# Patient Record
Sex: FEMALE | Race: WHITE | NOT HISPANIC OR LATINO | URBAN - METROPOLITAN AREA
[De-identification: names, ages, dates, MRNs, and addresses within clinical notes are randomized per-mention and may not be internally consistent; named-entity substitution may affect disease eponyms.]

---

## 2017-04-06 ENCOUNTER — OUTPATIENT (OUTPATIENT)
Dept: OUTPATIENT SERVICES | Facility: HOSPITAL | Age: 77
LOS: 1 days | Discharge: HOME | End: 2017-04-06

## 2017-06-27 DIAGNOSIS — Z12.31 ENCOUNTER FOR SCREENING MAMMOGRAM FOR MALIGNANT NEOPLASM OF BREAST: ICD-10-CM

## 2018-04-09 ENCOUNTER — OUTPATIENT (OUTPATIENT)
Dept: OUTPATIENT SERVICES | Facility: HOSPITAL | Age: 78
LOS: 1 days | Discharge: HOME | End: 2018-04-09

## 2018-04-09 DIAGNOSIS — Z12.31 ENCOUNTER FOR SCREENING MAMMOGRAM FOR MALIGNANT NEOPLASM OF BREAST: ICD-10-CM

## 2018-05-02 ENCOUNTER — INPATIENT (INPATIENT)
Facility: HOSPITAL | Age: 78
LOS: 4 days | Discharge: HOME | End: 2018-05-07
Attending: HOSPITALIST | Admitting: HOSPITALIST

## 2018-05-02 VITALS
TEMPERATURE: 98 F | HEART RATE: 85 BPM | OXYGEN SATURATION: 99 % | SYSTOLIC BLOOD PRESSURE: 110 MMHG | RESPIRATION RATE: 16 BRPM | DIASTOLIC BLOOD PRESSURE: 52 MMHG

## 2018-05-02 LAB
ABO RH CONFIRMATION: SIGNIFICANT CHANGE UP
ALBUMIN SERPL ELPH-MCNC: 3.4 G/DL — LOW (ref 3.5–5.2)
ALP SERPL-CCNC: 76 U/L — SIGNIFICANT CHANGE UP (ref 30–115)
ALT FLD-CCNC: 13 U/L — SIGNIFICANT CHANGE UP (ref 0–41)
ANION GAP SERPL CALC-SCNC: 13 MMOL/L — SIGNIFICANT CHANGE UP (ref 7–14)
ANION GAP SERPL CALC-SCNC: 13 MMOL/L — SIGNIFICANT CHANGE UP (ref 7–14)
ANION GAP SERPL CALC-SCNC: 9 MMOL/L — SIGNIFICANT CHANGE UP (ref 7–14)
APTT BLD: 23.3 SEC — CRITICAL LOW (ref 27–39.2)
AST SERPL-CCNC: 21 U/L — SIGNIFICANT CHANGE UP (ref 0–41)
BASE EXCESS BLDV CALC-SCNC: -1.5 MMOL/L — SIGNIFICANT CHANGE UP (ref -2–2)
BASOPHILS # BLD AUTO: 0.06 K/UL — SIGNIFICANT CHANGE UP (ref 0–0.2)
BASOPHILS # BLD AUTO: 0.06 K/UL — SIGNIFICANT CHANGE UP (ref 0–0.2)
BASOPHILS NFR BLD AUTO: 0.5 % — SIGNIFICANT CHANGE UP (ref 0–1)
BASOPHILS NFR BLD AUTO: 0.6 % — SIGNIFICANT CHANGE UP (ref 0–1)
BILIRUB SERPL-MCNC: 0.2 MG/DL — SIGNIFICANT CHANGE UP (ref 0.2–1.2)
BLD GP AB SCN SERPL QL: SIGNIFICANT CHANGE UP
BUN SERPL-MCNC: 36 MG/DL — HIGH (ref 10–20)
BUN SERPL-MCNC: 38 MG/DL — HIGH (ref 10–20)
BUN SERPL-MCNC: 43 MG/DL — HIGH (ref 10–20)
CA-I SERPL-SCNC: 1.16 MMOL/L — SIGNIFICANT CHANGE UP (ref 1.12–1.3)
CALCIUM SERPL-MCNC: 8.2 MG/DL — LOW (ref 8.5–10.1)
CALCIUM SERPL-MCNC: 8.5 MG/DL — SIGNIFICANT CHANGE UP (ref 8.5–10.1)
CALCIUM SERPL-MCNC: 8.6 MG/DL — SIGNIFICANT CHANGE UP (ref 8.5–10.1)
CHLORIDE SERPL-SCNC: 102 MMOL/L — SIGNIFICANT CHANGE UP (ref 98–110)
CHLORIDE SERPL-SCNC: 107 MMOL/L — SIGNIFICANT CHANGE UP (ref 98–110)
CHLORIDE SERPL-SCNC: 108 MMOL/L — SIGNIFICANT CHANGE UP (ref 98–110)
CHOLEST SERPL-MCNC: 134 MG/DL — SIGNIFICANT CHANGE UP (ref 100–200)
CK SERPL-CCNC: 66 U/L — SIGNIFICANT CHANGE UP (ref 0–225)
CK SERPL-CCNC: 72 U/L — SIGNIFICANT CHANGE UP (ref 0–225)
CK SERPL-CCNC: 75 U/L — SIGNIFICANT CHANGE UP (ref 0–225)
CO2 SERPL-SCNC: 22 MMOL/L — SIGNIFICANT CHANGE UP (ref 17–32)
CO2 SERPL-SCNC: 23 MMOL/L — SIGNIFICANT CHANGE UP (ref 17–32)
CO2 SERPL-SCNC: 24 MMOL/L — SIGNIFICANT CHANGE UP (ref 17–32)
CREAT SERPL-MCNC: 0.8 MG/DL — SIGNIFICANT CHANGE UP (ref 0.7–1.5)
CREAT SERPL-MCNC: 0.8 MG/DL — SIGNIFICANT CHANGE UP (ref 0.7–1.5)
CREAT SERPL-MCNC: 0.9 MG/DL — SIGNIFICANT CHANGE UP (ref 0.7–1.5)
EOSINOPHIL # BLD AUTO: 0.02 K/UL — SIGNIFICANT CHANGE UP (ref 0–0.7)
EOSINOPHIL # BLD AUTO: 0.06 K/UL — SIGNIFICANT CHANGE UP (ref 0–0.7)
EOSINOPHIL NFR BLD AUTO: 0.2 % — SIGNIFICANT CHANGE UP (ref 0–8)
EOSINOPHIL NFR BLD AUTO: 0.6 % — SIGNIFICANT CHANGE UP (ref 0–8)
GAS PNL BLDV: 141 MMOL/L — SIGNIFICANT CHANGE UP (ref 136–145)
GAS PNL BLDV: SIGNIFICANT CHANGE UP
GLUCOSE SERPL-MCNC: 112 MG/DL — HIGH (ref 70–99)
GLUCOSE SERPL-MCNC: 86 MG/DL — SIGNIFICANT CHANGE UP (ref 70–99)
GLUCOSE SERPL-MCNC: 95 MG/DL — SIGNIFICANT CHANGE UP (ref 70–99)
HCO3 BLDV-SCNC: 23 MMOL/L — SIGNIFICANT CHANGE UP (ref 22–29)
HCT VFR BLD CALC: 20.1 % — LOW (ref 37–47)
HCT VFR BLD CALC: 23.7 % — LOW (ref 37–47)
HCT VFR BLD CALC: 25.8 % — LOW (ref 37–47)
HCT VFR BLDA CALC: 23.9 % — LOW (ref 34–44)
HDLC SERPL-MCNC: 31 MG/DL — LOW (ref 40–125)
HGB BLD CALC-MCNC: 7.8 G/DL — LOW (ref 14–18)
HGB BLD-MCNC: 6.8 G/DL — CRITICAL LOW (ref 12–16)
HGB BLD-MCNC: 8 G/DL — LOW (ref 12–16)
HGB BLD-MCNC: 8.8 G/DL — LOW (ref 12–16)
IMM GRANULOCYTES NFR BLD AUTO: 0.4 % — HIGH (ref 0.1–0.3)
IMM GRANULOCYTES NFR BLD AUTO: 0.4 % — HIGH (ref 0.1–0.3)
INR BLD: 1.06 RATIO — SIGNIFICANT CHANGE UP (ref 0.65–1.3)
LACTATE BLDV-MCNC: 1.7 MMOL/L — HIGH (ref 0.5–1.6)
LIPID PNL WITH DIRECT LDL SERPL: 80 MG/DL — SIGNIFICANT CHANGE UP (ref 4–129)
LYMPHOCYTES # BLD AUTO: 2.35 K/UL — SIGNIFICANT CHANGE UP (ref 1.2–3.4)
LYMPHOCYTES # BLD AUTO: 2.69 K/UL — SIGNIFICANT CHANGE UP (ref 1.2–3.4)
LYMPHOCYTES # BLD AUTO: 21 % — SIGNIFICANT CHANGE UP (ref 20.5–51.1)
LYMPHOCYTES # BLD AUTO: 28.2 % — SIGNIFICANT CHANGE UP (ref 20.5–51.1)
MCHC RBC-ENTMCNC: 28.7 PG — SIGNIFICANT CHANGE UP (ref 27–31)
MCHC RBC-ENTMCNC: 29 PG — SIGNIFICANT CHANGE UP (ref 27–31)
MCHC RBC-ENTMCNC: 29.2 PG — SIGNIFICANT CHANGE UP (ref 27–31)
MCHC RBC-ENTMCNC: 33.8 G/DL — SIGNIFICANT CHANGE UP (ref 32–37)
MCHC RBC-ENTMCNC: 33.8 G/DL — SIGNIFICANT CHANGE UP (ref 32–37)
MCHC RBC-ENTMCNC: 34.1 G/DL — SIGNIFICANT CHANGE UP (ref 32–37)
MCV RBC AUTO: 84 FL — SIGNIFICANT CHANGE UP (ref 81–99)
MCV RBC AUTO: 85.9 FL — SIGNIFICANT CHANGE UP (ref 81–99)
MCV RBC AUTO: 86.3 FL — SIGNIFICANT CHANGE UP (ref 81–99)
MONOCYTES # BLD AUTO: 0.65 K/UL — HIGH (ref 0.1–0.6)
MONOCYTES # BLD AUTO: 0.69 K/UL — HIGH (ref 0.1–0.6)
MONOCYTES NFR BLD AUTO: 6.2 % — SIGNIFICANT CHANGE UP (ref 1.7–9.3)
MONOCYTES NFR BLD AUTO: 6.8 % — SIGNIFICANT CHANGE UP (ref 1.7–9.3)
NEUTROPHILS # BLD AUTO: 6.04 K/UL — SIGNIFICANT CHANGE UP (ref 1.4–6.5)
NEUTROPHILS # BLD AUTO: 8.05 K/UL — HIGH (ref 1.4–6.5)
NEUTROPHILS NFR BLD AUTO: 63.4 % — SIGNIFICANT CHANGE UP (ref 42.2–75.2)
NEUTROPHILS NFR BLD AUTO: 71.7 % — SIGNIFICANT CHANGE UP (ref 42.2–75.2)
NRBC # BLD: 0 /100 WBCS — SIGNIFICANT CHANGE UP (ref 0–0)
NRBC # BLD: 0 /100 WBCS — SIGNIFICANT CHANGE UP (ref 0–0)
NT-PROBNP SERPL-SCNC: 3189 PG/ML — HIGH (ref 0–300)
PCO2 BLDV: 34 MMHG — LOW (ref 41–51)
PH BLDV: 7.43 — SIGNIFICANT CHANGE UP (ref 7.26–7.43)
PLATELET # BLD AUTO: 177 K/UL — SIGNIFICANT CHANGE UP (ref 130–400)
PLATELET # BLD AUTO: 181 K/UL — SIGNIFICANT CHANGE UP (ref 130–400)
PLATELET # BLD AUTO: 192 K/UL — SIGNIFICANT CHANGE UP (ref 130–400)
PO2 BLDV: 21 MMHG — SIGNIFICANT CHANGE UP (ref 20–40)
POTASSIUM BLDV-SCNC: 3.9 MMOL/L — SIGNIFICANT CHANGE UP (ref 3.3–5.6)
POTASSIUM SERPL-MCNC: 3.7 MMOL/L — SIGNIFICANT CHANGE UP (ref 3.5–5)
POTASSIUM SERPL-MCNC: 3.9 MMOL/L — SIGNIFICANT CHANGE UP (ref 3.5–5)
POTASSIUM SERPL-MCNC: 4 MMOL/L — SIGNIFICANT CHANGE UP (ref 3.5–5)
POTASSIUM SERPL-SCNC: 3.7 MMOL/L — SIGNIFICANT CHANGE UP (ref 3.5–5)
POTASSIUM SERPL-SCNC: 3.9 MMOL/L — SIGNIFICANT CHANGE UP (ref 3.5–5)
POTASSIUM SERPL-SCNC: 4 MMOL/L — SIGNIFICANT CHANGE UP (ref 3.5–5)
PROT SERPL-MCNC: 5.6 G/DL — LOW (ref 6–8)
PROTHROM AB SERPL-ACNC: 11.5 SEC — SIGNIFICANT CHANGE UP (ref 9.95–12.87)
RBC # BLD: 2.33 M/UL — LOW (ref 4.2–5.4)
RBC # BLD: 2.76 M/UL — LOW (ref 4.2–5.4)
RBC # BLD: 3.07 M/UL — LOW (ref 4.2–5.4)
RBC # FLD: 15.1 % — HIGH (ref 11.5–14.5)
RBC # FLD: 15.4 % — HIGH (ref 11.5–14.5)
RBC # FLD: 16.6 % — HIGH (ref 11.5–14.5)
SAO2 % BLDV: 30 % — SIGNIFICANT CHANGE UP
SODIUM SERPL-SCNC: 138 MMOL/L — SIGNIFICANT CHANGE UP (ref 135–146)
SODIUM SERPL-SCNC: 141 MMOL/L — SIGNIFICANT CHANGE UP (ref 135–146)
SODIUM SERPL-SCNC: 142 MMOL/L — SIGNIFICANT CHANGE UP (ref 135–146)
TOTAL CHOLESTEROL/HDL RATIO MEASUREMENT: 4.3 RATIO — SIGNIFICANT CHANGE UP (ref 4–5.5)
TRIGL SERPL-MCNC: 189 MG/DL — HIGH (ref 10–149)
TROPONIN T SERPL-MCNC: 0.02 NG/ML — HIGH
TYPE + AB SCN PNL BLD: SIGNIFICANT CHANGE UP
WBC # BLD: 11.21 K/UL — HIGH (ref 4.8–10.8)
WBC # BLD: 9.24 K/UL — SIGNIFICANT CHANGE UP (ref 4.8–10.8)
WBC # BLD: 9.54 K/UL — SIGNIFICANT CHANGE UP (ref 4.8–10.8)
WBC # FLD AUTO: 11.21 K/UL — HIGH (ref 4.8–10.8)
WBC # FLD AUTO: 9.24 K/UL — SIGNIFICANT CHANGE UP (ref 4.8–10.8)
WBC # FLD AUTO: 9.54 K/UL — SIGNIFICANT CHANGE UP (ref 4.8–10.8)

## 2018-05-02 RX ORDER — PANTOPRAZOLE SODIUM 20 MG/1
40 TABLET, DELAYED RELEASE ORAL
Qty: 0 | Refills: 0 | Status: DISCONTINUED | OUTPATIENT
Start: 2018-05-02 | End: 2018-05-06

## 2018-05-02 RX ORDER — INFLUENZA VIRUS VACCINE 15; 15; 15; 15 UG/.5ML; UG/.5ML; UG/.5ML; UG/.5ML
0.5 SUSPENSION INTRAMUSCULAR ONCE
Qty: 0 | Refills: 0 | Status: COMPLETED | OUTPATIENT
Start: 2018-05-02 | End: 2018-05-02

## 2018-05-02 RX ORDER — SODIUM CHLORIDE 9 MG/ML
3 INJECTION INTRAMUSCULAR; INTRAVENOUS; SUBCUTANEOUS ONCE
Qty: 0 | Refills: 0 | Status: COMPLETED | OUTPATIENT
Start: 2018-05-02 | End: 2018-05-02

## 2018-05-02 RX ORDER — SIMVASTATIN 20 MG/1
20 TABLET, FILM COATED ORAL AT BEDTIME
Qty: 0 | Refills: 0 | Status: DISCONTINUED | OUTPATIENT
Start: 2018-05-02 | End: 2018-05-07

## 2018-05-02 RX ORDER — SODIUM CHLORIDE 9 MG/ML
1000 INJECTION INTRAMUSCULAR; INTRAVENOUS; SUBCUTANEOUS ONCE
Qty: 0 | Refills: 0 | Status: COMPLETED | OUTPATIENT
Start: 2018-05-02 | End: 2018-05-02

## 2018-05-02 RX ADMIN — SODIUM CHLORIDE 1000 MILLILITER(S): 9 INJECTION INTRAMUSCULAR; INTRAVENOUS; SUBCUTANEOUS at 11:12

## 2018-05-02 RX ADMIN — SODIUM CHLORIDE 3 MILLILITER(S): 9 INJECTION INTRAMUSCULAR; INTRAVENOUS; SUBCUTANEOUS at 11:11

## 2018-05-02 RX ADMIN — Medication 1 TABLET(S): at 18:15

## 2018-05-02 RX ADMIN — PANTOPRAZOLE SODIUM 40 MILLIGRAM(S): 20 TABLET, DELAYED RELEASE ORAL at 21:40

## 2018-05-02 NOTE — H&P ADULT - NSHPLABSRESULTS_GEN_ALL_CORE
6.8    11.21 )-----------( 192      ( 02 May 2018 10:32 )             20.1       05-02    138  |  102  |  43<H>  ----------------------------<  112<H>  4.0   |  23  |  0.9    Ca    8.6      02 May 2018 10:32    TPro  5.6<L>  /  Alb  3.4<L>  /  TBili  0.2  /  DBili  x   /  AST  21  /  ALT  13  /  AlkPhos  76  05-02          PT/INR - ( 02 May 2018 10:32 )   PT: 11.50 sec;   INR: 1.06 ratio         PTT - ( 02 May 2018 10:32 )  PTT:23.3 sec    Lactate Trend      CARDIAC MARKERS ( 02 May 2018 10:32 )  x     / 0.02 ng/mL / 75 U/L / x     / x            CAPILLARY BLOOD GLUCOSE        < from: Xray Chest 1 View AP/PA (05.02.18 @ 11:40) >    No focal opacity.      < end of copied text >

## 2018-05-02 NOTE — PROGRESS NOTE ADULT - SUBJECTIVE AND OBJECTIVE BOX
HISTORY OF PRESENTING ILLNESS:   76 yo F with PMHx of DLD, questionable CHF, and anemia presents with intermittent BRBPR x 4 d associated with tenesmus. Overnight, developed lightheadedness, headache, accompanied by chest pain and palpitations. On arrival, EKG showed ST elevation in aVR with reciprocal inferolateral ST depressions. STEMI code was initially activated but later cancelled per Cardio as EKG did not meet criteria. She reports history of GIB, never requiring urgent colonoscopy or blood transfusion. Followed by GI Dr. Aparicio as outpatient. Last colonoscopy < 6 yo showing polyps. Was taking Alleve PM 1 tab QHS x 5 days for shoulder pain PTP. Stool guaiac+.     Admission Vitals: T 96.2 F, HR 83, BP 95/48, RR 20, SpO2 99% on RA  Admission Labs: WBC 11.21, Hb 6.8 --> 8.0, CE negative x 2, BNP 3189,     Cardio: Dr. Arenas  GI: Dr. Aparicio  PMD: Dr. Lilly    PAST MEDICAL & SURGICAL HISTORY:  PAST MEDICAL & SURGICAL HISTORY:  High cholesterol  No significant past surgical history    SOCIAL HISTORY:  Negative x 3     ALLERGIES:  lidocaine (Rash)    MEDICATIONS:  STANDING MEDICATIONS  influenza   Vaccine 0.5 milliLiter(s) IntraMuscular once  multivitamin 1 Tablet(s) Oral daily  pantoprazole  Injectable 40 milliGRAM(s) IV Push two times a day  simvastatin 20 milliGRAM(s) Oral at bedtime    PRN MEDICATIONS    VITALS:   T(F): 97.4  HR: 80  BP: 119/53  RR: 18  SpO2: 99%      LABS:                        8.0    9.24  )-----------( 177      ( 02 May 2018 17:45 )             23.7     05-02    141  |  108  |  38<H>  ----------------------------<  95  3.9   |  24  |  0.8    Ca    8.5      02 May 2018 17:45  Mg     2.0     05-02    TPro  5.6<L>  /  Alb  3.4<L>  /  TBili  0.2  /  DBili  x   /  AST  21  /  ALT  13  /  AlkPhos  76  05-02    PT/INR - ( 02 May 2018 10:32 )   PT: 11.50 sec;   INR: 1.06 ratio       PTT - ( 02 May 2018 10:32 )  PTT:23.3 sec    Creatine Kinase, Serum: 72 U/L (05-02-18 @ 17:45)  Troponin T, Serum: 0.02 ng/mL <H> (05-02-18 @ 17:45)  Creatine Kinase, Serum: 75 U/L (05-02-18 @ 10:32)  Troponin T, Serum: 0.02 ng/mL <H> (05-02-18 @ 10:32)    CARDIAC MARKERS ( 02 May 2018 17:45 )  x     / 0.02 ng/mL / 72 U/L / x     / 4.3 ng/mL  CARDIAC MARKERS ( 02 May 2018 10:32 )  x     / 0.02 ng/mL / 75 U/L / x     / x        RADIOLOGY:  CT A/P NC (05/02):   1.  No evidence of retroperitoneal hematoma.  2.  There is a 2.3 cm right hepatic hypoattenuating lesion, indeterminant etiology. Suggest further evaluation by MRI of the liver with and without gadolinium.    PHYSICAL EXAM:  GEN: No acute distress  HEENT: NCAT  LUNGS: Clear to auscultation bilaterally   HEART: S1/S2 present. RRR.   ABD: Soft, non-tender, non-distended. Bowel sounds present  EXT: Moves all 4 extremities  NEURO: AAOX3. Obeys commands.

## 2018-05-02 NOTE — CONSULT NOTE ADULT - ASSESSMENT
IMPRESSION:    GI bleeding - mostly lower   Acute blood loss anemia ( last hb was 12.4 8/2016)  Htn    PLAN:    CNS: monitor, no sedative meds     HEENT: Oral care    PULMONARY:  HOB @ 45 degrees    CARDIOVASCULAR: Hold anti htn meds, trend cardiac enzymes     GI: GI prophylaxis.  npo, ivf, ppi bid, gi consult. Lavage. Transfuse 2 units PRBC     RENAL:  Follow up lytes.  Correct as needed    INFECTIOUS DISEASE: off abx     HEMATOLOGICAL:  DVT prophylaxis- scd.     ENDOCRINE:  Follow up FS.  Insulin protocol if needed.    dispo : No need for ICU monitoring as of now. If pt's gastric Lavage is positive or pt becomes tachycardic/hypotensive or if GI recommends ICU placement, please call [phone]. IMPRESSION:    GI bleeding - mostly lower   Acute blood loss anemia ( last hb was 12.4 8/2016)  Htn    PLAN:    CNS: monitor, no sedative meds     HEENT: Oral care    PULMONARY:  HOB @ 45 degrees    CARDIOVASCULAR: Hold anti htn meds, trend cardiac enzymes     GI: GI prophylaxis.  npo, ivf, ppi bid, gi consult. Lavage. Transfuse 2 units PRBC, q 6 cbc    RENAL:  Follow up lytes.  Correct as needed    INFECTIOUS DISEASE: off abx     HEMATOLOGICAL:  DVT prophylaxis- scd.     ENDOCRINE:  Follow up FS.  Insulin protocol if needed.    dispo : No need for ICU monitoring as of now. If pt's gastric Lavage is positive or pt becomes tachycardic/hypotensive or if GI recommends ICU placement, please call [phone]. IMPRESSION:    GI bleeding - mostly lower   Acute blood loss anemia ( last hb was 12.4 8/2016)  Htn  NSTEMI    PLAN:    CNS: monitor, no sedative meds     HEENT: Oral care    PULMONARY:  HOB @ 45 degrees    CARDIOVASCULAR: Hold anti htn meds, trend cardiac enzymes     GI: GI prophylaxis.  npo, ivf, ppi bid, gi consult. Lavage. Transfuse 2 units PRBC, q 6 cbc    RENAL:  Follow up lytes.  Correct as needed    INFECTIOUS DISEASE: off abx     HEMATOLOGICAL:  DVT prophylaxis- scd.     ENDOCRINE:  Follow up FS.  Insulin protocol if needed.    Dispo: Admit to ICU for overnight monitoring. IMPRESSION:    GI bleeding -Likely lowerr   Acute blood loss anemia  HO HTN    PLAN:    CNS: NO depressants     HEENT: Oral care    PULMONARY:  HOB @ 45 degrees    CARDIOVASCULAR: I=O.  FU with cardiology.  ECHO     GI: GI prophylaxis.  NPO for now.  FU with GI.  Scheduled for endo today    RENAL:  Follow up lytes.  Correct as needed    INFECTIOUS DISEASE: off abx     HEMATOLOGICAL:  DVT prophylaxis Seq.  FU CBC .     ENDOCRINE:  Follow up FS.  Insulin protocol if needed.    Dispo: Transfer to floor after EGD unless high risk finding.

## 2018-05-02 NOTE — ED PROVIDER NOTE - CARE PLAN
Principal Discharge DX:	Gastrointestinal hemorrhage, unspecified gastrointestinal hemorrhage type Principal Discharge DX:	Gastrointestinal hemorrhage, unspecified gastrointestinal hemorrhage type  Secondary Diagnosis:	Anemia  Secondary Diagnosis:	Abnormal EKG

## 2018-05-02 NOTE — ED PROVIDER NOTE - ATTENDING CONTRIBUTION TO CARE
77y f h/o hl, anemia p/w GIB. Pt rpts intermitt bloody diarrhea x 4d, described as loose brown stool mixed w/brb. Accomp by crampy lower abd pain during BMs only. Overnight dvlpd lighthheadedness, HA, accomp by CP earlier this am. 77y f h/o hl, anemia, no blood thinners p/w GIB. Pt rpts intermitt bloody diarrhea x 4d, described as loose brown stool mixed w/brb. Accomp by crampy lower abd pain during BMs only. Overnight dvlpd lighthheadedness, HA, accomp by CP earlier this am. Rpts GIB before but never requiring urgent colonoscopy or blood transfusion. Followed by GI Dr. Aparicio, pt rpts last colonoscopy <5y ago showing polyps. Pt denies visual changes, loc, diaphoresis, sob, nv, melena, flank pain, urinary sx, edema. EKG on arrival showing AVR elevation w/inferolat depressions, STEMI code was activated but cancelled per Cardio team (see progress notes). PE: elderly f mild pallor nad, ncat, perrl/eomi, neck supple, rrr nl s1s2 no mrg, ctab no wrr, abd soft ntnd no palpable masses no rgr, rectal- as per MLP note, ext no cce dpi x 4. a/p: GIB, poss demand ischemia - ekg, cxr, labs, vbg, reassess.

## 2018-05-02 NOTE — ED PROVIDER NOTE - OBJECTIVE STATEMENT
76 y/o F with a PMH of HLD and ?CHF who presents with profusely bloody diarrhea x 4 days, now with lightheadedness, palpitations, tingling, and intermittent CP. She has had GIB in the past, but has not required transfusion. Denies f/c, SOB, abd pain, distention, n/v, urinary sxs, calf pain, leg swelling, use of AC/AP, other complaints.  Last colonscopy <5 years ago and with polyps.

## 2018-05-02 NOTE — H&P ADULT - NSHPOUTPATIENTPROVIDERS_GEN_ALL_CORE
Cardio- Dr. Jackson  GI- Dr. Aparicio  PMD-  Pharm- Cardio- Dr. Jackson  GI- Dr. Aparicio  PMD- Dr. Lilly  Pharm- [facility] [address] (previously the Rx Department [facility])

## 2018-05-02 NOTE — H&P ADULT - HISTORY OF PRESENT ILLNESS
78 y/o F with PMHx of HLD, CHF, anemia, no blood thinners p/w GIB. Pt reports intermittent bloody diarrhea x 4d, described as loose brown stool mixed with bright red blood per rectum. Associated with crampy lower abd pain during BMs only, not associated with eating. Overnight developed lightheadedness, HA, accompanied by chest pain and palpitations earlier on morning of presentation. EKG on arrival showed AVR elevation w/inferolat depressions, STEMI code was activated but cancelled per Cardio team as EKG did not meet criteria. Reports GIB before but never requiring urgent colonoscopy or blood transfusion. Followed by GI Dr. Aparicio as outpatient, last colonoscopy <5y ago showing polyps. Pt denies diarrhea, hematemesis, coffee brown emesis, melena, SOB, N/V, LOC, flank pain, or urinary sx. Patient reports no use of NSAIDs, anticoagulants, antiplatelets, or excessive caffeine use. 76 y/o F with PMHx of HLD, questionable CHF, anemia, no blood thinners p/w GIB. Pt reports intermittent bloody diarrhea x 4d, described as loose brown stool mixed with bright red blood per rectum. Associated with crampy lower abd pain during BMs only, not associated with eating. Overnight developed lightheadedness, HA, accompanied by chest pain and palpitations earlier on morning of presentation. EKG on arrival showed AVR elevation w/inferolat depressions, STEMI code was activated but cancelled per Cardio team as EKG did not meet criteria. Reports GIB before but never requiring urgent colonoscopy or blood transfusion. Followed by GI Dr. Aparicio as outpatient, last colonoscopy <5y ago showing polyps. Pt denies hematemesis, coffee brown emesis, melena, SOB, N/V, LOC, flank pain, or urinary sx. Patient reports no use of anticoagulants, antiplatelets, or excessive caffeine use, but was taking aleve PM 1 tab at night for 5 days for shoulder pain prior to presentation.

## 2018-05-02 NOTE — H&P ADULT - NSHPPHYSICALEXAM_GEN_ALL_CORE
T(F): 96.2 (05-02-18 @ 14:40), Max: 98 (05-02-18 @ 10:12)  HR: 83 (05-02-18 @ 14:40) (83 - 86)  BP: 95/48 (05-02-18 @ 14:40) (95/48 - 129/59)  RR: 20 (05-02-18 @ 14:40) (16 - 20)  SpO2: 99% (05-02-18 @ 14:40) (99% - 99%)    PHYSICAL EXAM:  GENERAL: NAD, well-developed  HEAD:  Atraumatic, Normocephalic  EYES: EOMI, PERRLA, conjunctiva and sclera clear  NECK: Supple, No JVD  CHEST/LUNG: Clear to auscultation bilaterally; No wheeze  HEART: Regular rate and rhythm; No murmurs, rubs, or gallops  ABDOMEN: Soft, Nontender, Nondistended; Bowel sounds present  EXTREMITIES:  2+ Peripheral Pulses, No clubbing, cyanosis, or edema  PSYCH: AAOx3  NEUROLOGY: non-focal  SKIN: No rashes or lesions  RECTAL: Guaiac positive, brown stool T(F): 96.2 (05-02-18 @ 14:40), Max: 98 (05-02-18 @ 10:12)  HR: 83 (05-02-18 @ 14:40) (83 - 86)  BP: 95/48 (05-02-18 @ 14:40) (95/48 - 129/59)  RR: 20 (05-02-18 @ 14:40) (16 - 20)  SpO2: 99% (05-02-18 @ 14:40) (99% - 99%)    PHYSICAL EXAM:  GENERAL: NAD, well-developed, pallor  HEAD:  Atraumatic, Normocephalic  EYES: EOMI, PERRLA, conjunctiva and sclera clear, pallor  NECK: Supple, No JVD  CHEST/LUNG: Clear to auscultation bilaterally; No wheeze  HEART: Regular rate and rhythm; SEJ murmur  ABDOMEN: Soft, Nontender, Nondistended; Bowel sounds present  EXTREMITIES:  2+ Peripheral Pulses, No clubbing, cyanosis, or edema  PSYCH: AAOx3  NEUROLOGY: non-focal  SKIN: No rashes or lesions  RECTAL: Guaiac positive, brown stool

## 2018-05-02 NOTE — CHART NOTE - NSCHARTNOTEFT_GEN_A_CORE
STEMI code overheaded to which I responded and arrived to asses patient. EKG reviewed and patient examined personally by myself. Case d/w Interventional Cardiology attending(Dr. Rivas), patient assessed not be suitable for emergent cardiac catheterization. STEMI code to be cancelled and this has been communicated by myself to ED staff.  Pateint sees Dr. Arenas as OP, refer patient to her cardiologist for further cardiac care if needed.

## 2018-05-02 NOTE — CONSULT NOTE ADULT - ASSESSMENT
gibleed   anemia   Plan - cardiac w/u for cp    transfuse 2 units prbc   egd tomorrow after transfusion and cardiac eval( elevated bun)   colonoscopy on friday    iv protonix

## 2018-05-02 NOTE — CONSULT NOTE ADULT - ASSESSMENT
chest pain  and GI bleed   ,anemia           transfuse PRBC       serial ECG enzymes    echo      GI consult

## 2018-05-02 NOTE — PROGRESS NOTE ADULT - ASSESSMENT
DIAGNOSIS:  #) Lower GI bleed   #) QT Prolongation   #) CHF exacerbation   #) Hyperlipidemia   #) Presyncope    PLAN:  CNS:   - No sedatives     HEENT:  - Oral care    PULMONARY:  - HOB @ 45 degrees     CARDIOVASCULAR:  - CE negative x 1, continue to trend CE   - f/u 2D Echo  - QTc 491, avoid QT prolonging medications   - BNP 3189; hold HCTZ until BP improves   - GI requesting Cardio clearance for procedure  - c/w Zocor 20 mg QHS     GI:  - c/w IV Protonix 40 mg BID   - Diet: Clears  - NPO after midnight for EGD (05/03) and Colonoscopy (05/04)  - c/w Multivitamin QD    RENAL:  - None     INFECTIOUS DISEASE:  - None     HEMATOLOGICAL:  - s/p 2 U PRBC in ED   - f/u CBC Q6H  - Transfuse Hb < 7  - Active T+S     ENDOCRINE:  - None     MUSCULOSKELETAL:  - Activity: Bedrest    CODE STATUS:  - Full Code    DISPOSITION:   - Continued MICU observation

## 2018-05-02 NOTE — ED PROVIDER NOTE - PROGRESS NOTE DETAILS
STEMI code was activated for AVR elevation w/inferolat depressions - Cardio fellow Dr. Ledezma assessed pt @ bedside, no active CP during assessment, advised EKG does not meet STEMI criteria, code cancelled, case d/w Att Dr. Rivas rectal exam - brown stool, guaiac pos s/w GI Dr. Rawls will come assess pt in ED; Cardio Dr. Nava @ bedside, will follow EFRAIN Rodriguez requesting ICU consult - s/w ICU fellow Dr. Browne, @ bedside now assessing pt ICU fellow Dr. Browne @ bedside - d/w GI Dr. Aparicio, a/w plan for tele admission, does not need ICU, requested CT AP to look for any gross masses/lesions, plan for poss inpt egd/colonoscopy this Fri - ICU Dr. Browne aware, EFRAIN Rodriguez aware, med team will f/u final results of CT AP

## 2018-05-02 NOTE — H&P ADULT - NSHPSOCIALHISTORY_GEN_ALL_CORE
Marital Status:  (   )    (   ) Single    (   )    (  )   Occupation:   Lives with: (  ) alone  (  ) children   (  ) spouse   (  ) parents  (  ) other    Substance Use (street drugs): (  ) never used  (  ) other:  Tobacco Usage:  (   ) never smoked   (   ) former smoker   (   ) current smoker  (     ) pack year  (        ) last cigarette date  Alcohol Usage:  Sexual History: Marital Status:  ( X  )    (   ) Single    (   )    (  )   Occupation:   Lives with: (  ) alone  (  ) children   (X  ) spouse   (  ) parents  (  ) other    Substance Use (street drugs): (X  ) never used  (  ) other:  Tobacco Usage:  ( X  ) never smoked   (   ) former smoker   (   ) current smoker  (     ) pack year  (        ) last cigarette date  Alcohol Usage: none

## 2018-05-02 NOTE — ED ADULT NURSE NOTE - OBJECTIVE STATEMENT
Pt aox3, came to ED for chest pain, dizziness and bloody stool. Pt denies chest pain at this time, EKG done, no active bleeding, STEMI code called, code was then cancelled by cardiac fellow. VS wnl, pt remains on cardiac monitor, labs drawn and sent, IV line in place, will continue to monitor the pt.

## 2018-05-02 NOTE — H&P ADULT - ASSESSMENT
77y female with  h/o HLD, CHF, anemia, no blood thinners p/w BRBPR associated with diffuse lower abd pain with BMs, also reporting chest pain and palpitations likely from symptomatic anemia. Guaiac positive, admitted for GIB suspected lower source    # GIB suspected lower source  - evaluated by ICU- no need for unit, f/u CT A/P to look for gross masses/lesions, f/u NG lavage  - s/p 2 units PRBC in ED. If patient becomes symptomatic during transfusion please give lasix 40mg IV x1  - f/u CBC Q12H, transfuse if Hgb <7, maintain active T&S  - Protonix 40mg IV BID  - GI, Dr. Love: NPO after midnight for EGD 5/3 after cardio eval, c-scope 5/4    # Chest pain and palpitations- likely symptomatic anemia, Hgb 6.8  - CE neg x1, f/u serial cardiac enzymes  - EKG showed new ~1mm elevation in AVR--> assessed by CardioDr. Rivas and STEMI code canceled EKG does not meet criteria  - Cardio, Dr. Jackson: f/u serial cardiac enzymes, echo     # Prolonged QTc : 491  - please avoid Zofran and QT prolonging medications, repeat EKG prior    # CHF  - f/u echo  - c/w home meds    # HLD  - f/u lipid profile  - c/w statin    Full Code  From home  DVT PPx- not indicated in setting of GIB  GI PPx- on Protonix IV BID  Diet: clears, NPO after midnight for EGD 5/3  Dispo: pending EGD 5/3, c-scope 5/4 76 y/o female with PMHx HLD, CHF, anemia, no blood thinners p/w BRBPR associated with diffuse lower abd pain with BMs, also reporting chest pain and palpitations likely from symptomatic anemia. Guaiac positive, admitted for GIB suspected lower source    # GIB suspected lower source  - evaluated by ICU- no need for unit, f/u CT A/P to look for gross masses/lesions, f/u NG lavage  - s/p 2 units PRBC in ED. If patient becomes symptomatic during transfusion please give lasix 40mg IV x1  - f/u CBC Q12H, transfuse if Hgb <7, maintain active T&S  - Protonix 40mg IV BID  - GI, Dr. Love: NPO after midnight for EGD 5/3 after cardio eval, c-scope 5/4    # Chest pain and palpitations- likely symptomatic anemia, Hgb 6.8  - CE neg x1, f/u serial cardiac enzymes  - EKG showed new ~1mm elevation in AVR--> assessed by CardioDr. Rivas and STEMI code canceled EKG does not meet criteria  - Cardio, Dr. Jackson: f/u serial cardiac enzymes, echo     # Prolonged QTc : 491  - please avoid Zofran and QT prolonging medications, repeat EKG prior    # R/o CHF- patient denies history, BNP elevated 3189  - f/u echo  - hold HCTZ until BP improves, then restart if clinically appropriate. Consider NS IV bolus if patient still hypotensive after transfusion    # HLD  - f/u lipid profile  - c/w statin    Full Code  From home  DVT PPx- not indicated in setting of GIB  GI PPx- on Protonix IV BID  Diet: clears, NPO after midnight for EGD 5/3  Dispo: pending EGD 5/3, c-scope 5/4

## 2018-05-02 NOTE — H&P ADULT - NSHPREVIEWOFSYSTEMS_GEN_ALL_CORE
CONSTITUTIONAL: No weakness, fevers or chills  EYES/ENT: No visual changes;  No vertigo or throat pain   NECK: No pain or stiffness  RESPIRATORY: No cough, wheezing, hemoptysis; No shortness of breath  CARDIOVASCULAR: No chest pain or palpitations  GASTROINTESTINAL: Abd pain as in HPI, no epigastric pain. No nausea, vomiting, hematemesis or coffee ground emesis; No diarrhea or constipation. No melena.  GENITOURINARY: No dysuria, frequency or hematuria  NEUROLOGICAL: No numbness or weakness  SKIN: No itching, rashes CONSTITUTIONAL: No weakness, fevers or chills  EYES/ENT: No visual changes;  No vertigo or throat pain   NECK: No pain or stiffness  RESPIRATORY: No cough, wheezing, hemoptysis; No shortness of breath  CARDIOVASCULAR: No chest pain or palpitations  GASTROINTESTINAL: Abd pain as in HPI, no epigastric pain. No nausea, vomiting, hematemesis or coffee ground emesis; No constipation. No melena.  GENITOURINARY: No dysuria, frequency or hematuria  NEUROLOGICAL: No numbness or weakness  SKIN: No itching, rashes

## 2018-05-03 LAB
ANION GAP SERPL CALC-SCNC: 9 MMOL/L — SIGNIFICANT CHANGE UP (ref 7–14)
BASOPHILS # BLD AUTO: 0.08 K/UL — SIGNIFICANT CHANGE UP (ref 0–0.2)
BASOPHILS # BLD AUTO: 0.08 K/UL — SIGNIFICANT CHANGE UP (ref 0–0.2)
BASOPHILS # BLD AUTO: 0.1 K/UL — SIGNIFICANT CHANGE UP (ref 0–0.2)
BASOPHILS NFR BLD AUTO: 0.7 % — SIGNIFICANT CHANGE UP (ref 0–1)
BASOPHILS NFR BLD AUTO: 0.9 % — SIGNIFICANT CHANGE UP (ref 0–1)
BASOPHILS NFR BLD AUTO: 1.3 % — HIGH (ref 0–1)
BUN SERPL-MCNC: 29 MG/DL — HIGH (ref 10–20)
CALCIUM SERPL-MCNC: 8.3 MG/DL — LOW (ref 8.5–10.1)
CHLORIDE SERPL-SCNC: 108 MMOL/L — SIGNIFICANT CHANGE UP (ref 98–110)
CK SERPL-CCNC: 58 U/L — SIGNIFICANT CHANGE UP (ref 0–225)
CO2 SERPL-SCNC: 23 MMOL/L — SIGNIFICANT CHANGE UP (ref 17–32)
CREAT SERPL-MCNC: 0.7 MG/DL — SIGNIFICANT CHANGE UP (ref 0.7–1.5)
EOSINOPHIL # BLD AUTO: 0.07 K/UL — SIGNIFICANT CHANGE UP (ref 0–0.7)
EOSINOPHIL # BLD AUTO: 0.07 K/UL — SIGNIFICANT CHANGE UP (ref 0–0.7)
EOSINOPHIL # BLD AUTO: 0.1 K/UL — SIGNIFICANT CHANGE UP (ref 0–0.7)
EOSINOPHIL NFR BLD AUTO: 0.6 % — SIGNIFICANT CHANGE UP (ref 0–8)
EOSINOPHIL NFR BLD AUTO: 0.9 % — SIGNIFICANT CHANGE UP (ref 0–8)
EOSINOPHIL NFR BLD AUTO: 1.2 % — SIGNIFICANT CHANGE UP (ref 0–8)
GLUCOSE SERPL-MCNC: 92 MG/DL — SIGNIFICANT CHANGE UP (ref 70–99)
HCT VFR BLD CALC: 25.7 % — LOW (ref 37–47)
HCT VFR BLD CALC: 26.1 % — LOW (ref 37–47)
HCT VFR BLD CALC: 26.2 % — LOW (ref 37–47)
HGB BLD-MCNC: 8.8 G/DL — LOW (ref 12–16)
IMM GRANULOCYTES NFR BLD AUTO: 0.3 % — SIGNIFICANT CHANGE UP (ref 0.1–0.3)
IMM GRANULOCYTES NFR BLD AUTO: 0.3 % — SIGNIFICANT CHANGE UP (ref 0.1–0.3)
IMM GRANULOCYTES NFR BLD AUTO: 0.4 % — HIGH (ref 0.1–0.3)
LYMPHOCYTES # BLD AUTO: 2.21 K/UL — SIGNIFICANT CHANGE UP (ref 1.2–3.4)
LYMPHOCYTES # BLD AUTO: 2.56 K/UL — SIGNIFICANT CHANGE UP (ref 1.2–3.4)
LYMPHOCYTES # BLD AUTO: 2.58 K/UL — SIGNIFICANT CHANGE UP (ref 1.2–3.4)
LYMPHOCYTES # BLD AUTO: 21.7 % — SIGNIFICANT CHANGE UP (ref 20.5–51.1)
LYMPHOCYTES # BLD AUTO: 28.2 % — SIGNIFICANT CHANGE UP (ref 20.5–51.1)
LYMPHOCYTES # BLD AUTO: 30 % — SIGNIFICANT CHANGE UP (ref 20.5–51.1)
MAGNESIUM SERPL-MCNC: 1.9 MG/DL — SIGNIFICANT CHANGE UP (ref 1.8–2.4)
MCHC RBC-ENTMCNC: 28.3 PG — SIGNIFICANT CHANGE UP (ref 27–31)
MCHC RBC-ENTMCNC: 28.5 PG — SIGNIFICANT CHANGE UP (ref 27–31)
MCHC RBC-ENTMCNC: 28.7 PG — SIGNIFICANT CHANGE UP (ref 27–31)
MCHC RBC-ENTMCNC: 33.6 G/DL — SIGNIFICANT CHANGE UP (ref 32–37)
MCHC RBC-ENTMCNC: 33.7 G/DL — SIGNIFICANT CHANGE UP (ref 32–37)
MCHC RBC-ENTMCNC: 34.2 G/DL — SIGNIFICANT CHANGE UP (ref 32–37)
MCV RBC AUTO: 83.2 FL — SIGNIFICANT CHANGE UP (ref 81–99)
MCV RBC AUTO: 83.9 FL — SIGNIFICANT CHANGE UP (ref 81–99)
MCV RBC AUTO: 85.3 FL — SIGNIFICANT CHANGE UP (ref 81–99)
MONOCYTES # BLD AUTO: 0.59 K/UL — SIGNIFICANT CHANGE UP (ref 0.1–0.6)
MONOCYTES # BLD AUTO: 0.61 K/UL — HIGH (ref 0.1–0.6)
MONOCYTES # BLD AUTO: 0.84 K/UL — HIGH (ref 0.1–0.6)
MONOCYTES NFR BLD AUTO: 7.1 % — SIGNIFICANT CHANGE UP (ref 1.7–9.3)
MONOCYTES NFR BLD AUTO: 7.1 % — SIGNIFICANT CHANGE UP (ref 1.7–9.3)
MONOCYTES NFR BLD AUTO: 7.5 % — SIGNIFICANT CHANGE UP (ref 1.7–9.3)
NEUTROPHILS # BLD AUTO: 4.85 K/UL — SIGNIFICANT CHANGE UP (ref 1.4–6.5)
NEUTROPHILS # BLD AUTO: 5.21 K/UL — SIGNIFICANT CHANGE UP (ref 1.4–6.5)
NEUTROPHILS # BLD AUTO: 8.21 K/UL — HIGH (ref 1.4–6.5)
NEUTROPHILS NFR BLD AUTO: 60.5 % — SIGNIFICANT CHANGE UP (ref 42.2–75.2)
NEUTROPHILS NFR BLD AUTO: 61.7 % — SIGNIFICANT CHANGE UP (ref 42.2–75.2)
NEUTROPHILS NFR BLD AUTO: 69.6 % — SIGNIFICANT CHANGE UP (ref 42.2–75.2)
NRBC # BLD: 0 /100 WBCS — SIGNIFICANT CHANGE UP (ref 0–0)
NRBC # BLD: 0 /100 WBCS — SIGNIFICANT CHANGE UP (ref 0–0)
PLATELET # BLD AUTO: 179 K/UL — SIGNIFICANT CHANGE UP (ref 130–400)
PLATELET # BLD AUTO: 182 K/UL — SIGNIFICANT CHANGE UP (ref 130–400)
PLATELET # BLD AUTO: 204 K/UL — SIGNIFICANT CHANGE UP (ref 130–400)
POTASSIUM SERPL-MCNC: 3.7 MMOL/L — SIGNIFICANT CHANGE UP (ref 3.5–5)
POTASSIUM SERPL-SCNC: 3.7 MMOL/L — SIGNIFICANT CHANGE UP (ref 3.5–5)
RBC # BLD: 3.07 M/UL — LOW (ref 4.2–5.4)
RBC # BLD: 3.09 M/UL — LOW (ref 4.2–5.4)
RBC # BLD: 3.11 M/UL — LOW (ref 4.2–5.4)
RBC # FLD: 15.5 % — HIGH (ref 11.5–14.5)
RBC # FLD: 15.6 % — HIGH (ref 11.5–14.5)
RBC # FLD: 16.4 % — HIGH (ref 11.5–14.5)
SODIUM SERPL-SCNC: 140 MMOL/L — SIGNIFICANT CHANGE UP (ref 135–146)
TROPONIN T SERPL-MCNC: 0.03 NG/ML — CRITICAL HIGH
WBC # BLD: 11.8 K/UL — HIGH (ref 4.8–10.8)
WBC # BLD: 7.85 K/UL — SIGNIFICANT CHANGE UP (ref 4.8–10.8)
WBC # BLD: 8.61 K/UL — SIGNIFICANT CHANGE UP (ref 4.8–10.8)
WBC # FLD AUTO: 11.8 K/UL — HIGH (ref 4.8–10.8)
WBC # FLD AUTO: 7.85 K/UL — SIGNIFICANT CHANGE UP (ref 4.8–10.8)
WBC # FLD AUTO: 8.61 K/UL — SIGNIFICANT CHANGE UP (ref 4.8–10.8)

## 2018-05-03 RX ORDER — SOD SULF/SODIUM/NAHCO3/KCL/PEG
4000 SOLUTION, RECONSTITUTED, ORAL ORAL ONCE
Qty: 0 | Refills: 0 | Status: COMPLETED | OUTPATIENT
Start: 2018-05-03 | End: 2018-05-03

## 2018-05-03 RX ORDER — SODIUM CHLORIDE 9 MG/ML
1000 INJECTION, SOLUTION INTRAVENOUS
Qty: 0 | Refills: 0 | Status: DISCONTINUED | OUTPATIENT
Start: 2018-05-03 | End: 2018-05-07

## 2018-05-03 RX ADMIN — Medication 20 MILLIGRAM(S): at 21:44

## 2018-05-03 RX ADMIN — PANTOPRAZOLE SODIUM 40 MILLIGRAM(S): 20 TABLET, DELAYED RELEASE ORAL at 05:52

## 2018-05-03 RX ADMIN — SODIUM CHLORIDE 50 MILLILITER(S): 9 INJECTION, SOLUTION INTRAVENOUS at 19:55

## 2018-05-03 RX ADMIN — SODIUM CHLORIDE 50 MILLILITER(S): 9 INJECTION, SOLUTION INTRAVENOUS at 09:37

## 2018-05-03 RX ADMIN — SIMVASTATIN 20 MILLIGRAM(S): 20 TABLET, FILM COATED ORAL at 00:09

## 2018-05-03 RX ADMIN — Medication 4000 MILLILITER(S): at 17:43

## 2018-05-03 RX ADMIN — SIMVASTATIN 20 MILLIGRAM(S): 20 TABLET, FILM COATED ORAL at 21:43

## 2018-05-03 RX ADMIN — PANTOPRAZOLE SODIUM 40 MILLIGRAM(S): 20 TABLET, DELAYED RELEASE ORAL at 17:46

## 2018-05-03 RX ADMIN — Medication 1 TABLET(S): at 13:43

## 2018-05-03 NOTE — PROGRESS NOTE ADULT - SUBJECTIVE AND OBJECTIVE BOX
OVERNIGHT EVENTS:       HISTORY OF PRESENTING ILLNESS:   78 yo F with PMHx of DLD, questionable CHF, and anemia presents with intermittent BRBPR x 4 d associated with tenesmus. Overnight, developed lightheadedness, headache, accompanied by chest pain and palpitations. On arrival, EKG showed ST elevation in aVR with reciprocal inferolateral ST depressions. STEMI code was initially activated but later cancelled per Cardio as EKG did not meet criteria. She reports history of GIB, never requiring urgent colonoscopy or blood transfusion. Followed by GI Dr. Aparicio as outpatient. Last colonoscopy < 3 yo showing polyps. Was taking Alleve PM 1 tab QHS x 5 days for shoulder pain PTP. Stool guaiac+.     Admission Vitals: T 96.2 F, HR 83, BP 95/48, RR 20, SpO2 99% on RA  Admission Labs: WBC 11.21, Hb 6.8 --> 8.0, CE negative x 2, BNP 3189,     Cardio: Dr. Arenas  GI: Dr. Aparicio  PMD: Dr. Lilly      PAST MEDICAL & SURGICAL HISTORY:  PAST MEDICAL & SURGICAL HISTORY:  High cholesterol  No significant past surgical history    SOCIAL HISTORY:    ALLERGIES:  lidocaine (Rash)    MEDICATIONS:  STANDING MEDICATIONS  influenza   Vaccine 0.5 milliLiter(s) IntraMuscular once  lactated ringers. 1000 milliLiter(s) IV Continuous <Continuous>  multivitamin 1 Tablet(s) Oral daily  pantoprazole  Injectable 40 milliGRAM(s) IV Push two times a day  simvastatin 20 milliGRAM(s) Oral at bedtime    PRN MEDICATIONS    VITALS:   T(F): 97.7  HR: 74  BP: 107/46  RR: 22  SpO2: 99%    05-02-18 @ 07:01  -  05-03-18 @ 07:00  --------------------------------------------------------  IN: 278 mL / OUT: 1 mL / NET: 277 mL    05-03-18 @ 07:01  -  05-03-18 @ 15:06  --------------------------------------------------------  IN: 350 mL / OUT: 400 mL / NET: -50 mL        LABS:                        8.8    7.85  )-----------( 179      ( 03 May 2018 04:28 )             26.1     05-03    140  |  108  |  29<H>  ----------------------------<  92  3.7   |  23  |  0.7    Ca    8.3<L>      03 May 2018 04:28  Mg     1.9     05-03    TPro  5.6<L>  /  Alb  3.4<L>  /  TBili  0.2  /  DBili  x   /  AST  21  /  ALT  13  /  AlkPhos  76  05-02    PT/INR - ( 02 May 2018 10:32 )   PT: 11.50 sec;   INR: 1.06 ratio         PTT - ( 02 May 2018 10:32 )  PTT:23.3 sec      Creatine Kinase, Serum: 58 U/L (05-03-18 @ 04:28)  Troponin T, Serum: 0.03 ng/mL <HH> (05-03-18 @ 04:28)  Creatine Kinase, Serum: 66 U/L (05-02-18 @ 21:02)  Troponin T, Serum: 0.02 ng/mL <H> (05-02-18 @ 21:02)  Creatine Kinase, Serum: 72 U/L (05-02-18 @ 17:45)  Troponin T, Serum: 0.02 ng/mL <H> (05-02-18 @ 17:45)      CARDIAC MARKERS ( 03 May 2018 04:28 )  x     / 0.03 ng/mL / 58 U/L / x     / x      CARDIAC MARKERS ( 02 May 2018 21:02 )  x     / 0.02 ng/mL / 66 U/L / x     / x      CARDIAC MARKERS ( 02 May 2018 17:45 )  x     / 0.02 ng/mL / 72 U/L / x     / 4.3 ng/mL  CARDIAC MARKERS ( 02 May 2018 10:32 )  x     / 0.02 ng/mL / 75 U/L / x     / x          RADIOLOGY:    PHYSICAL EXAM:  GEN: No acute distress  HEENT:   LUNGS: Clear to auscultation bilaterally   HEART: S1/S2 present. RRR.   ABD: Soft, non-tender, non-distended. Bowel sounds present  EXT: Moves all 4 extremities  NEURO: AAOX3. Obeys commands.     1. OVERNIGHT EVENTS:       HISTORY OF PRESENTING ILLNESS:   76 yo F with PMHx of DLD, questionable CHF, and anemia presents with intermittent BRBPR x 4 d associated with tenesmus. Overnight, developed lightheadedness, headache, accompanied by chest pain and palpitations. On arrival, EKG showed ST elevation in aVR with reciprocal inferolateral ST depressions. STEMI code was initially activated but later cancelled per Cardio as EKG did not meet criteria. She reports history of GIB, never requiring urgent colonoscopy or blood transfusion. Followed by GI Dr. Aparicio as outpatient. Last colonoscopy < 5 yo showing polyps. Was taking Alleve PM 1 tab QHS x 5 days for shoulder pain PTP. Stool guaiac+.     Admission Vitals: T 96.2 F, HR 83, BP 95/48, RR 20, SpO2 99% on RA  Admission Labs: WBC 11.21, Hb 6.8 --> 8.0, CE negative x 2, BNP 3189,     Cardio: Dr. Arenas  GI: Dr. Aparicio  PMD: Dr. Lilly      PAST MEDICAL & SURGICAL HISTORY:  PAST MEDICAL & SURGICAL HISTORY:  High cholesterol  No significant past surgical history    SOCIAL HISTORY:  Ex-smoker, smoked for 20 years    ALLERGIES:  lidocaine (Rash)    MEDICATIONS:  STANDING MEDICATIONS  influenza   Vaccine 0.5 milliLiter(s) IntraMuscular once  lactated ringers. 1000 milliLiter(s) IV Continuous <Continuous>  multivitamin 1 Tablet(s) Oral daily  pantoprazole  Injectable 40 milliGRAM(s) IV Push two times a day  simvastatin 20 milliGRAM(s) Oral at bedtime    PRN MEDICATIONS    VITALS:   T(F): 97.7  HR: 74  BP: 107/46  RR: 22  SpO2: 99%    05-02-18 @ 07:01  -  05-03-18 @ 07:00  --------------------------------------------------------  IN: 278 mL / OUT: 1 mL / NET: 277 mL    05-03-18 @ 07:01  -  05-03-18 @ 15:06  --------------------------------------------------------  IN: 350 mL / OUT: 400 mL / NET: -50 mL        LABS:                        8.8    7.85  )-----------( 179      ( 03 May 2018 04:28 )             26.1     05-03    140  |  108  |  29<H>  ----------------------------<  92  3.7   |  23  |  0.7    Ca    8.3<L>      03 May 2018 04:28  Mg     1.9     05-03    TPro  5.6<L>  /  Alb  3.4<L>  /  TBili  0.2  /  DBili  x   /  AST  21  /  ALT  13  /  AlkPhos  76  05-02    PT/INR - ( 02 May 2018 10:32 )   PT: 11.50 sec;   INR: 1.06 ratio         PTT - ( 02 May 2018 10:32 )  PTT:23.3 sec      Creatine Kinase, Serum: 58 U/L (05-03-18 @ 04:28)  Troponin T, Serum: 0.03 ng/mL <HH> (05-03-18 @ 04:28)  Creatine Kinase, Serum: 66 U/L (05-02-18 @ 21:02)  Troponin T, Serum: 0.02 ng/mL <H> (05-02-18 @ 21:02)  Creatine Kinase, Serum: 72 U/L (05-02-18 @ 17:45)  Troponin T, Serum: 0.02 ng/mL <H> (05-02-18 @ 17:45)      CARDIAC MARKERS ( 03 May 2018 04:28 )  x     / 0.03 ng/mL / 58 U/L / x     / x      CARDIAC MARKERS ( 02 May 2018 21:02 )  x     / 0.02 ng/mL / 66 U/L / x     / x      CARDIAC MARKERS ( 02 May 2018 17:45 )  x     / 0.02 ng/mL / 72 U/L / x     / 4.3 ng/mL  CARDIAC MARKERS ( 02 May 2018 10:32 )  x     / 0.02 ng/mL / 75 U/L / x     / x          RADIOLOGY:  CT A/P NC (05/02):   1.  No evidence of retroperitoneal hematoma.  2.  There is a 2.3 cm right hepatic hypoattenuating lesion, indeterminant etiology. Suggest further evaluation by MRI of the liver with and without gadolinium.    PHYSICAL EXAM:  GEN: No acute distress  HEENT: NCAT  LUNGS: Clear to auscultation bilaterally   HEART: Systolic heart murmur   ABD: Soft, non-tender, non-distended. Bowel sounds present  EXT: Moves all 4 extremities  NEURO: AAOX3. Obeys commands.

## 2018-05-04 ENCOUNTER — RESULT REVIEW (OUTPATIENT)
Age: 78
End: 2018-05-04

## 2018-05-04 DIAGNOSIS — R58 HEMORRHAGE, NOT ELSEWHERE CLASSIFIED: ICD-10-CM

## 2018-05-04 DIAGNOSIS — D64.9 ANEMIA, UNSPECIFIED: ICD-10-CM

## 2018-05-04 LAB
ALBUMIN SERPL ELPH-MCNC: 2.8 G/DL — LOW (ref 3.5–5.2)
ALP SERPL-CCNC: 65 U/L — SIGNIFICANT CHANGE UP (ref 30–115)
ALT FLD-CCNC: 12 U/L — SIGNIFICANT CHANGE UP (ref 0–41)
ANION GAP SERPL CALC-SCNC: 12 MMOL/L — SIGNIFICANT CHANGE UP (ref 7–14)
ANION GAP SERPL CALC-SCNC: 12 MMOL/L — SIGNIFICANT CHANGE UP (ref 7–14)
AST SERPL-CCNC: 20 U/L — SIGNIFICANT CHANGE UP (ref 0–41)
BASOPHILS # BLD AUTO: 0.03 K/UL — SIGNIFICANT CHANGE UP (ref 0–0.2)
BASOPHILS # BLD AUTO: 0.06 K/UL — SIGNIFICANT CHANGE UP (ref 0–0.2)
BASOPHILS NFR BLD AUTO: 0.4 % — SIGNIFICANT CHANGE UP (ref 0–1)
BASOPHILS NFR BLD AUTO: 0.8 % — SIGNIFICANT CHANGE UP (ref 0–1)
BILIRUB SERPL-MCNC: 0.3 MG/DL — SIGNIFICANT CHANGE UP (ref 0.2–1.2)
BUN SERPL-MCNC: 15 MG/DL — SIGNIFICANT CHANGE UP (ref 10–20)
BUN SERPL-MCNC: 21 MG/DL — HIGH (ref 10–20)
CALCIUM SERPL-MCNC: 8.1 MG/DL — LOW (ref 8.5–10.1)
CALCIUM SERPL-MCNC: 8.1 MG/DL — LOW (ref 8.5–10.1)
CHLORIDE SERPL-SCNC: 106 MMOL/L — SIGNIFICANT CHANGE UP (ref 98–110)
CHLORIDE SERPL-SCNC: 107 MMOL/L — SIGNIFICANT CHANGE UP (ref 98–110)
CK SERPL-CCNC: 63 U/L — SIGNIFICANT CHANGE UP (ref 0–225)
CO2 SERPL-SCNC: 23 MMOL/L — SIGNIFICANT CHANGE UP (ref 17–32)
CO2 SERPL-SCNC: 24 MMOL/L — SIGNIFICANT CHANGE UP (ref 17–32)
CREAT SERPL-MCNC: 0.7 MG/DL — SIGNIFICANT CHANGE UP (ref 0.7–1.5)
CREAT SERPL-MCNC: 0.7 MG/DL — SIGNIFICANT CHANGE UP (ref 0.7–1.5)
EOSINOPHIL # BLD AUTO: 0.03 K/UL — SIGNIFICANT CHANGE UP (ref 0–0.7)
EOSINOPHIL # BLD AUTO: 0.08 K/UL — SIGNIFICANT CHANGE UP (ref 0–0.7)
EOSINOPHIL NFR BLD AUTO: 0.4 % — SIGNIFICANT CHANGE UP (ref 0–8)
EOSINOPHIL NFR BLD AUTO: 1.1 % — SIGNIFICANT CHANGE UP (ref 0–8)
GLUCOSE SERPL-MCNC: 87 MG/DL — SIGNIFICANT CHANGE UP (ref 70–99)
GLUCOSE SERPL-MCNC: 96 MG/DL — SIGNIFICANT CHANGE UP (ref 70–99)
HCT VFR BLD CALC: 23.4 % — LOW (ref 37–47)
HCT VFR BLD CALC: 24.4 % — LOW (ref 37–47)
HGB BLD-MCNC: 8 G/DL — LOW (ref 12–16)
HGB BLD-MCNC: 8.1 G/DL — LOW (ref 12–16)
IMM GRANULOCYTES NFR BLD AUTO: 0.3 % — SIGNIFICANT CHANGE UP (ref 0.1–0.3)
IMM GRANULOCYTES NFR BLD AUTO: 0.4 % — HIGH (ref 0.1–0.3)
LYMPHOCYTES # BLD AUTO: 1.79 K/UL — SIGNIFICANT CHANGE UP (ref 1.2–3.4)
LYMPHOCYTES # BLD AUTO: 1.99 K/UL — SIGNIFICANT CHANGE UP (ref 1.2–3.4)
LYMPHOCYTES # BLD AUTO: 21.3 % — SIGNIFICANT CHANGE UP (ref 20.5–51.1)
LYMPHOCYTES # BLD AUTO: 28.1 % — SIGNIFICANT CHANGE UP (ref 20.5–51.1)
MAGNESIUM SERPL-MCNC: 1.8 MG/DL — SIGNIFICANT CHANGE UP (ref 1.8–2.4)
MCHC RBC-ENTMCNC: 28.7 PG — SIGNIFICANT CHANGE UP (ref 27–31)
MCHC RBC-ENTMCNC: 28.8 PG — SIGNIFICANT CHANGE UP (ref 27–31)
MCHC RBC-ENTMCNC: 33.2 G/DL — SIGNIFICANT CHANGE UP (ref 32–37)
MCHC RBC-ENTMCNC: 34.2 G/DL — SIGNIFICANT CHANGE UP (ref 32–37)
MCV RBC AUTO: 84.2 FL — SIGNIFICANT CHANGE UP (ref 81–99)
MCV RBC AUTO: 86.5 FL — SIGNIFICANT CHANGE UP (ref 81–99)
MONOCYTES # BLD AUTO: 0.46 K/UL — SIGNIFICANT CHANGE UP (ref 0.1–0.6)
MONOCYTES # BLD AUTO: 0.49 K/UL — SIGNIFICANT CHANGE UP (ref 0.1–0.6)
MONOCYTES NFR BLD AUTO: 5.5 % — SIGNIFICANT CHANGE UP (ref 1.7–9.3)
MONOCYTES NFR BLD AUTO: 6.9 % — SIGNIFICANT CHANGE UP (ref 1.7–9.3)
NEUTROPHILS # BLD AUTO: 4.43 K/UL — SIGNIFICANT CHANGE UP (ref 1.4–6.5)
NEUTROPHILS # BLD AUTO: 6.05 K/UL — SIGNIFICANT CHANGE UP (ref 1.4–6.5)
NEUTROPHILS NFR BLD AUTO: 62.8 % — SIGNIFICANT CHANGE UP (ref 42.2–75.2)
NEUTROPHILS NFR BLD AUTO: 72 % — SIGNIFICANT CHANGE UP (ref 42.2–75.2)
NRBC # BLD: 0 /100 WBCS — SIGNIFICANT CHANGE UP (ref 0–0)
PLATELET # BLD AUTO: 186 K/UL — SIGNIFICANT CHANGE UP (ref 130–400)
PLATELET # BLD AUTO: 204 K/UL — SIGNIFICANT CHANGE UP (ref 130–400)
POTASSIUM SERPL-MCNC: 3.4 MMOL/L — LOW (ref 3.5–5)
POTASSIUM SERPL-MCNC: 3.6 MMOL/L — SIGNIFICANT CHANGE UP (ref 3.5–5)
POTASSIUM SERPL-SCNC: 3.4 MMOL/L — LOW (ref 3.5–5)
POTASSIUM SERPL-SCNC: 3.6 MMOL/L — SIGNIFICANT CHANGE UP (ref 3.5–5)
PROT SERPL-MCNC: 4.8 G/DL — LOW (ref 6–8)
RBC # BLD: 2.78 M/UL — LOW (ref 4.2–5.4)
RBC # BLD: 2.82 M/UL — LOW (ref 4.2–5.4)
RBC # FLD: 16.1 % — HIGH (ref 11.5–14.5)
RBC # FLD: 16.6 % — HIGH (ref 11.5–14.5)
SODIUM SERPL-SCNC: 142 MMOL/L — SIGNIFICANT CHANGE UP (ref 135–146)
SODIUM SERPL-SCNC: 142 MMOL/L — SIGNIFICANT CHANGE UP (ref 135–146)
TROPONIN T SERPL-MCNC: 0.03 NG/ML — CRITICAL HIGH
WBC # BLD: 7.07 K/UL — SIGNIFICANT CHANGE UP (ref 4.8–10.8)
WBC # BLD: 8.39 K/UL — SIGNIFICANT CHANGE UP (ref 4.8–10.8)
WBC # FLD AUTO: 7.07 K/UL — SIGNIFICANT CHANGE UP (ref 4.8–10.8)
WBC # FLD AUTO: 8.39 K/UL — SIGNIFICANT CHANGE UP (ref 4.8–10.8)

## 2018-05-04 RX ADMIN — PANTOPRAZOLE SODIUM 40 MILLIGRAM(S): 20 TABLET, DELAYED RELEASE ORAL at 05:52

## 2018-05-04 RX ADMIN — PANTOPRAZOLE SODIUM 40 MILLIGRAM(S): 20 TABLET, DELAYED RELEASE ORAL at 17:46

## 2018-05-04 RX ADMIN — SODIUM CHLORIDE 50 MILLILITER(S): 9 INJECTION, SOLUTION INTRAVENOUS at 19:28

## 2018-05-04 RX ADMIN — SIMVASTATIN 20 MILLIGRAM(S): 20 TABLET, FILM COATED ORAL at 21:41

## 2018-05-04 NOTE — PROGRESS NOTE ADULT - SUBJECTIVE AND OBJECTIVE BOX
Over Night Events:  No further bleeding.  NO transfusion last 24 hour.  For EGD and Colon today         ROS:  See HPI    PHYSICAL EXAM    ICU Vital Signs Last 24 Hrs  T(C): 36.3 (04 May 2018 08:00), Max: 36.7 (03 May 2018 20:00)  T(F): 97.4 (04 May 2018 08:00), Max: 98.1 (03 May 2018 20:00)  HR: 78 (04 May 2018 08:00) (68 - 86)  BP: 103/54 (04 May 2018 08:00) (73/44 - 138/73)  BP(mean): 69 (04 May 2018 08:00) (54 - 111)  ABP: --  ABP(mean): --  RR: 27 (04 May 2018 08:00) (15 - 37)  SpO2: 98% (04 May 2018 08:00) (95% - 100%)      General: In NAD   HEENT: DILLON             Lymph Nodes: No cervical LN   Lungs: Bilateral BS  Cardiovascular: Regular   Abdomen: Soft, Positive BS  Extremities: No clubbing   Skin: Warm  Neurological: Non focal       05-03-18 @ 07:01  -  05-04-18 @ 07:00  --------------------------------------------------------  IN:    lactated ringers.: 1150 mL    Oral Fluid: 3300 mL  Total IN: 4450 mL    OUT:    Voided: 750 mL  Total OUT: 750 mL    Total NET: 3700 mL      05-04-18 @ 07:01  -  05-04-18 @ 08:56  --------------------------------------------------------  IN:    lactated ringers.: 100 mL  Total IN: 100 mL    OUT:    Voided: 1 mL  Total OUT: 1 mL    Total NET: 99 mL          LABS:                            8.0    7.07  )-----------( 186      ( 04 May 2018 04:39 )             23.4                                               05-04    142  |  107  |  21<H>  ----------------------------<  96  3.4<L>   |  23  |  0.7    Ca    8.1<L>      04 May 2018 04:39  Mg     1.8     05-04    TPro  4.8<L>  /  Alb  2.8<L>  /  TBili  0.3  /  DBili  x   /  AST  20  /  ALT  12  /  AlkPhos  65  05-04      PT/INR - ( 02 May 2018 10:32 )   PT: 11.50 sec;   INR: 1.06 ratio         PTT - ( 02 May 2018 10:32 )  PTT:23.3 sec                                           CARDIAC MARKERS ( 04 May 2018 04:39 )  x     / 0.03 ng/mL / 63 U/L / x     / x      CARDIAC MARKERS ( 03 May 2018 04:28 )  x     / 0.03 ng/mL / 58 U/L / x     / x      CARDIAC MARKERS ( 02 May 2018 21:02 )  x     / 0.02 ng/mL / 66 U/L / x     / x      CARDIAC MARKERS ( 02 May 2018 17:45 )  x     / 0.02 ng/mL / 72 U/L / x     / 4.3 ng/mL  CARDIAC MARKERS ( 02 May 2018 10:32 )  x     / 0.02 ng/mL / 75 U/L / x     / x                                                LIVER FUNCTIONS - ( 04 May 2018 04:39 )  Alb: 2.8 g/dL / Pro: 4.8 g/dL / ALK PHOS: 65 U/L / ALT: 12 U/L / AST: 20 U/L / GGT: x                                                                                                                                       MEDICATIONS  (STANDING):  bisacodyl 20 milliGRAM(s) Oral at bedtime  influenza   Vaccine 0.5 milliLiter(s) IntraMuscular once  lactated ringers. 1000 milliLiter(s) (50 mL/Hr) IV Continuous <Continuous>  multivitamin 1 Tablet(s) Oral daily  pantoprazole  Injectable 40 milliGRAM(s) IV Push two times a day  simvastatin 20 milliGRAM(s) Oral at bedtime    MEDICATIONS  (PRN):      Xrays:                                                                                     ECHO

## 2018-05-04 NOTE — CONSULT NOTE ADULT - SUBJECTIVE AND OBJECTIVE BOX
Patient is a 77y old  Female who presents with a chief complaint of     HPI:      PAST MEDICAL & SURGICAL HISTORY:  Water retention  High cholesterol      PREVIOUS DIAGNOSTIC TESTING:      ECHO  FINDINGS:    STRESS  FINDINGS:    CATHETERIZATION  FINDINGS:    MEDICATIONS  (STANDING):    MEDICATIONS  (PRN):      FAMILY HISTORY:      SOCIAL HISTORY:  CIGARETTES:    ALCOHOL:    REVIEW OF SYSTEMS:  CONSTITUTIONAL: No fever, weight loss, or fatigue  NECK: No pain or stiffness  RESPIRATORY: No cough, wheezing, chills or hemoptysis; No shortness of breath  CARDIOVASCULAR: No chest pain, palpitations, dizziness, or leg swelling  GASTROINTESTINAL: No abdominal or epigastric pain. No nausea, vomiting, or hematemesis; No diarrhea or constipation. No melena or hematochezia.  GENITOURINARY: No dysuria, frequency, hematuria, or incontinence  NEUROLOGICAL: No headaches, memory loss, loss of strength, numbness, or tremors  SKIN: No itching, burning, rashes, or lesions   ENDOCRINE: No heat or cold intolerance; No hair loss  MUSCULOSKELETAL: No joint pain or swelling; No muscle, back, or extremity pain  HEME/LYMPH: No easy bruising, or bleeding gums          Vital Signs Last 24 Hrs  T(C): 36.7 (02 May 2018 10:12), Max: 36.7 (02 May 2018 10:12)  T(F): 98 (02 May 2018 10:12), Max: 98 (02 May 2018 10:12)  HR: 86 (02 May 2018 10:34) (85 - 86)  BP: 129/59 (02 May 2018 10:34) (110/52 - 129/59)  BP(mean): --  RR: 16 (02 May 2018 10:12) (16 - 16)  SpO2: 99% (02 May 2018 10:12) (99% - 99%)        PHYSICAL EXAM:  GENERAL: NAD, well-groomed, well-developed  HEAD:  Atraumatic, Normocephalic  NECK: Supple, No JVD, Normal thyroid  NERVOUS SYSTEM:  Alert & Oriented X3, Good concentration  CHEST/LUNG: Clear to percussion bilaterally; No rales, rhonchi, wheezing, or rubs  HEART: Regular rate and rhythm; No murmurs, rubs, or gallops  ABDOMEN: Soft, Nontender, Nondistended; Bowel sounds present  EXTREMITIES:  2+ Peripheral Pulses, No clubbing, cyanosis, or edema  SKIN: No rashes or lesions    INTERPRETATION OF TELEMETRY:    ECG:    I&O's Detail      LABS:                        6.8    11.21 )-----------( 192      ( 02 May 2018 10:32 )             20.1     05-02    138  |  102  |  43<H>  ----------------------------<  112<H>  4.0   |  23  |  0.9    Ca    8.6      02 May 2018 10:32    TPro  5.6<L>  /  Alb  3.4<L>  /  TBili  0.2  /  DBili  x   /  AST  21  /  ALT  13  /  AlkPhos  76  05-02    CARDIAC MARKERS ( 02 May 2018 10:32 )  x     / 0.02 ng/mL / 75 U/L / x     / x          PT/INR - ( 02 May 2018 10:32 )   PT: 11.50 sec;   INR: 1.06 ratio         PTT - ( 02 May 2018 10:32 )  PTT:23.3 sec    I&O's Summary      RADIOLOGY & ADDITIONAL STUDIES:
Patient is a 77y old  Female who presents with a chief complaint of dizziness and GI bleeding     HPI:    78 YO F p/w c/o dizziness, pre syncope since last night. Pt had one episode of bright red blood movement 4 days ago, than was constipated for 2 days, than yesterday had 2 episodes of BRBPR mixed with some dark blood. Pt Denies abd pain, nausea, vomiting. Has been taking aleeve once a night for last 5 days. No hx of GIB in past. No reflux. Pt is not tachycardic currently, not hypotensive. Pt had chest pain last night which resolved.     PAST MEDICAL & SURGICAL HISTORY:  Water retention  High cholesterol      SOCIAL HX:   Smoking   -ve x 3                       ETOH                            Other    FAMILY HISTORY:      ROS:  See HPI     Allergies    lidocaine (Rash)    Intolerances          PHYSICAL EXAM    ICU Vital Signs Last 24 Hrs  T(C): 36.7 (02 May 2018 10:12), Max: 36.7 (02 May 2018 10:12)  T(F): 98 (02 May 2018 10:12), Max: 98 (02 May 2018 10:12)  HR: 86 (02 May 2018 10:34) (85 - 86)  BP: 129/59 (02 May 2018 10:34) (110/52 - 129/59)  RR: 16 (02 May 2018 10:12) (16 - 16)  SpO2: 99% (02 May 2018 10:12) (99% - 99%)      General: NAD, AAO x 3, looks pale   HEENT:  DILLON              Lymph Nodes: No cervical LN   Lungs: Bilateral BS  Cardiovascular: Regular  Abdomen: Soft, Positive BS  Extremities: No clubbing  Skin: Warm  Neurological: Non focal         LABS:                          6.8    11.21 )-----------( 192      ( 02 May 2018 10:32 )             20.1                                               05-02    138  |  102  |  43<H>  ----------------------------<  112<H>  4.0   |  23  |  0.9    Ca    8.6      02 May 2018 10:32    TPro  5.6<L>  /  Alb  3.4<L>  /  TBili  0.2  /  DBili  x   /  AST  21  /  ALT  13  /  AlkPhos  76  05-02      PT/INR - ( 02 May 2018 10:32 )   PT: 11.50 sec;   INR: 1.06 ratio         PTT - ( 02 May 2018 10:32 )  PTT:23.3 sec                                           CARDIAC MARKERS ( 02 May 2018 10:32 )  x     / 0.02 ng/mL / 75 U/L / x     / x                                                LIVER FUNCTIONS - ( 02 May 2018 10:32 )  Alb: 3.4 g/dL / Pro: 5.6 g/dL / ALK PHOS: 76 U/L / ALT: 13 U/L / AST: 21 U/L / GGT: x                                                                         X-Rays                no opacity.                                                                      ECHO    MEDICATIONS  (STANDING):    MEDICATIONS  (PRN):
bleeding
Chief Complaint: Patient is a 77y old  Female who presents with a chief complaint of lower gi bleeding for past several days . mostly brbpr   past colonosacopy 5 years ago  reported polyps in past    Review Of Systems: presyncope episode , denies abdominal pain , denies diarrhea , hematemesis, or coffee brown emesis     Medications:      PMHX/PSHX:  Water retention  High cholesterol      Family history:      Social History:       Allergies:  lidocaine (Rash)            PHYSICAL EXAM:   Vital Signs:  Vital Signs Last 24 Hrs  T(C): 36.7 (02 May 2018 10:12), Max: 36.7 (02 May 2018 10:12)  T(F): 98 (02 May 2018 10:12), Max: 98 (02 May 2018 10:12)  HR: 86 (02 May 2018 10:34) (85 - 86)  BP: 129/59 (02 May 2018 10:34) (110/52 - 129/59)  BP(mean): --  RR: 16 (02 May 2018 10:12) (16 - 16)  SpO2: 99% (02 May 2018 10:12) (99% - 99%)  Daily     Daily     T(C): 36.7 (05-02-18 @ 10:12), Max: 36.7 (05-02-18 @ 10:12)  HR: 86 (05-02-18 @ 10:34) (85 - 86)  BP: 129/59 (05-02-18 @ 10:34) (110/52 - 129/59)  RR: 16 (05-02-18 @ 10:12) (16 - 16)  SpO2: 99% (05-02-18 @ 10:12) (99% - 99%)    GENERAL:  Appears stated age, well-groomed, well-nourished, no distress  ABDOMEN:  Soft, non-tender, non-distended, normoactive bowel sounds,  no masses ,no hepato-splenomegaly, no signs of chronic liver disease        LABS:                        6.8    11.21 )-----------( 192      ( 02 May 2018 10:32 )             20.1     05-02    138  |  102  |  43<H>  ----------------------------<  112<H>  4.0   |  23  |  0.9    Ca    8.6      02 May 2018 10:32    TPro  5.6<L>  /  Alb  3.4<L>  /  TBili  0.2  /  DBili  x   /  AST  21  /  ALT  13  /  AlkPhos  76  05-02    LIVER FUNCTIONS - ( 02 May 2018 10:32 )  Alb: 3.4 g/dL / Pro: 5.6 g/dL / ALK PHOS: 76 U/L / ALT: 13 U/L / AST: 21 U/L / GGT: x           PT/INR - ( 02 May 2018 10:32 )   PT: 11.50 sec;   INR: 1.06 ratio         PTT - ( 02 May 2018 10:32 )  PTT:23.3 sec        Imaging:

## 2018-05-04 NOTE — PROGRESS NOTE ADULT - ASSESSMENT
IMPRESSION:    GI bleeding -Likely lower   Acute blood loss anemia.  Stable   HO HTN    PLAN:    CNS: NO depressants     HEENT: Oral care    PULMONARY:  HOB @ 45 degrees    CARDIOVASCULAR: I=O.  FU with cardiology.  FU ECHO     GI: GI prophylaxis.  NPO for now.  FU with GI.  Scheduled for endo today    RENAL:  Follow up lytes.  Correct as needed    INFECTIOUS DISEASE: off abx     HEMATOLOGICAL:  DVT prophylaxis Seq.  FU CBC .     ENDOCRINE:  Follow up FS.      Dispo: Transfer to floor after EGD unless high risk finding.

## 2018-05-04 NOTE — PROGRESS NOTE ADULT - SUBJECTIVE AND OBJECTIVE BOX
OVERNIGHT EVENTS:   None. NPO for EGD/Colonoscopy today    HISTORY OF PRESENTING ILLNESS:   76 yo F with PMHx of DLD, questionable CHF, and anemia presents with intermittent BRBPR x 4 d associated with tenesmus. Overnight, developed lightheadedness, headache, accompanied by chest pain and palpitations. On arrival, EKG showed ST elevation in aVR with reciprocal inferolateral ST depressions. STEMI code was initially activated but later cancelled per Cardio as EKG did not meet criteria. She reports history of GIB, never requiring urgent colonoscopy or blood transfusion. Followed by GI Dr. Aparicio as outpatient. Last colonoscopy < 3 yo showing polyps. Was taking Alleve PM 1 tab QHS x 5 days for shoulder pain PTP. Stool guaiac+.     Admission Vitals: T 96.2 F, HR 83, BP 95/48, RR 20, SpO2 99% on RA  Admission Labs: WBC 11.21, Hb 6.8 --> 8.0, CE negative x 2, BNP 3189,     Cardio: Dr. Arenas  GI: Dr. Aparicio  PMD: Dr. Lilly    MEDICATIONS:  STANDING MEDICATIONS  bisacodyl 20 milliGRAM(s) Oral at bedtime  influenza   Vaccine 0.5 milliLiter(s) IntraMuscular once  lactated ringers. 1000 milliLiter(s) IV Continuous <Continuous>  multivitamin 1 Tablet(s) Oral daily  pantoprazole  Injectable 40 milliGRAM(s) IV Push two times a day  simvastatin 20 milliGRAM(s) Oral at bedtime    PRN MEDICATIONS    VITALS:   T(F): 97.4  HR: 72  BP: 103/44  RR: 11  SpO2: 98%    LABS:                        8.0    7.07  )-----------( 186      ( 04 May 2018 04:39 )             23.4     05-04    142  |  107  |  21<H>  ----------------------------<  96  3.4<L>   |  23  |  0.7    Ca    8.1<L>      04 May 2018 04:39  Mg     1.8     05-04    TPro  4.8<L>  /  Alb  2.8<L>  /  TBili  0.3  /  DBili  x   /  AST  20  /  ALT  12  /  AlkPhos  65  05-04    Creatine Kinase, Serum: 63 U/L (05-04-18 @ 04:39)  Troponin T, Serum: 0.03 ng/mL <HH> (05-04-18 @ 04:39)    CARDIAC MARKERS ( 04 May 2018 04:39 )  x     / 0.03 ng/mL / 63 U/L / x     / x      CARDIAC MARKERS ( 03 May 2018 04:28 )  x     / 0.03 ng/mL / 58 U/L / x     / x      CARDIAC MARKERS ( 02 May 2018 21:02 )  x     / 0.02 ng/mL / 66 U/L / x     / x      CARDIAC MARKERS ( 02 May 2018 17:45 )  x     / 0.02 ng/mL / 72 U/L / x     / 4.3 ng/mL    RADIOLOGY:  CT A/P NC (05/02):   1.  No evidence of retroperitoneal hematoma.  2.  There is a 2.3 cm right hepatic hypoattenuating lesion, indeterminant etiology. Suggest further evaluation by MRI of the liver with and without gadolinium.    PHYSICAL EXAM:  GEN: No acute distress  HEENT: NCAT  LUNGS: Clear to auscultation bilaterally   HEART: Systolic heart murmur   ABD: Soft, non-tender, non-distended. Bowel sounds present  EXT: Moves all 4 extremities  NEURO: AAOX3. Obeys commands.

## 2018-05-04 NOTE — PROGRESS NOTE ADULT - ASSESSMENT
DIAGNOSIS:  #) Lower GI bleed   #) QT Prolongation   #) CHF exacerbation   #) Hyperlipidemia   #) Presyncope    PLAN:  CNS:   - No sedatives     HEENT:  - Oral care    PULMONARY:  - HOB @ 45 degrees     CARDIOVASCULAR:  - Keep I = O  - CE negative x 4  - f/u 2D Echo  - QTc 491, avoid QT prolonging medications   - BNP 3189; hold HCTZ until BP improves   - Cardio cleared patient for EGD/Colonoscopy  - c/w Zocor 20 mg QHS     GI:  - c/w IV Protonix 40 mg BID   - Diet: Clears  - Pending EGD and Colonoscopy (05/04), 10 am   - c/w Multivitamin QD    RENAL:  - None     INFECTIOUS DISEASE:  - None     HEMATOLOGICAL:  - s/p 2 U PRBC in ED   - f/u CBC Q6H  - Transfuse Hb < 7  - Active T+S     ENDOCRINE:  - None     MUSCULOSKELETAL:  - Activity: Bedrest    CODE STATUS:  - Full Code    DISPOSITION:   - Continued MICU observation until after EGD (unless high risk finding)

## 2018-05-04 NOTE — PROGRESS NOTE ADULT - SUBJECTIVE AND OBJECTIVE BOX
INTERVAL HISTORY: No overnight events.  	  MEDICATIONS:  bisacodyl 20 milliGRAM(s) Oral at bedtime  influenza   Vaccine 0.5 milliLiter(s) IntraMuscular once  lactated ringers. 1000 milliLiter(s) IV Continuous <Continuous>  multivitamin 1 Tablet(s) Oral daily  pantoprazole  Injectable 40 milliGRAM(s) IV Push two times a day  simvastatin 20 milliGRAM(s) Oral at bedtime      REVIEW OF SYSTEMS:  CONSTITUTIONAL: No fever, weight loss, or fatigue  NECK: No pain or stiffness  RESPIRATORY: No cough, wheezing, chills or hemoptysis; No shortness of breath  CARDIOVASCULAR: No chest pain, palpitations, dizziness, or leg swelling  GASTROINTESTINAL: No abdominal or epigastric pain. No nausea, vomiting, or hematemesis; No diarrhea or constipation. No melena or hematochezia.  GENITOURINARY: No dysuria, frequency, hematuria, or incontinence  NEUROLOGICAL: No headaches, memory loss, loss of strength, numbness, or tremors  SKIN: No itching, burning, rashes, or lesions   ENDOCRINE: No heat or cold intolerance; No hair loss  MUSCULOSKELETAL: No joint pain or swelling; No muscle, back, or extremity pain  HEME/LYMPH: No easy bruising, or bleeding gums    PHYSICAL EXAM:  T(C): 36.3 (05-04-18 @ 08:00), Max: 36.7 (05-03-18 @ 20:00)  HR: 78 (05-04-18 @ 08:00) (68 - 86)  BP: 103/54 (05-04-18 @ 08:00) (73/44 - 138/73)  RR: 27 (05-04-18 @ 08:00) (15 - 37)  SpO2: 98% (05-04-18 @ 08:00) (95% - 100%)  Wt(kg): --  I&O's Summary    03 May 2018 07:01  -  04 May 2018 07:00  --------------------------------------------------------  IN: 4450 mL / OUT: 750 mL / NET: 3700 mL    04 May 2018 07:01  -  04 May 2018 09:19  --------------------------------------------------------  IN: 100 mL / OUT: 1 mL / NET: 99 mL          GENERAL: NAD, well-groomed, well-developed  HEAD:  Atraumatic, Normocephalic  NECK: Supple, No JVD, Normal thyroid  NERVOUS SYSTEM:  Alert & Oriented X3, Good concentration  CHEST/LUNG: Clear to percussion bilaterally; No rales, rhonchi, wheezing, or rubs  HEART: Regular rate and rhythm; No murmurs, rubs, or gallops  ABDOMEN: Soft, Nontender, Nondistended; Bowel sounds present  EXTREMITIES:  2+ Peripheral Pulses, No clubbing, cyanosis, or edema  SKIN: No rashes or lesions    LABS:	 	    CARDIAC MARKERS ( 04 May 2018 04:39 )  x     / 0.03 ng/mL / 63 U/L / x     / x      CARDIAC MARKERS ( 03 May 2018 04:28 )  x     / 0.03 ng/mL / 58 U/L / x     / x      CARDIAC MARKERS ( 02 May 2018 21:02 )  x     / 0.02 ng/mL / 66 U/L / x     / x      CARDIAC MARKERS ( 02 May 2018 17:45 )  x     / 0.02 ng/mL / 72 U/L / x     / 4.3 ng/mL  CARDIAC MARKERS ( 02 May 2018 10:32 )  x     / 0.02 ng/mL / 75 U/L / x     / x                                8.0    7.07  )-----------( 186      ( 04 May 2018 04:39 )             23.4     05-04    142  |  107  |  21<H>  ----------------------------<  96  3.4<L>   |  23  |  0.7    Ca    8.1<L>      04 May 2018 04:39  Mg     1.8     05-04    TPro  4.8<L>  /  Alb  2.8<L>  /  TBili  0.3  /  DBili  x   /  AST  20  /  ALT  12  /  AlkPhos  65  05-04    proBNP:   Lipid Profile:

## 2018-05-04 NOTE — CHART NOTE - NSCHARTNOTEFT_GEN_A_CORE
ICU DOWNGRADE NOTE    #) HISTORY OF PRESENTING ILLNESS   76 yo F with PMHx of DLD, questionable CHF, and anemia presents with intermittent BRBPR x 4 d associated with tenesmus. Overnight, developed lightheadedness, headache, accompanied by chest pain and palpitations. On arrival, EKG showed ST elevation in aVR with reciprocal inferolateral ST depressions. STEMI code was initially activated but later cancelled per Cardio as EKG did not meet criteria. She reports history of GIB, never requiring urgent colonoscopy or blood transfusion. Followed by GI Dr. Aparicio as outpatient. Last colonoscopy < 5 yo showing polyps. Was taking Alleve PM 1 tab QHS x 5 days for shoulder pain PTP. Stool guaiac+.     Admission Vitals: T 96.2 F, HR 83, BP 95/48, RR 20, SpO2 99% on RA  Admission Labs: WBC 11.21, Hb 6.8 --> 8.0, CE negative x 2, BNP 3189,     Cardio: Dr. Arenas  GI: Dr. Aparicio  PMD: Dr. Lilly    Patient was upgraded to ICU for increased monitoring.    #) ICU COURSE    Patient was awaiting EGD and Colonoscopy with Dr. Aparicio. Dr. Arenas cleared her for the procedure today. She stayed in the ICU for 2 days because the procedure was delayed by 1 day.     She received no transfusions in the ICU. She drank 2/3 of her Golytely and took 20 g of the Bisacodyl bowel prep.     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 Overnight, developed lightheadedness, headache, accompanied by chest pain and palpitations. On arrival, EKG showed ST elevation in aVR with reciprocal inferolateral ST depressions. STEMI code was initially activated but later cancelled per Cardio as EKG did not meet criteria. She reports history of GIB, never requiring urgent colonoscopy or blood transfusion. Followed by GI Dr. Aparicio as outpatient. Last colonoscopy < 5 yo showing polyps. Was taking Alleve PM 1 tab QHS x 5 days for shoulder pain PTP. Stool guaiac+.     Admission Vitals: T 96.2 F, HR 83, BP 95/48, RR 20, SpO2 99% on RA  Admission Labs: WBC 11.21, Hb 6.8 --> 8.0, CE negative x 2, BNP 3189,     Cardio: Dr. Arenas  GI: Dr. Aparicio  PMD: Dr. Lilly    Patient was upgraded to ICU for increased monitoring.    #) ICU COURSE    Patient was awaiting EGD and Colonoscopy with Dr. Aparicio. Dr. Arenas cleared her for the procedure today. She stayed in the ICU for 2 days because the procedure was delayed by 1 day.     She received no transfusions in the ICU. She drank 2/3 of her Golytely and took 20 mg of the Bisacodyl bowel prep.     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

## 2018-05-05 LAB
ANION GAP SERPL CALC-SCNC: 12 MMOL/L — SIGNIFICANT CHANGE UP (ref 7–14)
BASOPHILS # BLD AUTO: 0.04 K/UL — SIGNIFICANT CHANGE UP (ref 0–0.2)
BASOPHILS NFR BLD AUTO: 0.6 % — SIGNIFICANT CHANGE UP (ref 0–1)
BLD GP AB SCN SERPL QL: SIGNIFICANT CHANGE UP
BUN SERPL-MCNC: 16 MG/DL — SIGNIFICANT CHANGE UP (ref 10–20)
CALCIUM SERPL-MCNC: 8 MG/DL — LOW (ref 8.5–10.1)
CHLORIDE SERPL-SCNC: 104 MMOL/L — SIGNIFICANT CHANGE UP (ref 98–110)
CO2 SERPL-SCNC: 24 MMOL/L — SIGNIFICANT CHANGE UP (ref 17–32)
CREAT SERPL-MCNC: 0.7 MG/DL — SIGNIFICANT CHANGE UP (ref 0.7–1.5)
EOSINOPHIL # BLD AUTO: 0.03 K/UL — SIGNIFICANT CHANGE UP (ref 0–0.7)
EOSINOPHIL NFR BLD AUTO: 0.4 % — SIGNIFICANT CHANGE UP (ref 0–8)
GLUCOSE SERPL-MCNC: 88 MG/DL — SIGNIFICANT CHANGE UP (ref 70–99)
HCT VFR BLD CALC: 20.6 % — LOW (ref 37–47)
HGB BLD-MCNC: 6.8 G/DL — CRITICAL LOW (ref 12–16)
IMM GRANULOCYTES NFR BLD AUTO: 0.3 % — SIGNIFICANT CHANGE UP (ref 0.1–0.3)
LYMPHOCYTES # BLD AUTO: 1.45 K/UL — SIGNIFICANT CHANGE UP (ref 1.2–3.4)
LYMPHOCYTES # BLD AUTO: 21.1 % — SIGNIFICANT CHANGE UP (ref 20.5–51.1)
MCHC RBC-ENTMCNC: 28.7 PG — SIGNIFICANT CHANGE UP (ref 27–31)
MCHC RBC-ENTMCNC: 33 G/DL — SIGNIFICANT CHANGE UP (ref 32–37)
MCV RBC AUTO: 86.9 FL — SIGNIFICANT CHANGE UP (ref 81–99)
MONOCYTES # BLD AUTO: 0.44 K/UL — SIGNIFICANT CHANGE UP (ref 0.1–0.6)
MONOCYTES NFR BLD AUTO: 6.4 % — SIGNIFICANT CHANGE UP (ref 1.7–9.3)
NEUTROPHILS # BLD AUTO: 4.88 K/UL — SIGNIFICANT CHANGE UP (ref 1.4–6.5)
NEUTROPHILS NFR BLD AUTO: 71.2 % — SIGNIFICANT CHANGE UP (ref 42.2–75.2)
NRBC # BLD: 0 /100 WBCS — SIGNIFICANT CHANGE UP (ref 0–0)
PLATELET # BLD AUTO: 176 K/UL — SIGNIFICANT CHANGE UP (ref 130–400)
POTASSIUM SERPL-MCNC: 4.1 MMOL/L — SIGNIFICANT CHANGE UP (ref 3.5–5)
POTASSIUM SERPL-SCNC: 4.1 MMOL/L — SIGNIFICANT CHANGE UP (ref 3.5–5)
RBC # BLD: 2.37 M/UL — LOW (ref 4.2–5.4)
RBC # FLD: 16.5 % — HIGH (ref 11.5–14.5)
SODIUM SERPL-SCNC: 140 MMOL/L — SIGNIFICANT CHANGE UP (ref 135–146)
TYPE + AB SCN PNL BLD: SIGNIFICANT CHANGE UP
WBC # BLD: 6.86 K/UL — SIGNIFICANT CHANGE UP (ref 4.8–10.8)
WBC # FLD AUTO: 6.86 K/UL — SIGNIFICANT CHANGE UP (ref 4.8–10.8)

## 2018-05-05 RX ORDER — LANOLIN ALCOHOL/MO/W.PET/CERES
5 CREAM (GRAM) TOPICAL AT BEDTIME
Qty: 0 | Refills: 0 | Status: DISCONTINUED | OUTPATIENT
Start: 2018-05-05 | End: 2018-05-07

## 2018-05-05 RX ORDER — DIPHENHYDRAMINE HCL 50 MG
25 CAPSULE ORAL ONCE
Qty: 0 | Refills: 0 | Status: COMPLETED | OUTPATIENT
Start: 2018-05-05 | End: 2018-05-05

## 2018-05-05 RX ADMIN — SODIUM CHLORIDE 50 MILLILITER(S): 9 INJECTION, SOLUTION INTRAVENOUS at 12:09

## 2018-05-05 RX ADMIN — Medication 5 MILLIGRAM(S): at 22:29

## 2018-05-05 RX ADMIN — Medication 25 MILLIGRAM(S): at 22:30

## 2018-05-05 RX ADMIN — PANTOPRAZOLE SODIUM 40 MILLIGRAM(S): 20 TABLET, DELAYED RELEASE ORAL at 17:49

## 2018-05-05 RX ADMIN — SIMVASTATIN 20 MILLIGRAM(S): 20 TABLET, FILM COATED ORAL at 22:29

## 2018-05-05 RX ADMIN — Medication 1 TABLET(S): at 12:08

## 2018-05-05 RX ADMIN — PANTOPRAZOLE SODIUM 40 MILLIGRAM(S): 20 TABLET, DELAYED RELEASE ORAL at 05:17

## 2018-05-05 NOTE — PROGRESS NOTE ADULT - SUBJECTIVE AND OBJECTIVE BOX
GERRY FRIED MRN-[id number]    Hospitalist Note  78 y/o F with PMHx of HLD, Chronic CHF, and chronic Anemia (off anti-platelet medications) admitted for hematochezia  no blood thinners admitted for hematochezia.  The patient was initially admitted to the MICU and receiving 2u PRBC transfusion.  She later underwent colonoscopy and was found to have diverticular disease and AVMs.     Overnight events/Updates: Her Hgb has stabilized at 8.1, though she reported streaks of fresh blood on toilet paper.  She also complained of dizziness while ambulating.    Vital Signs Last 24 Hrs  T(C): 36.3 (05 May 2018 05:28), Max: 37.1 (04 May 2018 21:25)  T(F): 97.3 (05 May 2018 05:28), Max: 98.7 (04 May 2018 21:25)  HR: 86 (05 May 2018 09:43) (75 - 86)  BP: 90/36 (05 May 2018 09:43) (90/36 - 119/59)  BP(mean): 77 (04 May 2018 19:00) (70 - 87)  RR: 18 (05 May 2018 05:28) (18 - 41)  SpO2: 97% (05 May 2018 08:23) (97% - 100%)    Physical Examination:  General: AAO x 3  HEENT: PERRLA, EOMI  CV= S1 & S2 appreciated, holosystolic murmur  Lungs=CTA BL  Abdominal Examination= + BS, Soft, NT/ND  Extremity Examination= No C/C/E    ROS: No chest pain, no shortness of breath.  All other systems reviewed and are within normal limits except for the complaints in the HPI.    MEDICATIONS  (STANDING):  bisacodyl 20 milliGRAM(s) Oral at bedtime  lactated ringers. 1000 milliLiter(s) (50 mL/Hr) IV Continuous <Continuous>  multivitamin 1 Tablet(s) Oral daily  pantoprazole  Injectable 40 milliGRAM(s) IV Push two times a day  simvastatin 20 milliGRAM(s) Oral at bedtime    MEDICATIONS  (PRN):                            8.1    8.39  )-----------( 204      ( 04 May 2018 20:44 )             24.4     05-04    142  |  106  |  15  ----------------------------<  87  3.6   |  24  |  0.7    Ca    8.1<L>      04 May 2018 20:44  Mg     1.8     05-04    TPro  4.8<L>  /  Alb  2.8<L>  /  TBili  0.3  /  DBili  x   /  AST  20  /  ALT  12  /  AlkPhos  65  05-04      Case discussed with housestaff & family  SUNSHINE Flores [id number]

## 2018-05-05 NOTE — PROGRESS NOTE ADULT - SUBJECTIVE AND OBJECTIVE BOX
INTERVAL HPI/OVERNIGHT EVENTS:    78 y/o F with PMHx of HLD, Chronic CHF, and chronic Anemia (off anti-platelet medications) admitted for hematochezia. The patient was initially admitted to the MICU and receiving 2u PRBC transfusion.      5/4/18 egd by dr hoyos showing esophagitis and erosive gastritis.   5/4/18 colonoscopy Dr hoyos good prep: GII hemorrhoids, sigmoid and ascending diverticulosis, avm in cecum and ascending colon, semi pedunculated polyp in hepatic flexure removed by snare after saline injection.     On 5/5 morning, patient admits to having a small bm with red blood noticed, BP dropped to 90 systolic and patient c/o dizziness and nausea, hb dropped to 6.8 s/p 1 u PRBC which resolved her symptoms.      ROS wnl     PAST MEDICAL & SURGICAL HISTORY:  High cholesterol  No significant past surgical history      Home Medications:  hydroCHLOROthiazide 12.5 mg oral capsule: 1 cap(s) orally once a day (02 May 2018 15:53)  simvastatin 20 mg oral tablet: 1 tab(s) orally once a day (at bedtime) (02 May 2018 15:53)      MEDICATIONS  (STANDING):  bisacodyl 20 milliGRAM(s) Oral at bedtime  lactated ringers. 1000 milliLiter(s) (50 mL/Hr) IV Continuous <Continuous>  melatonin 5 milliGRAM(s) Oral at bedtime  multivitamin 1 Tablet(s) Oral daily  pantoprazole  Injectable 40 milliGRAM(s) IV Push two times a day  simvastatin 20 milliGRAM(s) Oral at bedtime    MEDICATIONS  (PRN):      Allergies    lidocaine (Rash)    Intolerances            PHYSICAL EXAM:   Vital Signs:  Vital Signs Last 24 Hrs  T(C): 36.5 (05 May 2018 20:58), Max: 36.9 (05 May 2018 13:37)  T(F): 97.7 (05 May 2018 20:58), Max: 98.4 (05 May 2018 13:37)  HR: 74 (05 May 2018 20:58) (74 - 86)  BP: 116/57 (05 May 2018 20:58) (90/36 - 118/54)  BP(mean): --  RR: 18 (05 May 2018 20:58) (16 - 18)  SpO2: 97% (05 May 2018 08:23) (97% - 97%)  Daily     Daily     GENERAL:  no distress  HEENT:  NC/AT,  anicteric  CHEST:   no increased effort, breath sounds clear  HEART:  Regular rhythm  ABDOMEN:  Soft, non-tender, non-distended, normoactive bowel sounds,  no masses ,no hepato-splenomegaly, no signs of chronic liver disease  EXTEREMITIES:  no cyanosis      LABS:                        6.8    6.86  )-----------( 176      ( 05 May 2018 12:19 )             20.6       Hemoglobin: 6.8 g/dL (05-05-18 @ 12:19)  Hemoglobin: 8.1 g/dL (05-04-18 @ 20:44)  Hemoglobin: 8.0 g/dL (05-04-18 @ 04:39)  Hemoglobin: 8.8 g/dL (05-03-18 @ 21:40)  Hemoglobin: 8.8 g/dL (05-03-18 @ 04:28)  Hemoglobin: 8.8 g/dL (05-03-18 @ 00:54)      05-05    140  |  104  |  16  ----------------------------<  88  4.1   |  24  |  0.7    Ca    8.0<L>      05 May 2018 12:19  Mg     1.8     05-04    TPro  4.8<L>  /  Alb  2.8<L>  /  TBili  0.3  /  DBili  x   /  AST  20  /  ALT  12  /  AlkPhos  65  05-04          Alanine Aminotransferase (ALT/SGPT): 12 U/L (05-04-18 @ 04:39)  Alkaline Phosphatase, Serum: 65 U/L (05-04-18 @ 04:39)  Aspartate Aminotransferase (AST/SGOT): 20 U/L (05-04-18 @ 04:39)            RADIOLOGY & ADDITIONAL TESTS:

## 2018-05-05 NOTE — PROGRESS NOTE ADULT - ASSESSMENT
78 y/o F with PMHx of HLD, Chronic CHF, and chronic Anemia (off anti-platelet medications) admitted for hematochezia. The patient was initially admitted to the MICU and receiving 2u PRBC transfusion. On 5/5 morning, patient admits to having a small bm with red blood noticed, BP dropped to 90 systolic and patient c/o dizziness and nausea, hb dropped to 6.8 s/p 1 u PRBC which resolved her symptoms.     5/4/18 egd by dr hoyos showing esophagitis and erosive gastritis.   5/4/18 colonoscopy Dr hoyos good prep: GII hemorrhoids, sigmoid and ascending diverticulosis, avm in cecum and ascending colon, semi pedunculated polyp in hepatic flexure removed by snare after saline injection.     *Symptomatic anemia   likely 2/2 previous bleeding  cbc q12h   c/w PPI   transfuse as needed   no signs of active GI bleed at current time   will continue with conservative management 78 y/o F with PMHx of HLD, Chronic CHF, and chronic Anemia (off anti-platelet medications) admitted for hematochezia. The patient was initially admitted to the MICU and receiving 2u PRBC transfusion. On 5/5 morning, patient admits to having a small bm with red blood noticed, BP dropped to 90 systolic and patient c/o dizziness and nausea, hb dropped to 6.8 s/p 1 u PRBC which resolved her symptoms.     5/4/18 egd by dr hoyos showing esophagitis and erosive gastritis.   5/4/18 colonoscopy Dr hoyos good prep: GII hemorrhoids, sigmoid and ascending diverticulosis, avm in cecum and ascending colon, semi pedunculated polyp in hepatic flexure removed by snare after saline injection.     *Symptomatic anemia   likely 2/2 previous bleeding  cbc q12h   c/w PPI   transfuse as needed   no signs of active GI bleed at current time   will continue with conservative management for now 78 y/o F with PMHx of HLD, Chronic CHF, and chronic Anemia (off anti-platelet medications) admitted for hematochezia. The patient was initially admitted to the MICU and receiving 2u PRBC transfusion. On 5/5 morning, patient admits to having a small bm with red blood noticed, BP dropped to 90 systolic and patient c/o dizziness and nausea, hb dropped to 6.8 s/p 1 u PRBC which resolved her symptoms.     5/4/18 egd by dr Aparicio showing esophagitis and erosive gastritis.   5/4/18 colonoscopy Dr aparicio good prep: GII hemorrhoids, sigmoid and ascending diverticulosis, avm in cecum and ascending colon, semi pedunculated polyp in hepatic flexure removed by snare after saline injection.     *Symptomatic anemia   likely 2/2 previous bleeding  cbc q12h   c/w PPI   transfuse as needed   no signs of active GI bleed at current time   BP is 105/52, HR 75.  Pt feels much improved and is not dizzy.  Hb today is 7.9 and yesterday it was 6.8.  Pt received 1 U  will continue with conservative management for now 78 y/o F with PMHx of HLD, Chronic CHF, and chronic Anemia (off anti-platelet medications) admitted for hematochezia. The patient was initially admitted to the MICU and receiving 2u PRBC transfusion. On 5/5 morning, patient admits to having a small bm with red blood noticed, BP dropped to 90 systolic and patient c/o dizziness and nausea, hb dropped to 6.8 s/p 1 u PRBC which resolved her symptoms.     5/4/18 egd by dr Aparicio showing esophagitis and erosive gastritis.   5/4/18 colonoscopy Dr aparicio good prep: GII hemorrhoids, sigmoid and ascending diverticulosis, avm in cecum and ascending colon, semi pedunculated polyp in hepatic flexure removed by snare after saline injection.     *Symptomatic anemia   likely 2/2 previous bleeding  cbc q12h   c/w PPI   transfuse as needed   no signs of active GI bleed at current time   BP is 105/52, HR 75.  Pt feels much improved and is not dizzy.  Hb today is 7.9 and yesterday it was 6.8.  Pt received 1 U of prbc  start anusol hc q12h  repeat cbc  will continue with conservative management for now

## 2018-05-05 NOTE — PROGRESS NOTE ADULT - ASSESSMENT
78 y/o F with PMHx of HLD, Chronic CHF, and chronic Anemia (off anti-platelet medications) admitted for hematochezia  no blood thinners admitted for hematochezia.     Hematochezia: likely secondary to AVMs/diverticular bleed.  Hgb remains stable ~8.  Continue Protonix and switch to an oral formulation.  Follow-up with GI-Dr. Aparicio later today.  Repeat CBC for tomorrow.  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  Please observe for signs of volume overload while the patient receives IVF/following blood transfusion  Hypercholesteremia: continue Zocor as directed  DVT prophylaxis: compression stockings to the LE   Physical Therapy evaluation requested

## 2018-05-05 NOTE — PROGRESS NOTE ADULT - ASSESSMENT
76 y/o female with PMHx HLD, CHF, anemia, no blood thinners p/w BRBPR associated with diffuse lower abd pain with BMs, also reporting chest pain and palpitations likely from symptomatic anemia. Guaiac positive, admitted for GIB suspected lower source    LGIB  - s/p 2 units PRBC in ED. If patient becomes symptomatic during transfusion please give lasix 40mg IV x1  - f/u CBC Q12H, transfuse if Hgb <7, maintain active T&S  - Protonix 40mg IV BID  - GI on board  -s/p colonoscopy. f/u report    Chest pain and palpitations- likely symptomatic anemia, Hgb 6.8  - CE neg x1, f/u serial cardiac enzymes  - EKG showed new ~1mm elevation in AVR--> assessed by Cardio, Dr. Rivas and STEMI code canceled EKG does not meet criteria  - Cardio, Dr. Jackson cleared pt for colo    Prolonged QTc : 491  - please avoid Zofran and QT prolonging medications, repeat EKG prior    r/o CHF  - patient denies history, BNP elevated 3189  - hold HCTZ until BP improves, then restart if clinically appropriate. Consider NS IV bolus if patient still hypotensive after transfusion  -Echo: G1DD    HLD  - c/w statin    DVT PPx  - not indicated in setting of GIB    GI PPx  - on Protonix IV BID    Dispo  -from home    FULL CODE 76 y/o female with PMHx HLD, CHF, anemia, no blood thinners p/w BRBPR associated with diffuse lower abd pain with BMs, also reporting chest pain and palpitations likely from symptomatic anemia. Guaiac positive, admitted for GIB suspected lower source    LGIB  - s/p 2 units PRBC in ED. If patient becomes symptomatic during transfusion please give lasix 40mg IV x1  - f/u CBC Q12H, transfuse if Hgb <7, maintain active T&S  - Protonix 40mg IV BID  - GI on board  -s/p colonoscopy.   -tx 1U pRBCs as per hospitalist    Chest pain and palpitations- likely symptomatic anemia, Hgb 6.8  - CE neg x1, f/u serial cardiac enzymes  - EKG showed new ~1mm elevation in AVR--> assessed by Cardio, Dr. Rivas and STEMI code canceled EKG does not meet criteria  - Cardio, Dr. Jackson cleared pt for colo    Prolonged QTc : 491  - please avoid Zofran and QT prolonging medications, repeat EKG prior    r/o CHF  - patient denies history, BNP elevated 3189  - hold HCTZ until BP improves, then restart if clinically appropriate. Consider NS IV bolus if patient still hypotensive after transfusion  -Echo: G1DD    HLD  - c/w statin    DVT PPx  - not indicated in setting of GIB    GI PPx  - on Protonix IV BID    Dispo  -from home    FULL CODE

## 2018-05-05 NOTE — PROGRESS NOTE ADULT - SUBJECTIVE AND OBJECTIVE BOX
SUBJECTIVE:    Patient is a 77y old Female who presents with a chief complaint of GI bleed- BRPBR (02 May 2018 14:53)    Currently admitted to medicine with the primary diagnosis of Gastrointestinal hemorrhage, unspecified gastrointestinal hemorrhage type     Today is hospital day 3d. This morning she is resting comfortably in bed and reports no new issues or overnight events.     PAST MEDICAL & SURGICAL HISTORY  High cholesterol  No significant past surgical history    SOCIAL HISTORY:  Negative for smoking/alcohol/drug use.     ALLERGIES:  lidocaine (Rash)    MEDICATIONS:  STANDING MEDICATIONS  bisacodyl 20 milliGRAM(s) Oral at bedtime  lactated ringers. 1000 milliLiter(s) IV Continuous <Continuous>  multivitamin 1 Tablet(s) Oral daily  pantoprazole  Injectable 40 milliGRAM(s) IV Push two times a day  simvastatin 20 milliGRAM(s) Oral at bedtime    PRN MEDICATIONS    VITALS:   T(F): 97.3  HR: 80  BP: 112/52  RR: 18  SpO2: 98%    LABS:                        8.1    8.39  )-----------( 204      ( 04 May 2018 20:44 )             24.4     05-04    142  |  106  |  15  ----------------------------<  87  3.6   |  24  |  0.7    Ca    8.1<L>      04 May 2018 20:44  Mg     1.8     05-04    TPro  4.8<L>  /  Alb  2.8<L>  /  TBili  0.3  /  DBili  x   /  AST  20  /  ALT  12  /  AlkPhos  65  05-04    CARDIAC MARKERS ( 04 May 2018 04:39 )  x     / 0.03 ng/mL / 63 U/L / x     / x        Troponin T, Serum: 0.03 ng/mL (05-04-18 @ 04:39)  Troponin T, Serum: 0.03 ng/mL (05-03-18 @ 04:28)  Troponin T, Serum: 0.02 ng/mL (05-02-18 @ 21:02)  Troponin T, Serum: 0.02 ng/mL (05-02-18 @ 17:45)  Troponin T, Serum: 0.02 ng/mL (05-02-18 @ 10:32)  Serum Pro-Brain Natriuretic Peptide: 3189 pg/mL (05-02-18 @ 10:32)      PHYSICAL EXAM:  GEN: NAD  LUNGS: Clear to auscultation bilaterally.   HEART: +s1s2 RRR.   ABD: Soft, NT/ND. (+) bs  EXT: no c/c/e. 2+PP, MOORE  NEURO: AAOX3. No focal defecits SUBJECTIVE:    Patient is a 77y old Female who presents with a chief complaint of GI bleed- BRPBR (02 May 2018 14:53)    Currently admitted to medicine with the primary diagnosis of Gastrointestinal hemorrhage, unspecified gastrointestinal hemorrhage type     Today is hospital day 3d. This morning she is resting comfortably in bed and reports one blood BM in AM and squeezing cp    PAST MEDICAL & SURGICAL HISTORY  High cholesterol  No significant past surgical history    SOCIAL HISTORY:  Negative for smoking/alcohol/drug use.     ALLERGIES:  lidocaine (Rash)    MEDICATIONS:  STANDING MEDICATIONS  bisacodyl 20 milliGRAM(s) Oral at bedtime  lactated ringers. 1000 milliLiter(s) IV Continuous <Continuous>  multivitamin 1 Tablet(s) Oral daily  pantoprazole  Injectable 40 milliGRAM(s) IV Push two times a day  simvastatin 20 milliGRAM(s) Oral at bedtime    PRN MEDICATIONS    VITALS:   T(F): 97.3  HR: 80  BP: 112/52  RR: 18  SpO2: 98%    LABS:                        8.1    8.39  )-----------( 204      ( 04 May 2018 20:44 )             24.4     05-04    142  |  106  |  15  ----------------------------<  87  3.6   |  24  |  0.7    Ca    8.1<L>      04 May 2018 20:44  Mg     1.8     05-04    TPro  4.8<L>  /  Alb  2.8<L>  /  TBili  0.3  /  DBili  x   /  AST  20  /  ALT  12  /  AlkPhos  65  05-04    CARDIAC MARKERS ( 04 May 2018 04:39 )  x     / 0.03 ng/mL / 63 U/L / x     / x        Troponin T, Serum: 0.03 ng/mL (05-04-18 @ 04:39)  Troponin T, Serum: 0.03 ng/mL (05-03-18 @ 04:28)  Troponin T, Serum: 0.02 ng/mL (05-02-18 @ 21:02)  Troponin T, Serum: 0.02 ng/mL (05-02-18 @ 17:45)  Troponin T, Serum: 0.02 ng/mL (05-02-18 @ 10:32)  Serum Pro-Brain Natriuretic Peptide: 3189 pg/mL (05-02-18 @ 10:32)      PHYSICAL EXAM:  GEN: NAD  LUNGS: Clear to auscultation bilaterally.   HEART: +s1s2 RRR.   ABD: Soft, NT/ND. (+) bs  EXT: no c/c/e. 2+PP, MOORE  NEURO: AAOX3. No focal defecits

## 2018-05-06 LAB
ANION GAP SERPL CALC-SCNC: 12 MMOL/L — SIGNIFICANT CHANGE UP (ref 7–14)
BASOPHILS # BLD AUTO: 0.05 K/UL — SIGNIFICANT CHANGE UP (ref 0–0.2)
BASOPHILS NFR BLD AUTO: 0.9 % — SIGNIFICANT CHANGE UP (ref 0–1)
BUN SERPL-MCNC: 12 MG/DL — SIGNIFICANT CHANGE UP (ref 10–20)
CALCIUM SERPL-MCNC: 8 MG/DL — LOW (ref 8.5–10.1)
CHLORIDE SERPL-SCNC: 104 MMOL/L — SIGNIFICANT CHANGE UP (ref 98–110)
CO2 SERPL-SCNC: 23 MMOL/L — SIGNIFICANT CHANGE UP (ref 17–32)
CREAT SERPL-MCNC: 0.7 MG/DL — SIGNIFICANT CHANGE UP (ref 0.7–1.5)
EOSINOPHIL # BLD AUTO: 0.14 K/UL — SIGNIFICANT CHANGE UP (ref 0–0.7)
EOSINOPHIL NFR BLD AUTO: 2.4 % — SIGNIFICANT CHANGE UP (ref 0–8)
GLUCOSE SERPL-MCNC: 87 MG/DL — SIGNIFICANT CHANGE UP (ref 70–99)
HCT VFR BLD CALC: 23.9 % — LOW (ref 37–47)
HGB BLD-MCNC: 7.9 G/DL — LOW (ref 12–16)
IMM GRANULOCYTES NFR BLD AUTO: 0.2 % — SIGNIFICANT CHANGE UP (ref 0.1–0.3)
LYMPHOCYTES # BLD AUTO: 1.64 K/UL — SIGNIFICANT CHANGE UP (ref 1.2–3.4)
LYMPHOCYTES # BLD AUTO: 28.7 % — SIGNIFICANT CHANGE UP (ref 20.5–51.1)
MCHC RBC-ENTMCNC: 27.8 PG — SIGNIFICANT CHANGE UP (ref 27–31)
MCHC RBC-ENTMCNC: 33.1 G/DL — SIGNIFICANT CHANGE UP (ref 32–37)
MCV RBC AUTO: 84.2 FL — SIGNIFICANT CHANGE UP (ref 81–99)
MONOCYTES # BLD AUTO: 0.49 K/UL — SIGNIFICANT CHANGE UP (ref 0.1–0.6)
MONOCYTES NFR BLD AUTO: 8.6 % — SIGNIFICANT CHANGE UP (ref 1.7–9.3)
NEUTROPHILS # BLD AUTO: 3.39 K/UL — SIGNIFICANT CHANGE UP (ref 1.4–6.5)
NEUTROPHILS NFR BLD AUTO: 59.2 % — SIGNIFICANT CHANGE UP (ref 42.2–75.2)
NRBC # BLD: 0 /100 WBCS — SIGNIFICANT CHANGE UP (ref 0–0)
PLATELET # BLD AUTO: 177 K/UL — SIGNIFICANT CHANGE UP (ref 130–400)
POTASSIUM SERPL-MCNC: 3.4 MMOL/L — LOW (ref 3.5–5)
POTASSIUM SERPL-SCNC: 3.4 MMOL/L — LOW (ref 3.5–5)
RBC # BLD: 2.84 M/UL — LOW (ref 4.2–5.4)
RBC # FLD: 17.5 % — HIGH (ref 11.5–14.5)
SODIUM SERPL-SCNC: 139 MMOL/L — SIGNIFICANT CHANGE UP (ref 135–146)
WBC # BLD: 5.72 K/UL — SIGNIFICANT CHANGE UP (ref 4.8–10.8)
WBC # FLD AUTO: 5.72 K/UL — SIGNIFICANT CHANGE UP (ref 4.8–10.8)

## 2018-05-06 RX ORDER — DIPHENHYDRAMINE HCL 50 MG
25 CAPSULE ORAL ONCE
Qty: 0 | Refills: 0 | Status: COMPLETED | OUTPATIENT
Start: 2018-05-06 | End: 2018-05-06

## 2018-05-06 RX ORDER — PANTOPRAZOLE SODIUM 20 MG/1
40 TABLET, DELAYED RELEASE ORAL
Qty: 0 | Refills: 0 | Status: DISCONTINUED | OUTPATIENT
Start: 2018-05-06 | End: 2018-05-07

## 2018-05-06 RX ADMIN — SIMVASTATIN 20 MILLIGRAM(S): 20 TABLET, FILM COATED ORAL at 22:12

## 2018-05-06 RX ADMIN — Medication 5 MILLIGRAM(S): at 22:12

## 2018-05-06 RX ADMIN — SODIUM CHLORIDE 50 MILLILITER(S): 9 INJECTION, SOLUTION INTRAVENOUS at 12:50

## 2018-05-06 RX ADMIN — Medication 1 TABLET(S): at 12:00

## 2018-05-06 RX ADMIN — PANTOPRAZOLE SODIUM 40 MILLIGRAM(S): 20 TABLET, DELAYED RELEASE ORAL at 05:16

## 2018-05-06 RX ADMIN — PANTOPRAZOLE SODIUM 40 MILLIGRAM(S): 20 TABLET, DELAYED RELEASE ORAL at 17:20

## 2018-05-06 NOTE — PROGRESS NOTE ADULT - SUBJECTIVE AND OBJECTIVE BOX
GERRY FRIED [id number]    Hospitalist Note  76 y/o F with PMHx of HLD, Chronic CHF, and chronic Anemia (off anti-platelet medications) admitted for hematochezia  no blood thinners admitted for hematochezia.  The patient was initially admitted to the MICU and receiving 2u PRBC transfusion.  Status post colonoscopy with polyp removal    Overnight events/Updates: the patient received a third unit of blood yesterday afternoon.  Her Hgb rebounded from 6.8 to 7.9.  No hematochezia or melena reported.    Vital Signs Last 24 Hrse   T(C): 36.4 (06 May 2018 05:09), Max: 36.8 (05 May 2018 15:50)  T(F): 97.5 (06 May 2018 05:09), Max: 98.2 (05 May 2018 15:50)  HR: 72 (06 May 2018 05:09) (72 - 80)  BP: 112/53 (06 May 2018 05:09) (108/48 - 116/57)  BP(mean): --  RR: 18 (06 May 2018 05:09) (18 - 18)  SpO2: --    Physical Examination:  General: AAO x 3  HEENT: PERRLA, EOMI  CV= S1 & S2 appreciated  Lungs=CTA BL  Abdominal Examination= + BS, Soft, NT/ND  Extremity Examination= No C/C/E    ROS: No chest pain, no shortness of breath.  All other systems reviewed and are within normal limits except for the complaints in the HPI.    MEDICATIONS  (STANDING):  bisacodyl 20 milliGRAM(s) Oral at bedtime  lactated ringers. 1000 milliLiter(s) (50 mL/Hr) IV Continuous <Continuous>  melatonin 5 milliGRAM(s) Oral at bedtime  multivitamin 1 Tablet(s) Oral daily  pantoprazole  Injectable 40 milliGRAM(s) IV Push two times a day  simvastatin 20 milliGRAM(s) Oral at bedtime    MEDICATIONS  (PRN):                            7.9    5.72  )-----------( 177      ( 06 May 2018 06:57 )             23.9     05-06    139  |  104  |  12  ----------------------------<  87  3.4<L>   |  23  |  0.7    Ca    8.0<L>      06 May 2018 06:57        Case discussed with housestaff & family  SUNSHINE Flores [id number]

## 2018-05-06 NOTE — PROGRESS NOTE ADULT - ASSESSMENT
78 y/o F with PMHx of HLD, Chronic CHF, and chronic Anemia (off anti-platelet medications) admitted for hematochezia  no blood thinners admitted for hematochezia.     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  Status post 3u PRBC transfusion.  Hgb improved to 7.9.  Continue Protonix 40mg PO q12.  GI advised conservative management.  Repeat CBC in AM.   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  No evidence of volume overload.  Hypercholesteremia: continue Zocor as directed  DVT prophylaxis: compression stockings to the LE BL  Follow-up with physical therapy

## 2018-05-07 ENCOUNTER — TRANSCRIPTION ENCOUNTER (OUTPATIENT)
Age: 78
End: 2018-05-07

## 2018-05-07 VITALS
RESPIRATION RATE: 18 BRPM | SYSTOLIC BLOOD PRESSURE: 122 MMHG | TEMPERATURE: 98 F | DIASTOLIC BLOOD PRESSURE: 58 MMHG | HEART RATE: 69 BPM

## 2018-05-07 LAB
ANION GAP SERPL CALC-SCNC: 10 MMOL/L — SIGNIFICANT CHANGE UP (ref 7–14)
BASOPHILS # BLD AUTO: 0.03 K/UL — SIGNIFICANT CHANGE UP (ref 0–0.2)
BASOPHILS NFR BLD AUTO: 0.5 % — SIGNIFICANT CHANGE UP (ref 0–1)
BUN SERPL-MCNC: 9 MG/DL — LOW (ref 10–20)
CALCIUM SERPL-MCNC: 8 MG/DL — LOW (ref 8.5–10.1)
CHLORIDE SERPL-SCNC: 107 MMOL/L — SIGNIFICANT CHANGE UP (ref 98–110)
CO2 SERPL-SCNC: 24 MMOL/L — SIGNIFICANT CHANGE UP (ref 17–32)
CREAT SERPL-MCNC: 0.6 MG/DL — LOW (ref 0.7–1.5)
EOSINOPHIL # BLD AUTO: 0.18 K/UL — SIGNIFICANT CHANGE UP (ref 0–0.7)
EOSINOPHIL NFR BLD AUTO: 3.1 % — SIGNIFICANT CHANGE UP (ref 0–8)
GLUCOSE SERPL-MCNC: 87 MG/DL — SIGNIFICANT CHANGE UP (ref 70–99)
HCT VFR BLD CALC: 23.7 % — LOW (ref 37–47)
HGB BLD-MCNC: 8 G/DL — LOW (ref 12–16)
IMM GRANULOCYTES NFR BLD AUTO: 0.3 % — SIGNIFICANT CHANGE UP (ref 0.1–0.3)
LYMPHOCYTES # BLD AUTO: 1.69 K/UL — SIGNIFICANT CHANGE UP (ref 1.2–3.4)
LYMPHOCYTES # BLD AUTO: 29 % — SIGNIFICANT CHANGE UP (ref 20.5–51.1)
MCHC RBC-ENTMCNC: 28.6 PG — SIGNIFICANT CHANGE UP (ref 27–31)
MCHC RBC-ENTMCNC: 33.8 G/DL — SIGNIFICANT CHANGE UP (ref 32–37)
MCV RBC AUTO: 84.6 FL — SIGNIFICANT CHANGE UP (ref 81–99)
MONOCYTES # BLD AUTO: 0.5 K/UL — SIGNIFICANT CHANGE UP (ref 0.1–0.6)
MONOCYTES NFR BLD AUTO: 8.6 % — SIGNIFICANT CHANGE UP (ref 1.7–9.3)
NEUTROPHILS # BLD AUTO: 3.41 K/UL — SIGNIFICANT CHANGE UP (ref 1.4–6.5)
NEUTROPHILS NFR BLD AUTO: 58.5 % — SIGNIFICANT CHANGE UP (ref 42.2–75.2)
NRBC # BLD: 0 /100 WBCS — SIGNIFICANT CHANGE UP (ref 0–0)
PLATELET # BLD AUTO: 178 K/UL — SIGNIFICANT CHANGE UP (ref 130–400)
POTASSIUM SERPL-MCNC: 3.4 MMOL/L — LOW (ref 3.5–5)
POTASSIUM SERPL-SCNC: 3.4 MMOL/L — LOW (ref 3.5–5)
RBC # BLD: 2.8 M/UL — LOW (ref 4.2–5.4)
RBC # FLD: 17.5 % — HIGH (ref 11.5–14.5)
SODIUM SERPL-SCNC: 141 MMOL/L — SIGNIFICANT CHANGE UP (ref 135–146)
SURGICAL PATHOLOGY STUDY: SIGNIFICANT CHANGE UP
SURGICAL PATHOLOGY STUDY: SIGNIFICANT CHANGE UP
WBC # BLD: 5.83 K/UL — SIGNIFICANT CHANGE UP (ref 4.8–10.8)
WBC # FLD AUTO: 5.83 K/UL — SIGNIFICANT CHANGE UP (ref 4.8–10.8)

## 2018-05-07 RX ORDER — HYDROCORTISONE 1 %
1 OINTMENT (GRAM) TOPICAL
Qty: 0 | Refills: 0 | Status: DISCONTINUED | OUTPATIENT
Start: 2018-05-07 | End: 2018-05-07

## 2018-05-07 RX ORDER — PANTOPRAZOLE SODIUM 20 MG/1
1 TABLET, DELAYED RELEASE ORAL
Qty: 60 | Refills: 0
Start: 2018-05-07 | End: 2018-06-05

## 2018-05-07 RX ORDER — HYDROCORTISONE 1 %
1 OINTMENT (GRAM) TOPICAL
Qty: 28 | Refills: 0
Start: 2018-05-07 | End: 2018-05-20

## 2018-05-07 RX ADMIN — PANTOPRAZOLE SODIUM 40 MILLIGRAM(S): 20 TABLET, DELAYED RELEASE ORAL at 05:38

## 2018-05-07 NOTE — DIETITIAN INITIAL EVALUATION ADULT. - ORAL INTAKE PTA
Pt reports persistent good appetite with PO intake 3 meals/day w. occasional snacks. Pt reports having persistent episodes of diarrhea PTA but not directly associated w. food as diarrhea would occur at sporadic times throughout day, not directly after eating./good

## 2018-05-07 NOTE — PROGRESS NOTE ADULT - SUBJECTIVE AND OBJECTIVE BOX
GERRY FRIED [id number]    Hospitalist Note  78 y/o F with PMHx of HLD, Chronic CHF, and chronic Anemia (off anti-platelet medications) admitted for hematochezia  no blood thinners admitted for hematochezia.  The patient was initially admitted to the MICU and receiving 2u PRBC transfusion.  Status post colonoscopy with polyp removal    Overnight events/Updates: status post 3u PRBC transfusion.  Her Hgb rebounded from 6.8 to 7.9.  No further melena reported.  The patient is comfortable.    Vital Signs Last 24 Hrs  T(C): 36.2 (07 May 2018 05:18), Max: 36.2 (07 May 2018 05:18)  T(F): 97.1 (07 May 2018 05:18), Max: 97.1 (07 May 2018 05:18)  HR: 69 (07 May 2018 05:18) (69 - 75)  BP: 105/51 (07 May 2018 05:18) (105/51 - 128/54)  BP(mean): --  RR: 18 (07 May 2018 05:18) (18 - 18)  SpO2: --    Physical Examination:  General: AAO x 3  HEENT: PERRLA, EOMI  CV= S1 & S2 appreciated  Lungs=CTA BL  Abdominal Examination= + BS, Soft, NT/ND  Extremity Examination= No C/C/E    ROS: No chest pain, no shortness of breath.  All other systems reviewed and are within normal limits except for the complaints in the HPI.    MEDICATIONS  (STANDING):  bisacodyl 20 milliGRAM(s) Oral at bedtime  hydrocortisone hemorrhoidal Suppository 1 Suppository(s) Rectal two times a day  melatonin 5 milliGRAM(s) Oral at bedtime  multivitamin 1 Tablet(s) Oral daily  pantoprazole    Tablet 40 milliGRAM(s) Oral two times a day  simvastatin 20 milliGRAM(s) Oral at bedtime    MEDICATIONS  (PRN):                            8.0    5.83  )-----------( 178      ( 07 May 2018 05:54 )             23.7     05-07    141  |  107  |  9<L>  ----------------------------<  87  3.4<L>   |  24  |  0.6<L>    Ca    8.0<L>      07 May 2018 05:54        Case discussed with housestaff & family  SUNSHINE Flores [id number]

## 2018-05-07 NOTE — PROGRESS NOTE ADULT - ASSESSMENT
78 y/o F with PMHx of HLD, Chronic CHF, and chronic Anemia (off anti-platelet medications) admitted for hematochezia  no blood thinners admitted for hematochezia.     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  Status post 3u PRBC transfusion.  Hgb stable @ 8.  Continue Protonix 40mg PO q12.  Follow-up with Dr. Aparicio as outpatient.  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  No evidence of volume overload.  Hypercholesteremia: continue Zocor as directed  DVT prophylaxis: compression stockings to the LE BL  The patient was able to ambulate indendently; discharge home; time spent = 35 minutes

## 2018-05-07 NOTE — PROGRESS NOTE ADULT - PROVIDER SPECIALTY LIST ADULT
Cardiology
Critical Care
Gastroenterology
Hospitalist
Internal Medicine
Physiatry
Internal Medicine

## 2018-05-07 NOTE — DISCHARGE NOTE ADULT - CARE PROVIDER_API CALL
Deshawn Aparicio), Gastroenterology; Internal Medicine  [address] [place] [address]  Phone: [phone]  Fax: [phone] Deshawn Aparicio), Gastroenterology; Internal Medicine  [address] [place] [address]  Phone: [phone]  Fax: [phone]    Gatito Lilly), [address] [place] [address]  Phone: [phone]  Fax: [phone]

## 2018-05-07 NOTE — DISCHARGE NOTE ADULT - PLAN OF CARE
resolution and control of symptoms with close follow up Please take your protonix twice a day as well as the anusol suppository.  Please follow up with Dr. Aparicio in two weeks.  Avoid NSAIDS Resolution and control of symptoms with close follow up Please take your Protonix twice a day as well as the anusol suppository.  Please follow up with Dr. Aparicio in two weeks.  Avoid NSAIDS

## 2018-05-07 NOTE — PROGRESS NOTE ADULT - SUBJECTIVE AND OBJECTIVE BOX
SUBJECTIVE:    Patient is a 77y old Female who presents with a chief complaint of GI bleed- BRPBR (02 May 2018 14:53)    Currently admitted to medicine with the primary diagnosis of Gastrointestinal hemorrhage, unspecified gastrointestinal hemorrhage type     Today is hospital day 5d. This morning she is resting comfortably in bed and reports no new issues or overnight events.     PAST MEDICAL & SURGICAL HISTORY  High cholesterol  No significant past surgical history    SOCIAL HISTORY:    ALLERGIES:  lidocaine (Rash)    MEDICATIONS:  STANDING MEDICATIONS  bisacodyl 20 milliGRAM(s) Oral at bedtime  melatonin 5 milliGRAM(s) Oral at bedtime  multivitamin 1 Tablet(s) Oral daily  pantoprazole    Tablet 40 milliGRAM(s) Oral two times a day  simvastatin 20 milliGRAM(s) Oral at bedtime    PRN MEDICATIONS    VITALS:   T(F): 97.1  HR: 69  BP: 105/51  RR: 18  SpO2: --    LABS:                        8.0    5.83  )-----------( 178      ( 07 May 2018 05:54 )             23.7     05-07    141  |  107  |  9<L>  ----------------------------<  87  3.4<L>   |  24  |  0.6<L>    Ca    8.0<L>      07 May 2018 05:54      PHYSICAL EXAM:  GEN: No acute distress  LUNGS: Clear to auscultation bilaterally   HEART: S1/S2 present. RRR. harsh systolic murmur  ABD: Soft, non-tender, non-distended. Bowel sounds present  EXT: NC/NC/NE/2+PP/MOORE  NEURO: AAOX3    HOME MEDICATIONS:  Home Medications:  hydroCHLOROthiazide 12.5 mg oral capsule (05-02-18)  simvastatin 20 mg oral tablet (05-02-18)

## 2018-05-07 NOTE — DISCHARGE NOTE ADULT - MEDICATION SUMMARY - MEDICATIONS TO TAKE
I will START or STAY ON the medications listed below when I get home from the hospital:    simvastatin 20 mg oral tablet  -- 1 tab(s) by mouth once a day (at bedtime)  -- Indication: For Dyslipidemia    hydrocortisone 25 mg rectal suppository  -- 1 suppository(ies) rectally 2 times a day  -- Indication: For Hemorrhoids    hydroCHLOROthiazide 12.5 mg oral capsule  -- 1 cap(s) by mouth once a day  -- Indication: For HTN    pantoprazole 40 mg oral delayed release tablet  -- 1 tab(s) by mouth 2 times a day  -- Indication: For GASTRITIS

## 2018-05-07 NOTE — PROGRESS NOTE ADULT - SUBJECTIVE AND OBJECTIVE BOX
She has been transfused 3 units.   at this point she is ambulating nicely.  She does not require PT at this time.

## 2018-05-07 NOTE — DIETITIAN INITIAL EVALUATION ADULT. - ENERGY NEEDS
total kcal = 2244-4417 kcal/day (MSJ x 1.2-1.3).  total protein = 70-84 g/day (1.0-1.2 g/kd ABW).   total fluid = 1 mL : 1 kcal

## 2018-05-07 NOTE — PROGRESS NOTE ADULT - ASSESSMENT
76 y/o F with PMHx of HLD, Chronic CHF, and chronic Anemia (off anti-platelet medications) admitted for hematochezia  no blood thinners admitted for hematochezia.     Hematochezia - s/p colonoscopy and EGD on 5/4 showed esophagitis and gastritis, Hemorrhoids, diverticulosis and AVM's and polyps  -s/p 3U PRBC transfusion, CBC Q12H  -Protonix 40mg PO q12  -GI following, rec's anusol Q12H    Valvular CHF - echocardiogram revealed diastolic dysfunction and severe AS.  No evidence of volume overload.    DVT ppx - compression stockings  Dispo:home today after patient ambulates

## 2018-05-07 NOTE — DISCHARGE NOTE ADULT - CARE PLAN
Principal Discharge DX:	Gastrointestinal hemorrhage, unspecified gastrointestinal hemorrhage type  Goal:	resolution and control of symptoms with close follow up  Assessment and plan of treatment:	Please take your protonix twice a day as well as the anusol suppository.  Please follow up with Dr. Aparicio in two weeks.  Avoid NSAIDS

## 2018-05-07 NOTE — DISCHARGE NOTE ADULT - HOSPITAL COURSE
Patient arrived in the ED with low hg and required 3U PRBC transfusions during her stay.  She has chronic anemia.  She had an EGD and colonoscopy which showed diverticulosis, polyps, gastritis and esophagitis and polyp.  Patient was stabilized and cleared for discharge 76 y/o F with PMHx of HLD, Chronic CHF, and chronic Anemia (off anti-platelet medications) admitted for hematochezia.  She has chronic anemia.  The patient received a total of 3u PRBC.  She had an EGD and colonoscopy which showed diverticulosis, polyps, gastritis and esophagitis and polyp.  Patient was stabilized and cleared for discharge.

## 2018-05-07 NOTE — DISCHARGE NOTE ADULT - PATIENT PORTAL LINK FT
You can access the [facility] Patient Portal, offered by Health system, by registering with the following website: [email or web address]

## 2018-05-07 NOTE — DIETITIAN INITIAL EVALUATION ADULT. - DIET TYPE
Pt has been NPO/clears since admission (x6 days). Reported that she has been tolerating clears well w.out abd pain/discomfort. Received solid food at breakfast this AM and tolerated rice Krispies with milk and 1/2 banana but that was all pt was able to consume. Denied discomfort/pain with food. Defers nutrition supplements at this time./clear fluid

## 2018-05-07 NOTE — DIETITIAN INITIAL EVALUATION ADULT. - OTHER INFO
Pt presented with intermittent BRBPR x 4 days. Primary dx: GI hemorrhage, unspecified type. Hospital course complicated Hematochezia - s/p colonoscopy and EGD on 5/4 revealing esophagitis and gastritis, Hemorrhoids, diverticulosis and AVM's and polyps. Pt was given 3 units PRBC. Valvular CHF-echo showed diastolic dysfunction and severe AS. DVT ppx. Reason for assessment: NPO day 6.

## 2018-05-07 NOTE — DIETITIAN INITIAL EVALUATION ADULT. - FACTORS AFF FOOD INTAKE
+LBM 5/6 but no longer diarrhea. Denies abd pain/discomfort or difficulty chewing/swallowing./persistent diarrhea

## 2018-05-09 DIAGNOSIS — I35.0 NONRHEUMATIC AORTIC (VALVE) STENOSIS: ICD-10-CM

## 2018-05-09 DIAGNOSIS — I50.32 CHRONIC DIASTOLIC (CONGESTIVE) HEART FAILURE: ICD-10-CM

## 2018-05-09 DIAGNOSIS — K21.0 GASTRO-ESOPHAGEAL REFLUX DISEASE WITH ESOPHAGITIS: ICD-10-CM

## 2018-05-09 DIAGNOSIS — K55.21 ANGIODYSPLASIA OF COLON WITH HEMORRHAGE: ICD-10-CM

## 2018-05-09 DIAGNOSIS — K92.2 GASTROINTESTINAL HEMORRHAGE, UNSPECIFIED: ICD-10-CM

## 2018-05-09 DIAGNOSIS — I45.81 LONG QT SYNDROME: ICD-10-CM

## 2018-05-09 DIAGNOSIS — K63.5 POLYP OF COLON: ICD-10-CM

## 2018-05-09 DIAGNOSIS — D62 ACUTE POSTHEMORRHAGIC ANEMIA: ICD-10-CM

## 2018-05-09 DIAGNOSIS — E78.5 HYPERLIPIDEMIA, UNSPECIFIED: ICD-10-CM

## 2018-05-09 DIAGNOSIS — I10 ESSENTIAL (PRIMARY) HYPERTENSION: ICD-10-CM

## 2018-05-09 DIAGNOSIS — K57.31 DIVERTICULOSIS OF LARGE INTESTINE WITHOUT PERFORATION OR ABSCESS WITH BLEEDING: ICD-10-CM

## 2018-05-09 DIAGNOSIS — I95.9 HYPOTENSION, UNSPECIFIED: ICD-10-CM

## 2018-05-09 DIAGNOSIS — K29.00 ACUTE GASTRITIS WITHOUT BLEEDING: ICD-10-CM

## 2018-05-09 DIAGNOSIS — K64.1 SECOND DEGREE HEMORRHOIDS: ICD-10-CM

## 2018-05-09 LAB
CULTURE RESULTS: SIGNIFICANT CHANGE UP
SPECIMEN SOURCE: SIGNIFICANT CHANGE UP

## 2019-04-12 ENCOUNTER — OUTPATIENT (OUTPATIENT)
Dept: OUTPATIENT SERVICES | Facility: HOSPITAL | Age: 79
LOS: 1 days | Discharge: HOME | End: 2019-04-12
Payer: MEDICARE

## 2019-04-12 DIAGNOSIS — Z12.31 ENCOUNTER FOR SCREENING MAMMOGRAM FOR MALIGNANT NEOPLASM OF BREAST: ICD-10-CM

## 2019-04-12 PROBLEM — E78.00 PURE HYPERCHOLESTEROLEMIA, UNSPECIFIED: Chronic | Status: ACTIVE | Noted: 2018-05-02

## 2019-04-12 PROCEDURE — 77067 SCR MAMMO BI INCL CAD: CPT | Mod: 26

## 2019-04-12 PROCEDURE — 77063 BREAST TOMOSYNTHESIS BI: CPT | Mod: 26

## 2019-06-02 ENCOUNTER — EMERGENCY (EMERGENCY)
Facility: HOSPITAL | Age: 79
LOS: 0 days | Discharge: HOME | End: 2019-06-03
Attending: EMERGENCY MEDICINE | Admitting: EMERGENCY MEDICINE
Payer: MEDICARE

## 2019-06-02 VITALS
OXYGEN SATURATION: 95 % | SYSTOLIC BLOOD PRESSURE: 128 MMHG | DIASTOLIC BLOOD PRESSURE: 62 MMHG | TEMPERATURE: 98 F | HEART RATE: 88 BPM

## 2019-06-02 DIAGNOSIS — R06.02 SHORTNESS OF BREATH: ICD-10-CM

## 2019-06-02 DIAGNOSIS — R07.89 OTHER CHEST PAIN: ICD-10-CM

## 2019-06-02 DIAGNOSIS — K92.2 GASTROINTESTINAL HEMORRHAGE, UNSPECIFIED: ICD-10-CM

## 2019-06-02 DIAGNOSIS — R01.1 CARDIAC MURMUR, UNSPECIFIED: ICD-10-CM

## 2019-06-02 DIAGNOSIS — M79.661 PAIN IN RIGHT LOWER LEG: ICD-10-CM

## 2019-06-02 DIAGNOSIS — R07.9 CHEST PAIN, UNSPECIFIED: ICD-10-CM

## 2019-06-02 DIAGNOSIS — R60.0 LOCALIZED EDEMA: ICD-10-CM

## 2019-06-02 DIAGNOSIS — E78.5 HYPERLIPIDEMIA, UNSPECIFIED: ICD-10-CM

## 2019-06-02 DIAGNOSIS — D64.9 ANEMIA, UNSPECIFIED: ICD-10-CM

## 2019-06-02 DIAGNOSIS — Z87.891 PERSONAL HISTORY OF NICOTINE DEPENDENCE: ICD-10-CM

## 2019-06-02 DIAGNOSIS — M66.0 RUPTURE OF POPLITEAL CYST: ICD-10-CM

## 2019-06-02 LAB
ALBUMIN SERPL ELPH-MCNC: 4 G/DL — SIGNIFICANT CHANGE UP (ref 3.5–5.2)
ALP SERPL-CCNC: 102 U/L — SIGNIFICANT CHANGE UP (ref 30–115)
ALT FLD-CCNC: 15 U/L — SIGNIFICANT CHANGE UP (ref 0–41)
ANION GAP SERPL CALC-SCNC: 16 MMOL/L — HIGH (ref 7–14)
APPEARANCE UR: CLEAR — SIGNIFICANT CHANGE UP
AST SERPL-CCNC: 24 U/L — SIGNIFICANT CHANGE UP (ref 0–41)
BASE EXCESS BLDV CALC-SCNC: 3.1 MMOL/L — HIGH (ref -2–2)
BILIRUB SERPL-MCNC: 0.3 MG/DL — SIGNIFICANT CHANGE UP (ref 0.2–1.2)
BILIRUB UR-MCNC: NEGATIVE — SIGNIFICANT CHANGE UP
BUN SERPL-MCNC: 20 MG/DL — SIGNIFICANT CHANGE UP (ref 10–20)
CA-I SERPL-SCNC: 1.25 MMOL/L — SIGNIFICANT CHANGE UP (ref 1.12–1.3)
CALCIUM SERPL-MCNC: 9.6 MG/DL — SIGNIFICANT CHANGE UP (ref 8.5–10.1)
CHLORIDE SERPL-SCNC: 103 MMOL/L — SIGNIFICANT CHANGE UP (ref 98–110)
CO2 SERPL-SCNC: 22 MMOL/L — SIGNIFICANT CHANGE UP (ref 17–32)
COLOR SPEC: YELLOW — SIGNIFICANT CHANGE UP
CREAT SERPL-MCNC: 1 MG/DL — SIGNIFICANT CHANGE UP (ref 0.7–1.5)
DIFF PNL FLD: ABNORMAL
EPI CELLS # UR: ABNORMAL /HPF
GAS PNL BLDV: 140 MMOL/L — SIGNIFICANT CHANGE UP (ref 136–145)
GAS PNL BLDV: SIGNIFICANT CHANGE UP
GLUCOSE SERPL-MCNC: 136 MG/DL — HIGH (ref 70–99)
GLUCOSE UR QL: NEGATIVE MG/DL — SIGNIFICANT CHANGE UP
HCO3 BLDV-SCNC: 28 MMOL/L — SIGNIFICANT CHANGE UP (ref 22–29)
HCT VFR BLD CALC: 34.3 % — LOW (ref 37–47)
HCT VFR BLDA CALC: 36.1 % — SIGNIFICANT CHANGE UP (ref 34–44)
HGB BLD CALC-MCNC: 11.8 G/DL — LOW (ref 14–18)
HGB BLD-MCNC: 11.3 G/DL — LOW (ref 12–16)
INR BLD: 1.06 RATIO — SIGNIFICANT CHANGE UP (ref 0.65–1.3)
KETONES UR-MCNC: NEGATIVE — SIGNIFICANT CHANGE UP
LACTATE BLDV-MCNC: 1.1 MMOL/L — SIGNIFICANT CHANGE UP (ref 0.5–1.6)
LEUKOCYTE ESTERASE UR-ACNC: ABNORMAL
MCHC RBC-ENTMCNC: 28.8 PG — SIGNIFICANT CHANGE UP (ref 27–31)
MCHC RBC-ENTMCNC: 32.9 G/DL — SIGNIFICANT CHANGE UP (ref 32–37)
MCV RBC AUTO: 87.5 FL — SIGNIFICANT CHANGE UP (ref 81–99)
NITRITE UR-MCNC: NEGATIVE — SIGNIFICANT CHANGE UP
NRBC # BLD: 0 /100 WBCS — SIGNIFICANT CHANGE UP (ref 0–0)
NT-PROBNP SERPL-SCNC: 1864 PG/ML — HIGH (ref 0–300)
PCO2 BLDV: 45 MMHG — SIGNIFICANT CHANGE UP (ref 41–51)
PH BLDV: 7.41 — SIGNIFICANT CHANGE UP (ref 7.26–7.43)
PH UR: 6 — SIGNIFICANT CHANGE UP (ref 5–8)
PLATELET # BLD AUTO: 200 K/UL — SIGNIFICANT CHANGE UP (ref 130–400)
PO2 BLDV: 29 MMHG — SIGNIFICANT CHANGE UP (ref 20–40)
POTASSIUM BLDV-SCNC: 3.6 MMOL/L — SIGNIFICANT CHANGE UP (ref 3.3–5.6)
POTASSIUM SERPL-MCNC: 4.1 MMOL/L — SIGNIFICANT CHANGE UP (ref 3.5–5)
POTASSIUM SERPL-SCNC: 4.1 MMOL/L — SIGNIFICANT CHANGE UP (ref 3.5–5)
PROT SERPL-MCNC: 6.6 G/DL — SIGNIFICANT CHANGE UP (ref 6–8)
PROT UR-MCNC: NEGATIVE MG/DL — SIGNIFICANT CHANGE UP
PROTHROM AB SERPL-ACNC: 12.2 SEC — SIGNIFICANT CHANGE UP (ref 9.95–12.87)
RBC # BLD: 3.92 M/UL — LOW (ref 4.2–5.4)
RBC # FLD: 14.7 % — HIGH (ref 11.5–14.5)
RBC CASTS # UR COMP ASSIST: NEGATIVE — SIGNIFICANT CHANGE UP
SAO2 % BLDV: 52 % — SIGNIFICANT CHANGE UP
SODIUM SERPL-SCNC: 141 MMOL/L — SIGNIFICANT CHANGE UP (ref 135–146)
SP GR SPEC: 1.01 — SIGNIFICANT CHANGE UP (ref 1.01–1.03)
TROPONIN T SERPL-MCNC: 0.01 NG/ML — SIGNIFICANT CHANGE UP
UROBILINOGEN FLD QL: 0.2 MG/DL — SIGNIFICANT CHANGE UP (ref 0.2–0.2)
WBC # BLD: 8.44 K/UL — SIGNIFICANT CHANGE UP (ref 4.8–10.8)
WBC # FLD AUTO: 8.44 K/UL — SIGNIFICANT CHANGE UP (ref 4.8–10.8)
WBC UR QL: ABNORMAL /HPF

## 2019-06-02 PROCEDURE — 99218: CPT

## 2019-06-02 PROCEDURE — 93010 ELECTROCARDIOGRAM REPORT: CPT | Mod: 77

## 2019-06-02 PROCEDURE — 71045 X-RAY EXAM CHEST 1 VIEW: CPT | Mod: 26

## 2019-06-02 PROCEDURE — 93010 ELECTROCARDIOGRAM REPORT: CPT

## 2019-06-02 PROCEDURE — 93970 EXTREMITY STUDY: CPT | Mod: 26

## 2019-06-02 RX ORDER — ASPIRIN/CALCIUM CARB/MAGNESIUM 324 MG
325 TABLET ORAL ONCE
Refills: 0 | Status: COMPLETED | OUTPATIENT
Start: 2019-06-02 | End: 2019-06-03

## 2019-06-02 RX ORDER — SIMVASTATIN 20 MG/1
20 TABLET, FILM COATED ORAL ONCE
Refills: 0 | Status: COMPLETED | OUTPATIENT
Start: 2019-06-02 | End: 2019-06-03

## 2019-06-02 NOTE — ED ADULT NURSE REASSESSMENT NOTE - NS ED NURSE REASSESS COMMENT FT1
Presents to OBS unit with r calf swelling, venous doppler of bilateral lower extremities completed and pending radiology report. Patient aware of plan of care and in agreement. No need verbalized at this time.

## 2019-06-02 NOTE — ED ADULT NURSE NOTE - OBJECTIVE STATEMENT
presented to the ED w one day right calf pain, w swelling a/w intermittent chest pain, dyspnea on exertion for "some time"

## 2019-06-02 NOTE — ED CDU PROVIDER INITIAL DAY NOTE - OBJECTIVE STATEMENT
77 yo F with PMHx of lower GI bleed, anemia, HLD, heart murmur, and CHF? (pt denies but has it in medical hx) presents to the ED c/o mild intermittent midsternal chest pain that radiates into both arms x several weeks and pain behind right knee x 1 day. Pt describes the chest pain as tightness and the pain behind the knee as sharp. Pt went to PMD who told her to follow-up with her cardiologist, Dr. Arenas. Pt admits to mild associated SOB. She denies other complaints. Pt denies fever, chills, nausea, vomiting, abdominal pain, diarrhea, headache, dizziness, weakness, back pain, LOC, trauma, urinary symptoms, cough, recent travel, recent surgery.

## 2019-06-02 NOTE — ED CDU PROVIDER INITIAL DAY NOTE - PHYSICAL EXAMINATION
VITAL SIGNS: I have reviewed nursing notes and confirm.  CONSTITUTIONAL: Well-developed; well-nourished; in no acute distress.  SKIN: Skin exam is warm and dry, no acute rash.  HEAD: Normocephalic; atraumatic.  EYES: Conjunctiva and sclera clear.  ENT: No nasal discharge; airway clear.  CARD: S1, S2 normal; no gallops, or rubs. (+) systolic murmur. Regular rate and rhythm.  RESP: No wheezes, rales or rhonchi. Speaking in full sentences.   ABD: Normal bowel sounds; soft; non-distended; non-tender; No rebound or guarding.   EXT: Normal ROM. (+) TTP to posterior aspect of right knee with mild swelling. DP 2+.   NEURO: Alert, oriented. Grossly unremarkable. No focal deficits.

## 2019-06-02 NOTE — ED PROVIDER NOTE - NS ED ROS FT
Constitutional: No fever, chills.  Eyes: No visual changes.  ENT: No hearing changes.  Neck: No neck pain or stiffness.  Cardiovascular: + chest pain/sob  Pulmonary: No SOB, cough. No hemoptysis.  Abdominal:  No nausea, vomiting, diarrhea.  : No dysuria, frequency.  Neuro: No headache, syncope, dizziness.  MS: see hpi  Psych: No suicidal ideations.

## 2019-06-02 NOTE — ED CDU PROVIDER INITIAL DAY NOTE - NS ED ROS FT
Review of Systems  Constitutional:  No fever, chills.  Eyes:  No visual changes, eye pain, or discharge.  ENMT:  No hearing changes, pain, or discharge. No nasal congestion, discharge, or bleeding. No throat pain, swelling, or difficulty swallowing.  Cardiac:  No palpitations, syncope. (+) chest pain  Respiratory:  No cough. No hemoptysis. (+) SOB  GI:  No nausea, vomiting, diarrhea, or abdominal pain.   :  No dysuria, hematuria, frequency, or burning.   MS:  No back pain. (+) pain behind right knee  Skin:  No skin rash, pruritis, jaundice, or lesions.  Neuro:  No headache, dizziness, loss of sensation, or focal weakness.  No change in mental status.   Endocrine: No history of thyroid disease or diabetes.

## 2019-06-02 NOTE — ED PROVIDER NOTE - PHYSICAL EXAMINATION
Constitutional: Well developed, well nourished. NAD  Head: Normocephalic, atraumatic.  Eyes: PERRL, EOMI.  ENT: No nasal discharge. Mucous membranes dry.  Neck: Supple. Painless ROM.  Cardiovascular:  Regular rate and rhythm. + systolic murmur 3/6;  Pulmonary:  Lungs clear to auscultation bilaterally.   Abdominal: Soft. Nondistended. No rebound, guarding, rigidity.  Extremities. Pelvis stable. + right leg calf pain, mild redness, and swelling; pulses dp/pt, popliteal bilat equal  Skin: No rashes, cyanosis.  Neuro: AAOx3.;   Psych: Normal mood. Normal affect.

## 2019-06-02 NOTE — ED PROVIDER NOTE - CLINICAL SUMMARY MEDICAL DECISION MAKING FREE TEXT BOX
pw chest pain, SOB, exertional limitation and rle swelling and color change - US supports bakers cyst as cause of leg symptoms. Patient to be placed into observation status. There is a lack of diagnostic certainty, where a more precise diagnosis could decide inpatient admission or discharge to home, and/or need for therapeutic intensity, where extensive therapy has a reasonable possibility of abating the patient's presenting condition, and thereby prevents inpatient admission.

## 2019-06-02 NOTE — ED PROVIDER NOTE - OBJECTIVE STATEMENT
78 yold female to Ed Pmhx HLD, anemia 2t2 lower GI Bleed, ?CHF, Murmur c/o right lower extremity swelling and pain behind right calf and knee x 1 day; pt also c/o intermittent CP described as tightness, mild sob, joint pains x weeks; pt seen by pmd and referred to Dr. Cruz 6/29; pt last workup 1 yr ago stress test pt unsure of results;

## 2019-06-02 NOTE — ED PROVIDER NOTE - PROGRESS NOTE DETAILS
I personally evaluated the patient. I reviewed the Physician Assistant’s note (as assigned above), and agree with the findings and plan except as documented in my note.   77 y/o F PMH HTN, hyperlipidemia, prior GI bleed p/w R calf pain and RLE swelling x1 day c/o intermittent SOB and CP, worsening with exertion, relieved by rest. No palpitation. No n/v/d. No AC. No trauma. Exam: NAD, NCAT, HEENT: mmm, EOMI, PERRLA, Neck: supple, nontender, nl ROM, Heart: RRR, no murmur. Extremities: RLE diffuse enlargement, skin reddening and darkening, no pitting, tender to proximal and popliteal areas Lungs: BCTA, no signs of increased WOB, Abd: NTND, no guarding or rebound, no hernia palpated, no CVAT. MSK: chest, back, and ext nontender, nl rom, no deformity. Neuro: A&Ox3, normal strength, nl sensation throughout, normal speech. A/P: Concern for DVT +/- unstable angina vs embolic events, labs, imaging sx control, and reassess. I personally evaluated the patient. I reviewed the Physician Assistant’s note (as assigned above), and agree with the findings and plan except as documented in my note.   79 y/o F PMH HTN, hyperlipidemia, prior GI bleed p/w R calf pain and RLE swelling x1 day c/o intermittent SOB and CP, worsening with exertion, relieved by rest. No palpitations. No n/v/d. No AC. No trauma. Exam: NAD, NCAT, HEENT: mmm, EOMI, PERRLA, Neck: supple, nontender, nl ROM, Heart: RRR, no murmur. Extremities: RLE diffuse enlargement, skin reddening and darkening, no pitting, tender to proximal and popliteal areas Lungs: BCTA, no signs of increased WOB, Abd: NTND, no guarding or rebound, no hernia palpated, no CVAT. MSK: chest, back, and ext nontender, nl rom, no deformity. Neuro: A&Ox3, normal strength, nl sensation throughout, normal speech. A/P: Concern for DVT +/- unstable angina vs embolic events, labs, imaging sx control, and reassess.

## 2019-06-02 NOTE — ED ADULT TRIAGE NOTE - CHIEF COMPLAINT QUOTE
pt c.o. right leg swelling/pain that started yesterday associated with intermittent episodes of chest tightness and SOB

## 2019-06-03 VITALS — WEIGHT: 151.9 LBS

## 2019-06-03 LAB — TROPONIN T SERPL-MCNC: 0.01 NG/ML — SIGNIFICANT CHANGE UP

## 2019-06-03 PROCEDURE — 93018 CV STRESS TEST I&R ONLY: CPT

## 2019-06-03 PROCEDURE — 78452 HT MUSCLE IMAGE SPECT MULT: CPT | Mod: 26

## 2019-06-03 PROCEDURE — 93306 TTE W/DOPPLER COMPLETE: CPT | Mod: 26

## 2019-06-03 PROCEDURE — 93016 CV STRESS TEST SUPVJ ONLY: CPT

## 2019-06-03 PROCEDURE — 99217: CPT

## 2019-06-03 RX ORDER — ADENOSINE 3 MG/ML
60 INJECTION INTRAVENOUS ONCE
Refills: 0 | Status: COMPLETED | OUTPATIENT
Start: 2019-06-03 | End: 2019-06-03

## 2019-06-03 RX ADMIN — Medication 325 MILLIGRAM(S): at 00:42

## 2019-06-03 RX ADMIN — ADENOSINE 600 MILLIGRAM(S): 3 INJECTION INTRAVENOUS at 12:03

## 2019-06-03 RX ADMIN — SIMVASTATIN 20 MILLIGRAM(S): 20 TABLET, FILM COATED ORAL at 00:41

## 2019-06-03 NOTE — ED CDU PROVIDER DISPOSITION NOTE - NSFOLLOWUPINSTRUCTIONS_ED_ALL_ED_FT
Follow up with your primary care physician / cardiologist.    Chest Pain    Chest pain can be caused by many different conditions which may or may not be dangerous. Causes include heartburn, lung infections, heart attack, blood clot in lungs, skin infections, strain or damage to muscle, cartilage, or bones, etc. In addition to a history and physical examination, an electrocardiogram (ECG) or other lab tests may have been performed to determine the cause of your chest pain. Follow up with your primary care provider or with a cardiologist as instructed.     SEEK IMMEDIATE MEDICAL CARE IF YOU HAVE ANY OF THE FOLLOWING SYMPTOMS: worsening chest pain, coughing up blood, unexplained back/neck/jaw pain, severe abdominal pain, dizziness or lightheadedness, fainting, shortness of breath, sweaty or clammy skin, vomiting, or racing heart beat. These symptoms may represent a serious problem that is an emergency. Do not wait to see if the symptoms will go away. Get medical help right away. Call 911 and do not drive yourself to the hospital.

## 2019-06-03 NOTE — CONSULT NOTE ADULT - SUBJECTIVE AND OBJECTIVE BOX
Patient is a 78y old  Female who presents with a chief complaint of     HPI:      PAST MEDICAL & SURGICAL HISTORY:  High cholesterol  No significant past surgical history      PREVIOUS DIAGNOSTIC TESTING:      ECHO  FINDINGS:    STRESS  FINDINGS:    CATHETERIZATION  FINDINGS:    MEDICATIONS  (STANDING):  adenosine Injectable (ADENOSCAN) 60 milliGRAM(s) IV Bolus once    MEDICATIONS  (PRN):      FAMILY HISTORY:  No pertinent family history in first degree relatives      SOCIAL HISTORY:  CIGARETTES:    ALCOHOL:    REVIEW OF SYSTEMS:  CONSTITUTIONAL: No fever, weight loss, or fatigue  NECK: No pain or stiffness  RESPIRATORY: No cough, wheezing, chills or hemoptysis; No shortness of breath  CARDIOVASCULAR: No chest pain, palpitations, dizziness, or leg swelling  GASTROINTESTINAL: No abdominal or epigastric pain. No nausea, vomiting, or hematemesis; No diarrhea or constipation. No melena or hematochezia.  GENITOURINARY: No dysuria, frequency, hematuria, or incontinence  NEUROLOGICAL: No headaches, memory loss, loss of strength, numbness, or tremors  SKIN: No itching, burning, rashes, or lesions   ENDOCRINE: No heat or cold intolerance; No hair loss  MUSCULOSKELETAL: No joint pain or swelling; No muscle, back, or extremity pain  HEME/LYMPH: No easy bruising, or bleeding gums          Vital Signs Last 24 Hrs  T(C): 35.5 (2019 08:06), Max: 36.8 (2019 03:33)  T(F): 95.9 (2019 08:06), Max: 98.2 (2019 03:33)  HR: 61 (2019 08:06) (61 - 88)  BP: 140/61 (2019 08:06) (118/56 - 152/61)  BP(mean): --  RR: 18 (2019 08:06) (18 - 18)  SpO2: 96% (2019 03:33) (95% - 97%)        PHYSICAL EXAM:  GENERAL: NAD, well-groomed, well-developed  HEAD:  Atraumatic, Normocephalic  NECK: Supple, No JVD, Normal thyroid  NERVOUS SYSTEM:  Alert & Oriented X3, Good concentration  CHEST/LUNG: Clear to percussion bilaterally; No rales, rhonchi, wheezing, or rubs  HEART: Regular rate and rhythm; No murmurs, rubs, or gallops  ABDOMEN: Soft, Nontender, Nondistended; Bowel sounds present  EXTREMITIES:  2+ Peripheral Pulses, No clubbing, cyanosis, or edema  SKIN: No rashes or lesions    INTERPRETATION OF TELEMETRY:    ECG:    I&O's Detail      LABS:                        11.3   8.44  )-----------( 200      ( 2019 20:34 )             34.3     06-02    141  |  103  |  20  ----------------------------<  136<H>  4.1   |  22  |  1.0    Ca    9.6      2019 20:34    TPro  6.6  /  Alb  4.0  /  TBili  0.3  /  DBili  x   /  AST  24  /  ALT  15  /  AlkPhos  102  06-02    CARDIAC MARKERS ( 2019 01:17 )  x     / 0.01 ng/mL / x     / x     / x      CARDIAC MARKERS ( 2019 20:34 )  x     / 0.01 ng/mL / x     / x     / x          PT/INR - ( 2019 20:34 )   PT: 12.20 sec;   INR: 1.06 ratio           Urinalysis Basic - ( 2019 22:00 )    Color: Yellow / Appearance: Clear / S.010 / pH: x  Gluc: x / Ketone: Negative  / Bili: Negative / Urobili: 0.2 mg/dL   Blood: x / Protein: Negative mg/dL / Nitrite: Negative   Leuk Esterase: Small / RBC: Negative / WBC 10-25 /HPF   Sq Epi: x / Non Sq Epi: Few /HPF / Bacteria: x      I&O's Summary      RADIOLOGY & ADDITIONAL STUDIES:

## 2019-06-03 NOTE — ED CDU PROVIDER SUBSEQUENT DAY NOTE - NS ED ROS FT
Review of Systems  Constitutional:  No fever, chills.  Cardiac:  No active chest pain, palpitations, syncope, or edema.  Respiratory:  No dyspnea, cough. No hemoptysis.  GI:  No nausea, vomiting, diarrhea, or abdominal pain.   MS:  No back pain.  Skin:  No skin rash, pruritis, jaundice, or lesions.  Neuro:  No headache, dizziness, loss of sensation, or focal weakness.  No change in mental status.   Endocrine: No history of thyroid disease or diabetes.

## 2019-06-03 NOTE — ED ADULT NURSE REASSESSMENT NOTE - NS ED NURSE REASSESS COMMENT FT1
Pt assessed A/O times 4 Vs stable back from 2DECHO placed on cardiac monitor denies chest pain no SOB no N/V or dizziness ,ambulate with assistance Ed attending aware of pt status on going nursing observation .

## 2019-06-03 NOTE — ED CDU PROVIDER SUBSEQUENT DAY NOTE - PHYSICAL EXAMINATION
VITAL SIGNS: I have reviewed nursing notes and confirm.  CONSTITUTIONAL: Well-developed; well-nourished; in no acute distress.  SKIN: Skin exam is warm and dry, no acute rash.  HEAD: Normocephalic; atraumatic.  EYES: Conjunctiva and sclera clear.  CARD: S1, S2 normal; no murmurs, gallops, or rubs. Regular rate and rhythm.  RESP: No wheezes, rales or rhonchi.   ABD: Normal bowel sounds; soft; non-distended; non-tender; No rebound or guarding.   EXT: Normal ROM.  NEURO: Alert, oriented. Grossly unremarkable. No focal deficits.

## 2019-06-03 NOTE — ED CDU PROVIDER DISPOSITION NOTE - CLINICAL COURSE
No events on telemetry.  Normal troponin and non ischemic EKG.  Had a normal nuclear stress and a 2d echo that showed no acute pathology.  Was seen by her cardiologist who cleared patient for OP follow up.

## 2020-02-01 ENCOUNTER — INPATIENT (INPATIENT)
Facility: HOSPITAL | Age: 80
LOS: 0 days | Discharge: LEFT BEFORE DISCHARGE | End: 2020-02-02
Attending: HOSPITALIST | Admitting: HOSPITALIST
Payer: MEDICARE

## 2020-02-01 VITALS
HEART RATE: 61 BPM | TEMPERATURE: 99 F | RESPIRATION RATE: 18 BRPM | SYSTOLIC BLOOD PRESSURE: 201 MMHG | DIASTOLIC BLOOD PRESSURE: 81 MMHG | OXYGEN SATURATION: 99 %

## 2020-02-01 PROCEDURE — 93010 ELECTROCARDIOGRAM REPORT: CPT

## 2020-02-01 PROCEDURE — 99285 EMERGENCY DEPT VISIT HI MDM: CPT

## 2020-02-01 NOTE — ED ADULT TRIAGE NOTE - CHIEF COMPLAINT QUOTE
pt complaining of chest pain, feeling anxious. states chest pain started 2 hrs ago, states pain started when she was walking up the steps to go to bed.

## 2020-02-01 NOTE — ED ADULT NURSE NOTE - OBJECTIVE STATEMENT
patient complaints of mid chest pain that is now resolved. Patient reports SOB on execration and dizziness, Patient denies n/v.

## 2020-02-02 ENCOUNTER — TRANSCRIPTION ENCOUNTER (OUTPATIENT)
Age: 80
End: 2020-02-02

## 2020-02-02 VITALS
OXYGEN SATURATION: 96 % | RESPIRATION RATE: 18 BRPM | TEMPERATURE: 98 F | HEART RATE: 65 BPM | DIASTOLIC BLOOD PRESSURE: 56 MMHG | SYSTOLIC BLOOD PRESSURE: 120 MMHG

## 2020-02-02 LAB
ALBUMIN SERPL ELPH-MCNC: 3.9 G/DL — SIGNIFICANT CHANGE UP (ref 3.5–5.2)
ALBUMIN SERPL ELPH-MCNC: 4.2 G/DL — SIGNIFICANT CHANGE UP (ref 3.5–5.2)
ALP SERPL-CCNC: 85 U/L — SIGNIFICANT CHANGE UP (ref 30–115)
ALP SERPL-CCNC: 91 U/L — SIGNIFICANT CHANGE UP (ref 30–115)
ALT FLD-CCNC: 14 U/L — SIGNIFICANT CHANGE UP (ref 0–41)
ALT FLD-CCNC: 14 U/L — SIGNIFICANT CHANGE UP (ref 0–41)
ANION GAP SERPL CALC-SCNC: 10 MMOL/L — SIGNIFICANT CHANGE UP (ref 7–14)
ANION GAP SERPL CALC-SCNC: 12 MMOL/L — SIGNIFICANT CHANGE UP (ref 7–14)
APTT BLD: 29 SEC — SIGNIFICANT CHANGE UP (ref 27–39.2)
AST SERPL-CCNC: 21 U/L — SIGNIFICANT CHANGE UP (ref 0–41)
AST SERPL-CCNC: 24 U/L — SIGNIFICANT CHANGE UP (ref 0–41)
BASOPHILS # BLD AUTO: 0.08 K/UL — SIGNIFICANT CHANGE UP (ref 0–0.2)
BASOPHILS # BLD AUTO: 0.09 K/UL — SIGNIFICANT CHANGE UP (ref 0–0.2)
BASOPHILS NFR BLD AUTO: 1.3 % — HIGH (ref 0–1)
BASOPHILS NFR BLD AUTO: 1.4 % — HIGH (ref 0–1)
BILIRUB DIRECT SERPL-MCNC: <0.2 MG/DL — SIGNIFICANT CHANGE UP (ref 0–0.2)
BILIRUB DIRECT SERPL-MCNC: <0.2 MG/DL — SIGNIFICANT CHANGE UP (ref 0–0.2)
BILIRUB INDIRECT FLD-MCNC: >0 MG/DL — LOW (ref 0.2–1.2)
BILIRUB INDIRECT FLD-MCNC: >0.1 MG/DL — LOW (ref 0.2–1.2)
BILIRUB SERPL-MCNC: 0.2 MG/DL — SIGNIFICANT CHANGE UP (ref 0.2–1.2)
BILIRUB SERPL-MCNC: 0.3 MG/DL — SIGNIFICANT CHANGE UP (ref 0.2–1.2)
BUN SERPL-MCNC: 29 MG/DL — HIGH (ref 10–20)
BUN SERPL-MCNC: 32 MG/DL — HIGH (ref 10–20)
CALCIUM SERPL-MCNC: 9 MG/DL — SIGNIFICANT CHANGE UP (ref 8.5–10.1)
CALCIUM SERPL-MCNC: 9.3 MG/DL — SIGNIFICANT CHANGE UP (ref 8.5–10.1)
CHLORIDE SERPL-SCNC: 109 MMOL/L — SIGNIFICANT CHANGE UP (ref 98–110)
CHLORIDE SERPL-SCNC: 112 MMOL/L — HIGH (ref 98–110)
CK MB CFR SERPL CALC: 8.1 NG/ML — HIGH (ref 0.6–6.3)
CO2 SERPL-SCNC: 24 MMOL/L — SIGNIFICANT CHANGE UP (ref 17–32)
CO2 SERPL-SCNC: 24 MMOL/L — SIGNIFICANT CHANGE UP (ref 17–32)
CREAT SERPL-MCNC: 0.8 MG/DL — SIGNIFICANT CHANGE UP (ref 0.7–1.5)
CREAT SERPL-MCNC: 1 MG/DL — SIGNIFICANT CHANGE UP (ref 0.7–1.5)
EOSINOPHIL # BLD AUTO: 0.14 K/UL — SIGNIFICANT CHANGE UP (ref 0–0.7)
EOSINOPHIL # BLD AUTO: 0.17 K/UL — SIGNIFICANT CHANGE UP (ref 0–0.7)
EOSINOPHIL NFR BLD AUTO: 2.4 % — SIGNIFICANT CHANGE UP (ref 0–8)
EOSINOPHIL NFR BLD AUTO: 2.4 % — SIGNIFICANT CHANGE UP (ref 0–8)
GLUCOSE SERPL-MCNC: 129 MG/DL — HIGH (ref 70–99)
GLUCOSE SERPL-MCNC: 93 MG/DL — SIGNIFICANT CHANGE UP (ref 70–99)
HCT VFR BLD CALC: 34.2 % — LOW (ref 37–47)
HCT VFR BLD CALC: 35.2 % — LOW (ref 37–47)
HGB BLD-MCNC: 11.3 G/DL — LOW (ref 12–16)
HGB BLD-MCNC: 11.3 G/DL — LOW (ref 12–16)
IMM GRANULOCYTES NFR BLD AUTO: 0.2 % — SIGNIFICANT CHANGE UP (ref 0.1–0.3)
IMM GRANULOCYTES NFR BLD AUTO: 0.3 % — SIGNIFICANT CHANGE UP (ref 0.1–0.3)
INR BLD: 1.01 RATIO — SIGNIFICANT CHANGE UP (ref 0.65–1.3)
LYMPHOCYTES # BLD AUTO: 1.74 K/UL — SIGNIFICANT CHANGE UP (ref 1.2–3.4)
LYMPHOCYTES # BLD AUTO: 1.84 K/UL — SIGNIFICANT CHANGE UP (ref 1.2–3.4)
LYMPHOCYTES # BLD AUTO: 26 % — SIGNIFICANT CHANGE UP (ref 20.5–51.1)
LYMPHOCYTES # BLD AUTO: 29.7 % — SIGNIFICANT CHANGE UP (ref 20.5–51.1)
MAGNESIUM SERPL-MCNC: 2 MG/DL — SIGNIFICANT CHANGE UP (ref 1.8–2.4)
MCHC RBC-ENTMCNC: 28.9 PG — SIGNIFICANT CHANGE UP (ref 27–31)
MCHC RBC-ENTMCNC: 29.7 PG — SIGNIFICANT CHANGE UP (ref 27–31)
MCHC RBC-ENTMCNC: 32.1 G/DL — SIGNIFICANT CHANGE UP (ref 32–37)
MCHC RBC-ENTMCNC: 33 G/DL — SIGNIFICANT CHANGE UP (ref 32–37)
MCV RBC AUTO: 90 FL — SIGNIFICANT CHANGE UP (ref 81–99)
MCV RBC AUTO: 90 FL — SIGNIFICANT CHANGE UP (ref 81–99)
MONOCYTES # BLD AUTO: 0.47 K/UL — SIGNIFICANT CHANGE UP (ref 0.1–0.6)
MONOCYTES # BLD AUTO: 0.62 K/UL — HIGH (ref 0.1–0.6)
MONOCYTES NFR BLD AUTO: 8 % — SIGNIFICANT CHANGE UP (ref 1.7–9.3)
MONOCYTES NFR BLD AUTO: 8.8 % — SIGNIFICANT CHANGE UP (ref 1.7–9.3)
NEUTROPHILS # BLD AUTO: 3.42 K/UL — SIGNIFICANT CHANGE UP (ref 1.4–6.5)
NEUTROPHILS # BLD AUTO: 4.33 K/UL — SIGNIFICANT CHANGE UP (ref 1.4–6.5)
NEUTROPHILS NFR BLD AUTO: 58.3 % — SIGNIFICANT CHANGE UP (ref 42.2–75.2)
NEUTROPHILS NFR BLD AUTO: 61.2 % — SIGNIFICANT CHANGE UP (ref 42.2–75.2)
NRBC # BLD: 0 /100 WBCS — SIGNIFICANT CHANGE UP (ref 0–0)
NRBC # BLD: 0 /100 WBCS — SIGNIFICANT CHANGE UP (ref 0–0)
NT-PROBNP SERPL-SCNC: 1442 PG/ML — HIGH (ref 0–300)
PLATELET # BLD AUTO: 157 K/UL — SIGNIFICANT CHANGE UP (ref 130–400)
PLATELET # BLD AUTO: 175 K/UL — SIGNIFICANT CHANGE UP (ref 130–400)
POTASSIUM SERPL-MCNC: 3.6 MMOL/L — SIGNIFICANT CHANGE UP (ref 3.5–5)
POTASSIUM SERPL-MCNC: 3.9 MMOL/L — SIGNIFICANT CHANGE UP (ref 3.5–5)
POTASSIUM SERPL-SCNC: 3.6 MMOL/L — SIGNIFICANT CHANGE UP (ref 3.5–5)
POTASSIUM SERPL-SCNC: 3.9 MMOL/L — SIGNIFICANT CHANGE UP (ref 3.5–5)
PROT SERPL-MCNC: 6.1 G/DL — SIGNIFICANT CHANGE UP (ref 6–8)
PROT SERPL-MCNC: 6.6 G/DL — SIGNIFICANT CHANGE UP (ref 6–8)
PROTHROM AB SERPL-ACNC: 11.6 SEC — SIGNIFICANT CHANGE UP (ref 9.95–12.87)
RBC # BLD: 3.8 M/UL — LOW (ref 4.2–5.4)
RBC # BLD: 3.91 M/UL — LOW (ref 4.2–5.4)
RBC # FLD: 14.8 % — HIGH (ref 11.5–14.5)
RBC # FLD: 15 % — HIGH (ref 11.5–14.5)
SODIUM SERPL-SCNC: 145 MMOL/L — SIGNIFICANT CHANGE UP (ref 135–146)
SODIUM SERPL-SCNC: 146 MMOL/L — SIGNIFICANT CHANGE UP (ref 135–146)
TROPONIN T SERPL-MCNC: 0.01 NG/ML — SIGNIFICANT CHANGE UP
TROPONIN T SERPL-MCNC: 0.02 NG/ML — HIGH
WBC # BLD: 5.86 K/UL — SIGNIFICANT CHANGE UP (ref 4.8–10.8)
WBC # BLD: 7.07 K/UL — SIGNIFICANT CHANGE UP (ref 4.8–10.8)
WBC # FLD AUTO: 5.86 K/UL — SIGNIFICANT CHANGE UP (ref 4.8–10.8)
WBC # FLD AUTO: 7.07 K/UL — SIGNIFICANT CHANGE UP (ref 4.8–10.8)

## 2020-02-02 PROCEDURE — 71045 X-RAY EXAM CHEST 1 VIEW: CPT | Mod: 26

## 2020-02-02 PROCEDURE — 99223 1ST HOSP IP/OBS HIGH 75: CPT

## 2020-02-02 PROCEDURE — 93010 ELECTROCARDIOGRAM REPORT: CPT

## 2020-02-02 RX ORDER — HYDROCHLOROTHIAZIDE 25 MG
12.5 TABLET ORAL DAILY
Refills: 0 | Status: DISCONTINUED | OUTPATIENT
Start: 2020-02-02 | End: 2020-02-02

## 2020-02-02 RX ORDER — ASPIRIN/CALCIUM CARB/MAGNESIUM 324 MG
1 TABLET ORAL
Qty: 30 | Refills: 0
Start: 2020-02-02 | End: 2020-03-02

## 2020-02-02 RX ORDER — PANTOPRAZOLE SODIUM 20 MG/1
40 TABLET, DELAYED RELEASE ORAL
Refills: 0 | Status: DISCONTINUED | OUTPATIENT
Start: 2020-02-02 | End: 2020-02-02

## 2020-02-02 RX ORDER — ASPIRIN/CALCIUM CARB/MAGNESIUM 324 MG
162 TABLET ORAL ONCE
Refills: 0 | Status: COMPLETED | OUTPATIENT
Start: 2020-02-02 | End: 2020-02-02

## 2020-02-02 RX ORDER — SIMVASTATIN 20 MG/1
1 TABLET, FILM COATED ORAL
Qty: 0 | Refills: 0 | DISCHARGE

## 2020-02-02 RX ORDER — SIMVASTATIN 20 MG/1
20 TABLET, FILM COATED ORAL AT BEDTIME
Refills: 0 | Status: DISCONTINUED | OUTPATIENT
Start: 2020-02-02 | End: 2020-02-02

## 2020-02-02 RX ORDER — NITROGLYCERIN 6.5 MG
0.4 CAPSULE, EXTENDED RELEASE ORAL
Refills: 0 | Status: DISCONTINUED | OUTPATIENT
Start: 2020-02-02 | End: 2020-02-02

## 2020-02-02 RX ORDER — SIMVASTATIN 20 MG/1
1 TABLET, FILM COATED ORAL
Qty: 30 | Refills: 0
Start: 2020-02-02 | End: 2020-03-02

## 2020-02-02 RX ADMIN — PANTOPRAZOLE SODIUM 40 MILLIGRAM(S): 20 TABLET, DELAYED RELEASE ORAL at 12:43

## 2020-02-02 RX ADMIN — Medication 12.5 MILLIGRAM(S): at 06:38

## 2020-02-02 RX ADMIN — Medication 162 MILLIGRAM(S): at 01:38

## 2020-02-02 NOTE — H&P ADULT - ASSESSMENT
Chest pain:   typical chest pain  trop 0.02; trend troponin  EKG t wave inversion in multiple leads  Stress test 6 month back--normal  nitro PRN  s/p aspirin 162 mg     DLD    CHFpEF      DVt ppx  SCD Chest pain:   typical chest pain  EKG t wave inversion in multiple leads, present since prior  trop 0.02; trend troponin  Stress test 6 month back--normal  patient not on aspirin because high risk of bleeding  will likely need further workup again  nitro PRN  s/p aspirin 162 mg   restart aspirin 81 in AM, be cautious for Anemia  ECHO  telemonitoring  cardiology Dr Fisher    DLD  statin    CHFpEF  Diastolic CHF  no shortness of breath  no lower extremity edema    DVt ppx  SCD    Dispo: Acute

## 2020-02-02 NOTE — DISCHARGE NOTE NURSING/CASE MANAGEMENT/SOCIAL WORK - PATIENT PORTAL LINK FT
You can access the FollowMyHealth Patient Portal offered by [facility] by registering at the following website: [email or web address]. By joining [facility]’s FollowMyHealth portal, you will also be able to view your health information using other applications (apps) compatible with our system.

## 2020-02-02 NOTE — H&P ADULT - NSHPLABSRESULTS_GEN_ALL_CORE
11.3   7.07  )-----------( 175      ( 02 Feb 2020 00:35 )             35.2                 02-02    145  |  109  |  32<H>  ----------------------------<  129<H>  3.9   |  24  |  1.0    Ca    9.3      02 Feb 2020 00:35    TPro  6.6  /  Alb  4.2  /  TBili  0.2  /  DBili  <0.2  /  AST  24  /  ALT  14  /  AlkPhos  91  02-02    LIVER FUNCTIONS - ( 02 Feb 2020 00:35 )  Alb: 4.2 g/dL / Pro: 6.6 g/dL / ALK PHOS: 91 U/L / ALT: 14 U/L / AST: 24 U/L / GGT: x         PT/INR - ( 02 Feb 2020 00:35 )   PT: 11.60 sec;   INR: 1.01 ratio      PTT - ( 02 Feb 2020 00:35 )  PTT:29.0 sec  CARDIAC MARKERS ( 02 Feb 2020 00:35 )  x     / 0.02 ng/mL     Serum Pro-Brain Natriuretic Peptide: 1442 pg/mL (02-02-20 @ 00:35)

## 2020-02-02 NOTE — ED PROVIDER NOTE - OBJECTIVE STATEMENT
Patient states that while walking up the steps, started having chest pain, described as tightness, associated with sob and radiation of the pain to B/L shoulders, symptoms continued, patient got up to the floor, and sat/rested with resolution of symptoms. Patient family helped her and brought to ED for evaluation. Patient denies syncope/near syncope. No trauma. Denies abdominal pain, no nausea, no vomiting. Patient denies neurologic symptoms.

## 2020-02-02 NOTE — ED PROVIDER NOTE - CLINICAL SUMMARY MEDICAL DECISION MAKING FREE TEXT BOX
Patient remained stable in ED, discussed with cardiology fellow, followed recommendations. Patient remained stable in ED, EKG/Labs/imaging studies noted and discussed with patient, discussed with cardiology fellow, followed recommendations, Patient is admitted to Medicine for further evaluation and care.

## 2020-02-02 NOTE — DISCHARGE NOTE PROVIDER - HOSPITAL COURSE
78 y/o F with PMH of DLD, CHFpEF, Anemia, GI bleed came to the ED for chest pain for 1 day. Pain started 1 hour prior to the presentation, substernal chest pain, pressure/burning in character, 7-8 in intensity, sudden onset, no radiation; symptoms started when she was taking upstairs to the second floor, pain lasted for about 10 minutes and got relieved with rest, no radiation of the pain, no associated nausea, vomiting, reflux, palpitation, lower extremity edema.    She reports that she gets right shoulder pain on and off and takes tylenol for relief but this pain was different and severe. Troponin first set 0.02, second set negative. EKG showed similar changes from prior EKG in 2017. Spoke to  over the phone. He recommended repeat ECHO and nuclear stress test but patient refused to stay for the tests. PAtient chose to leave [facility] inspite of explaining multiple times that she is at risk of having an MI and needs more testing for chest pain.

## 2020-02-02 NOTE — CHART NOTE - NSCHARTNOTEFT_GEN_A_CORE
Patient wanted to leave AMA in the evening. Spoke to  over the phone about the plan. As per , patient has last seen Cardiologist  in 2017 when she had EF 55% and MVR, TR. She never followed up since 2017. He recommended that the patient have an ECHO and Nuclear stress test due to typical chest pain this admission. Patient still declined further work up and preferred to have all further workup outpatient. Explained to the patient that she might have an MI or die from cardiac arrest. Patient explained understanding. She said she will come back incase she has another episode of chest pain.

## 2020-02-02 NOTE — DISCHARGE NOTE PROVIDER - NSDCCPCAREPLAN_GEN_ALL_CORE_FT
PRINCIPAL DISCHARGE DIAGNOSIS  Diagnosis: Unstable angina pectoris  Assessment and Plan of Treatment: You were admitted with chest pain after walking up the stairs which resolved within few minutes. EKG stable changes from before but troponin first set was slightly elevated and second set was normal. Cardiologist recommended ECHO and stress test. You chose to leave against medical advise and schedule an appointment as outpatient with  for further testing. Please make an appointment as soon as possible after you leave the hospital. If you have any recurrence of symptoms please call 911 or come to nearest ED. PRINCIPAL DISCHARGE DIAGNOSIS  Diagnosis: Angina pectoris, unspecified  Assessment and Plan of Treatment: You were admitted with chest pain after walking up the stairs which resolved within few minutes. EKG stable changes from before but troponin first set was slightly elevated and second set was normal. Cardiologist recommended ECHO and stress test. You chose to leave against medical advise and schedule an appointment as outpatient with  for further testing. Please make an appointment as soon as possible after you leave the hospital. If you have any recurrence of symptoms please call 911 or come to nearest ED.

## 2020-02-02 NOTE — DISCHARGE NOTE PROVIDER - NSDCMRMEDTOKEN_GEN_ALL_CORE_FT
aspirin 81 mg oral delayed release tablet: 1 tab(s) orally once a day   hydroCHLOROthiazide 12.5 mg oral capsule: 1 cap(s) orally once a day  pantoprazole 40 mg oral delayed release tablet: 1 tab(s) orally 2 times a day  simvastatin 40 mg oral tablet: 1 tab(s) orally once a day

## 2020-02-02 NOTE — H&P ADULT - ATTENDING COMMENTS
Pt seen and examined at bedside. Presented with typical chest pain. 2d echo pending, cardiology consult pending. c/w tele, pt amenable to inpt workup.

## 2020-02-02 NOTE — DISCHARGE NOTE PROVIDER - CARE PROVIDER_API CALL
Yung Arenas)  Cardiovascular Disease  [address] [place] [address]  Phone: [phone]  Fax: [phone]  Established Patient  Follow Up Time: 1-3 days

## 2020-02-02 NOTE — H&P ADULT - NSHPPHYSICALEXAM_GEN_ALL_CORE
PHYSICAL EXAM:  GENERAL: NAD, well-developed  HEAD:  Atraumatic, Normocephalic  EYES: EOMI, PERRLA, conjunctiva and sclera clear  NECK: Supple, No JVD  CHEST/LUNG: Clear to auscultation bilaterally; No wheeze  HEART: Regular rate and rhythm; No murmurs, rubs, or gallops  ABDOMEN: Soft, Nontender, Nondistended; Bowel sounds present  EXTREMITIES:  2+ Peripheral Pulses, No clubbing, cyanosis, or edema  PSYCH: AAOx3  NEUROLOGY: non-focal  SKIN: No rashes or lesions PHYSICAL EXAM:  GENERAL: NAD, well-developed  HEAD:  Atraumatic, Normocephalic  EYES: Anicteric  NECK: Supple, No JVD  CHEST/LUNG: Clear to auscultation bilaterally; No wheeze  HEART: Regular rate and rhythm; No murmurs, rubs, or gallops  ABDOMEN: Soft, Nontender, Nondistended; Bowel sounds present  EXTREMITIES:  2+ Peripheral Pulses, No clubbing, cyanosis, or edema  PSYCH: AAOx3  NEUROLOGY: non-focal  SKIN: No rashes or lesions

## 2020-02-02 NOTE — H&P ADULT - HISTORY OF PRESENT ILLNESS
Patient states that while walking up the steps, started having chest pain, described as tightness, associated with sob and radiation of the pain to B/L shoulders, symptoms continued, patient got up to the floor, and sat/rested with resolution of symptoms. Patient family helped her and brought to ED for evaluation. Patient denies syncope/near syncope. No trauma. Denies abdominal pain, no nausea, no vomiting. Patient denies neurologic symptoms. 76 y/o F with PMH of DLD, CHFpEF, Anemia, GI bleed comes to the ED for chest pain for 1 day. Pain started 1 hour prior to the presentation, substernal chest pain, pressure/burning in character, 7-8 in intensity, sudden onset, no radiation; symptoms started when she was taking upstairs to the second floor, pain lasted for about 10 minutes and got relieved with rest, no radiation of the pain, no associated nausea, vomiting, reflux, palpitation, lower extremity edema.  She reports that she gets right shoulder pain on and off and takes tylenol for relief but this pain was different and severe.    No headache, loss of consciousness, seizure, fever, focal weakness.

## 2020-02-02 NOTE — ED PROVIDER NOTE - PHYSICAL EXAMINATION
Patient permission obtained for rectal exam, Chaperon is ED PCA  Rectal exam: no blood, no melena, brown stool.

## 2020-02-10 DIAGNOSIS — R07.9 CHEST PAIN, UNSPECIFIED: ICD-10-CM

## 2020-02-10 DIAGNOSIS — E78.5 HYPERLIPIDEMIA, UNSPECIFIED: ICD-10-CM

## 2020-02-10 DIAGNOSIS — I50.32 CHRONIC DIASTOLIC (CONGESTIVE) HEART FAILURE: ICD-10-CM

## 2020-02-10 DIAGNOSIS — D64.9 ANEMIA, UNSPECIFIED: ICD-10-CM

## 2020-02-10 DIAGNOSIS — R79.89 OTHER SPECIFIED ABNORMAL FINDINGS OF BLOOD CHEMISTRY: ICD-10-CM

## 2020-02-10 DIAGNOSIS — I20.0 UNSTABLE ANGINA: ICD-10-CM

## 2020-02-10 DIAGNOSIS — Z88.4 ALLERGY STATUS TO ANESTHETIC AGENT: ICD-10-CM

## 2020-02-10 DIAGNOSIS — Z53.29 PROCEDURE AND TREATMENT NOT CARRIED OUT BECAUSE OF PATIENT'S DECISION FOR OTHER REASONS: ICD-10-CM

## 2020-12-13 ENCOUNTER — OUTPATIENT (OUTPATIENT)
Dept: OUTPATIENT SERVICES | Facility: HOSPITAL | Age: 80
LOS: 1 days | Discharge: HOME | End: 2020-12-13

## 2020-12-13 DIAGNOSIS — Z11.59 ENCOUNTER FOR SCREENING FOR OTHER VIRAL DISEASES: ICD-10-CM

## 2020-12-16 ENCOUNTER — INPATIENT (INPATIENT)
Facility: HOSPITAL | Age: 80
LOS: 3 days | Discharge: HOME | End: 2020-12-20
Attending: INTERNAL MEDICINE | Admitting: INTERNAL MEDICINE
Payer: MEDICARE

## 2020-12-16 VITALS
RESPIRATION RATE: 16 BRPM | TEMPERATURE: 98 F | SYSTOLIC BLOOD PRESSURE: 190 MMHG | HEART RATE: 59 BPM | OXYGEN SATURATION: 99 % | DIASTOLIC BLOOD PRESSURE: 72 MMHG

## 2020-12-16 LAB
ALBUMIN SERPL ELPH-MCNC: 4 G/DL — SIGNIFICANT CHANGE UP (ref 3.5–5.2)
ALP SERPL-CCNC: 94 U/L — SIGNIFICANT CHANGE UP (ref 30–115)
ALT FLD-CCNC: 16 U/L — SIGNIFICANT CHANGE UP (ref 0–41)
ANION GAP SERPL CALC-SCNC: 10 MMOL/L — SIGNIFICANT CHANGE UP (ref 7–14)
ANION GAP SERPL CALC-SCNC: 13 MMOL/L — SIGNIFICANT CHANGE UP (ref 7–14)
APTT BLD: 29 SEC — SIGNIFICANT CHANGE UP (ref 27–39.2)
AST SERPL-CCNC: 64 U/L — HIGH (ref 0–41)
BASOPHILS # BLD AUTO: 0.04 K/UL — SIGNIFICANT CHANGE UP (ref 0–0.2)
BASOPHILS NFR BLD AUTO: 0.3 % — SIGNIFICANT CHANGE UP (ref 0–1)
BILIRUB SERPL-MCNC: 1 MG/DL — SIGNIFICANT CHANGE UP (ref 0.2–1.2)
BUN SERPL-MCNC: 20 MG/DL — SIGNIFICANT CHANGE UP (ref 10–20)
BUN SERPL-MCNC: 23 MG/DL — HIGH (ref 10–20)
CALCIUM SERPL-MCNC: 9.3 MG/DL — SIGNIFICANT CHANGE UP (ref 8.5–10.1)
CALCIUM SERPL-MCNC: 9.5 MG/DL — SIGNIFICANT CHANGE UP (ref 8.5–10.1)
CHLORIDE SERPL-SCNC: 106 MMOL/L — SIGNIFICANT CHANGE UP (ref 98–110)
CHLORIDE SERPL-SCNC: 107 MMOL/L — SIGNIFICANT CHANGE UP (ref 98–110)
CO2 SERPL-SCNC: 22 MMOL/L — SIGNIFICANT CHANGE UP (ref 17–32)
CO2 SERPL-SCNC: 25 MMOL/L — SIGNIFICANT CHANGE UP (ref 17–32)
CREAT SERPL-MCNC: 0.7 MG/DL — SIGNIFICANT CHANGE UP (ref 0.7–1.5)
CREAT SERPL-MCNC: 0.8 MG/DL — SIGNIFICANT CHANGE UP (ref 0.7–1.5)
EOSINOPHIL # BLD AUTO: 0 K/UL — SIGNIFICANT CHANGE UP (ref 0–0.7)
EOSINOPHIL NFR BLD AUTO: 0 % — SIGNIFICANT CHANGE UP (ref 0–8)
GLUCOSE BLDC GLUCOMTR-MCNC: 95 MG/DL — SIGNIFICANT CHANGE UP (ref 70–99)
GLUCOSE SERPL-MCNC: 100 MG/DL — HIGH (ref 70–99)
GLUCOSE SERPL-MCNC: 91 MG/DL — SIGNIFICANT CHANGE UP (ref 70–99)
HCT VFR BLD CALC: 36.4 % — LOW (ref 37–47)
HCT VFR BLD CALC: 37.5 % — SIGNIFICANT CHANGE UP (ref 37–47)
HGB BLD-MCNC: 11.6 G/DL — LOW (ref 12–16)
HGB BLD-MCNC: 11.7 G/DL — LOW (ref 12–16)
IMM GRANULOCYTES NFR BLD AUTO: 0.3 % — SIGNIFICANT CHANGE UP (ref 0.1–0.3)
INR BLD: 1.07 RATIO — SIGNIFICANT CHANGE UP (ref 0.65–1.3)
LYMPHOCYTES # BLD AUTO: 1.04 K/UL — LOW (ref 1.2–3.4)
LYMPHOCYTES # BLD AUTO: 8.3 % — LOW (ref 20.5–51.1)
MAGNESIUM SERPL-MCNC: 2 MG/DL — SIGNIFICANT CHANGE UP (ref 1.8–2.4)
MCHC RBC-ENTMCNC: 28.6 PG — SIGNIFICANT CHANGE UP (ref 27–31)
MCHC RBC-ENTMCNC: 28.7 PG — SIGNIFICANT CHANGE UP (ref 27–31)
MCHC RBC-ENTMCNC: 31.2 G/DL — LOW (ref 32–37)
MCHC RBC-ENTMCNC: 31.9 G/DL — LOW (ref 32–37)
MCV RBC AUTO: 89.9 FL — SIGNIFICANT CHANGE UP (ref 81–99)
MCV RBC AUTO: 91.9 FL — SIGNIFICANT CHANGE UP (ref 81–99)
MONOCYTES # BLD AUTO: 0.57 K/UL — SIGNIFICANT CHANGE UP (ref 0.1–0.6)
MONOCYTES NFR BLD AUTO: 4.6 % — SIGNIFICANT CHANGE UP (ref 1.7–9.3)
NEUTROPHILS # BLD AUTO: 10.79 K/UL — HIGH (ref 1.4–6.5)
NEUTROPHILS NFR BLD AUTO: 86.5 % — HIGH (ref 42.2–75.2)
NRBC # BLD: 0 /100 WBCS — SIGNIFICANT CHANGE UP (ref 0–0)
NRBC # BLD: 0 /100 WBCS — SIGNIFICANT CHANGE UP (ref 0–0)
PLATELET # BLD AUTO: 171 K/UL — SIGNIFICANT CHANGE UP (ref 130–400)
PLATELET # BLD AUTO: 201 K/UL — SIGNIFICANT CHANGE UP (ref 130–400)
POTASSIUM SERPL-MCNC: 3.9 MMOL/L — SIGNIFICANT CHANGE UP (ref 3.5–5)
POTASSIUM SERPL-MCNC: 4.6 MMOL/L — SIGNIFICANT CHANGE UP (ref 3.5–5)
POTASSIUM SERPL-SCNC: 3.9 MMOL/L — SIGNIFICANT CHANGE UP (ref 3.5–5)
POTASSIUM SERPL-SCNC: 4.6 MMOL/L — SIGNIFICANT CHANGE UP (ref 3.5–5)
PROT SERPL-MCNC: 6.4 G/DL — SIGNIFICANT CHANGE UP (ref 6–8)
PROTHROM AB SERPL-ACNC: 12.3 SEC — SIGNIFICANT CHANGE UP (ref 9.95–12.87)
RBC # BLD: 4.05 M/UL — LOW (ref 4.2–5.4)
RBC # BLD: 4.08 M/UL — LOW (ref 4.2–5.4)
RBC # FLD: 15.1 % — HIGH (ref 11.5–14.5)
RBC # FLD: 15.3 % — HIGH (ref 11.5–14.5)
SODIUM SERPL-SCNC: 141 MMOL/L — SIGNIFICANT CHANGE UP (ref 135–146)
SODIUM SERPL-SCNC: 142 MMOL/L — SIGNIFICANT CHANGE UP (ref 135–146)
WBC # BLD: 12.48 K/UL — HIGH (ref 4.8–10.8)
WBC # BLD: 6.45 K/UL — SIGNIFICANT CHANGE UP (ref 4.8–10.8)
WBC # FLD AUTO: 12.48 K/UL — HIGH (ref 4.8–10.8)
WBC # FLD AUTO: 6.45 K/UL — SIGNIFICANT CHANGE UP (ref 4.8–10.8)

## 2020-12-16 PROCEDURE — 93010 ELECTROCARDIOGRAM REPORT: CPT

## 2020-12-16 PROCEDURE — 71045 X-RAY EXAM CHEST 1 VIEW: CPT | Mod: 26

## 2020-12-16 PROCEDURE — 74174 CTA ABD&PLVS W/CONTRAST: CPT | Mod: 26

## 2020-12-16 RX ORDER — ACETAMINOPHEN 500 MG
650 TABLET ORAL EVERY 6 HOURS
Refills: 0 | Status: DISCONTINUED | OUTPATIENT
Start: 2020-12-16 | End: 2020-12-20

## 2020-12-16 RX ORDER — CHLOROPROCAINE HCL/PF 20 MG/ML
10 VIAL (ML) INJECTION ONCE
Refills: 0 | Status: DISCONTINUED | OUTPATIENT
Start: 2020-12-16 | End: 2020-12-20

## 2020-12-16 RX ORDER — PANTOPRAZOLE SODIUM 20 MG/1
40 TABLET, DELAYED RELEASE ORAL
Refills: 0 | Status: DISCONTINUED | OUTPATIENT
Start: 2020-12-17 | End: 2020-12-20

## 2020-12-16 RX ORDER — ASPIRIN/CALCIUM CARB/MAGNESIUM 324 MG
81 TABLET ORAL DAILY
Refills: 0 | Status: DISCONTINUED | OUTPATIENT
Start: 2020-12-17 | End: 2020-12-20

## 2020-12-16 RX ORDER — METOPROLOL TARTRATE 50 MG
25 TABLET ORAL
Qty: 0 | Refills: 0 | DISCHARGE

## 2020-12-16 RX ORDER — CHLORHEXIDINE GLUCONATE 213 G/1000ML
1 SOLUTION TOPICAL DAILY
Refills: 0 | Status: DISCONTINUED | OUTPATIENT
Start: 2020-12-16 | End: 2020-12-20

## 2020-12-16 RX ORDER — ATORVASTATIN CALCIUM 80 MG/1
40 TABLET, FILM COATED ORAL AT BEDTIME
Refills: 0 | Status: DISCONTINUED | OUTPATIENT
Start: 2020-12-16 | End: 2020-12-20

## 2020-12-16 RX ORDER — FUROSEMIDE 40 MG
20 TABLET ORAL DAILY
Refills: 0 | Status: DISCONTINUED | OUTPATIENT
Start: 2020-12-17 | End: 2020-12-17

## 2020-12-16 RX ADMIN — Medication 650 MILLIGRAM(S): at 22:02

## 2020-12-16 RX ADMIN — ATORVASTATIN CALCIUM 40 MILLIGRAM(S): 80 TABLET, FILM COATED ORAL at 21:09

## 2020-12-16 NOTE — CHART NOTE - NSCHARTNOTEFT_GEN_A_CORE
PRE-OP DIAGNOSIS: Stable angina / moderate to severe AS    PROCEDURE:    [X] LHC                         [X] Coronary angiography                         [X] RHC    Physician: Dr. Arenas  Interventional Fellow: Dr. Katz  Fellow: Dr. Olsen    ANESTHESIA TYPE:  [  ]General Anesthesia  [ x ] Sedation  [  ] Local/Regional    ESTIMATED BLOOD LOSS:    10   mL    CONDITION  [  ] Critical  [  ] Serious  [  ]Fair  [X]Good    IV CONTRAST:       50      mL    FINDINGS  Left Heart Catheterization:  LVEF%: 55% on echo  LVEDP: Elevated  [X] Non-obstructive CAD    ACCESS:    [ ] right radial artery  [X] right femoral artery --> Manual Compression  [X] right femoral vein --> Manual Compression    LEFT HEART CATHETERIZATION                                    Left main:  Normal    LAD:       Prox: Minor luminal irregularities       Mid: Mild disease       Dist: Mild disease  Diag: Mild disease    Left Circumflex:       Prox: Minor luminal irregularities       Dist: Mild luminal irregularities  OM: Mild luminal irregularities    Right Coronary Artery:       Prox: Minor luminal irregularities       Mid: Normal       Dist: Normal    INTERVENTION: none  IMPLANTS: none    SPECIMEN REMOVED: N/A    RIGHT HEART CATHETERIZATION  RA: 18/14/14  RV 55/12/17  PA: 48/25/34  PCW: 26/30/25  CO/CI (by thermodilution): 3.75 / 2.19  CO/CI (by Leslie): 3.23 / 1.88      POST-OP DIAGNOSIS:  - Non-obstructive CAD  - Elevated PCW      PLAN OF CARE  [X] D/C Home today  [X] Lasix 20mg PO daily starting 12/17/2020  [X] Outpatient follow-up with cardiology

## 2020-12-16 NOTE — H&P ADULT - ASSESSMENT
IMPRESSION:    Severe hypotension in the setting of underlying HOCM and moderate to severe AS requiring vasopressor support  Non-obstructive CAD    PLAN:    CNS: Avoid sedation    HEENT: Oral care    PULMONARY:  HOB @ 45 degrees    CARDIOVASCULAR:  - Hypotension, likely secondary to reduced preload from diuresis and worsened dynamic LVOT obstruction  - Give NS 1 Liter bolus, monitor BP  - Continue with ASA, statin  - Wean off Phenylephrine as tolerated  - Obtain 2d echo to assess intracavitary HOCM gradient and severity of AS    GI: GI prophylaxis.  Feeding     RENAL:  Follow up lytes.  Correct as needed    INFECTIOUS DISEASE:   - Fever of 101 F yesterday with no signs/symptoms of infection  - F/U UA, UCx, Blood cultures  - Monitor off Abx    HEMATOLOGICAL:  DVT prophylaxis (on lovenox)    ENDOCRINE:  Follow up FS.  Insulin protocol if needed.    MUSCULOSKELETAL: OOB to chair once hemodynamically stable

## 2020-12-16 NOTE — H&P ADULT - NSICDXPASTMEDICALHX_GEN_ALL_CORE_FT
PAST MEDICAL HISTORY:  Aortic stenosis     High cholesterol     Hypertrophic obstructive cardiomyopathy (HOCM)

## 2020-12-16 NOTE — H&P ADULT - HISTORY OF PRESENT ILLNESS
80 years old female known to have DLD, HFpEF, Aortic stenosis, HOCM, HTN comes to the ED for chest pain for 1 day. Pain started 1 hour prior to the presentation, substernal chest pain, pressure/burning in character, 7-8 in intensity, sudden onset, no radiation; symptoms started when she was taking upstairs to the second floor, pain lasted for about 10 minutes and got relieved with rest, no radiation of the pain, no associated nausea, vomiting, reflux, palpitation, lower extremity edema.  She reports that she gets right shoulder pain on and off and takes tylenol for relief but this pain was different and severe.    No headache, loss of consciousness, seizure, fever, focal weakness.

## 2020-12-16 NOTE — CHART NOTE - NSCHARTNOTEFT_GEN_A_CORE
Cardiology fellow note:    Post cardiac cath, patient became hypotensive with SBP in the 80s. On physical exam, patient was complaining of upper back/shoulder pain. Groin (access site) was examined and there were no hematoma. She received IV fluids but remained hypotensive. CT scan done STAT ruled-out RPH or any other bleeding complication. Following that, patient became tachypneic with desaturation in the 80s. Physical exam as well as chest x-ray were in favor of pulmonary edema. She was given lasix and put on BiPAP with significant improvement of symptoms.    Plan  - Admit to CCU  - BP improved (hypotension most probably secondary to Hydralazine and Fentanyl that were given during cardiac cath)  - Lasix 20mg IV q24h (avoid aggressive diuresis)  - Monitor renal function, electrolytes, daily weight and volume status, strict I&Os; keep I<Os and adjust diuretics accordingly  - BiPAP as needed  - Check chest x-ray in AM  - Hold all anti-hypertensive medications for now  - Check right groin (access site) frequently  - Plan discussed with Dr. Arenas

## 2020-12-16 NOTE — H&P ADULT - NSHPREVIEWOFSYSTEMS_GEN_ALL_CORE
REVIEW OF SYSTEMS:    CONSTITUTIONAL: No weakness, fevers or chills  EYES/ENT: No visual changes;  No vertigo or throat pain   NECK: No pain or stiffness  RESPIRATORY: No cough, wheezing, hemoptysis  CARDIOVASCULAR: No chest pain or palpitations  GASTROINTESTINAL: No abdominal or epigastric pain. No nausea, vomiting, or hematemesis; No diarrhea or constipation. No melena or hematochezia.  GENITOURINARY: No dysuria, frequency or hematuria  NEUROLOGICAL: No numbness or weakness  SKIN: No itching, rashes

## 2020-12-16 NOTE — H&P ADULT - NSHPPHYSICALEXAM_GEN_ALL_CORE
T(F): 99.1 (12-17-20 @ 08:00), Max: 101 (12-16-20 @ 21:00)  HR: 78 (12-17-20 @ 13:00) (66 - 86)  BP: 87/50 (12-17-20 @ 13:00) (76/48 - 119/58)  RR: 29 (12-17-20 @ 13:00) (19 - 39)  SpO2: 100% (12-17-20 @ 13:00) (88% - 100%)    PHYSICAL EXAM:    GENERAL: NAD, well-developed  HEAD:  Atraumatic, Normocephalic  EYES: EOMI, PERRLA, conjunctiva and sclera clear  NECK: Supple, No JVD  CHEST/LUNG: Clear to auscultation bilaterally; No wheeze  HEART: Regular rate and rhythm; No murmurs, rubs, or gallops  ABDOMEN: Soft, Nontender, Nondistended; Bowel sounds present  EXTREMITIES:  2+ Peripheral Pulses, No clubbing, cyanosis, or edema  PSYCH: AAOx3  NEUROLOGY: non-focal  SKIN: No rashes or lesions

## 2020-12-16 NOTE — H&P ADULT - NSHPLABSRESULTS_GEN_ALL_CORE
11.1   10.41 )-----------( 192      ( 17 Dec 2020 04:30 )             35.6           138  |  104  |  25<H>  ----------------------------<  156<H>  3.6   |  23  |  0.9    Ca    8.8      17 Dec 2020 10:41  Mg     1.9         TPro  5.8<L>  /  Alb  3.5  /  TBili  1.1  /  DBili  x   /  AST  70<H>  /  ALT  16  /  AlkPhos  81                Urinalysis Basic - ( 17 Dec 2020 10:41 )    Color: Light Yellow / Appearance: Clear / S.026 / pH: x  Gluc: x / Ketone: Trace  / Bili: Negative / Urobili: <2 mg/dL   Blood: x / Protein: Trace / Nitrite: Negative   Leuk Esterase: Small / RBC: 61 /HPF / WBC 15 /HPF   Sq Epi: x / Non Sq Epi: 4 /HPF / Bacteria: Negative        PT/INR - ( 16 Dec 2020 20:55 )   PT: 12.30 sec;   INR: 1.07 ratio         PTT - ( 16 Dec 2020 20:55 )  PTT:29.0 sec    Lactate Trend      CARDIAC MARKERS ( 17 Dec 2020 10:41 )  x     / 1.12 ng/mL / 347 U/L / x     / 27.3 ng/mL        CAPILLARY BLOOD GLUCOSE      POCT Blood Glucose.: 95 mg/dL (16 Dec 2020 21:22)

## 2020-12-16 NOTE — H&P ADULT - ATTENDING COMMENTS
Seen / examined and above reviewed.  Critically ill patient with hemodynamic instability.    ECHO reviewed:  HOCM with severe LVH, hyperdynamic LVSF, MEGHAN with mild to moderate MR and severe subvalvular gradient.  Likely moderate valvular AS.    Elective cath demonstrated no  obstructive CAD.  Complicated by pulmonary edema, then hypotension after IV Lasix.  Likley related to dynamic outflow obstruction.  Now stable post 1L NS / on PERCY.    Plan to wean off PERCY.  Maintain euvolemic / hold diuretics.  Initiate low-dose beta blocker when hemodynamics stable.    Follow-up cultures / COVID for isolated fever.

## 2020-12-17 DIAGNOSIS — Z02.9 ENCOUNTER FOR ADMINISTRATIVE EXAMINATIONS, UNSPECIFIED: ICD-10-CM

## 2020-12-17 LAB
ALBUMIN SERPL ELPH-MCNC: 3.5 G/DL — SIGNIFICANT CHANGE UP (ref 3.5–5.2)
ALBUMIN SERPL ELPH-MCNC: 3.7 G/DL — SIGNIFICANT CHANGE UP (ref 3.5–5.2)
ALP SERPL-CCNC: 81 U/L — SIGNIFICANT CHANGE UP (ref 30–115)
ALP SERPL-CCNC: 85 U/L — SIGNIFICANT CHANGE UP (ref 30–115)
ALT FLD-CCNC: 16 U/L — SIGNIFICANT CHANGE UP (ref 0–41)
ALT FLD-CCNC: 17 U/L — SIGNIFICANT CHANGE UP (ref 0–41)
ANION GAP SERPL CALC-SCNC: 11 MMOL/L — SIGNIFICANT CHANGE UP (ref 7–14)
ANION GAP SERPL CALC-SCNC: 13 MMOL/L — SIGNIFICANT CHANGE UP (ref 7–14)
APPEARANCE UR: CLEAR — SIGNIFICANT CHANGE UP
AST SERPL-CCNC: 70 U/L — HIGH (ref 0–41)
AST SERPL-CCNC: 79 U/L — HIGH (ref 0–41)
BACTERIA # UR AUTO: NEGATIVE — SIGNIFICANT CHANGE UP
BASOPHILS # BLD AUTO: 0.07 K/UL — SIGNIFICANT CHANGE UP (ref 0–0.2)
BASOPHILS # BLD AUTO: 0.08 K/UL — SIGNIFICANT CHANGE UP (ref 0–0.2)
BASOPHILS NFR BLD AUTO: 0.6 % — SIGNIFICANT CHANGE UP (ref 0–1)
BASOPHILS NFR BLD AUTO: 0.8 % — SIGNIFICANT CHANGE UP (ref 0–1)
BILIRUB SERPL-MCNC: 0.4 MG/DL — SIGNIFICANT CHANGE UP (ref 0.2–1.2)
BILIRUB SERPL-MCNC: 1.1 MG/DL — SIGNIFICANT CHANGE UP (ref 0.2–1.2)
BILIRUB UR-MCNC: NEGATIVE — SIGNIFICANT CHANGE UP
BLD GP AB SCN SERPL QL: SIGNIFICANT CHANGE UP
BLD GP AB SCN SERPL QL: SIGNIFICANT CHANGE UP
BUN SERPL-MCNC: 22 MG/DL — HIGH (ref 10–20)
BUN SERPL-MCNC: 25 MG/DL — HIGH (ref 10–20)
CALCIUM SERPL-MCNC: 8.8 MG/DL — SIGNIFICANT CHANGE UP (ref 8.5–10.1)
CALCIUM SERPL-MCNC: 9.3 MG/DL — SIGNIFICANT CHANGE UP (ref 8.5–10.1)
CHLORIDE SERPL-SCNC: 104 MMOL/L — SIGNIFICANT CHANGE UP (ref 98–110)
CHLORIDE SERPL-SCNC: 107 MMOL/L — SIGNIFICANT CHANGE UP (ref 98–110)
CK MB CFR SERPL CALC: 27.3 NG/ML — HIGH (ref 0.6–6.3)
CK SERPL-CCNC: 347 U/L — HIGH (ref 0–225)
CO2 SERPL-SCNC: 21 MMOL/L — SIGNIFICANT CHANGE UP (ref 17–32)
CO2 SERPL-SCNC: 23 MMOL/L — SIGNIFICANT CHANGE UP (ref 17–32)
COLOR SPEC: SIGNIFICANT CHANGE UP
CREAT SERPL-MCNC: 0.8 MG/DL — SIGNIFICANT CHANGE UP (ref 0.7–1.5)
CREAT SERPL-MCNC: 0.9 MG/DL — SIGNIFICANT CHANGE UP (ref 0.7–1.5)
DIFF PNL FLD: ABNORMAL
EOSINOPHIL # BLD AUTO: 0.02 K/UL — SIGNIFICANT CHANGE UP (ref 0–0.7)
EOSINOPHIL # BLD AUTO: 0.03 K/UL — SIGNIFICANT CHANGE UP (ref 0–0.7)
EOSINOPHIL NFR BLD AUTO: 0.2 % — SIGNIFICANT CHANGE UP (ref 0–8)
EOSINOPHIL NFR BLD AUTO: 0.3 % — SIGNIFICANT CHANGE UP (ref 0–8)
EPI CELLS # UR: 4 /HPF — SIGNIFICANT CHANGE UP (ref 0–5)
GLUCOSE SERPL-MCNC: 156 MG/DL — HIGH (ref 70–99)
GLUCOSE SERPL-MCNC: 89 MG/DL — SIGNIFICANT CHANGE UP (ref 70–99)
GLUCOSE UR QL: NEGATIVE — SIGNIFICANT CHANGE UP
HCT VFR BLD CALC: 32.5 % — LOW (ref 37–47)
HCT VFR BLD CALC: 35.6 % — LOW (ref 37–47)
HGB BLD-MCNC: 10.1 G/DL — LOW (ref 12–16)
HGB BLD-MCNC: 11.1 G/DL — LOW (ref 12–16)
HYALINE CASTS # UR AUTO: 8 /LPF — HIGH (ref 0–7)
IMM GRANULOCYTES NFR BLD AUTO: 0.3 % — SIGNIFICANT CHANGE UP (ref 0.1–0.3)
IMM GRANULOCYTES NFR BLD AUTO: 0.4 % — HIGH (ref 0.1–0.3)
KETONES UR-MCNC: SIGNIFICANT CHANGE UP
LEUKOCYTE ESTERASE UR-ACNC: ABNORMAL
LYMPHOCYTES # BLD AUTO: 1.75 K/UL — SIGNIFICANT CHANGE UP (ref 1.2–3.4)
LYMPHOCYTES # BLD AUTO: 15.7 % — LOW (ref 20.5–51.1)
LYMPHOCYTES # BLD AUTO: 19.3 % — LOW (ref 20.5–51.1)
LYMPHOCYTES # BLD AUTO: 2.01 K/UL — SIGNIFICANT CHANGE UP (ref 1.2–3.4)
MAGNESIUM SERPL-MCNC: 1.9 MG/DL — SIGNIFICANT CHANGE UP (ref 1.8–2.4)
MAGNESIUM SERPL-MCNC: 1.9 MG/DL — SIGNIFICANT CHANGE UP (ref 1.8–2.4)
MCHC RBC-ENTMCNC: 28 PG — SIGNIFICANT CHANGE UP (ref 27–31)
MCHC RBC-ENTMCNC: 28.1 PG — SIGNIFICANT CHANGE UP (ref 27–31)
MCHC RBC-ENTMCNC: 31.1 G/DL — LOW (ref 32–37)
MCHC RBC-ENTMCNC: 31.2 G/DL — LOW (ref 32–37)
MCV RBC AUTO: 89.9 FL — SIGNIFICANT CHANGE UP (ref 81–99)
MCV RBC AUTO: 90.3 FL — SIGNIFICANT CHANGE UP (ref 81–99)
MONOCYTES # BLD AUTO: 0.92 K/UL — HIGH (ref 0.1–0.6)
MONOCYTES # BLD AUTO: 0.93 K/UL — HIGH (ref 0.1–0.6)
MONOCYTES NFR BLD AUTO: 8.3 % — SIGNIFICANT CHANGE UP (ref 1.7–9.3)
MONOCYTES NFR BLD AUTO: 8.9 % — SIGNIFICANT CHANGE UP (ref 1.7–9.3)
NEUTROPHILS # BLD AUTO: 7.33 K/UL — HIGH (ref 1.4–6.5)
NEUTROPHILS # BLD AUTO: 8.35 K/UL — HIGH (ref 1.4–6.5)
NEUTROPHILS NFR BLD AUTO: 70.4 % — SIGNIFICANT CHANGE UP (ref 42.2–75.2)
NEUTROPHILS NFR BLD AUTO: 74.8 % — SIGNIFICANT CHANGE UP (ref 42.2–75.2)
NITRITE UR-MCNC: NEGATIVE — SIGNIFICANT CHANGE UP
NRBC # BLD: 0 /100 WBCS — SIGNIFICANT CHANGE UP (ref 0–0)
NRBC # BLD: 0 /100 WBCS — SIGNIFICANT CHANGE UP (ref 0–0)
PH UR: 6 — SIGNIFICANT CHANGE UP (ref 5–8)
PLATELET # BLD AUTO: 166 K/UL — SIGNIFICANT CHANGE UP (ref 130–400)
PLATELET # BLD AUTO: 192 K/UL — SIGNIFICANT CHANGE UP (ref 130–400)
POTASSIUM SERPL-MCNC: 3.6 MMOL/L — SIGNIFICANT CHANGE UP (ref 3.5–5)
POTASSIUM SERPL-MCNC: 3.8 MMOL/L — SIGNIFICANT CHANGE UP (ref 3.5–5)
POTASSIUM SERPL-SCNC: 3.6 MMOL/L — SIGNIFICANT CHANGE UP (ref 3.5–5)
POTASSIUM SERPL-SCNC: 3.8 MMOL/L — SIGNIFICANT CHANGE UP (ref 3.5–5)
PROT SERPL-MCNC: 5.8 G/DL — LOW (ref 6–8)
PROT SERPL-MCNC: 5.9 G/DL — LOW (ref 6–8)
PROT UR-MCNC: SIGNIFICANT CHANGE UP
RBC # BLD: 3.6 M/UL — LOW (ref 4.2–5.4)
RBC # BLD: 3.96 M/UL — LOW (ref 4.2–5.4)
RBC # FLD: 15.2 % — HIGH (ref 11.5–14.5)
RBC # FLD: 15.2 % — HIGH (ref 11.5–14.5)
RBC CASTS # UR COMP ASSIST: 61 /HPF — HIGH (ref 0–4)
SODIUM SERPL-SCNC: 138 MMOL/L — SIGNIFICANT CHANGE UP (ref 135–146)
SODIUM SERPL-SCNC: 141 MMOL/L — SIGNIFICANT CHANGE UP (ref 135–146)
SP GR SPEC: 1.03 — SIGNIFICANT CHANGE UP (ref 1.01–1.03)
TROPONIN T SERPL-MCNC: 1.12 NG/ML — CRITICAL HIGH
UROBILINOGEN FLD QL: SIGNIFICANT CHANGE UP
WBC # BLD: 10.41 K/UL — SIGNIFICANT CHANGE UP (ref 4.8–10.8)
WBC # BLD: 11.15 K/UL — HIGH (ref 4.8–10.8)
WBC # FLD AUTO: 10.41 K/UL — SIGNIFICANT CHANGE UP (ref 4.8–10.8)
WBC # FLD AUTO: 11.15 K/UL — HIGH (ref 4.8–10.8)
WBC UR QL: 15 /HPF — HIGH (ref 0–5)

## 2020-12-17 PROCEDURE — 93306 TTE W/DOPPLER COMPLETE: CPT | Mod: 26

## 2020-12-17 PROCEDURE — 93010 ELECTROCARDIOGRAM REPORT: CPT

## 2020-12-17 PROCEDURE — 99291 CRITICAL CARE FIRST HOUR: CPT

## 2020-12-17 PROCEDURE — 71045 X-RAY EXAM CHEST 1 VIEW: CPT | Mod: 26

## 2020-12-17 RX ORDER — SODIUM CHLORIDE 9 MG/ML
500 INJECTION INTRAMUSCULAR; INTRAVENOUS; SUBCUTANEOUS ONCE
Refills: 0 | Status: COMPLETED | OUTPATIENT
Start: 2020-12-17 | End: 2020-12-17

## 2020-12-17 RX ORDER — MAGNESIUM SULFATE 500 MG/ML
2 VIAL (ML) INJECTION ONCE
Refills: 0 | Status: DISCONTINUED | OUTPATIENT
Start: 2020-12-17 | End: 2020-12-17

## 2020-12-17 RX ORDER — PHENYLEPHRINE HYDROCHLORIDE 10 MG/ML
0.2 INJECTION INTRAVENOUS
Qty: 160 | Refills: 0 | Status: DISCONTINUED | OUTPATIENT
Start: 2020-12-17 | End: 2020-12-18

## 2020-12-17 RX ORDER — POTASSIUM CHLORIDE 20 MEQ
20 PACKET (EA) ORAL ONCE
Refills: 0 | Status: COMPLETED | OUTPATIENT
Start: 2020-12-17 | End: 2020-12-17

## 2020-12-17 RX ORDER — ENOXAPARIN SODIUM 100 MG/ML
40 INJECTION SUBCUTANEOUS DAILY
Refills: 0 | Status: DISCONTINUED | OUTPATIENT
Start: 2020-12-17 | End: 2020-12-20

## 2020-12-17 RX ADMIN — PHENYLEPHRINE HYDROCHLORIDE 2.5 MICROGRAM(S)/KG/MIN: 10 INJECTION INTRAVENOUS at 10:00

## 2020-12-17 RX ADMIN — Medication 20 MILLIGRAM(S): at 05:00

## 2020-12-17 RX ADMIN — SODIUM CHLORIDE 125 MILLILITER(S): 9 INJECTION INTRAMUSCULAR; INTRAVENOUS; SUBCUTANEOUS at 18:52

## 2020-12-17 RX ADMIN — SODIUM CHLORIDE 2000 MILLILITER(S): 9 INJECTION INTRAMUSCULAR; INTRAVENOUS; SUBCUTANEOUS at 09:49

## 2020-12-17 RX ADMIN — Medication 650 MILLIGRAM(S): at 20:28

## 2020-12-17 RX ADMIN — CHLORHEXIDINE GLUCONATE 1 APPLICATION(S): 213 SOLUTION TOPICAL at 13:31

## 2020-12-17 RX ADMIN — Medication 20 MILLIEQUIVALENT(S): at 09:49

## 2020-12-17 RX ADMIN — PANTOPRAZOLE SODIUM 40 MILLIGRAM(S): 20 TABLET, DELAYED RELEASE ORAL at 09:49

## 2020-12-17 RX ADMIN — Medication 650 MILLIGRAM(S): at 20:58

## 2020-12-17 RX ADMIN — Medication 650 MILLIGRAM(S): at 00:00

## 2020-12-17 RX ADMIN — ATORVASTATIN CALCIUM 40 MILLIGRAM(S): 80 TABLET, FILM COATED ORAL at 22:11

## 2020-12-17 RX ADMIN — Medication 81 MILLIGRAM(S): at 13:31

## 2020-12-17 NOTE — PROGRESS NOTE ADULT - SUBJECTIVE AND OBJECTIVE BOX
PATIENT:  GERRY FRIED  [id number]    CHIEF COMPLAINT:  Patient is a 80y old  Female who presents with a chief complaint of     INTERVAL HISTORY/OVERNIGHT EVENTS:      MEDICATIONS:  MEDICATIONS  (STANDING):  aspirin enteric coated 81 milliGRAM(s) Oral daily  atorvastatin 40 milliGRAM(s) Oral at bedtime  chlorhexidine 4% Liquid 1 Application(s) Topical daily  chloroprocaine 3% Injectable 10 milliLiter(s) Local Injection once  furosemide   Injectable 20 milliGRAM(s) IV Push daily  pantoprazole    Tablet 40 milliGRAM(s) Oral before breakfast    MEDICATIONS  (PRN):  acetaminophen   Tablet .. 650 milliGRAM(s) Oral every 6 hours PRN Temp greater or equal to 38C (100.4F), Moderate Pain (4 - 6)      ALLERGIES:  Allergies    lidocaine (Rash)    Intolerances        OBJECTIVE:  ICU Vital Signs Last 24 Hrs  T(C): 37.2 (17 Dec 2020 04:00), Max: 38.3 (16 Dec 2020 21:00)  T(F): 99 (17 Dec 2020 04:00), Max: 101 (16 Dec 2020 21:00)  HR: 68 (17 Dec 2020 04:00) (59 - 84)  BP: 98/56 (17 Dec 2020 04:00) (84/42 - 190/72)  BP(mean): 73 (17 Dec 2020 04:00) (56 - 111)  ABP: --  ABP(mean): --  RR: 31 (17 Dec 2020 04:00) (16 - 33)  SpO2: 97% (17 Dec 2020 04:05) (88% - 100%)      Adult Advanced Hemodynamics Last 24 Hrs  CVP(mm Hg): --  CVP(cm H2O): --  CO: --  CI: --  PA: --  PA(mean): --  PCWP: --  SVR: --  SVRI: --  PVR: --  PVRI: --  CAPILLARY BLOOD GLUCOSE      POCT Blood Glucose.: 95 mg/dL (16 Dec 2020 21:22)    CAPILLARY BLOOD GLUCOSE      POCT Blood Glucose.: 95 mg/dL (16 Dec 2020 21:22)    I&O's Summary    16 Dec 2020 07:01  -  17 Dec 2020 06:26  --------------------------------------------------------  IN: 240 mL / OUT: 320 mL / NET: -80 mL      Daily     Daily Weight in k.7 (17 Dec 2020 04:00)    PHYSICAL EXAMINATION:  General:   Neurology:   Respiratory:   CV:   Abdominal:   Extremities:  Incisions:       LABS:                          11.1   10.41 )-----------( 192      ( 17 Dec 2020 04:30 )             35.6     12-    142  |  107  |  20  ----------------------------<  91  4.6   |  22  |  0.7    Ca    9.5      16 Dec 2020 20:55  Mg     2.0     12-    TPro  6.4  /  Alb  4.0  /  TBili  1.0  /  DBili  x   /  AST  64<H>  /  ALT  16  /  AlkPhos  94  12-16    LIVER FUNCTIONS - ( 16 Dec 2020 20:55 )  Alb: 4.0 g/dL / Pro: 6.4 g/dL / ALK PHOS: 94 U/L / ALT: 16 U/L / AST: 64 U/L / GGT: x           PT/INR - ( 16 Dec 2020 20:55 )   PT: 12.30 sec;   INR: 1.07 ratio         PTT - ( 16 Dec 2020 20:55 )  PTT:29.0 sec            TELEMETRY:     EKG:     IMAGING:           PATIENT:  GERRY FRIED  [id number]    CHIEF COMPLAINT:  Patient is a 80y old  Female who presented for elective cath    INTERVAL HISTORY/OVERNIGHT EVENTS:  Overnight after cath pt became hypotensive was given bolus then she became tachypnic and desated to 80s. CXR suggestive of pulmonary edema was given 20 mg of lasix and started on BIPAP with improvement. This am pt became hypotensive again with MAP of 40s was given 250cc NS without improvement so was started on neosynephrine.    MEDICATIONS:  MEDICATIONS  (STANDING):  aspirin enteric coated 81 milliGRAM(s) Oral daily  atorvastatin 40 milliGRAM(s) Oral at bedtime  chlorhexidine 4% Liquid 1 Application(s) Topical daily  chloroprocaine 3% Injectable 10 milliLiter(s) Local Injection once  furosemide   Injectable 20 milliGRAM(s) IV Push daily  pantoprazole    Tablet 40 milliGRAM(s) Oral before breakfast    MEDICATIONS  (PRN):  acetaminophen   Tablet .. 650 milliGRAM(s) Oral every 6 hours PRN Temp greater or equal to 38C (100.4F), Moderate Pain (4 - 6)      ALLERGIES:  Allergies    lidocaine (Rash)    Intolerances        OBJECTIVE:  ICU Vital Signs Last 24 Hrs  T(C): 37.2 (17 Dec 2020 04:00), Max: 38.3 (16 Dec 2020 21:00)  T(F): 99 (17 Dec 2020 04:00), Max: 101 (16 Dec 2020 21:00)  HR: 68 (17 Dec 2020 04:00) (59 - 84)  BP: 98/56 (17 Dec 2020 04:00) (84/42 - 190/72)  BP(mean): 73 (17 Dec 2020 04:00) (56 - 111)  ABP: --  ABP(mean): --  RR: 31 (17 Dec 2020 04:00) (16 - 33)  SpO2: 97% (17 Dec 2020 04:05) (88% - 100%)      Adult Advanced Hemodynamics Last 24 Hrs  CVP(mm Hg): --  CVP(cm H2O): --  CO: --  CI: --  PA: --  PA(mean): --  PCWP: --  SVR: --  SVRI: --  PVR: --  PVRI: --  CAPILLARY BLOOD GLUCOSE      POCT Blood Glucose.: 95 mg/dL (16 Dec 2020 21:22)    CAPILLARY BLOOD GLUCOSE      POCT Blood Glucose.: 95 mg/dL (16 Dec 2020 21:22)    I&O's Summary    16 Dec 2020 07:01  -  17 Dec 2020 06:26  --------------------------------------------------------  IN: 240 mL / OUT: 320 mL / NET: -80 mL      Daily     Daily Weight in k.7 (17 Dec 2020 04:00)    PHYSICAL EXAMINATION:  General: No acute distress  Neurology: AAOX3  Respiratory: diminshed air entry bilaterally  CV: Regular S1&S2, no murmurs appreciated  Abdominal: soft, non tender, non distended  Extremities: No edema      LABS:                          11.1   10.41 )-----------( 192      ( 17 Dec 2020 04:30 )             35.6     12-    142  |  107  |  20  ----------------------------<  91  4.6   |  22  |  0.7    Ca    9.5      16 Dec 2020 20:55  Mg     2.0     12-16    TPro  6.4  /  Alb  4.0  /  TBili  1.0  /  DBili  x   /  AST  64<H>  /  ALT  16  /  AlkPhos  94  12-16    LIVER FUNCTIONS - ( 16 Dec 2020 20:55 )  Alb: 4.0 g/dL / Pro: 6.4 g/dL / ALK PHOS: 94 U/L / ALT: 16 U/L / AST: 64 U/L / GGT: x           PT/INR - ( 16 Dec 2020 20:55 )   PT: 12.30 sec;   INR: 1.07 ratio         PTT - ( 16 Dec 2020 20:55 )  PTT:29.0 sec            TELEMETRY:     EKG:   < from: 12 Lead ECG (20 @ 05:50) >  Ventricular Rate 75 BPM    Atrial Rate 75 BPM    P-R Interval 198 ms    QRS Duration 166 ms    Q-T Interval 480 ms    QTC Calculation(Bazett) 536 ms    P Axis 38 degrees    R Axis -29 degrees    T Axis 137 degrees    Diagnosis Line Normal sinus rhythm  Possible Left atrial enlargement  Left bundle branch block  Abnormal ECG    < end of copied text >               PATIENT:  GERRY FRIED  [id number]    CHIEF COMPLAINT:  Patient is a 80y old  Female who presented for elective cath    INTERVAL HISTORY/OVERNIGHT EVENTS:  Overnight after cath pt became hypotensive was given bolus then she became tachypnic and desated to 80s. CXR suggestive of pulmonary edema was given 20 mg of lasix and started on BIPAP with improvement. This am pt became hypotensive again with MAP of 40s was given 1L bolus NS without improvement so was started on neosynephrine.    MEDICATIONS:  MEDICATIONS  (STANDING):  aspirin enteric coated 81 milliGRAM(s) Oral daily  atorvastatin 40 milliGRAM(s) Oral at bedtime  chlorhexidine 4% Liquid 1 Application(s) Topical daily  chloroprocaine 3% Injectable 10 milliLiter(s) Local Injection once  furosemide   Injectable 20 milliGRAM(s) IV Push daily  pantoprazole    Tablet 40 milliGRAM(s) Oral before breakfast    MEDICATIONS  (PRN):  acetaminophen   Tablet .. 650 milliGRAM(s) Oral every 6 hours PRN Temp greater or equal to 38C (100.4F), Moderate Pain (4 - 6)      ALLERGIES:  Allergies    lidocaine (Rash)    Intolerances        OBJECTIVE:  ICU Vital Signs Last 24 Hrs  T(C): 37.2 (17 Dec 2020 04:00), Max: 38.3 (16 Dec 2020 21:00)  T(F): 99 (17 Dec 2020 04:00), Max: 101 (16 Dec 2020 21:00)  HR: 68 (17 Dec 2020 04:00) (59 - 84)  BP: 98/56 (17 Dec 2020 04:00) (84/42 - 190/72)  BP(mean): 73 (17 Dec 2020 04:00) (56 - 111)  ABP: --  ABP(mean): --  RR: 31 (17 Dec 2020 04:00) (16 - 33)  SpO2: 97% (17 Dec 2020 04:05) (88% - 100%)      Adult Advanced Hemodynamics Last 24 Hrs  CVP(mm Hg): --  CVP(cm H2O): --  CO: --  CI: --  PA: --  PA(mean): --  PCWP: --  SVR: --  SVRI: --  PVR: --  PVRI: --  CAPILLARY BLOOD GLUCOSE      POCT Blood Glucose.: 95 mg/dL (16 Dec 2020 21:22)    CAPILLARY BLOOD GLUCOSE      POCT Blood Glucose.: 95 mg/dL (16 Dec 2020 21:22)    I&O's Summary    16 Dec 2020 07:01  -  17 Dec 2020 06:26  --------------------------------------------------------  IN: 240 mL / OUT: 320 mL / NET: -80 mL      Daily     Daily Weight in k.7 (17 Dec 2020 04:00)    PHYSICAL EXAMINATION:  General: No acute distress  Neurology: AAOX3  Respiratory: diminshed air entry bilaterally  CV: Regular S1&S2, no murmurs appreciated  Abdominal: soft, non tender, non distended  Extremities: No edema      LABS:                          11.1   10.41 )-----------( 192      ( 17 Dec 2020 04:30 )             35.6     12-    142  |  107  |  20  ----------------------------<  91  4.6   |  22  |  0.7    Ca    9.5      16 Dec 2020 20:55  Mg     2.0     12-16    TPro  6.4  /  Alb  4.0  /  TBili  1.0  /  DBili  x   /  AST  64<H>  /  ALT  16  /  AlkPhos  94  12-16    LIVER FUNCTIONS - ( 16 Dec 2020 20:55 )  Alb: 4.0 g/dL / Pro: 6.4 g/dL / ALK PHOS: 94 U/L / ALT: 16 U/L / AST: 64 U/L / GGT: x           PT/INR - ( 16 Dec 2020 20:55 )   PT: 12.30 sec;   INR: 1.07 ratio         PTT - ( 16 Dec 2020 20:55 )  PTT:29.0 sec            TELEMETRY:     EKG:   < from: 12 Lead ECG (20 @ 05:50) >  Ventricular Rate 75 BPM    Atrial Rate 75 BPM    P-R Interval 198 ms    QRS Duration 166 ms    Q-T Interval 480 ms    QTC Calculation(Bazett) 536 ms    P Axis 38 degrees    R Axis -29 degrees    T Axis 137 degrees    Diagnosis Line Normal sinus rhythm  Possible Left atrial enlargement  Left bundle branch block  Abnormal ECG    < end of copied text >               PATIENT:  GERRY FRIED  [id number]    CHIEF COMPLAINT:  Patient is a 80y old  Female who presented for elective cath    INTERVAL HISTORY/OVERNIGHT EVENTS:  Pt. resting comfortably in bed. Off phenylephrine since 8 pm yesterday. No more episodes of fevers. Not requiring oxygen.    MEDICATIONS:  MEDICATIONS  (STANDING):  aspirin enteric coated 81 milliGRAM(s) Oral daily  atorvastatin 40 milliGRAM(s) Oral at bedtime  chlorhexidine 4% Liquid 1 Application(s) Topical daily  chloroprocaine 3% Injectable 10 milliLiter(s) Local Injection once  pantoprazole    Tablet 40 milliGRAM(s) Oral before breakfast    MEDICATIONS  (PRN):  acetaminophen   Tablet .. 650 milliGRAM(s) Oral every 6 hours PRN Temp greater or equal to 38C (100.4F), Moderate Pain (4 - 6)      ALLERGIES:  Allergies    lidocaine (Rash)    Intolerances        OBJECTIVE:  ICU Vital Signs Last 24 Hrs  T(C): 37.2 (17 Dec 2020 04:00), Max: 38.3 (16 Dec 2020 21:00)  T(F): 99 (17 Dec 2020 04:00), Max: 101 (16 Dec 2020 21:00)  HR: 68 (17 Dec 2020 04:00) (59 - 84)  BP: 98/56 (17 Dec 2020 04:00) (84/42 - 190/72)  BP(mean): 73 (17 Dec 2020 04:00) (56 - 111)  ABP: --  ABP(mean): --  RR: 31 (17 Dec 2020 04:00) (16 - 33)  SpO2: 97% (17 Dec 2020 04:05) (88% - 100%)      Adult Advanced Hemodynamics Last 24 Hrs  CVP(mm Hg): --  CVP(cm H2O): --  CO: --  CI: --  PA: --  PA(mean): --  PCWP: --  SVR: --  SVRI: --  PVR: --  PVRI: --  CAPILLARY BLOOD GLUCOSE      POCT Blood Glucose.: 95 mg/dL (16 Dec 2020 21:22)    CAPILLARY BLOOD GLUCOSE      POCT Blood Glucose.: 95 mg/dL (16 Dec 2020 21:22)    I&O's Summary    16 Dec 2020 07:01  -  17 Dec 2020 06:26  --------------------------------------------------------  IN: 240 mL / OUT: 320 mL / NET: -80 mL      Daily     Daily Weight in k.7 (17 Dec 2020 04:00)    PHYSICAL EXAMINATION:  General: No acute distress  Neurology: AAOX3  Respiratory: diminished air entry bilaterally  CV: Regular S1&S2, no murmurs appreciated  Abdominal: soft, non tender, non distended  Extremities: No edema      LABS:                          11.1   10.41 )-----------( 192      ( 17 Dec 2020 04:30 )             35.6     12-16    142  |  107  |  20  ----------------------------<  91  4.6   |  22  |  0.7    Ca    9.5      16 Dec 2020 20:55  Mg     2.0     12-16    TPro  6.4  /  Alb  4.0  /  TBili  1.0  /  DBili  x   /  AST  64<H>  /  ALT  16  /  AlkPhos  94  12-16    LIVER FUNCTIONS - ( 16 Dec 2020 20:55 )  Alb: 4.0 g/dL / Pro: 6.4 g/dL / ALK PHOS: 94 U/L / ALT: 16 U/L / AST: 64 U/L / GGT: x           PT/INR - ( 16 Dec 2020 20:55 )   PT: 12.30 sec;   INR: 1.07 ratio         PTT - ( 16 Dec 2020 20:55 )  PTT:29.0 sec            TELEMETRY:     EKG:   < from: 12 Lead ECG (20 @ 05:50) >  Ventricular Rate 75 BPM    Atrial Rate 75 BPM    P-R Interval 198 ms    QRS Duration 166 ms    Q-T Interval 480 ms    QTC Calculation(Bazett) 536 ms    P Axis 38 degrees    R Axis -29 degrees    T Axis 137 degrees    Diagnosis Line Normal sinus rhythm  Possible Left atrial enlargement  Left bundle branch block  Abnormal ECG      TTE Echo Complete w/o Contrast w/ Doppler (20 @ 09:53)   Summary:   1. LV Ejection Fraction by Roberson's Method with a biplane EF of 74 %.   2. Hyperdynamic global left ventricular systolic function.   3. Increased LVwall thickness with asymmetric septal hypertrophy measuring up to 2 cm. Systolic anterior motion of the mitral valve resulting in dynamic LVOT obstruction. Increase in LVOT gradient noted with Valsalva ( resting peak velocity 4 m/s, with Valsalva 5.5m/s).   4. Modertae to Severe MR. MEGHAN noted with posteriorly directed moderate-severe MR jet. Peak MR jet velocity 7.7 m/s and peak gradient 240 mmHG.   5. Mild thickening of the anterior and posterior mitral valve leaflets.   6. Moderate mitral annular calcification.   7. Fibrocalcific aortic valve with decreased opening. TONE by planimetry 1.4 cm2. Hemodynamic assessment cannot be performed due to increased LVOT velocities and MEGHAN.   8. Mild-moderate tricuspid regurgitation.   9. Estimated pulmonary artery systolic pressure is 45.6 mmHg assuming a right atrial pressure of 3 mmHg, which is consistent with mild pulmonary hypertension.  10. Severely enlarged left atrium.  11. LA volume Index is 75.0 ml/m² ml/m2.    PHYSICIAN INTERPRETATION:  Left Ventricle: Global LV systolic function was hyperdynamic. Increased LV wall thickness with asymmetric septal hypertrophy measuring up to 2 cm. Systolic anterior motion of the mitral valve resulting in dynamic LVOT obstruction. Increase in LVOT gradient noted with Valsalva ( resting peak velocity 4 m/s, with Valsalva 5.5 m/s).  Right Ventricle: The right ventricular size is normal. RV systolic function is normal.  Left Atrium: Severely enlarged left atrium. LA volume Index is 75.0 ml/m² ml/m2.  Right Atrium: Normal right atrial size.  Pericardium: There is no evidence of pericardial effusion.  Mitral Valve: Mild thickening of the anterior and posterior mitral valve leaflets. There is moderate mitral annular calcification. No evidence of mitral stenosis. No evidence of mitral valve stenosis. Modertae to Severe MR. MEGHAN noted with posteriorly directed moderate-severe MR jet. Peak MR jet velocity 7.7 m/s and peak gradient 240 mmHG.  Tricuspid Valve: Mild-moderate tricuspid regurgitation is visualized. Estimated pulmonary artery systolic pressure is 45.6 mmHg assuming a right atrial pressure of 3 mmHg, which is consistent with mild pulmonary hypertension.  Aortic Valve: The aortic valve is trileaflet. Fibrocalcific aortic valve with decreased opening. TONE by planimetry 1.4 cm2. Hemodynamic assessment cannot be performed due to increased LVOT velocities and MEGHAN.  Pulmonic Valve: The pulmonic valve was not well visualized.  Aorta: The aortic root is normal in size and structure.  Pulmonary Artery: The pulmonary artery is not well seen.  Venous: The inferior vena cava was normal sized, with respiratory size variation greater than 50%.

## 2020-12-17 NOTE — PROGRESS NOTE ADULT - ASSESSMENT
IMPRESSION:    Severe hypotension from worsening dynamic LVOT obstruction with underlying HOCM and moderate AS requiring vasopressor support - resolved, now off Phenylephrine  MEGHAN with moderate to severe MR  Non-obstructive CAD  Fever - one episode, now resolved - unclear etiology, with no fevers in the past 24 hours    PLAN:    CNS: Avoid sedation    HEENT: Oral care    PULMONARY:  HOB @ 45 degrees    CARDIOVASCULAR:  - Hypotension resolved s/p NS 1.5 liters yesterday, off phenylepherine  - Monitor BP  - Continue with ASA, statin  - Start Toprol 25mg XL Q24    GI: GI prophylaxis.  Feeding     RENAL:  Follow up lytes.  Correct as needed    INFECTIOUS DISEASE:   - No fever in the past 24 hours  - F/U UA, UCx, Blood cultures  - Monitor off Abx    HEMATOLOGICAL:  DVT prophylaxis (on lovenox)    ENDOCRINE:  Follow up FS.  Insulin protocol if needed.    MUSCULOSKELETAL: ambulate as tolerated    DISPOSITION: Discharge home today, if ambulating and hemodynamically stable in the afternoon

## 2020-12-17 NOTE — PROGRESS NOTE ADULT - SUBJECTIVE AND OBJECTIVE BOX
Cardiology Follow up    GERRY FRIED   80y Female    PMH:   DLD  CHF  GI bleed  HTN  AS    No significant past surgical history    Allergies    lidocaine (Rash)    Intolerances    Patient seen and examined at bedside.   B/P low in AM after Lasix dose. Lasix D/C, NS 1.5 L bolus given, started on Phenylephrine gtt for B/P support.   Patient without complaints. Denies CP, SOB, palpitations, or dizziness  NSVT 9 beats noted on TELE overnight.    Vital Signs Last 24 Hrs  T(C): 37.2 (17 Dec 2020 04:00), Max: 38.3 (16 Dec 2020 21:00)  T(F): 99 (17 Dec 2020 04:00), Max: 101 (16 Dec 2020 21:00)  HR: 70 (17 Dec 2020 06:00) (66 - 84)  BP: 92/43 (17 Dec 2020 06:00) (84/42 - 119/58)  BP(mean): 55 (17 Dec 2020 06:00) (55 - 82)  RR: 23 (17 Dec 2020 06:00) (19 - 33)  SpO2: 95% (17 Dec 2020 06:00) (88% - 100%)    MEDICATIONS  (STANDING):  aspirin enteric coated 81 milliGRAM(s) Oral daily  atorvastatin 40 milliGRAM(s) Oral at bedtime  chlorhexidine 4% Liquid 1 Application(s) Topical daily  chloroprocaine 3% Injectable 10 milliLiter(s) Local Injection once  pantoprazole    Tablet 40 milliGRAM(s) Oral before breakfast  phenylephrine    Infusion 0.2 MICROgram(s)/kG/Min (2.5 mL/Hr) IV Continuous <Continuous>  sodium chloride 0.9% Bolus 500 milliLiter(s) IV Bolus once    MEDICATIONS  (PRN):  acetaminophen   Tablet .. 650 milliGRAM(s) Oral every 6 hours PRN Temp greater or equal to 38C (100.4F), Moderate Pain (4 - 6)      REVIEW OF SYSTEMS:          All negative except as mentioned in HPI    PHYSICAL EXAM:           CONSTITUTIONAL: Well-developed; well-nourished; in no acute distress  	SKIN: warm, dry  	HEAD: Normocephalic; atraumatic  	EYES: PERRL.  	ENT: No nasal discharge, airway clear, mucous membranes moist  	NECK: Supple; non tender.  	CARD: +S1, +S2, no murmurs, gallops, or rubs. Regular rate and rhythm    	RESP: No wheezes, rales or rhonchi. CTA B/L  	ABD: soft ntnd, + BS x 4 quadrants  	EXT: moves all extremities,  no clubbing, cyanosis or edema  	NEURO: Alert and oriented x3, no focal deficits          PSYCH: Cooperative, appropriate          VASCULAR:  + Rad / + PTs / +  DPs          EXTREMITY:             Right Groin: access site soft, no hematoma, no pain, + pulses, no sign of infection, no numbness  	            ECG:   < from: 12 Lead ECG (12.17.20 @ 10:21) >  Ventricular Rate 79 BPM    Atrial Rate 79 BPM    P-R Interval 176 ms    QRS Duration 162 ms    Q-T Interval 460 ms    QTC Calculation(Bazett) 527 ms    P Axis 91 degrees    R Axis -37 degrees    T Axis 138 degrees    Diagnosis Line Normal sinus rhythm  Left axis deviation  Left bundle branch block  Abnormal ECG    Confirmed by Jameel Salgado ([id number]) on 12/17/2020 12:51:11 PM                                                                                                                2D ECHO:  < from: OBSERVATION (TTE Echo Complete w/o Contrast w/ Doppler) (12.17.20 @ 10:55) >  CardExmStatus_ExamStatus Exam Completed  Pending report     LABS:                        11.1   10.41 )-----------( 192      ( 17 Dec 2020 04:30 )             35.6     12-17    138  |  104  |  25<H>  ----------------------------<  156<H>  3.6   |  23  |  0.9    Ca    8.8      17 Dec 2020 10:41  Mg     1.9     12-17    TPro  5.8<L>  /  Alb  3.5  /  TBili  1.1  /  DBili  x   /  AST  70<H>  /  ALT  16  /  AlkPhos  81  12-17    CARDIAC MARKERS ( 17 Dec 2020 10:41 )  x     / 1.12 ng/mL / 347 U/L / x     / 27.3 ng/mL    Magnesium, Serum: 1.9 mg/dL [1.8 - 2.4] (12-17-20 @ 10:41)  Magnesium, Serum: 1.9 mg/dL [1.8 - 2.4] (12-17-20 @ 04:30)  Magnesium, Serum: 2.0 mg/dL [1.8 - 2.4] (12-16-20 @ 20:55)  LIVER FUNCTIONS - ( 17 Dec 2020 10:41 )  Alb: 3.5 g/dL / Pro: 5.8 g/dL / ALK PHOS: 81 U/L / ALT: 16 U/L / AST: 70 U/L / GGT: x             A/P:  I discussed the case with Cardiologist Dr. Arenas and recommend the following:    S/P LHC on 12/16/2020                      Phenylephrine gtt for B/P support, keep MAP > 65                   D/C Lasix                   Hold all anti-hypertensive medications for now                   Strict I/O, daily weight   	     Continue Aspirin, Statin Therapy                   GI / DVT prophylaxis                    Monitor renal function, electrolytes                   Keep K = 4, Mg = 2                   f/u 2D Echo results                                      Post cath instructions, access site care and activity restrictions reviewed with patient                     Discussed with patient to return to hospital if experience chest pain, shortness breath, dizziness and site bleeding                   Aggressive risk factor modification, diet counseling, smoking cessation discussed with patient                       Monitor in CCU

## 2020-12-18 ENCOUNTER — TRANSCRIPTION ENCOUNTER (OUTPATIENT)
Age: 80
End: 2020-12-18

## 2020-12-18 LAB
ANION GAP SERPL CALC-SCNC: 8 MMOL/L — SIGNIFICANT CHANGE UP (ref 7–14)
ANION GAP SERPL CALC-SCNC: 8 MMOL/L — SIGNIFICANT CHANGE UP (ref 7–14)
BASOPHILS # BLD AUTO: 0.05 K/UL — SIGNIFICANT CHANGE UP (ref 0–0.2)
BASOPHILS # BLD AUTO: 0.07 K/UL — SIGNIFICANT CHANGE UP (ref 0–0.2)
BASOPHILS NFR BLD AUTO: 0.7 % — SIGNIFICANT CHANGE UP (ref 0–1)
BASOPHILS NFR BLD AUTO: 0.8 % — SIGNIFICANT CHANGE UP (ref 0–1)
BUN SERPL-MCNC: 19 MG/DL — SIGNIFICANT CHANGE UP (ref 10–20)
BUN SERPL-MCNC: 22 MG/DL — HIGH (ref 10–20)
CALCIUM SERPL-MCNC: 8.7 MG/DL — SIGNIFICANT CHANGE UP (ref 8.5–10.1)
CALCIUM SERPL-MCNC: 8.7 MG/DL — SIGNIFICANT CHANGE UP (ref 8.5–10.1)
CHLORIDE SERPL-SCNC: 108 MMOL/L — SIGNIFICANT CHANGE UP (ref 98–110)
CHLORIDE SERPL-SCNC: 110 MMOL/L — SIGNIFICANT CHANGE UP (ref 98–110)
CO2 SERPL-SCNC: 23 MMOL/L — SIGNIFICANT CHANGE UP (ref 17–32)
CO2 SERPL-SCNC: 25 MMOL/L — SIGNIFICANT CHANGE UP (ref 17–32)
CREAT SERPL-MCNC: 0.7 MG/DL — SIGNIFICANT CHANGE UP (ref 0.7–1.5)
CREAT SERPL-MCNC: 0.8 MG/DL — SIGNIFICANT CHANGE UP (ref 0.7–1.5)
EOSINOPHIL # BLD AUTO: 0.1 K/UL — SIGNIFICANT CHANGE UP (ref 0–0.7)
EOSINOPHIL # BLD AUTO: 0.11 K/UL — SIGNIFICANT CHANGE UP (ref 0–0.7)
EOSINOPHIL NFR BLD AUTO: 1.2 % — SIGNIFICANT CHANGE UP (ref 0–8)
EOSINOPHIL NFR BLD AUTO: 1.5 % — SIGNIFICANT CHANGE UP (ref 0–8)
GLUCOSE SERPL-MCNC: 102 MG/DL — HIGH (ref 70–99)
GLUCOSE SERPL-MCNC: 104 MG/DL — HIGH (ref 70–99)
HCT VFR BLD CALC: 31.4 % — LOW (ref 37–47)
HCT VFR BLD CALC: 32.4 % — LOW (ref 37–47)
HGB BLD-MCNC: 10 G/DL — LOW (ref 12–16)
HGB BLD-MCNC: 10.2 G/DL — LOW (ref 12–16)
IMM GRANULOCYTES NFR BLD AUTO: 0.3 % — SIGNIFICANT CHANGE UP (ref 0.1–0.3)
IMM GRANULOCYTES NFR BLD AUTO: 0.4 % — HIGH (ref 0.1–0.3)
LYMPHOCYTES # BLD AUTO: 1.39 K/UL — SIGNIFICANT CHANGE UP (ref 1.2–3.4)
LYMPHOCYTES # BLD AUTO: 1.71 K/UL — SIGNIFICANT CHANGE UP (ref 1.2–3.4)
LYMPHOCYTES # BLD AUTO: 18.9 % — LOW (ref 20.5–51.1)
LYMPHOCYTES # BLD AUTO: 20.7 % — SIGNIFICANT CHANGE UP (ref 20.5–51.1)
MAGNESIUM SERPL-MCNC: 1.9 MG/DL — SIGNIFICANT CHANGE UP (ref 1.8–2.4)
MCHC RBC-ENTMCNC: 28.3 PG — SIGNIFICANT CHANGE UP (ref 27–31)
MCHC RBC-ENTMCNC: 28.7 PG — SIGNIFICANT CHANGE UP (ref 27–31)
MCHC RBC-ENTMCNC: 31.5 G/DL — LOW (ref 32–37)
MCHC RBC-ENTMCNC: 31.8 G/DL — LOW (ref 32–37)
MCV RBC AUTO: 89.8 FL — SIGNIFICANT CHANGE UP (ref 81–99)
MCV RBC AUTO: 90 FL — SIGNIFICANT CHANGE UP (ref 81–99)
MONOCYTES # BLD AUTO: 0.67 K/UL — HIGH (ref 0.1–0.6)
MONOCYTES # BLD AUTO: 0.79 K/UL — HIGH (ref 0.1–0.6)
MONOCYTES NFR BLD AUTO: 9.1 % — SIGNIFICANT CHANGE UP (ref 1.7–9.3)
MONOCYTES NFR BLD AUTO: 9.6 % — HIGH (ref 1.7–9.3)
NEUTROPHILS # BLD AUTO: 5.13 K/UL — SIGNIFICANT CHANGE UP (ref 1.4–6.5)
NEUTROPHILS # BLD AUTO: 5.55 K/UL — SIGNIFICANT CHANGE UP (ref 1.4–6.5)
NEUTROPHILS NFR BLD AUTO: 67.3 % — SIGNIFICANT CHANGE UP (ref 42.2–75.2)
NEUTROPHILS NFR BLD AUTO: 69.5 % — SIGNIFICANT CHANGE UP (ref 42.2–75.2)
NRBC # BLD: 0 /100 WBCS — SIGNIFICANT CHANGE UP (ref 0–0)
NRBC # BLD: 0 /100 WBCS — SIGNIFICANT CHANGE UP (ref 0–0)
PLATELET # BLD AUTO: 142 K/UL — SIGNIFICANT CHANGE UP (ref 130–400)
PLATELET # BLD AUTO: 144 K/UL — SIGNIFICANT CHANGE UP (ref 130–400)
POTASSIUM SERPL-MCNC: 3.8 MMOL/L — SIGNIFICANT CHANGE UP (ref 3.5–5)
POTASSIUM SERPL-MCNC: 4 MMOL/L — SIGNIFICANT CHANGE UP (ref 3.5–5)
POTASSIUM SERPL-SCNC: 3.8 MMOL/L — SIGNIFICANT CHANGE UP (ref 3.5–5)
POTASSIUM SERPL-SCNC: 4 MMOL/L — SIGNIFICANT CHANGE UP (ref 3.5–5)
RBC # BLD: 3.49 M/UL — LOW (ref 4.2–5.4)
RBC # BLD: 3.61 M/UL — LOW (ref 4.2–5.4)
RBC # FLD: 15.1 % — HIGH (ref 11.5–14.5)
RBC # FLD: 15.1 % — HIGH (ref 11.5–14.5)
SARS-COV-2 IGG SERPL QL IA: NEGATIVE — SIGNIFICANT CHANGE UP
SARS-COV-2 IGM SERPL IA-ACNC: <0.1 INDEX — SIGNIFICANT CHANGE UP
SARS-COV-2 RNA SPEC QL NAA+PROBE: DETECTED
SODIUM SERPL-SCNC: 141 MMOL/L — SIGNIFICANT CHANGE UP (ref 135–146)
SODIUM SERPL-SCNC: 141 MMOL/L — SIGNIFICANT CHANGE UP (ref 135–146)
TROPONIN T SERPL-MCNC: 0.86 NG/ML — CRITICAL HIGH
WBC # BLD: 7.37 K/UL — SIGNIFICANT CHANGE UP (ref 4.8–10.8)
WBC # BLD: 8.25 K/UL — SIGNIFICANT CHANGE UP (ref 4.8–10.8)
WBC # FLD AUTO: 7.37 K/UL — SIGNIFICANT CHANGE UP (ref 4.8–10.8)
WBC # FLD AUTO: 8.25 K/UL — SIGNIFICANT CHANGE UP (ref 4.8–10.8)

## 2020-12-18 PROCEDURE — 71045 X-RAY EXAM CHEST 1 VIEW: CPT | Mod: 26

## 2020-12-18 PROCEDURE — 99233 SBSQ HOSP IP/OBS HIGH 50: CPT

## 2020-12-18 PROCEDURE — 93010 ELECTROCARDIOGRAM REPORT: CPT

## 2020-12-18 RX ORDER — DEXAMETHASONE 0.5 MG/5ML
6 ELIXIR ORAL DAILY
Refills: 0 | Status: DISCONTINUED | OUTPATIENT
Start: 2020-12-18 | End: 2020-12-20

## 2020-12-18 RX ORDER — SIMVASTATIN 20 MG/1
1 TABLET, FILM COATED ORAL
Qty: 30 | Refills: 0
Start: 2020-12-18 | End: 2021-01-16

## 2020-12-18 RX ORDER — METOPROLOL TARTRATE 50 MG
25 TABLET ORAL DAILY
Refills: 0 | Status: DISCONTINUED | OUTPATIENT
Start: 2020-12-18 | End: 2020-12-20

## 2020-12-18 RX ORDER — METOPROLOL TARTRATE 50 MG
1 TABLET ORAL
Qty: 0 | Refills: 0 | DISCHARGE

## 2020-12-18 RX ORDER — METOPROLOL TARTRATE 50 MG
1 TABLET ORAL
Qty: 30 | Refills: 0
Start: 2020-12-18 | End: 2021-01-16

## 2020-12-18 RX ORDER — SIMVASTATIN 20 MG/1
1 TABLET, FILM COATED ORAL
Qty: 0 | Refills: 0 | DISCHARGE

## 2020-12-18 RX ORDER — LOSARTAN POTASSIUM 100 MG/1
1 TABLET, FILM COATED ORAL
Qty: 0 | Refills: 0 | DISCHARGE

## 2020-12-18 RX ADMIN — ATORVASTATIN CALCIUM 40 MILLIGRAM(S): 80 TABLET, FILM COATED ORAL at 21:53

## 2020-12-18 RX ADMIN — ENOXAPARIN SODIUM 40 MILLIGRAM(S): 100 INJECTION SUBCUTANEOUS at 11:25

## 2020-12-18 RX ADMIN — PANTOPRAZOLE SODIUM 40 MILLIGRAM(S): 20 TABLET, DELAYED RELEASE ORAL at 05:18

## 2020-12-18 RX ADMIN — Medication 25 MILLIGRAM(S): at 09:54

## 2020-12-18 RX ADMIN — Medication 81 MILLIGRAM(S): at 11:26

## 2020-12-18 RX ADMIN — CHLORHEXIDINE GLUCONATE 1 APPLICATION(S): 213 SOLUTION TOPICAL at 11:25

## 2020-12-18 RX ADMIN — Medication 6 MILLIGRAM(S): at 19:53

## 2020-12-18 NOTE — DISCHARGE NOTE PROVIDER - HOSPITAL COURSE
79 yo F PMHx HFpEF, aortic stenosis, HOCM, HTN and DLD, has been having intermittent chest pain with exertion for few months that has been worsening over last month and was referred for elective cath. Pt did develop chest pain an hour prior to presentation. Cath done on 12/16 and showed non obstructive CAD. After cath pt developed pt became hypotensive likely from hydralazine and fentanyl that were given during cath as CT r/o RPH and there was no groin hematoma at access site. She was given fluids then became tachypnic and desated to 80s. CXR with new bilateral opacities suggesting pulmonary edema so was given 20 g of lasix and put on BIPAP with improvement. The next day developed severe hypotension from worsening dynamic LVOT obstruction with underlying HOCM and moderate AS, total 1.5 L NS and required pressors ~ 10 hrs then BP stabilized.           79 yo F PMHx HFpEF, aortic stenosis, HOCM, HTN and DLD, has been having intermittent chest pain with exertion for few months that has been worsening over last month and was referred for elective cath. Pt did develop chest pain an hour prior to presentation. Cath done on 12/16 and showed non obstructive CAD. After cath pt developed pt became hypotensive likely from hydralazine and fentanyl that were given during cath as CT r/o RPH and there was no groin hematoma at access site. She was given fluids then became tachypnic and desated to 80s. CXR with new bilateral opacities suggesting pulmonary edema so was given 20 g of lasix and put on BIPAP with improvement. The next day developed severe hypotension from worsening dynamic LVOT obstruction with underlying HOCM and moderate AS, total 1.5 L NS and required pressors ~ 10 hrs then BP stabilized.  Home losartan held on discharge

## 2020-12-18 NOTE — DISCHARGE NOTE PROVIDER - NSDCCPCAREPLAN_GEN_ALL_CORE_FT
PRINCIPAL DISCHARGE DIAGNOSIS  Diagnosis: Chest pain  Assessment and Plan of Treatment: You came in because you have been experiencing chest pain. You had cardiac catheterization done on 12/16 and it was normal. No heart attack. Please take your medications as prescribed and follow up with your cardiologist.      SECONDARY DISCHARGE DIAGNOSES  Diagnosis: HOCM (hypertrophic obstructive cardiomyopathy)  Assessment and Plan of Treatment: You have an outflow obstruction in your heart. Please make sure you stay hydrated so your blood pressure does not drop. Please follow up with your cardiologist.    Diagnosis: Aortic stenosis  Assessment and Plan of Treatment: You have narrowing of one the valves in your heart. Please follow up with your cardiologist.    Diagnosis: Hypotension  Assessment and Plan of Treatment: You blood pressure dropped after the catheterization and then again the next day, you required intravenous medications to help support it. Please avoid dehydration.     PRINCIPAL DISCHARGE DIAGNOSIS  Diagnosis: Chest pain  Assessment and Plan of Treatment: You came in because you have been experiencing chest pain. You had cardiac catheterization done on 12/16 and it was normal. No heart attack. Please take your medications as prescribed and follow up with your cardiologist.      SECONDARY DISCHARGE DIAGNOSES  Diagnosis: Exposure to COVID-19 virus  Assessment and Plan of Treatment: You were found to be positive for COVID-19, however you do not require oxygen and your blood work is normal so you don't need hospitalization. You should quarantine at home and isolate yourself from you family members for 14 days. If you develop any shortness of breath, return to the hospital, however for symptoms of fever, you can take tylenol at home to control.    Diagnosis: HOCM (hypertrophic obstructive cardiomyopathy)  Assessment and Plan of Treatment: You have an outflow obstruction in your heart. Please make sure you stay hydrated so your blood pressure does not drop. Please follow up with your cardiologist.    Diagnosis: Aortic stenosis  Assessment and Plan of Treatment: You have narrowing of one the valves in your heart. Please follow up with your cardiologist.    Diagnosis: Hypotension  Assessment and Plan of Treatment: You blood pressure dropped after the catheterization and then again the next day, you required intravenous medications to help support it. Please avoid dehydration.

## 2020-12-18 NOTE — DISCHARGE NOTE PROVIDER - PROVIDER TOKENS
PROVIDER:[TOKEN:[[id number]],FOLLOWUP:[2 weeks]],PROVIDER:[TOKEN:[[id number]],FOLLOWUP:[2 weeks]]

## 2020-12-18 NOTE — DISCHARGE NOTE NURSING/CASE MANAGEMENT/SOCIAL WORK - PATIENT PORTAL LINK FT
You can access the FollowMyHealth Patient Portal offered by [facility] by registering at the following website: [email or web address]. By joining [facility]’s FollowMyHealth portal, you will also be able to view your health information using other applications (apps) compatible with our system.

## 2020-12-18 NOTE — DISCHARGE NOTE PROVIDER - CARE PROVIDER_API CALL
Yung Arenas  Cardiovascular Disease  [address] [place] [address] [address]  [place] [address]  Phone: [phone]  Fax: [phone]  Follow Up Time: 2 weeks    Gatito Lilly  [address] [place] [address] [place] [address]  Phone: [phone]  Fax: [phone]  Follow Up Time: 2 weeks

## 2020-12-18 NOTE — PROGRESS NOTE ADULT - SUBJECTIVE AND OBJECTIVE BOX
Cardiology Follow up    GERRY FRIED   80y Female  PAST MEDICAL & SURGICAL HISTORY:  Aortic stenosis    Hypertrophic obstructive cardiomyopathy (HOCM)    High cholesterol    No significant past surgical history         HPI:  80 years old female known to have DLD, HFpEF, Aortic stenosis, HOCM, HTN comes to the ED for chest pain for 1 day. Pain started 1 hour prior to the presentation, substernal chest pain, pressure/burning in character, 7-8 in intensity, sudden onset, no radiation; symptoms started when she was taking upstairs to the second floor, pain lasted for about 10 minutes and got relieved with rest, no radiation of the pain, no associated nausea, vomiting, reflux, palpitation, lower extremity edema.  She reports that she gets right shoulder pain on and off and takes tylenol for relief but this pain was different and severe.    No headache, loss of consciousness, seizure, fever, focal weakness.   (16 Dec 2020 11:54)    Allergies    lidocaine (Rash)    Intolerances    Patient seen and examined at bedside. No acute events overnight.  Patient without complaints. Pt ambulated OOB to chair without issues/symptoms  Denies CP, SOB, palpitations, or dizziness  Off Phenylephrine gtt since 8 pm yesterday, MAP > 65   NSVT 10 and 4 beats noted on telemetry overnight    Vital Signs Last 24 Hrs  T(C): 36.5 (18 Dec 2020 04:00), Max: 37.6 (17 Dec 2020 20:00)  T(F): 97.7 (18 Dec 2020 04:00), Max: 99.6 (17 Dec 2020 20:00)  HR: 68 (18 Dec 2020 06:00) (68 - 82)  BP: 126/59 (18 Dec 2020 06:00) (87/50 - 138/57)  BP(mean): 89 (18 Dec 2020 06:00) (53 - 91)  RR: 23 (18 Dec 2020 06:00) (21 - 56)  SpO2: 97% (18 Dec 2020 06:00) (93% - 100%)    MEDICATIONS  (STANDING):  aspirin enteric coated 81 milliGRAM(s) Oral daily  atorvastatin 40 milliGRAM(s) Oral at bedtime  chlorhexidine 4% Liquid 1 Application(s) Topical daily  chloroprocaine 3% Injectable 10 milliLiter(s) Local Injection once  enoxaparin Injectable 40 milliGRAM(s) SubCutaneous daily  metoprolol succinate ER 25 milliGRAM(s) Oral daily  pantoprazole    Tablet 40 milliGRAM(s) Oral before breakfast    MEDICATIONS  (PRN):  acetaminophen   Tablet .. 650 milliGRAM(s) Oral every 6 hours PRN Temp greater or equal to 38C (100.4F), Moderate Pain (4 - 6)      REVIEW OF SYSTEMS:          All negative except as mentioned in HPI    PHYSICAL EXAM:           CONSTITUTIONAL: Well-developed; well-nourished; in no acute distress  	SKIN: warm, dry  	HEAD: Normocephalic; atraumatic  	EYES: PERRL.  	ENT: No nasal discharge, airway clear, mucous membranes moist  	NECK: Supple; non tender.  	CARD: +S1, +S2, no murmurs, gallops, or rubs. Regular rate and rhythm    	RESP: No wheezes, rales or rhonchi. CTA B/L  	ABD: soft ntnd, + BS x 4 quadrants  	EXT: moves all extremities,  no clubbing, cyanosis or edema  	NEURO: Alert and oriented x3, no focal deficits          PSYCH: Cooperative, appropriate          VASCULAR:  + Rad / + PTs / +  DPs          EXTREMITY:             Right Groin: access site soft, no hematoma, no pain, + pulses, no sign of infection, no numbness  	            ECG:   < from: 12 Lead ECG (12.18.20 @ 05:09) >  Ventricular Rate 71 BPM    Atrial Rate 71 BPM    P-R Interval 158 ms    QRS Duration 158 ms    Q-T Interval 484 ms    QTC Calculation(Bazett) 525 ms    P Axis 40 degrees    R Axis -31 degrees    T Axis 139 degrees    Diagnosis Line Normal sinus rhythm  Possible Left atrial enlargement  Left axis deviation  Left bundle branch block  Abnormal ECG    Confirmed by SAL MARTINEZ MD ([id number]) on 12/18/2020 9:50:33 AM                                                                                                               2D ECHO:  < from: TTE Echo Complete w/o Contrast w/ Doppler (12.17.20 @ 09:53) >  Summary:   1. LV Ejection Fraction by Roberson's Method with a biplane EF of 74 %.   2. Hyperdynamic global left ventricular systolic function.   3. Increased LVwall thickness with asymmetric septal hypertrophy measuring up to 2 cm. Systolic anterior motion of the mitral valve resulting in dynamic LVOT obstruction. Increase in LVOT gradient noted with Valsalva ( resting peak velocity 4 m/s, with Valsalva 5.5m/s).   4. Modertae to Severe MR. MEGHAN noted with posteriorly directed moderate-severe MR jet. Peak MR jet velocity 7.7 m/s and peak gradient 240 mmHG.   5. Mild thickening of the anterior and posterior mitral valve leaflets.   6. Moderate mitral annular calcification.   7. Fibrocalcific aortic valve with decreased opening. TONE by planimetry 1.4 cm2. Hemodynamic assessment cannot be performed due to increased LVOT velocities and MEGHAN.   8. Mild-moderate tricuspid regurgitation.   9. Estimated pulmonary artery systolic pressure is 45.6 mmHg assuming a right atrial pressure of 3 mmHg, which is consistent with mild pulmonary hypertension.  10. Severely enlarged left atrium.  11. LA volume Index is 75.0 ml/m² ml/m2.    LABS:                        10.2   7.37  )-----------( 142      ( 18 Dec 2020 05:05 )             32.4     12-18    141  |  108  |  19  ----------------------------<  102<H>  4.0   |  25  |  0.8    Ca    8.7      18 Dec 2020 05:05  Mg     1.9     12-18    TPro  5.8<L>  /  Alb  3.5  /  TBili  1.1  /  DBili  x   /  AST  70<H>  /  ALT  16  /  AlkPhos  81  12-17    CARDIAC MARKERS ( 18 Dec 2020 00:41 )  x     / 0.86 ng/mL / x     / x     / x      CARDIAC MARKERS ( 17 Dec 2020 10:41 )  x     / 1.12 ng/mL / 347 U/L / x     / 27.3 ng/mL    Magnesium, Serum: 1.9 mg/dL [1.8 - 2.4] (12-18-20 @ 05:05)  Magnesium, Serum: 1.9 mg/dL [1.8 - 2.4] (12-17-20 @ 10:41)  LIVER FUNCTIONS - ( 17 Dec 2020 10:41 )  Alb: 3.5 g/dL / Pro: 5.8 g/dL / ALK PHOS: 81 U/L / ALT: 16 U/L / AST: 70 U/L / GGT: x             A/P:  I discussed the case with Cardiologist Dr. Arenas and recommend the following:    S/P LHC on 12/16/2020                       Reintroduce BB today                   If B/P can tolerate, resume ARB later in the day   	     Continue Aspirin, Statin Therapy                   Ambulate with assistance                    Post cath instructions, access site care and activity restrictions reviewed with patient                     Discussed with patient to return to hospital if experience chest pain, shortness breath, dizziness and site bleeding                   Aggressive risk factor modification, diet counseling, smoking cessation discussed with patient                       Follow up with Cardiology Dr. Reis regarding patient disposition.

## 2020-12-18 NOTE — DISCHARGE NOTE PROVIDER - NSDCMRMEDTOKEN_GEN_ALL_CORE_FT
aspirin 81 mg oral delayed release tablet: 1 tab(s) orally once a day   losartan 50 mg oral tablet: 1 tab(s) orally once a day  metoprolol succinate 25 mg oral tablet, extended release: 1 tab(s) orally once a day  pantoprazole 40 mg oral delayed release tablet: 1 tab(s) orally 2 times a day  simvastatin 20 mg oral tablet: 1 tab(s) orally once a day (at bedtime)   aspirin 81 mg oral delayed release tablet: 1 tab(s) orally once a day   metoprolol succinate 25 mg oral tablet, extended release: 1 tab(s) orally once a day  pantoprazole 40 mg oral delayed release tablet: 1 tab(s) orally 2 times a day  simvastatin 20 mg oral tablet: 1 tab(s) orally once a day (at bedtime)   aspirin 81 mg oral delayed release tablet: 1 tab(s) orally once a day   metoprolol succinate 25 mg oral tablet, extended release: 1 tab(s) orally once a day  pantoprazole 40 mg oral delayed release tablet: 1 tab(s) orally 2 times a day  simvastatin 20 mg oral tablet: 1 tab(s) orally once a day (at bedtime)    aspirin 81 mg oral delayed release tablet: 1 tab(s) orally once a day   pantoprazole 40 mg oral delayed release tablet: 1 tab(s) orally 2 times a day  simvastatin 20 mg oral tablet: 1 tab(s) orally once a day (at bedtime)   Toprol-XL 50 mg oral tablet, extended release: 1 tab(s) orally once a day

## 2020-12-18 NOTE — PROGRESS NOTE ADULT - SUBJECTIVE AND OBJECTIVE BOX
PATIENT:  GERRY FRIED  [id number]    CHIEF COMPLAINT:  Patient is a 80y old  Female who presents with a chief complaint of s/p LHC (17 Dec 2020 12:48)      INTERVAL HISTORY/OVERNIGHT EVENTS:      MEDICATIONS:  MEDICATIONS  (STANDING):  aspirin enteric coated 81 milliGRAM(s) Oral daily  atorvastatin 40 milliGRAM(s) Oral at bedtime  chlorhexidine 4% Liquid 1 Application(s) Topical daily  chloroprocaine 3% Injectable 10 milliLiter(s) Local Injection once  enoxaparin Injectable 40 milliGRAM(s) SubCutaneous daily  pantoprazole    Tablet 40 milliGRAM(s) Oral before breakfast    MEDICATIONS  (PRN):  acetaminophen   Tablet .. 650 milliGRAM(s) Oral every 6 hours PRN Temp greater or equal to 38C (100.4F), Moderate Pain (4 - 6)      ALLERGIES:  Allergies    lidocaine (Rash)    Intolerances        OBJECTIVE:  ICU Vital Signs Last 24 Hrs  T(C): 36.5 (18 Dec 2020 04:00), Max: 37.6 (17 Dec 2020 20:00)  T(F): 97.7 (18 Dec 2020 04:00), Max: 99.6 (17 Dec 2020 20:00)  HR: 68 (18 Dec 2020 06:00) (68 - 86)  BP: 126/59 (18 Dec 2020 06:00) (76/48 - 138/57)  BP(mean): 89 (18 Dec 2020 06:00) (53 - 91)  ABP: --  ABP(mean): --  RR: 23 (18 Dec 2020 06:00) (21 - 56)  SpO2: 97% (18 Dec 2020 06:00) (93% - 100%)      Adult Advanced Hemodynamics Last 24 Hrs  CVP(mm Hg): --  CVP(cm H2O): --  CO: --  CI: --  PA: --  PA(mean): --  PCWP: --  SVR: --  SVRI: --  PVR: --  PVRI: --  CAPILLARY BLOOD GLUCOSE        CAPILLARY BLOOD GLUCOSE      POCT Blood Glucose.: 95 mg/dL (16 Dec 2020 21:22)    I&O's Summary    16 Dec 2020 07:01  -  17 Dec 2020 07:00  --------------------------------------------------------  IN: 300 mL / OUT: 475 mL / NET: -175 mL    17 Dec 2020 07:01  -  18 Dec 2020 06:37  --------------------------------------------------------  IN: 2585.3 mL / OUT: 1050 mL / NET: 1535.3 mL      Daily     Daily Weight in k.2 (18 Dec 2020 06:00)    PHYSICAL EXAMINATION:  General:   HEENT:   Neurology:   Respiratory:   CV:   Abdominal:   Extremities:   Incisions:   Tubes:    LABS:                          10.2   7.37  )-----------( 142      ( 18 Dec 2020 05:05 )             32.4         141  |  108  |  19  ----------------------------<  102<H>  4.0   |  25  |  0.8    Ca    8.7      18 Dec 2020 05:05  Mg     1.9         TPro  5.8<L>  /  Alb  3.5  /  TBili  1.1  /  DBili  x   /  AST  70<H>  /  ALT  16  /  AlkPhos  81      LIVER FUNCTIONS - ( 17 Dec 2020 10:41 )  Alb: 3.5 g/dL / Pro: 5.8 g/dL / ALK PHOS: 81 U/L / ALT: 16 U/L / AST: 70 U/L / GGT: x           PT/INR - ( 16 Dec 2020 20:55 )   PT: 12.30 sec;   INR: 1.07 ratio         PTT - ( 16 Dec 2020 20:55 )  PTT:29.0 sec  Troponin T, Serum: 0.86 ng/mL ( @ 00:41)  Creatine Kinase, Serum: 347 U/L ( @ 10:41)  CKMB Units: 27.3 ng/mL ( @ 10:41)  Troponin T, Serum: 1.12 ng/mL ( @ 10:41)    CARDIAC MARKERS ( 18 Dec 2020 00:41 )  x     / 0.86 ng/mL / x     / x     / x      CARDIAC MARKERS ( 17 Dec 2020 10:41 )  x     / 1.12 ng/mL / 347 U/L / x     / 27.3 ng/mL      Urinalysis Basic - ( 17 Dec 2020 10:41 )    Color: Light Yellow / Appearance: Clear / S.026 / pH: x  Gluc: x / Ketone: Trace  / Bili: Negative / Urobili: <2 mg/dL   Blood: x / Protein: Trace / Nitrite: Negative   Leuk Esterase: Small / RBC: 61 /HPF / WBC 15 /HPF   Sq Epi: x / Non Sq Epi: 4 /HPF / Bacteria: Negative        TELEMETRY:     EKG:     IMAGING:           PATIENT:  GERRY FRIED  [id number]    CHIEF COMPLAINT:  Patient is a 80y old  Female who presents with a chief complaint of s/p LHC (17 Dec 2020 12:48)      INTERVAL HISTORY/OVERNIGHT EVENTS:  Pt has been off neosynephrine since 8:00 pm last night and BP stable. This morning no new issues and afebrile.      MEDICATIONS:  MEDICATIONS  (STANDING):  aspirin enteric coated 81 milliGRAM(s) Oral daily  atorvastatin 40 milliGRAM(s) Oral at bedtime  chlorhexidine 4% Liquid 1 Application(s) Topical daily  chloroprocaine 3% Injectable 10 milliLiter(s) Local Injection once  enoxaparin Injectable 40 milliGRAM(s) SubCutaneous daily  pantoprazole    Tablet 40 milliGRAM(s) Oral before breakfast    MEDICATIONS  (PRN):  acetaminophen   Tablet .. 650 milliGRAM(s) Oral every 6 hours PRN Temp greater or equal to 38C (100.4F), Moderate Pain (4 - 6)      ALLERGIES:  Allergies    lidocaine (Rash)    Intolerances        OBJECTIVE:  ICU Vital Signs Last 24 Hrs  T(C): 36.5 (18 Dec 2020 04:00), Max: 37.6 (17 Dec 2020 20:00)  T(F): 97.7 (18 Dec 2020 04:00), Max: 99.6 (17 Dec 2020 20:00)  HR: 68 (18 Dec 2020 06:00) (68 - 86)  BP: 126/59 (18 Dec 2020 06:00) (76/48 - 138/57)  BP(mean): 89 (18 Dec 2020 06:00) (53 - 91)  ABP: --  ABP(mean): --  RR: 23 (18 Dec 2020 06:00) (21 - 56)  SpO2: 97% (18 Dec 2020 06:00) (93% - 100%)      Adult Advanced Hemodynamics Last 24 Hrs  CVP(mm Hg): --  CVP(cm H2O): --  CO: --  CI: --  PA: --  PA(mean): --  PCWP: --  SVR: --  SVRI: --  PVR: --  PVRI: --  CAPILLARY BLOOD GLUCOSE        CAPILLARY BLOOD GLUCOSE      POCT Blood Glucose.: 95 mg/dL (16 Dec 2020 21:22)    I&O's Summary    16 Dec 2020 07:01  -  17 Dec 2020 07:00  --------------------------------------------------------  IN: 300 mL / OUT: 475 mL / NET: -175 mL    17 Dec 2020 07:01  -  18 Dec 2020 06:37  --------------------------------------------------------  IN: 2585.3 mL / OUT: 1050 mL / NET: 1535.3 mL      Daily     Daily Weight in k.2 (18 Dec 2020 06:00)    PHYSICAL EXAMINATION:  General: No acute distress  Neurology: AAOX3  Respiratory: Clear to auscultation bilaterally  CV: Regular S1&S2, no murmurs  Abdominal: soft, non tender, non distended  Extremities: No edema      LABS:                          10.2   7.37  )-----------( 142      ( 18 Dec 2020 05:05 )             32.4         141  |  108  |  19  ----------------------------<  102<H>  4.0   |  25  |  0.8    Ca    8.7      18 Dec 2020 05:05  Mg     1.9         TPro  5.8<L>  /  Alb  3.5  /  TBili  1.1  /  DBili  x   /  AST  70<H>  /  ALT  16  /  AlkPhos  81      LIVER FUNCTIONS - ( 17 Dec 2020 10:41 )  Alb: 3.5 g/dL / Pro: 5.8 g/dL / ALK PHOS: 81 U/L / ALT: 16 U/L / AST: 70 U/L / GGT: x           PT/INR - ( 16 Dec 2020 20:55 )   PT: 12.30 sec;   INR: 1.07 ratio         PTT - ( 16 Dec 2020 20:55 )  PTT:29.0 sec  Troponin T, Serum: 0.86 ng/mL ( @ 00:41)  Creatine Kinase, Serum: 347 U/L ( @ 10:41)  CKMB Units: 27.3 ng/mL ( @ 10:41)  Troponin T, Serum: 1.12 ng/mL ( @ 10:41)    CARDIAC MARKERS ( 18 Dec 2020 00:41 )  x     / 0.86 ng/mL / x     / x     / x      CARDIAC MARKERS ( 17 Dec 2020 10:41 )  x     / 1.12 ng/mL / 347 U/L / x     / 27.3 ng/mL      Urinalysis Basic - ( 17 Dec 2020 10:41 )    Color: Light Yellow / Appearance: Clear / S.026 / pH: x  Gluc: x / Ketone: Trace  / Bili: Negative / Urobili: <2 mg/dL   Blood: x / Protein: Trace / Nitrite: Negative   Leuk Esterase: Small / RBC: 61 /HPF / WBC 15 /HPF   Sq Epi: x / Non Sq Epi: 4 /HPF / Bacteria: Negative        TELEMETRY:   No events    EKG:

## 2020-12-18 NOTE — DISCHARGE NOTE PROVIDER - NSDCFUADDINST_GEN_ALL_CORE_FT
- Please follow up with Dr. Arenas in 1-2 weeks  - Please avoid baths, only showers allowed for 7 days  - Please avoid heavy weight lifting, no more than 10 lbs  - Please visit the nearest the emergency department or call 911 if you experience any chest pain, shortness of breath or dizziness.  - Please make sure to stay hydrated to avoid any drop in your blood pressure - Please follow up with Dr. Arenas in 1-2 weeks  - Please avoid baths, only showers allowed for 7 days  - Please avoid heavy weight lifting, no more than 10 lbs  - Please visit the nearest the emergency department or call 911 if you experience any chest pain, shortness of breath or dizziness.  - Please make sure to stay hydrated to avoid any drop in your blood pressure  - Please do not take losartan unless otherwise instructed by Dr. Arenas in 1-2 weeks

## 2020-12-18 NOTE — PHYSICAL THERAPY INITIAL EVALUATION ADULT - GENERAL OBSERVATIONS, REHAB EVAL
Pt encountered in the b/s chair, NAD, +tele, agreeable for b/s PT IE/tx. Pt left in the b/s chair post tx, antonia well to tx, all needs within reach.

## 2020-12-19 LAB
CRP SERPL-MCNC: 2.76 MG/DL — HIGH (ref 0–0.4)
D DIMER BLD IA.RAPID-MCNC: 681 NG/ML DDU — HIGH (ref 0–230)
LDH SERPL L TO P-CCNC: 383 — HIGH (ref 50–242)

## 2020-12-19 PROCEDURE — 99233 SBSQ HOSP IP/OBS HIGH 50: CPT

## 2020-12-19 PROCEDURE — 93010 ELECTROCARDIOGRAM REPORT: CPT

## 2020-12-19 PROCEDURE — 71045 X-RAY EXAM CHEST 1 VIEW: CPT | Mod: 26

## 2020-12-19 RX ORDER — SENNA PLUS 8.6 MG/1
2 TABLET ORAL AT BEDTIME
Refills: 0 | Status: DISCONTINUED | OUTPATIENT
Start: 2020-12-19 | End: 2020-12-20

## 2020-12-19 RX ADMIN — PANTOPRAZOLE SODIUM 40 MILLIGRAM(S): 20 TABLET, DELAYED RELEASE ORAL at 05:40

## 2020-12-19 RX ADMIN — SENNA PLUS 2 TABLET(S): 8.6 TABLET ORAL at 21:50

## 2020-12-19 RX ADMIN — ENOXAPARIN SODIUM 40 MILLIGRAM(S): 100 INJECTION SUBCUTANEOUS at 12:08

## 2020-12-19 RX ADMIN — Medication 650 MILLIGRAM(S): at 00:48

## 2020-12-19 RX ADMIN — Medication 650 MILLIGRAM(S): at 00:18

## 2020-12-19 RX ADMIN — Medication 6 MILLIGRAM(S): at 05:40

## 2020-12-19 RX ADMIN — Medication 25 MILLIGRAM(S): at 05:40

## 2020-12-19 RX ADMIN — Medication 81 MILLIGRAM(S): at 12:08

## 2020-12-19 RX ADMIN — ATORVASTATIN CALCIUM 40 MILLIGRAM(S): 80 TABLET, FILM COATED ORAL at 21:50

## 2020-12-19 NOTE — CONSULT NOTE ADULT - ASSESSMENT
ASSESSMENT  80 years old female known to have DLD, HFpEF, Aortic stenosis, HOCM, HTN comes to the ED for chest pain for 1 day. Pain started 1 hour prior to the presentation, substernal chest pain, pressure/burning in character, 7-8 in intensity, sudden onset, no radiation; symptoms started when she was taking upstairs to the second floor, pain lasted for about 10 minutes and got relieved with rest, no radiation of the pain, no associated nausea, vomiting, reflux, palpitation, lower extremity edema.      IMPRESSION  #Hypotension secondary to LVOT obstruction/HOCM, moderate AS  - s/p [facility] 12/16    #COVID-19 infection, Hospital Acquired  - had acute episode of desaturation, but now on room air   - COVID positive 12/17  - D-Dimer Assay, Quantitative: 681: Manufacturers recommended Cut off for VTE is 230 ng/ml D-DU ng/mL DDU (12.19.20 @ 04:57)  - C-Reactive Protein, Serum: 2.76 mg/dL (12.19.20 @ 04:57)  - Ferritin Pending  - Lactate Dehydrogenase, Serum: 383 (12.19.20 @ 04:57)  - Procal Pending    #Abx allergy: lidocaine (Rash)    RECOMMENDATIONS  - episode of desaturation, but now on room air  - CXR improved between 12/18 and 12/19, possibly fluid related  - would hold off on RDV/Steroids as patient currently on room air as patient does not meet criteria for COVID-19 therapeutics (SpO2 =94% on room air, and requiring supplemental oxygen)  - trend inflammatory markers every other day  - if O2 requirments dropping < 94, can start RDV/Steroids  - supportive care    Please call or message on [employer] Teams if with any questions.  Spectra [phone]

## 2020-12-19 NOTE — PROGRESS NOTE ADULT - SUBJECTIVE AND OBJECTIVE BOX
PATIENT:  GERRY FRIED  [id number]    CHIEF COMPLAINT:  Patient is a 80y old  Female who presents with a chief complaint of chest pain (18 Dec 2020 11:31)      INTERVAL HISTORY/OVERNIGHT EVENTS:          MEDICATIONS:  MEDICATIONS  (STANDING):  aspirin enteric coated 81 milliGRAM(s) Oral daily  atorvastatin 40 milliGRAM(s) Oral at bedtime  chlorhexidine 4% Liquid 1 Application(s) Topical daily  chloroprocaine 3% Injectable 10 milliLiter(s) Local Injection once  dexAMETHasone  Injectable 6 milliGRAM(s) IV Push daily  enoxaparin Injectable 40 milliGRAM(s) SubCutaneous daily  metoprolol succinate ER 25 milliGRAM(s) Oral daily  pantoprazole    Tablet 40 milliGRAM(s) Oral before breakfast    MEDICATIONS  (PRN):  acetaminophen   Tablet .. 650 milliGRAM(s) Oral every 6 hours PRN Temp greater or equal to 38C (100.4F), Moderate Pain (4 - 6)      ALLERGIES:  Allergies    lidocaine (Rash)    Intolerances        OBJECTIVE:  ICU Vital Signs Last 24 Hrs  T(C): 35.6 (19 Dec 2020 04:00), Max: 36.9 (18 Dec 2020 08:00)  T(F): 96 (19 Dec 2020 04:00), Max: 98.4 (18 Dec 2020 08:00)  HR: 68 (19 Dec 2020 06:00) (64 - 80)  BP: 134/65 (19 Dec 2020 06:00) (108/82 - 165/73)  BP(mean): 87 (19 Dec 2020 06:00) (83 - 120)  ABP: --  ABP(mean): --  RR: 21 (19 Dec 2020 06:00) (17 - 27)  SpO2: 96% (19 Dec 2020 06:00) (95% - 99%)      Adult Advanced Hemodynamics Last 24 Hrs  CVP(mm Hg): --  CVP(cm H2O): --  CO: --  CI: --  PA: --  PA(mean): --  PCWP: --  SVR: --  SVRI: --  PVR: --  PVRI: --  CAPILLARY BLOOD GLUCOSE        CAPILLARY BLOOD GLUCOSE        I&O's Summary    18 Dec 2020 07:01  -  19 Dec 2020 07:00  --------------------------------------------------------  IN: 1080 mL / OUT: 2750 mL / NET: -1670 mL      Daily     Daily Weight in k.7 (19 Dec 2020 06:00)    PHYSICAL EXAMINATION:  General:     Neurology:   Respiratory:   CV:   Abdominal:   Extremities:   Incisions:   Tubes:    LABS:                          10.2   7.37  )-----------( 142      ( 18 Dec 2020 05:05 )             32.4     12-18    141  |  108  |  19  ----------------------------<  102<H>  4.0   |  25  |  0.8    Ca    8.7      18 Dec 2020 05:05  Mg     1.9     12-18    TPro  5.8<L>  /  Alb  3.5  /  TBili  1.1  /  DBili  x   /  AST  70<H>  /  ALT  16  /  AlkPhos  81  12-17    LIVER FUNCTIONS - ( 17 Dec 2020 10:41 )  Alb: 3.5 g/dL / Pro: 5.8 g/dL / ALK PHOS: 81 U/L / ALT: 16 U/L / AST: 70 U/L / GGT: x               CARDIAC MARKERS ( 18 Dec 2020 00:41 )  x     / 0.86 ng/mL / x     / x     / x      CARDIAC MARKERS ( 17 Dec 2020 10:41 )  x     / 1.12 ng/mL / 347 U/L / x     / 27.3 ng/mL      Urinalysis Basic - ( 17 Dec 2020 10:41 )    Color: Light Yellow / Appearance: Clear / S.026 / pH: x  Gluc: x / Ketone: Trace  / Bili: Negative / Urobili: <2 mg/dL   Blood: x / Protein: Trace / Nitrite: Negative   Leuk Esterase: Small / RBC: 61 /HPF / WBC 15 /HPF   Sq Epi: x / Non Sq Epi: 4 /HPF / Bacteria: Negative        TELEMETRY:     EKG:     IMAGING:           PATIENT:  GERRY FRIED  [id number]    CHIEF COMPLAINT:  Patient is a 80y old  Female who presents with a chief complaint of chest pain (18 Dec 2020 11:31)      INTERVAL HISTORY/OVERNIGHT EVENTS:  No overnight events. Pt was cleared for discharge yesterday but then COVID came back positive and given that she had an episode of desaturation on admission and was started on BIPAP and also family was not ready to have her home, decision was made to hold her discharge.        MEDICATIONS:  MEDICATIONS  (STANDING):  aspirin enteric coated 81 milliGRAM(s) Oral daily  atorvastatin 40 milliGRAM(s) Oral at bedtime  chlorhexidine 4% Liquid 1 Application(s) Topical daily  chloroprocaine 3% Injectable 10 milliLiter(s) Local Injection once  dexAMETHasone  Injectable 6 milliGRAM(s) IV Push daily  enoxaparin Injectable 40 milliGRAM(s) SubCutaneous daily  metoprolol succinate ER 25 milliGRAM(s) Oral daily  pantoprazole    Tablet 40 milliGRAM(s) Oral before breakfast    MEDICATIONS  (PRN):  acetaminophen   Tablet .. 650 milliGRAM(s) Oral every 6 hours PRN Temp greater or equal to 38C (100.4F), Moderate Pain (4 - 6)      ALLERGIES:  Allergies    lidocaine (Rash)    Intolerances        OBJECTIVE:  ICU Vital Signs Last 24 Hrs  T(C): 35.6 (19 Dec 2020 04:00), Max: 36.9 (18 Dec 2020 08:00)  T(F): 96 (19 Dec 2020 04:00), Max: 98.4 (18 Dec 2020 08:00)  HR: 68 (19 Dec 2020 06:00) (64 - 80)  BP: 134/65 (19 Dec 2020 06:00) (108/82 - 165/73)  BP(mean): 87 (19 Dec 2020 06:00) (83 - 120)  ABP: --  ABP(mean): --  RR: 21 (19 Dec 2020 06:00) (17 - 27)  SpO2: 96% (19 Dec 2020 06:00) (95% - 99%)      Adult Advanced Hemodynamics Last 24 Hrs  CVP(mm Hg): --  CVP(cm H2O): --  CO: --  CI: --  PA: --  PA(mean): --  PCWP: --  SVR: --  SVRI: --  PVR: --  PVRI: --  CAPILLARY BLOOD GLUCOSE        CAPILLARY BLOOD GLUCOSE        I&O's Summary    18 Dec 2020 07:01  -  19 Dec 2020 07:00  --------------------------------------------------------  IN: 1080 mL / OUT: 2750 mL / NET: -1670 mL      Daily     Daily Weight in k.7 (19 Dec 2020 06:00)    PHYSICAL EXAMINATION:  General: No acute distress  Neurology: AAOX3  Respiratory: Diminshed air entry bilaterally  CV: Regular S1&S2, no murmurs appreciated  Abdominal: soft, non tender, non distended  Extremities: No edema    LABS:                          10.2   7.37  )-----------( 142      ( 18 Dec 2020 05:05 )             32.4     12-18    141  |  108  |  19  ----------------------------<  102<H>  4.0   |  25  |  0.8    Ca    8.7      18 Dec 2020 05:05  Mg     1.9     12-18    TPro  5.8<L>  /  Alb  3.5  /  TBili  1.1  /  DBili  x   /  AST  70<H>  /  ALT  16  /  AlkPhos  81  12-17    LIVER FUNCTIONS - ( 17 Dec 2020 10:41 )  Alb: 3.5 g/dL / Pro: 5.8 g/dL / ALK PHOS: 81 U/L / ALT: 16 U/L / AST: 70 U/L / GGT: x               CARDIAC MARKERS ( 18 Dec 2020 00:41 )  x     / 0.86 ng/mL / x     / x     / x      CARDIAC MARKERS ( 17 Dec 2020 10:41 )  x     / 1.12 ng/mL / 347 U/L / x     / 27.3 ng/mL      Urinalysis Basic - ( 17 Dec 2020 10:41 )    Color: Light Yellow / Appearance: Clear / S.026 / pH: x  Gluc: x / Ketone: Trace  / Bili: Negative / Urobili: <2 mg/dL   Blood: x / Protein: Trace / Nitrite: Negative   Leuk Esterase: Small / RBC: 61 /HPF / WBC 15 /HPF   Sq Epi: x / Non Sq Epi: 4 /HPF / Bacteria: Negative        TELEMETRY:   No events

## 2020-12-19 NOTE — CONSULT NOTE ADULT - SUBJECTIVE AND OBJECTIVE BOX
GERRY FRIED  80y, Female  Allergy: lidocaine (Rash)      CHIEF COMPLAINT: chest pain (19 Dec 2020 07:14)      LOS  3d    HPI:  80 years old female known to have DLD, HFpEF, Aortic stenosis, HOCM, HTN comes to the ED for chest pain for 1 day. Pain started 1 hour prior to the presentation, substernal chest pain, pressure/burning in character, 7-8 in intensity, sudden onset, no radiation; symptoms started when she was taking upstairs to the second floor, pain lasted for about 10 minutes and got relieved with rest, no radiation of the pain, no associated nausea, vomiting, reflux, palpitation, lower extremity edema.  She reports that she gets right shoulder pain on and off and takes tylenol for relief but this pain was different and severe.    No headache, loss of consciousness, seizure, fever, focal weakness.   (16 Dec 2020 11:54)      INFECTIOUS DISEASE HISTORY:    PAST MEDICAL & SURGICAL HISTORY:  Aortic stenosis    Hypertrophic obstructive cardiomyopathy (HOCM)    High cholesterol    No significant past surgical history        FAMILY HISTORY  No pertinent family history in first degree relatives        SOCIAL HISTORY  Social History:  Denies smoking, alcohol, drug abuse (02 Feb 2020 02:48)        ROS  General: Denies rigors, nightsweats  HEENT: Denies headache, rhinorrhea, sore throat, eye pain  CV: Denies CP, palpitations  PULM: Denies wheezing, hemoptysis  GI: Denies hematemesis, hematochezia, melena  : Denies discharge, hematuria  MSK: Denies arthralgias, myalgias  SKIN: Denies rash, lesions  NEURO: Denies paresthesias, weakness  PSYCH: Denies depression, anxiety    VITALS:  T(F): 96.8, Max: 98.4 (12-18-20 @ 20:00)  HR: 64  BP: 117/68  RR: 23Vital Signs Last 24 Hrs  T(C): 36 (19 Dec 2020 12:00), Max: 36.9 (18 Dec 2020 20:00)  T(F): 96.8 (19 Dec 2020 12:00), Max: 98.4 (18 Dec 2020 20:00)  HR: 64 (19 Dec 2020 14:24) (62 - 74)  BP: 117/68 (19 Dec 2020 14:24) (111/71 - 165/73)  BP(mean): 89 (19 Dec 2020 14:24) (69 - 120)  RR: 23 (19 Dec 2020 14:24) (17 - 32)  SpO2: 96% (19 Dec 2020 14:24) (94% - 99%)    PHYSICAL EXAM:  Gen: NAD, resting in bed  HEENT: Normocephalic, atraumatic  Neck: supple, no lymphadenopathy  CV: Regular rate & regular rhythm  Lungs: decreased BS at bases, no fremitus  Abdomen: Soft, BS present  Ext: Warm, well perfused  Neuro: non focal, awake  Skin: no rash, no erythema  Lines: no phlebitis    TESTS & MEASUREMENTS:                        10.2   7.37  )-----------( 142      ( 18 Dec 2020 05:05 )             32.4     12-18    141  |  108  |  19  ----------------------------<  102<H>  4.0   |  25  |  0.8    Ca    8.7      18 Dec 2020 05:05  Mg     1.9     12-18              Culture - Blood (collected 12-17-20 @ 16:17)  Source: .Blood Blood-Peripheral  Preliminary Report (12-18-20 @ 22:01):    No growth to date.    Culture - Blood (collected 12-17-20 @ 16:17)  Source: .Blood Blood-Peripheral  Preliminary Report (12-18-20 @ 22:01):    No growth to date.            INFECTIOUS DISEASES TESTING  COVID-19 PCR: Detected (12-17-20 @ 13:09)      RADIOLOGY & ADDITIONAL TESTS:  I have personally reviewed the last Chest xray  CXR  Xray Chest 1 View- PORTABLE-Urgent:   EXAM:  XR CHEST PORTABLE URGENT 1V            PROCEDURE DATE:  12/19/2020            INTERPRETATION:  Clinical History / Reason for exam: Shortness of breath.    Comparison : 12/18/2020.    Technique/Positioning: Frontal view of the chest.    Findings:    Support devices: Overlying telemetry leads    Cardiac/mediastinum/hilum: Stable appearance of the cardiomediastinal silhouette.    Lung parenchyma/Pleura: Significantly decreased bilateral radiating opacities. No new focal consolidation, pneumothorax or pleural effusion.    Skeleton/soft tissues: Unchanged.    Impression:    Significantly decreased bilateral radiating opacities. No new focal consolidation, pneumothorax or pleural effusion.              DIANNA FANG MD; Attending Radiologist  This document has been electronically signed. Dec 19 2020  2:27PM (12-19-20 @ 11:33)      CT      CARDIOLOGY TESTING  12 Lead ECG:   Ventricular Rate 67 BPM    Atrial Rate 67 BPM    P-R Interval 176 ms    QRS Duration 168 ms    Q-T Interval 506 ms    QTC Calculation(Bazett) 534 ms    P Axis 50 degrees    R Axis -37 degrees    T Axis 133 degrees    Diagnosis Line Normal sinus rhythm  Possible Left atrial enlargement  Left axis deviation  Left bundle branch block  Abnormal ECG    Confirmed by CHAZ SANCHEZ MD ([id number]) on 12/19/2020 9:39:46 AM (12-19-20 @ 05:59)  12 Lead ECG:   Ventricular Rate 71 BPM    Atrial Rate 71 BPM    P-R Interval 158 ms    QRS Duration 158 ms    Q-T Interval 484 ms    QTC Calculation(Bazett) 525 ms    P Axis 40 degrees    R Axis -31 degrees    T Axis 139 degrees    Diagnosis Line Normal sinus rhythm  Possible Left atrial enlargement  Left axis deviation  Left bundle branch block  Abnormal ECG    Confirmed by SAL MARTINEZ MD ([id number]) on 12/18/2020 9:50:33 AM (12-18-20 @ 05:09)      MEDICATIONS  aspirin enteric coated 81 Oral daily  atorvastatin 40 Oral at bedtime  chlorhexidine 4% Liquid 1 Topical daily  chloroprocaine 3% Injectable 10 Local Injection once  dexAMETHasone  Injectable 6 IV Push daily  enoxaparin Injectable 40 SubCutaneous daily  metoprolol succinate ER 25 Oral daily  pantoprazole    Tablet 40 Oral before breakfast      Weight  Weight (kg): 66.6 (12-17-20 @ 09:47)    ANTIBIOTICS:      ALLERGIES:  lidocaine (Rash)

## 2020-12-19 NOTE — PROGRESS NOTE ADULT - ATTENDING COMMENTS
Seen / examined and above reviewed.    Afebrile.  Hemodynamics stable.  On steroids.  Adequate SaO2 on room air.  Initial inflammatory markers mildly elevated.  No Remdesivir per ID.  Relatively stable opacities on CXR.    - Cont ASA  - Cont Toprol.  - COVID Rx per ID.
Seen / examined and above reviewed.    Clinical improvement.  Off PERCY.  Hemodynamics stable.  Compensated.    Toprol started.  Plan was for discharge, but COVID subsequently positive (afebrile / appears asymptomatic at present).    Discharge held.  CXR / inflammatory markers.

## 2020-12-19 NOTE — PROGRESS NOTE ADULT - ASSESSMENT
IMPRESSION:    Severe hypotension from worsening dynamic LVOT obstruction with underlying HOCM and moderate AS requiring vasopressor support - resolved, now off Phenylephrine  MEGHAN with moderate to severe MR  Non-obstructive CAD  Fever - no fever in the last 24h    PLAN:    CNS: Avoid sedation    HEENT: Oral care    PULMONARY:  HOB @ 45 degrees    CARDIOVASCULAR:  - Hypotension resolved, off phenylepherine  - Monitor BP  - Continue with ASA, statin  - Continue Toprol 25mg XL Q24    GI: GI prophylaxis.  Feeding     RENAL:  Follow up lytes.  Correct as needed    INFECTIOUS DISEASE:   - No fever in the past 24 hours  - COVID positive  - Check UCx  - Blood cultures negative to date  - Monitor off Abx    HEMATOLOGICAL:  DVT prophylaxis (on Lovenox)    ENDOCRINE:  Follow up FS.  Insulin protocol if needed.    MUSCULOSKELETAL: ambulate as tolerated IMPRESSION:    Severe hypotension from worsening dynamic LVOT obstruction with underlying HOCM and moderate AS requiring vasopressor support - resolved, now off Phenylephrine  MEGHAN with moderate to severe MR  Non-obstructive CAD  Fever - no fever in the last 24h    PLAN:    CNS: Avoid sedation    HEENT: Oral care    PULMONARY:  HOB @ 45 degrees    CARDIOVASCULAR:  - Hypotension resolved, off phenylepherine  - Monitor BP  - Continue with ASA, statin  - Continue Toprol 25mg XL Q24    GI: GI prophylaxis.  Feeding     RENAL:  Follow up lytes.  Correct as needed    INFECTIOUS DISEASE:   - No fever in the past 24 hours  - COVID positive  - Continue Decadron for now  - Check UCx  - Blood cultures negative to date  - Monitor off Abx  - ID evaluation today    HEMATOLOGICAL:  DVT prophylaxis (on Lovenox)    ENDOCRINE:  Follow up FS.  Insulin protocol if needed.    MUSCULOSKELETAL: ambulate as tolerated

## 2020-12-20 VITALS
TEMPERATURE: 97 F | HEART RATE: 64 BPM | RESPIRATION RATE: 18 BRPM | OXYGEN SATURATION: 97 % | DIASTOLIC BLOOD PRESSURE: 52 MMHG | SYSTOLIC BLOOD PRESSURE: 121 MMHG

## 2020-12-20 LAB
ANION GAP SERPL CALC-SCNC: 11 MMOL/L — SIGNIFICANT CHANGE UP (ref 7–14)
BASOPHILS # BLD AUTO: 0.03 K/UL — SIGNIFICANT CHANGE UP (ref 0–0.2)
BASOPHILS NFR BLD AUTO: 0.3 % — SIGNIFICANT CHANGE UP (ref 0–1)
BUN SERPL-MCNC: 24 MG/DL — HIGH (ref 10–20)
CALCIUM SERPL-MCNC: 9.4 MG/DL — SIGNIFICANT CHANGE UP (ref 8.5–10.1)
CHLORIDE SERPL-SCNC: 107 MMOL/L — SIGNIFICANT CHANGE UP (ref 98–110)
CO2 SERPL-SCNC: 23 MMOL/L — SIGNIFICANT CHANGE UP (ref 17–32)
CREAT SERPL-MCNC: 0.8 MG/DL — SIGNIFICANT CHANGE UP (ref 0.7–1.5)
EOSINOPHIL # BLD AUTO: 0.02 K/UL — SIGNIFICANT CHANGE UP (ref 0–0.7)
EOSINOPHIL NFR BLD AUTO: 0.2 % — SIGNIFICANT CHANGE UP (ref 0–8)
GLUCOSE SERPL-MCNC: 108 MG/DL — HIGH (ref 70–99)
HCT VFR BLD CALC: 30.5 % — LOW (ref 37–47)
HGB BLD-MCNC: 9.9 G/DL — LOW (ref 12–16)
IMM GRANULOCYTES NFR BLD AUTO: 0.2 % — SIGNIFICANT CHANGE UP (ref 0.1–0.3)
LYMPHOCYTES # BLD AUTO: 1.57 K/UL — SIGNIFICANT CHANGE UP (ref 1.2–3.4)
LYMPHOCYTES # BLD AUTO: 18 % — LOW (ref 20.5–51.1)
MAGNESIUM SERPL-MCNC: 1.8 MG/DL — SIGNIFICANT CHANGE UP (ref 1.8–2.4)
MCHC RBC-ENTMCNC: 28.3 PG — SIGNIFICANT CHANGE UP (ref 27–31)
MCHC RBC-ENTMCNC: 32.5 G/DL — SIGNIFICANT CHANGE UP (ref 32–37)
MCV RBC AUTO: 87.1 FL — SIGNIFICANT CHANGE UP (ref 81–99)
MONOCYTES # BLD AUTO: 0.63 K/UL — HIGH (ref 0.1–0.6)
MONOCYTES NFR BLD AUTO: 7.2 % — SIGNIFICANT CHANGE UP (ref 1.7–9.3)
NEUTROPHILS # BLD AUTO: 6.45 K/UL — SIGNIFICANT CHANGE UP (ref 1.4–6.5)
NEUTROPHILS NFR BLD AUTO: 74.1 % — SIGNIFICANT CHANGE UP (ref 42.2–75.2)
NRBC # BLD: 0 /100 WBCS — SIGNIFICANT CHANGE UP (ref 0–0)
PLATELET # BLD AUTO: 170 K/UL — SIGNIFICANT CHANGE UP (ref 130–400)
POTASSIUM SERPL-MCNC: 3.9 MMOL/L — SIGNIFICANT CHANGE UP (ref 3.5–5)
POTASSIUM SERPL-SCNC: 3.9 MMOL/L — SIGNIFICANT CHANGE UP (ref 3.5–5)
RBC # BLD: 3.5 M/UL — LOW (ref 4.2–5.4)
RBC # FLD: 14.9 % — HIGH (ref 11.5–14.5)
SODIUM SERPL-SCNC: 141 MMOL/L — SIGNIFICANT CHANGE UP (ref 135–146)
WBC # BLD: 8.72 K/UL — SIGNIFICANT CHANGE UP (ref 4.8–10.8)
WBC # FLD AUTO: 8.72 K/UL — SIGNIFICANT CHANGE UP (ref 4.8–10.8)

## 2020-12-20 PROCEDURE — 99233 SBSQ HOSP IP/OBS HIGH 50: CPT

## 2020-12-20 PROCEDURE — 71045 X-RAY EXAM CHEST 1 VIEW: CPT | Mod: 26

## 2020-12-20 PROCEDURE — 93010 ELECTROCARDIOGRAM REPORT: CPT

## 2020-12-20 RX ORDER — METOPROLOL TARTRATE 50 MG
1 TABLET ORAL
Qty: 30 | Refills: 0
Start: 2020-12-20 | End: 2021-01-18

## 2020-12-20 RX ORDER — METOPROLOL TARTRATE 50 MG
50 TABLET ORAL DAILY
Refills: 0 | Status: DISCONTINUED | OUTPATIENT
Start: 2020-12-20 | End: 2020-12-20

## 2020-12-20 RX ADMIN — Medication 81 MILLIGRAM(S): at 11:50

## 2020-12-20 RX ADMIN — ENOXAPARIN SODIUM 40 MILLIGRAM(S): 100 INJECTION SUBCUTANEOUS at 12:00

## 2020-12-20 RX ADMIN — PANTOPRAZOLE SODIUM 40 MILLIGRAM(S): 20 TABLET, DELAYED RELEASE ORAL at 07:28

## 2020-12-20 RX ADMIN — Medication 6 MILLIGRAM(S): at 05:22

## 2020-12-20 RX ADMIN — Medication 50 MILLIGRAM(S): at 11:50

## 2020-12-20 RX ADMIN — Medication 25 MILLIGRAM(S): at 05:22

## 2020-12-20 NOTE — PROGRESS NOTE ADULT - ASSESSMENT
IMPRESSION:    Severe hypotension from worsening dynamic LVOT obstruction with underlying HOCM and moderate AS (requiring vasopressor support - resolved, now off Phenylephrine)  MEGHAN with moderate to severe MR  Non-obstructive CAD  Fever - no fever in the last 24h    PLAN:    CNS: Avoid sedation    HEENT: Oral care    PULMONARY:  HOB @ 45 degrees    CARDIOVASCULAR:  - Hypotension resolved, off phenylepherine  - Monitor BP  - Continue with ASA, statin, toprol 25      GI: GI prophylaxis.  Feeding     RENAL:  Follow up lytes.  Correct as needed    INFECTIOUS DISEASE:   - No fever in the past 24 hours  - COVID positive  - Continue Decadron for now  - ua negative,  Blood cultures negative to date  - Monitor off Abx  - ID seen 12/19: doesn't meet criteria for rdv at this time    HEMATOLOGICAL:  DVT prophylaxis (on Lovenox)    ENDOCRINE:  Follow up FS.  Insulin protocol if needed.    MUSCULOSKELETAL: ambulate as tolerated IMPRESSION:    Severe hypotension from worsening dynamic LVOT obstruction with underlying HOCM and moderate AS (requiring vasopressor support - resolved, now off Phenylephrine)  MEGHAN with moderate to severe MR  Non-obstructive CAD  Fever - no fever in the last 24h    PLAN:    CNS: Avoid sedation    HEENT: Oral care    PULMONARY:  HOB @ 45 degrees    CARDIOVASCULAR:  - Hypotension resolved, off phenylepherine  - Monitor BP  - Continue with ASA, statin, increase toprol to 50 on dc      GI: GI prophylaxis.  Feeding     RENAL:  Follow up lytes.  Correct as needed    INFECTIOUS DISEASE:   - No fever in the past 24 hours  - COVID positive  - Continue Decadron for now  - ua negative,  Blood cultures negative to date  - Monitor off Abx  - ID seen 12/19: doesn't meet criteria for rdv at this time    HEMATOLOGICAL:  DVT prophylaxis (on Lovenox)    ENDOCRINE:  Follow up FS.  Insulin protocol if needed.    MUSCULOSKELETAL: ambulate as tolerated

## 2020-12-20 NOTE — PROGRESS NOTE ADULT - SUBJECTIVE AND OBJECTIVE BOX
Outpt cardiologist:  CHIEF COMPLAINT:  Patient is a 80y old  Female who presents with a chief complaint of chest pain (19 Dec 2020 16:24)      INTERVAL HISTORY/OVERNIGHT EVENTS:    no fevers overnight  no events on tele overnight      ======================  ROS: all other ros otherwise negative for new issues/complaints.   ======================  PHYSICAL EXAMINATION:  GEN:  nad.   HEENT:  eomi. ncat  PULM:  b/l bs. decreased at bases  CARD: regular. s1, s2.  no murmurs.   ABD: +bs. ntnd  EXT:  no pitting edema b/l . no new rashes  NEURO:  no new focal deficits.   ======================  MEDICATIONS:  aspirin enteric coated 81 milliGRAM(s) Oral daily  atorvastatin 40 milliGRAM(s) Oral at bedtime  chlorhexidine 4% Liquid 1 Application(s) Topical daily  chloroprocaine 3% Injectable 10 milliLiter(s) Local Injection once  dexAMETHasone  Injectable 6 milliGRAM(s) IV Push daily  enoxaparin Injectable 40 milliGRAM(s) SubCutaneous daily  metoprolol succinate ER 25 milliGRAM(s) Oral daily  pantoprazole    Tablet 40 milliGRAM(s) Oral before breakfast  senna 2 Tablet(s) Oral at bedtime    DRIPS:    PRN:     ======================    OBJECTIVE:        VS:  T(F): 98 (12-20 @ 04:00), Max: 98 (12-19 @ 20:00)  HR: 58 (12-20 @ 04:00) (58 - 72)  BP: 138/64 (12-20 @ 04:00) (95/51 - 161/55)  RR: 18 (12-20 @ 04:00) (17 - 39)  SpO2: 96% (12-20 @ 04:00) (94% - 98%)  CVP(mm Hg): --  CO: --  CI: --  PA: --  PCWP: --    I/O:      12-17 @ 07:01  -  12-18 @ 07:00  --------------------------------------------------------  IN: 2585.3 mL / OUT: 1050 mL / NET: 1535.3 mL    12-18 @ 07:01  -  12-19 @ 07:00  --------------------------------------------------------  IN: 1080 mL / OUT: 2750 mL / NET: -1670 mL    12-19 @ 07:01  -  12-20 @ 07:00  --------------------------------------------------------  IN: 1310 mL / OUT: 700 mL / NET: 610 mL        Weight trend:  Weight (kg): 66.6 (12-17)    ======================    LABS:                          9.9    8.72  )-----------( 170      ( 20 Dec 2020 04:52 )             30.5     12-20    141  |  107  |  24<H>  ----------------------------<  108<H>  3.9   |  23  |  0.8    Ca    9.4      20 Dec 2020 04:52  Mg     1.8     12-20                      Cultures:    Culture - Blood (collected 12-17)  Source: .Blood Blood-Peripheral  Preliminary Report:    No growth to date.    Culture - Blood (collected 12-17)  Source: .Blood Blood-Peripheral  Preliminary Report:    No growth to date.

## 2020-12-21 ENCOUNTER — TRANSCRIPTION ENCOUNTER (OUTPATIENT)
Age: 80
End: 2020-12-21

## 2020-12-22 LAB
CULTURE RESULTS: SIGNIFICANT CHANGE UP
CULTURE RESULTS: SIGNIFICANT CHANGE UP
SPECIMEN SOURCE: SIGNIFICANT CHANGE UP
SPECIMEN SOURCE: SIGNIFICANT CHANGE UP

## 2020-12-23 DIAGNOSIS — I08.0 RHEUMATIC DISORDERS OF BOTH MITRAL AND AORTIC VALVES: ICD-10-CM

## 2020-12-23 DIAGNOSIS — I50.32 CHRONIC DIASTOLIC (CONGESTIVE) HEART FAILURE: ICD-10-CM

## 2020-12-23 DIAGNOSIS — R07.9 CHEST PAIN, UNSPECIFIED: ICD-10-CM

## 2020-12-23 DIAGNOSIS — Z79.82 LONG TERM (CURRENT) USE OF ASPIRIN: ICD-10-CM

## 2020-12-23 DIAGNOSIS — T40.415A ADVERSE EFFECT OF FENTANYL OR FENTANYL ANALOGS, INITIAL ENCOUNTER: ICD-10-CM

## 2020-12-23 DIAGNOSIS — E78.00 PURE HYPERCHOLESTEROLEMIA, UNSPECIFIED: ICD-10-CM

## 2020-12-23 DIAGNOSIS — T46.5X5A ADVERSE EFFECT OF OTHER ANTIHYPERTENSIVE DRUGS, INITIAL ENCOUNTER: ICD-10-CM

## 2020-12-23 DIAGNOSIS — I25.10 ATHEROSCLEROTIC HEART DISEASE OF NATIVE CORONARY ARTERY WITHOUT ANGINA PECTORIS: ICD-10-CM

## 2020-12-23 DIAGNOSIS — U07.1 COVID-19: ICD-10-CM

## 2020-12-23 DIAGNOSIS — I42.1 OBSTRUCTIVE HYPERTROPHIC CARDIOMYOPATHY: ICD-10-CM

## 2020-12-23 DIAGNOSIS — I95.2 HYPOTENSION DUE TO DRUGS: ICD-10-CM

## 2020-12-23 DIAGNOSIS — I11.0 HYPERTENSIVE HEART DISEASE WITH HEART FAILURE: ICD-10-CM

## 2020-12-23 DIAGNOSIS — I47.2 VENTRICULAR TACHYCARDIA: ICD-10-CM

## 2020-12-23 DIAGNOSIS — I44.7 LEFT BUNDLE-BRANCH BLOCK, UNSPECIFIED: ICD-10-CM

## 2021-10-07 NOTE — H&P ADULT - CLICK TO LAUNCH ORM
Patient : Darian Antunez Age: 29 year old Sex: male   MRN: [id number] Encounter Date: 10/6/2021      E15/15   History     Chief Complaint   Patient presents with   • Abdominal Pain   • Back Pain       HPI provided by pt.  12:09 AM Darian Antunez is a 29 year old male who presents to the ED for evaluation of left abd pain and back pain that started at 9 PM. He describes the pain as a cramping sensation and he states that he feels like he needs to have a bowel movement. He feels nauseous. He denies vomiting. He was diaphoretic at home. He denies dysuria, hematuria, or pain in his testicles. He has h/o diabetes. He takes Metformin. His brother has had kidney stones. He denies personal h/o kidney stones. He took Tylenol at home with no relief of sx. No modifying factors.    PCP: Silver Holland MD       No Known Allergies      Current Outpatient Medications   Medication Sig   • ibuprofen (MOTRIN) 600 MG tablet Take 1 tablet by mouth every 8 hours as needed for Pain or Fever.   • benzonatate (TESSALON PERLES) 200 MG capsule Take 1 capsule by mouth 3 times daily as needed for Cough.   • fluticasone (FLONASE) 50 MCG/ACT nasal spray Spray 1 spray in each nostril daily.   • lidocaine viscous (XYLOCAINE) 2 % solution Take 15 mLs by mouth as needed for Pain.   • HYDROcodone-acetaminophen (NORCO) 5-325 MG per tablet Take 1-2 tablets by mouth every 6 hours as needed for Pain.   • ibuprofen (MOTRIN) 600 MG tablet Take 1 tablet by mouth every 6 hours as needed for Pain.   • HYDROcodone-acetaminophen (LORTAB) 2.5-167 MG/5ML solution Take 15 mLs by mouth 4 times daily as needed for Pain.   • ibuprofen (MOTRIN) 600 MG tablet Take 1 tablet by mouth 3 times daily as needed for Pain.       No past medical history pertinent to this visit.    No past surgical history pertinent to this visit.    No family history pertinent to this visit.      Social History     Tobacco Use   • Smoking status: Former Smoker   • Smokeless  tobacco: Never Used   Substance Use Topics   • Alcohol use: No   • Drug use: No       E-cigarette/Vaping     E-Cigarette/Vaping Substances & Devices       Review of Systems   Constitutional: Positive for diaphoresis. Negative for chills and fever.   HENT: Negative for congestion, rhinorrhea and sore throat.    Eyes: Negative for visual disturbance.   Respiratory: Negative for cough and shortness of breath.    Cardiovascular: Negative for chest pain.   Gastrointestinal: Positive for abdominal pain and nausea. Negative for constipation, diarrhea and vomiting.   Genitourinary: Negative for difficulty urinating, dysuria, frequency, hematuria and testicular pain.   Musculoskeletal: Positive for back pain. Negative for arthralgias.   Skin: Negative for rash.   Neurological: Negative for weakness, numbness and headaches.   Hematological: Does not bruise/bleed easily.   All other systems reviewed and are negative.      Physical Exam     ED Triage Vitals [10/07/21 0003]   ED Triage Vitals Group      Temp 98.3 °F (36.8 °C)      Heart Rate 86      Resp 18      BP (!) 165/106      SpO2 97 %      EtCO2 mmHg       Height 6' (1.829 m)      Weight (!) 331 lb 9.2 oz (150.4 kg)      Weight Scale Used Scale in bed      BMI (Calculated) 44.97      IBW/kg (Calculated) 77.6     Vitals:    10/07/21 0003   BP: (!) 165/106   BP Location: RUE - Right upper extremity   Patient Position: Semi-Abdullahi's   Pulse: 86   Resp: 18   Temp: 98.3 °F (36.8 °C)   TempSrc: Oral   SpO2: 97%   Weight: (!) 150.4 kg (331 lb 9.2 oz)   Height: 6' (1.829 m)       Physical Exam  Vitals and nursing note reviewed.   Constitutional:       General: He is not in acute distress.     Appearance: He is well-developed. He is not diaphoretic.   HENT:      Head: Normocephalic and atraumatic.      Nose: Nose normal.   Eyes:      General: No scleral icterus.     Conjunctiva/sclera: Conjunctivae normal.      Pupils: Pupils are equal, round, and reactive to light.   Neck:       Vascular: No JVD.   Cardiovascular:      Rate and Rhythm: Normal rate and regular rhythm.      Heart sounds: Normal heart sounds. No murmur heard.   No friction rub. No gallop.    Pulmonary:      Effort: Pulmonary effort is normal. No respiratory distress.      Breath sounds: Normal breath sounds. No stridor. No wheezing or rales.   Chest:      Chest wall: No tenderness.   Abdominal:      General: Bowel sounds are normal.      Palpations: Abdomen is soft. There is no mass.      Tenderness: There is no abdominal tenderness. There is no right CVA tenderness, left CVA tenderness, guarding or rebound.   Musculoskeletal:         General: No tenderness. Normal range of motion.      Cervical back: Normal range of motion and neck supple.   Lymphadenopathy:      Cervical: No cervical adenopathy.   Skin:     General: Skin is warm and dry.      Findings: No erythema or rash.   Neurological:      Mental Status: He is alert and oriented to person, place, and time.      Cranial Nerves: No cranial nerve deficit.      Motor: No abnormal muscle tone.      Coordination: Coordination normal.   Psychiatric:         Thought Content: Thought content normal.         Judgment: Judgment normal.         ED Course     Procedures    Lab Results     Results for orders placed or performed during the hospital encounter of 10/06/21   Comprehensive Metabolic Panel   Result Value Ref Range    Fasting Status      Sodium 144 135 - 145 mmol/L    Potassium 4.0 3.4 - 5.1 mmol/L    Chloride 104 98 - 107 mmol/L    Carbon Dioxide 32 21 - 32 mmol/L    Anion Gap 12 10 - 20 mmol/L    Glucose 137 (H) 70 - 99 mg/dL    BUN 13 6 - 20 mg/dL    Creatinine 0.92 0.67 - 1.17 mg/dL    Glomerular Filtration Rate >90 >=60    BUN/ Creatinine Ratio 14 7 - 25    Calcium 9.5 8.4 - 10.2 mg/dL    Bilirubin, Total 0.6 0.2 - 1.0 mg/dL    GOT/AST 40 (H) <=37 Units/L    GPT/ALT 83 (H) <64 Units/L    Alkaline Phosphatase 130 (H) 45 - 117 Units/L    Albumin 3.7 3.6 - 5.1 g/dL     Protein, Total 8.1 6.4 - 8.2 g/dL    Globulin 4.4 (H) 2.0 - 4.0 g/dL    A/G Ratio 0.8 (L) 1.0 - 2.4   Lipase   Result Value Ref Range    Lipase 132 73 - 393 Units/L   Urinalysis With Microscopy & Culture If Indicated   Result Value Ref Range    COLOR, URINALYSIS Yellow     APPEARANCE, URINALYSIS Cloudy     GLUCOSE, URINALYSIS Negative Negative mg/dL    BILIRUBIN, URINALYSIS Negative Negative    KETONES, URINALYSIS Negative Negative mg/dL    SPECIFIC GRAVITY, URINALYSIS 1.025 1.005 - 1.030    OCCULT BLOOD, URINALYSIS Large (A) Negative    PH, URINALYSIS 5.0 5.0 - 7.0    PROTEIN, URINALYSIS 30  (A) Negative mg/dL    UROBILINOGEN, URINALYSIS 2.0 (A) 0.2, 1.0 mg/dL    NITRITE, URINALYSIS Negative Negative    LEUKOCYTE ESTERASE, URINALYSIS Negative Negative    SQUAMOUS EPITHELIAL, URINALYSIS 1 to 5 None Seen, 1 to 5 /hpf    ERYTHROCYTES, URINALYSIS >100 (A) None Seen, 1 to 2 /hpf    LEUKOCYTES, URINALYSIS 1 to 5 None Seen, 1 to 5 /hpf    BACTERIA, URINALYSIS None Seen None Seen /hpf    HYALINE CASTS, URINALYSIS 1 to 5 None Seen, 1 to 5 /lpf    MUCUS Present    CBC with Automated Differential (performable only)   Result Value Ref Range    WBC 10.0 4.2 - 11.0 K/mcL    RBC 5.07 4.50 - 5.90 mil/mcL    HGB 14.4 13.0 - 17.0 g/dL    HCT 45.1 39.0 - 51.0 %    MCV 89.0 78.0 - 100.0 fl    MCH 28.4 26.0 - 34.0 pg    MCHC 31.9 (L) 32.0 - 36.5 g/dL    RDW-CV 15.4 (H) 11.0 - 15.0 %    RDW-SD 49.9 39.0 - 50.0 fL     140 - 450 K/mcL    NRBC 0 <=0 /100 WBC    Neutrophil, Percent 72 %    Lymphocytes, Percent 17 %    Mono, Percent 7 %    Eosinophils, Percent 3 %    Basophils, Percent 1 %    Immature Granulocytes 0 %    Absolute Neutrophils 7.3 1.8 - 7.7 K/mcL    Absolute Lymphocytes 1.7 1.0 - 4.8 K/mcL    Absolute Monocytes 0.7 0.3 - 0.9 K/mcL    Absolute Eosinophils  0.3 0.0 - 0.5 K/mcL    Absolute Basophils 0.1 0.0 - 0.3 K/mcL    Absolute Immmature Granulocytes 0.0 0.0 - 0.2 K/mcL   ISTAT8 VENOUS  POINT OF CARE   Result Value Ref  Range    BUN - POINT OF CARE 14 6 - 20 mg/dL    SODIUM - POINT OF CARE 141 135 - 145 mmol/L    POTASSIUM - POINT OF CARE 3.9 3.4 - 5.1 mmol/L    CHLORIDE - POINT OF CARE 100 98 - 107 mmol/L    TCO2 - POINT OF CARE 29 (H) 19 - 24 mmol/L    ANION GAP - POINT OF CARE 18 10 - 20 mmol/L    HEMATOCRIT - POINT OF CARE 48.0 39.0 - 51.0 %    HEMOGLOBIN - POINT OF CARE 16.3 13.0 - 17.0 g/dL    GLUCOSE - POINT OF CARE 137 (H) 70 - 99 mg/dL    CALCIUM, IONIZED - POINT OF CARE 1.28 1.15 - 1.29 mmol/L    Creatinine 0.90 0.67 - 1.17 mg/dL    Glomerular Filtration Rate >90 >=60       Radiology Results     Imaging Results          CT ABDOMEN PELVIS FOR KIDNEY STONES (Final result)  Result time 10/07/21 01:27:26    Final result                 Impression:    IMPRESSION:       1. 3 mm calculus in the dependent portions of the urinary bladder just past  the left ureterovesicular junction. This most likely represents a recently  migrated obstructive calculus given presence of left flank pain and minimal  left hydronephrosis and perinephric stranding.  2. Additional findings as outlined above.                     Narrative:    CT OF THE ABDOMEN AND PELVIS WITHOUT IV CONTRAST      INDICATION:  Left flank pain    COMPARISON:  None.    TECHNIQUE:  CT of the abdomen and CT of the pelvis were performed without  IV contrast.    FINDINGS:    Unenhanced images of the liver, spleen, pancreas, gallbladder, and adrenal  glands are unremarkable.    There is a 3 mm calculus in the dependent portions of the urinary bladder  just past the left ureterovesicular junction. This most likely represents a  recently migrated obstructive calculus given presence of left flank pain  and minimal left hydronephrosis and perinephric stranding.    There is no evidence of right hydronephrosis or obstructive  nephrolithiasis.    There is no evidence of bowel obstruction. Portions of a normal caliber  appendix visualized.                                    ED  Medication Orders (From admission, onward)    Ordered Start     Status Ordering Provider    10/07/21 0014 10/07/21 0013  morphine injection 4 mg  EVERY 15 MIN PRN         Last MAR action: Given JULI GALDAMEZ    10/07/21 0014 10/07/21 0015  ondansetron (ZOFRAN) injection 4 mg  ONCE         Last MAR action: Given JULI GALDAMEZ               [facility]      ED Course  Initial assessment and plan. Pt presents to the ED with  left abd pain and back pain. He describes the pain as a cramping sensation. He feels nauseous. He denies vomiting or urinary sx. Family h/o kidney stones. Plan for labs and CT abd.    2:20 AM The pt's status is rechecked. He is feeling much better. Pt updated on lab and CT abd results. UA negative for infection. We discussed that his CT showed a 3mm stone in his bladder. We discussed his plan of care. Pt understands return to the ED precautions. Pt agrees with plan. All questions answered.       [facility]  Critical Care time spent on this patient outside of billable procedures:  None      Clinical Impression  ED Diagnosis        Final diagnosis    Renal colic on left side                Follow Up:  Silver Holland MD  [address] [facility] [address]  [phone]    In 1 week  Return for any problems or worsening.    Richi Verduzco MD  [address]    [facility] [id number] [phone]    In 4 weeks  Return for any problems or worsening.          Summary of your Discharge Medications      Take these Medications      Details   ondansetron 4 MG disintegrating tablet  Commonly known as: Zofran ODT   Place 1 tablet onto the tongue every 6 hours.            Pt is discharged in stable condition      I have reviewed the information recorded by the scribe for accuracy and agree with its contents.    Gita Salvador acting as the scribe for Juli Galdamez MD  Physician: Juli Galdamez MD  Physician #: [id number]  Scribe: Gita Galdamez MD  10/07/21 0348     .

## 2022-05-06 NOTE — PROGRESS NOTE ADULT - ASSESSMENT
asymptomatic   no chest pain     can procede wiyh cardiac work up High Dose Vitamin A Counseling: Side effects reviewed, pt to contact office should one occur.

## 2022-08-31 ENCOUNTER — INPATIENT (INPATIENT)
Facility: HOSPITAL | Age: 82
LOS: 5 days | Discharge: NURSING, REHAB OR LONG-TERM CARE | End: 2022-09-06
Attending: INTERNAL MEDICINE | Admitting: INTERNAL MEDICINE

## 2022-08-31 VITALS
TEMPERATURE: 98 F | RESPIRATION RATE: 17 BRPM | WEIGHT: 147.05 LBS | DIASTOLIC BLOOD PRESSURE: 74 MMHG | HEIGHT: 59 IN | OXYGEN SATURATION: 96 % | SYSTOLIC BLOOD PRESSURE: 154 MMHG | HEART RATE: 65 BPM

## 2022-08-31 LAB
ALBUMIN SERPL ELPH-MCNC: 4.2 G/DL — SIGNIFICANT CHANGE UP (ref 3.5–5.2)
ALP SERPL-CCNC: 106 U/L — SIGNIFICANT CHANGE UP (ref 30–115)
ALT FLD-CCNC: 20 U/L — SIGNIFICANT CHANGE UP (ref 0–41)
ANION GAP SERPL CALC-SCNC: 11 MMOL/L — SIGNIFICANT CHANGE UP (ref 7–14)
APTT BLD: 29.2 SEC — SIGNIFICANT CHANGE UP (ref 27–39.2)
AST SERPL-CCNC: 38 U/L — SIGNIFICANT CHANGE UP (ref 0–41)
BASOPHILS # BLD AUTO: 0.07 K/UL — SIGNIFICANT CHANGE UP (ref 0–0.2)
BASOPHILS NFR BLD AUTO: 0.9 % — SIGNIFICANT CHANGE UP (ref 0–1)
BILIRUB SERPL-MCNC: 0.3 MG/DL — SIGNIFICANT CHANGE UP (ref 0.2–1.2)
BLD GP AB SCN SERPL QL: SIGNIFICANT CHANGE UP
BUN SERPL-MCNC: 23 MG/DL — HIGH (ref 10–20)
CALCIUM SERPL-MCNC: 9.5 MG/DL — SIGNIFICANT CHANGE UP (ref 8.5–10.1)
CHLORIDE SERPL-SCNC: 105 MMOL/L — SIGNIFICANT CHANGE UP (ref 98–110)
CK MB CFR SERPL CALC: 3.9 NG/ML — SIGNIFICANT CHANGE UP (ref 0.6–6.3)
CO2 SERPL-SCNC: 22 MMOL/L — SIGNIFICANT CHANGE UP (ref 17–32)
CREAT SERPL-MCNC: 0.9 MG/DL — SIGNIFICANT CHANGE UP (ref 0.7–1.5)
EGFR: 64 ML/MIN/1.73M2 — SIGNIFICANT CHANGE UP
EOSINOPHIL # BLD AUTO: 0.08 K/UL — SIGNIFICANT CHANGE UP (ref 0–0.7)
EOSINOPHIL NFR BLD AUTO: 1 % — SIGNIFICANT CHANGE UP (ref 0–8)
GLUCOSE SERPL-MCNC: 153 MG/DL — HIGH (ref 70–99)
HCT VFR BLD CALC: 38.6 % — SIGNIFICANT CHANGE UP (ref 37–47)
HGB BLD-MCNC: 12.7 G/DL — SIGNIFICANT CHANGE UP (ref 12–16)
IMM GRANULOCYTES NFR BLD AUTO: 0.7 % — HIGH (ref 0.1–0.3)
INR BLD: 1.08 RATIO — SIGNIFICANT CHANGE UP (ref 0.65–1.3)
LYMPHOCYTES # BLD AUTO: 1.44 K/UL — SIGNIFICANT CHANGE UP (ref 1.2–3.4)
LYMPHOCYTES # BLD AUTO: 17.8 % — LOW (ref 20.5–51.1)
MCHC RBC-ENTMCNC: 29.7 PG — SIGNIFICANT CHANGE UP (ref 27–31)
MCHC RBC-ENTMCNC: 32.9 G/DL — SIGNIFICANT CHANGE UP (ref 32–37)
MCV RBC AUTO: 90.4 FL — SIGNIFICANT CHANGE UP (ref 81–99)
MONOCYTES # BLD AUTO: 0.48 K/UL — SIGNIFICANT CHANGE UP (ref 0.1–0.6)
MONOCYTES NFR BLD AUTO: 5.9 % — SIGNIFICANT CHANGE UP (ref 1.7–9.3)
NEUTROPHILS # BLD AUTO: 5.97 K/UL — SIGNIFICANT CHANGE UP (ref 1.4–6.5)
NEUTROPHILS NFR BLD AUTO: 73.7 % — SIGNIFICANT CHANGE UP (ref 42.2–75.2)
NRBC # BLD: 0 /100 WBCS — SIGNIFICANT CHANGE UP (ref 0–0)
PLATELET # BLD AUTO: 167 K/UL — SIGNIFICANT CHANGE UP (ref 130–400)
POTASSIUM SERPL-MCNC: 4.5 MMOL/L — SIGNIFICANT CHANGE UP (ref 3.5–5)
POTASSIUM SERPL-SCNC: 4.5 MMOL/L — SIGNIFICANT CHANGE UP (ref 3.5–5)
PROT SERPL-MCNC: 6.5 G/DL — SIGNIFICANT CHANGE UP (ref 6–8)
PROTHROM AB SERPL-ACNC: 12.4 SEC — SIGNIFICANT CHANGE UP (ref 9.95–12.87)
RBC # BLD: 4.27 M/UL — SIGNIFICANT CHANGE UP (ref 4.2–5.4)
RBC # FLD: 14.8 % — HIGH (ref 11.5–14.5)
SARS-COV-2 RNA SPEC QL NAA+PROBE: SIGNIFICANT CHANGE UP
SODIUM SERPL-SCNC: 138 MMOL/L — SIGNIFICANT CHANGE UP (ref 135–146)
TROPONIN T SERPL-MCNC: <0.01 NG/ML — SIGNIFICANT CHANGE UP
WBC # BLD: 8.1 K/UL — SIGNIFICANT CHANGE UP (ref 4.8–10.8)
WBC # FLD AUTO: 8.1 K/UL — SIGNIFICANT CHANGE UP (ref 4.8–10.8)

## 2022-08-31 PROCEDURE — 99233 SBSQ HOSP IP/OBS HIGH 50: CPT

## 2022-08-31 PROCEDURE — 71045 X-RAY EXAM CHEST 1 VIEW: CPT | Mod: 26

## 2022-08-31 PROCEDURE — 73552 X-RAY EXAM OF FEMUR 2/>: CPT | Mod: 26,LT

## 2022-08-31 PROCEDURE — 93010 ELECTROCARDIOGRAM REPORT: CPT

## 2022-08-31 PROCEDURE — 99285 EMERGENCY DEPT VISIT HI MDM: CPT | Mod: FS

## 2022-08-31 PROCEDURE — 99283 EMERGENCY DEPT VISIT LOW MDM: CPT

## 2022-08-31 PROCEDURE — 73502 X-RAY EXAM HIP UNI 2-3 VIEWS: CPT | Mod: 26,LT

## 2022-08-31 RX ORDER — METOPROLOL TARTRATE 50 MG
50 TABLET ORAL DAILY
Refills: 0 | Status: DISCONTINUED | OUTPATIENT
Start: 2022-08-31 | End: 2022-09-01

## 2022-08-31 RX ORDER — PREGABALIN 225 MG/1
250 CAPSULE ORAL DAILY
Refills: 0 | Status: DISCONTINUED | OUTPATIENT
Start: 2022-08-31 | End: 2022-08-31

## 2022-08-31 RX ORDER — VERAPAMIL HCL 240 MG
120 CAPSULE, EXTENDED RELEASE PELLETS 24 HR ORAL DAILY
Refills: 0 | Status: DISCONTINUED | OUTPATIENT
Start: 2022-08-31 | End: 2022-09-01

## 2022-08-31 RX ORDER — DISOPYRAMIDE PHOSPHATE 100 MG
100 CAPSULE ORAL THREE TIMES A DAY
Refills: 0 | Status: DISCONTINUED | OUTPATIENT
Start: 2022-08-31 | End: 2022-09-01

## 2022-08-31 RX ORDER — CHOLECALCIFEROL (VITAMIN D3) 125 MCG
2000 CAPSULE ORAL DAILY
Refills: 0 | Status: DISCONTINUED | OUTPATIENT
Start: 2022-08-31 | End: 2022-09-01

## 2022-08-31 RX ORDER — MORPHINE SULFATE 50 MG/1
2 CAPSULE, EXTENDED RELEASE ORAL EVERY 4 HOURS
Refills: 0 | Status: DISCONTINUED | OUTPATIENT
Start: 2022-08-31 | End: 2022-09-01

## 2022-08-31 RX ORDER — ASPIRIN/CALCIUM CARB/MAGNESIUM 324 MG
81 TABLET ORAL DAILY
Refills: 0 | Status: DISCONTINUED | OUTPATIENT
Start: 2022-08-31 | End: 2022-09-01

## 2022-08-31 RX ORDER — ENOXAPARIN SODIUM 100 MG/ML
40 INJECTION SUBCUTANEOUS EVERY 24 HOURS
Refills: 0 | Status: DISCONTINUED | OUTPATIENT
Start: 2022-08-31 | End: 2022-09-01

## 2022-08-31 RX ORDER — PREGABALIN 225 MG/1
1000 CAPSULE ORAL DAILY
Refills: 0 | Status: DISCONTINUED | OUTPATIENT
Start: 2022-08-31 | End: 2022-09-01

## 2022-08-31 RX ORDER — ACETAMINOPHEN 500 MG
650 TABLET ORAL EVERY 6 HOURS
Refills: 0 | Status: DISCONTINUED | OUTPATIENT
Start: 2022-08-31 | End: 2022-09-01

## 2022-08-31 RX ORDER — MORPHINE SULFATE 50 MG/1
2 CAPSULE, EXTENDED RELEASE ORAL ONCE
Refills: 0 | Status: DISCONTINUED | OUTPATIENT
Start: 2022-08-31 | End: 2022-08-31

## 2022-08-31 RX ORDER — SIMVASTATIN 20 MG/1
20 TABLET, FILM COATED ORAL AT BEDTIME
Refills: 0 | Status: DISCONTINUED | OUTPATIENT
Start: 2022-08-31 | End: 2022-09-01

## 2022-08-31 RX ORDER — SODIUM CHLORIDE 9 MG/ML
1000 INJECTION, SOLUTION INTRAVENOUS
Refills: 0 | Status: DISCONTINUED | OUTPATIENT
Start: 2022-08-31 | End: 2022-09-01

## 2022-08-31 RX ORDER — POLYETHYLENE GLYCOL 3350 17 G/17G
17 POWDER, FOR SOLUTION ORAL DAILY
Refills: 0 | Status: DISCONTINUED | OUTPATIENT
Start: 2022-08-31 | End: 2022-09-01

## 2022-08-31 RX ADMIN — MORPHINE SULFATE 2 MILLIGRAM(S): 50 CAPSULE, EXTENDED RELEASE ORAL at 08:19

## 2022-08-31 RX ADMIN — SIMVASTATIN 20 MILLIGRAM(S): 20 TABLET, FILM COATED ORAL at 23:03

## 2022-08-31 RX ADMIN — MORPHINE SULFATE 2 MILLIGRAM(S): 50 CAPSULE, EXTENDED RELEASE ORAL at 08:34

## 2022-08-31 RX ADMIN — Medication 2000 UNIT(S): at 14:08

## 2022-08-31 RX ADMIN — MORPHINE SULFATE 2 MILLIGRAM(S): 50 CAPSULE, EXTENDED RELEASE ORAL at 19:30

## 2022-08-31 RX ADMIN — MORPHINE SULFATE 2 MILLIGRAM(S): 50 CAPSULE, EXTENDED RELEASE ORAL at 23:33

## 2022-08-31 RX ADMIN — Medication 81 MILLIGRAM(S): at 14:08

## 2022-08-31 RX ADMIN — PREGABALIN 1000 MICROGRAM(S): 225 CAPSULE ORAL at 14:08

## 2022-08-31 RX ADMIN — SODIUM CHLORIDE 50 MILLILITER(S): 9 INJECTION, SOLUTION INTRAVENOUS at 14:09

## 2022-08-31 NOTE — ED PROVIDER NOTE - OBJECTIVE STATEMENT
82 yo female, pmh of hld, HCOM, on asa, presents to ed for fall, occurred this morning, c/o left hip pain, unable to ambulate since fall, mild, aching, no radiation. Denies fever, chills, cp, sob, neck pain, visual changes, nvd, dizziness, numbness, tingling, chi, loc, back pain.

## 2022-08-31 NOTE — ED ADULT TRIAGE NOTE - MODE OF ARRIVAL
Patient was advised of his test results as reviewed by Dr Freire as well as her recommendations as seen below.  He is agreeable to see Dr Yanet Dhillon in [place] and will wait for the phone call from her office to schedule the appointment.  He will still have the bone density test done as recommended by Dr Freire.  He did verbalize understanding and has no further questions at this time but will call back if something comes up.    [facility]

## 2022-08-31 NOTE — ED PROVIDER NOTE - NS ED ATTENDING STATEMENT MOD
This was a shared visit with the NEGRA. I reviewed and verified the documentation and independently performed the documented:

## 2022-08-31 NOTE — ED PROVIDER NOTE - NS ED ROS FT
Constitutional: (-) fever, (-) chills  Eyes: (-) visual changes  ENT: (-) nasal congestions  Cardiovascular: (-) chest pain, (-) syncope  Respiratory: (-) cough, (-) shortness of breath, (-) dyspnea,   Gastrointestinal: (-) vomiting, (-) diarrhea, (-)nausea,  Musculoskeletal: (-) neck pain, (-) back pain, (+) joint pain,  Integumentary: (-) rash, (-) edema, (-) bruises  Neurological: (-) headache, (-) loc, (-) dizziness, (-) tingling, (-)numbness,  Peripheral Vascular: (-) leg swelling  :  (-)dysuria,  (-) hematuria  Allergic/Immunologic: (-) pruritus

## 2022-08-31 NOTE — H&P ADULT - ATTENDING COMMENTS
Patient seen and examined independently.     PAST MEDICAL & SURGICAL HISTORY:  High cholesterol  Hypertrophic obstructive cardiomyopathy (HOCM)  Aortic stenosis (moderate)  No significant past surgical history    Vitals:  T(F): 97.8 (08-31-22 @ 09:27)  HR: 59 (08-31-22 @ 09:27)  BP: 144/64 (08-31-22 @ 09:27)  RR: 18 (08-31-22 @ 09:27)  SpO2: 97% (08-31-22 @ 09:27)    TESTS & MEASUREMENTS:                        12.7   8.10  )-----------( 167      ( 31 Aug 2022 08:06 )             38.6     PT/INR - ( 31 Aug 2022 08:06 )   PT: 12.40 sec;   INR: 1.08 ratio         PTT - ( 31 Aug 2022 08:06 )  PTT:29.2 sec  08-31    138  |  105  |  23<H>  ----------------------------<  153<H>  4.5   |  22  |  0.9    Ca    9.5      31 Aug 2022 08:06    TPro  6.5  /  Alb  4.2  /  TBili  0.3  /  DBili  x   /  AST  38  /  ALT  20  /  AlkPhos  106  08-31    LIVER FUNCTIONS - ( 31 Aug 2022 08:06 )  Alb: 4.2 g/dL / Pro: 6.5 g/dL / ALK PHOS: 106 U/L / ALT: 20 U/L / AST: 38 U/L / GGT: x           In summary:  HEALTH ISSUES - PROBLEM Dx:  .. mechanical fall resulting in Left femoral neck fracture. Orthopedic team planning Left Patient seen and examined independently.     PAST MEDICAL & SURGICAL HISTORY:  High cholesterol  Hypertrophic obstructive cardiomyopathy (HOCM)  Aortic stenosis (moderate)  No significant past surgical history    Vitals:  T(F): 97.8 (08-31-22 @ 09:27)  HR: 59 (08-31-22 @ 09:27)  BP: 144/64 (08-31-22 @ 09:27)  RR: 18 (08-31-22 @ 09:27)  SpO2: 97% (08-31-22 @ 09:27)    TESTS & MEASUREMENTS:                        12.7   8.10  )-----------( 167      ( 31 Aug 2022 08:06 )             38.6     PT/INR - ( 31 Aug 2022 08:06 )   PT: 12.40 sec;   INR: 1.08 ratio         PTT - ( 31 Aug 2022 08:06 )  PTT:29.2 sec  08-31    138  |  105  |  23<H>  ----------------------------<  153<H>  4.5   |  22  |  0.9    Ca    9.5      31 Aug 2022 08:06    TPro  6.5  /  Alb  4.2  /  TBili  0.3  /  DBili  x   /  AST  38  /  ALT  20  /  AlkPhos  106  08-31    LIVER FUNCTIONS - ( 31 Aug 2022 08:06 )  Alb: 4.2 g/dL / Pro: 6.5 g/dL / ALK PHOS: 106 U/L / ALT: 20 U/L / AST: 38 U/L / GGT: x           In summary:  HEALTH ISSUES - PROBLEM Dx:  .. mechanical fall resulting in Left femoral neck fracture. Orthopedic team planning Left hip HA or QUIQUE  .. HTN on therapy   .. HOCM with Aortic Stenosis  .. Acute pain related to Trauma     PLAN  .. Analgesics for pain   .. Preoperative Assessment: No medical contraindication to proceed to OR; however, patient's main issue is cardiac due to HOCM and AS; please refer to cardiology evaluation. I also included in a separate chart note the aimee-operative recommendations provided by patient's outpatient [facility] cardiologist.   .. If uncontrolled BP, please evaluate for uncontrolled pain; may use selective Beta-Blocker therapy or clonidine. Must avoid potent vasodilators like hydralazine or ACE-I/ARBs  .. As per [facility] cardiologist:   "Ensure patient has taken all of their prescribed HCM-related medications (beta blockers, verapamil, disopyramide/Norpace) the morning of procedure, despite NPO status".   .. UH or Low-Molecular Weight Heparin (Lovenox) if OK with ortho/surgery team for DVT prophylaxis  .. I provided hand-off to medicine team on call. Patient's son to provide non-formulary medications to be ordered here. Patient seen and examined independently.     PAST MEDICAL & SURGICAL HISTORY:  High cholesterol  Hypertrophic obstructive cardiomyopathy (HOCM)  Aortic stenosis (moderate)  No significant past surgical history    Vitals:  T(F): 97.8 (08-31-22 @ 09:27)  HR: 59 (08-31-22 @ 09:27)  BP: 144/64 (08-31-22 @ 09:27)  RR: 18 (08-31-22 @ 09:27)  SpO2: 97% (08-31-22 @ 09:27)    TESTS & MEASUREMENTS:                        12.7   8.10  )-----------( 167      ( 31 Aug 2022 08:06 )             38.6     PT/INR - ( 31 Aug 2022 08:06 )   PT: 12.40 sec;   INR: 1.08 ratio         PTT - ( 31 Aug 2022 08:06 )  PTT:29.2 sec  08-31    138  |  105  |  23<H>  ----------------------------<  153<H>  4.5   |  22  |  0.9    Ca    9.5      31 Aug 2022 08:06    TPro  6.5  /  Alb  4.2  /  TBili  0.3  /  DBili  x   /  AST  38  /  ALT  20  /  AlkPhos  106  08-31    LIVER FUNCTIONS - ( 31 Aug 2022 08:06 )  Alb: 4.2 g/dL / Pro: 6.5 g/dL / ALK PHOS: 106 U/L / ALT: 20 U/L / AST: 38 U/L / GGT: x           In summary:  HEALTH ISSUES - PROBLEM Dx:  .. mechanical fall resulting in Left femoral neck fracture. Orthopedic team planning Left hip HA or QUIQUE  .. HTN on therapy   .. HOCM with Aortic Stenosis (no evidence of severe AS on most recent Echo)   .. Acute pain related to Trauma     PLAN  .. Analgesics for pain   .. Preoperative Assessment: No medical contraindication to proceed to OR; however, patient's main issue is cardiac due to HOCM and AS; please refer to cardiology evaluation. I also included in a separate chart note the aimee-operative recommendations provided by patient's outpatient [facility] cardiologist.   .. If uncontrolled BP, please evaluate for uncontrolled pain; may use selective Beta-Blocker therapy or clonidine. Must avoid potent vasodilators like hydralazine or ACE-I/ARBs  .. As per [facility] cardiologist:   "Ensure patient has taken all of their prescribed HCM-related medications (beta blockers, verapamil, disopyramide/Norpace) the morning of procedure, despite NPO status".   .. UH or Low-Molecular Weight Heparin (Lovenox) if OK with ortho/surgery team for DVT prophylaxis  .. I provided hand-off to medicine team on call. Patient's son to provide non-formulary medications to be ordered here. Patient seen and examined independently.     PAST MEDICAL & SURGICAL HISTORY:  High cholesterol  Hypertrophic obstructive cardiomyopathy (HOCM)  Aortic stenosis (moderate)  No significant past surgical history    Vitals:  T(F): 97.8 (08-31-22 @ 09:27)  HR: 59 (08-31-22 @ 09:27)  BP: 144/64 (08-31-22 @ 09:27)  RR: 18 (08-31-22 @ 09:27)  SpO2: 97% (08-31-22 @ 09:27)    TESTS & MEASUREMENTS:                        12.7   8.10  )-----------( 167      ( 31 Aug 2022 08:06 )             38.6     PT/INR - ( 31 Aug 2022 08:06 )   PT: 12.40 sec;   INR: 1.08 ratio         PTT - ( 31 Aug 2022 08:06 )  PTT:29.2 sec  08-31    138  |  105  |  23<H>  ----------------------------<  153<H>  4.5   |  22  |  0.9    Ca    9.5      31 Aug 2022 08:06    TPro  6.5  /  Alb  4.2  /  TBili  0.3  /  DBili  x   /  AST  38  /  ALT  20  /  AlkPhos  106  08-31    LIVER FUNCTIONS - ( 31 Aug 2022 08:06 )  Alb: 4.2 g/dL / Pro: 6.5 g/dL / ALK PHOS: 106 U/L / ALT: 20 U/L / AST: 38 U/L / GGT: x           In summary:  HEALTH ISSUES - PROBLEM Dx:  .. mechanical fall resulting in Left femoral neck fracture. Orthopedic team planning Left hip HA or QUIQUE  .. HTN on therapy   .. HOCM with Aortic Stenosis (no evidence of severe AS on most recent Echo)   .. Acute pain related to Trauma     PLAN  .. Analgesics for pain   .. Preoperative Assessment: No medical contraindication to proceed to OR; however, patient's main issue is cardiac due to HOCM and AS; please refer to cardiology evaluation. I also included in a separate chart note the aimee-operative recommendations provided by patient's outpatient [facility] cardiologist.   .. If uncontrolled BP, please evaluate for uncontrolled pain; may use selective Beta-Blocker therapy or clonidine. Must avoid potent vasodilators like hydralazine or ACE-I/ARBs  .. As per [facility] cardiologist:   "Ensure patient has taken all of their prescribed HCM-related medications (beta blockers, verapamil, disopyramide/Norpace) the morning of procedure, despite NPO status".   .. UH or Low-Molecular Weight Heparin (Lovenox) if OK with ortho/surgery team for DVT prophylaxis  .. I provided hand-off to medicine team on call. Patient's son to provide non-formulary medications to be ordered here.  .. I spent 65 minutes total in care coordination with outpatient cardiologist, ortho team, medicine house staff. I reviewed old records provided by Health system (see separate chart notes)

## 2022-08-31 NOTE — ED ADULT TRIAGE NOTE - CHIEF COMPLAINT QUOTE
BIBA Pt tripped while carrying something, denies LOC no blood thinners. Pt c/o left leg pain. Pt has abrasion to left elbow.

## 2022-08-31 NOTE — CONSULT NOTE ADULT - SUBJECTIVE AND OBJECTIVE BOX
Patient is a 81y old  Female who presents with a chief complaint of Pain in left hip / fracture (31 Aug 2022 10:44)      HPI:  81 year old female with past medical history of hypertrophic cardiomyopathy and HTN presents for pain in her left hip after falling. This morning the patient was walking from her living room to her kitchen when she fell on her left side. She was unable to get up so she called her . Her son came to help her up but she was in too much pain so they called EMS. Before this incident she was able to ambulate without assistance and only reported some mild pain in her left knee before her fall.     In the ED:   Vitals: /74, HR 65, RR 17, T 97.8, SpO2 96% on RA   Labs: WNL  Left hip Xray: Acute impacted fracture of the left femoral neck. Osteopenia.   (31 Aug 2022 10:44)      PAST MEDICAL & SURGICAL HISTORY:  High cholesterol      Hypertrophic obstructive cardiomyopathy (HOCM)      Aortic stenosis      No significant past surgical history          PREVIOUS DIAGNOSTIC TESTING:      ECHO  FINDINGS:    STRESS  FINDINGS:    CATHETERIZATION  FINDINGS:    MEDICATIONS  (STANDING):  aspirin enteric coated 81 milliGRAM(s) Oral daily  cholecalciferol 2000 Unit(s) Oral daily  cyanocobalamin 1000 MICROGram(s) Oral daily  dextrose 5% + sodium chloride 0.45%. 1000 milliLiter(s) (50 mL/Hr) IV Continuous <Continuous>  enoxaparin Injectable 40 milliGRAM(s) SubCutaneous every 24 hours  metoprolol succinate ER 50 milliGRAM(s) Oral daily  simvastatin 20 milliGRAM(s) Oral at bedtime  verapamil 120 milliGRAM(s) Oral daily    MEDICATIONS  (PRN):  acetaminophen     Tablet .. 650 milliGRAM(s) Oral every 6 hours PRN Mild Pain (1 - 3), Moderate Pain (4 - 6)  morphine  - Injectable 2 milliGRAM(s) IV Push every 4 hours PRN Severe Pain (7 - 10)      FAMILY HISTORY:      SOCIAL HISTORY:  CIGARETTES:    ALCOHOL:    REVIEW OF SYSTEMS:  CONSTITUTIONAL: No fever, weight loss, or fatigue  NECK: No pain or stiffness  RESPIRATORY: No cough, wheezing, chills or hemoptysis; No shortness of breath  CARDIOVASCULAR: No chest pain, palpitations, dizziness, or leg swelling  GASTROINTESTINAL: No abdominal or epigastric pain. No nausea, vomiting, or hematemesis; No diarrhea or constipation. No melena or hematochezia.  GENITOURINARY: No dysuria, frequency, hematuria, or incontinence  NEUROLOGICAL: No headaches, memory loss, loss of strength, numbness, or tremors  SKIN: No itching, burning, rashes, or lesions   ENDOCRINE: No heat or cold intolerance; No hair loss  MUSCULOSKELETAL: No joint pain or swelling; No muscle, back, or extremity pain  HEME/LYMPH: No easy bruising, or bleeding gums          Vital Signs Last 24 Hrs  T(C): 36.6 (31 Aug 2022 09:27), Max: 36.6 (31 Aug 2022 07:55)  T(F): 97.8 (31 Aug 2022 09:27), Max: 97.8 (31 Aug 2022 07:55)  HR: 59 (31 Aug 2022 09:27) (59 - 65)  BP: 144/64 (31 Aug 2022 09:27) (144/64 - 154/74)  BP(mean): --  RR: 18 (31 Aug 2022 09:27) (17 - 18)  SpO2: 97% (31 Aug 2022 09:27) (96% - 97%)    Parameters below as of 31 Aug 2022 09:27  Patient On (Oxygen Delivery Method): room air            PHYSICAL EXAM:  GENERAL: NAD, well-groomed, well-developed  HEAD:  Atraumatic, Normocephalic  NECK: Supple, No JVD, Normal thyroid  NERVOUS SYSTEM:  Alert & Oriented X3, Good concentration  CHEST/LUNG: Clear to percussion bilaterally; No rales, rhonchi, wheezing, or rubs  HEART: Regular rate and rhythm; No murmurs, rubs, or gallops  ABDOMEN: Soft, Nontender, Nondistended; Bowel sounds present  EXTREMITIES:  2+ Peripheral Pulses, No clubbing, cyanosis, or edema  SKIN: No rashes or lesions    INTERPRETATION OF TELEMETRY:    ECG:    I&O's Detail      LABS:                        12.7   8.10  )-----------( 167      ( 31 Aug 2022 08:06 )             38.6     08-31    138  |  105  |  23<H>  ----------------------------<  153<H>  4.5   |  22  |  0.9    Ca    9.5      31 Aug 2022 08:06    TPro  6.5  /  Alb  4.2  /  TBili  0.3  /  DBili  x   /  AST  38  /  ALT  20  /  AlkPhos  106  08-31        PT/INR - ( 31 Aug 2022 08:06 )   PT: 12.40 sec;   INR: 1.08 ratio         PTT - ( 31 Aug 2022 08:06 )  PTT:29.2 sec    I&O's Summary      RADIOLOGY & ADDITIONAL STUDIES:

## 2022-08-31 NOTE — CONSULT NOTE ADULT - SUBJECTIVE AND OBJECTIVE BOX
Orthopedics Consult Note:    This is a 81y Female who presents to the ED today with c/o left hip pain and inability to bear weight s/p mechanical fall at home this morning. Per patient she ambulates at home with no assistive devices and no pain prior to fall today. Pt is on aspirin daily for cardiac history of HCOM. Patient follows with private cardiologist . Pt denies any other injuries. Pt denies head trauma or LOC. Pt denies any numbness, tingling or parethesias. Pt denies fever or chills.    Past Medical & Surgical History:  No pertinent family history in first degree relatives  High cholesterol  Water retention  Hypertrophic obstructive cardiomyopathy (HOCM)  Aortic stenosis  No significant past surgical history    Allergies:  lidocaine (Rash)      Vital Signs:  T(C): 36.6 (08-31-22 @ 07:55), Max: 36.6 (08-31-22 @ 07:55)  HR: 65 (08-31-22 @ 07:55) (65 - 65)  BP: 154/74 (08-31-22 @ 07:55) (154/74 - 154/74)  RR: 17 (08-31-22 @ 07:55) (17 - 17)  SpO2: 96% (08-31-22 @ 07:55) (96% - 96%)    Labs:                        12.7   8.10  )-----------( 167      ( 31 Aug 2022 08:06 )             38.6       X-rays of the pelvis, left hip and femur demonstrate subcapital femoral neck hip fracture.    PE left hip:  +mild swelling, no ecchymosis, no erythema, skin intact, normothermic. +TTP over groin. LROM 2' pain. Pain with axial compression and log rolling. Moving all toes, sensation intact. DP and PT pulses 2+.    Secondary Survey:  Right/Left knee, ankle and B/L UEs with no swelling, no ecchymoses, no abrasions, no lacerations or any other signs of injury with full painless baseline ROM and no bony TTP. Able to SLR RLE. Sensation intact distally, moving all digits. Distal pulses intact.       Assessment:  81y Female with a left femoral neck hip fracture s/p mechanical fall today.    Plan:  Pt and family advised that surgical fixation of left hip fracture with brad arthroplasty vs. total hip arthroplasty is necessary.  Surgical procedure discussed in detail with pt and family including all R/B/As; Pt/Pts family expressed understanding and consents for surgery obtained.  Admit to Medical service for optimization and medical clearance.  f/u medical clearance.  f/u labs/imaging.  Complete bedrest.  Pain control.  NPO now/after midnight for possible OR today/ tomorrow pending medical optimization and clearance.   DVT ppx with SCDs for now.     Orthopedics Consult Note:    This is a 81y Female who presents to the ED today with c/o left hip pain and inability to bear weight s/p mechanical fall at home this morning. Per patient she ambulates at home with no assistive devices and no pain prior to fall today. Pt is on aspirin daily for cardiac history of HCOM. Patient follows with private cardiologist  in [facility] who she last saw in June for follow up with no complications. Pt denies any other injuries. Pt denies head trauma or LOC. Pt denies any numbness, tingling or paresthesia Pt denies fever or chills.    Past Medical & Surgical History:  No pertinent family history in first degree relatives  High cholesterol  Water retention  Hypertrophic obstructive cardiomyopathy (HOCM)  Aortic stenosis  No significant past surgical history    Allergies:  lidocaine (Rash)      Vital Signs:  T(C): 36.6 (08-31-22 @ 07:55), Max: 36.6 (08-31-22 @ 07:55)  HR: 65 (08-31-22 @ 07:55) (65 - 65)  BP: 154/74 (08-31-22 @ 07:55) (154/74 - 154/74)  RR: 17 (08-31-22 @ 07:55) (17 - 17)  SpO2: 96% (08-31-22 @ 07:55) (96% - 96%)    Labs:                        12.7   8.10  )-----------( 167      ( 31 Aug 2022 08:06 )             38.6       X-rays of the pelvis, left hip and femur demonstrate subcapital femoral neck hip fracture.    PE left hip:  +mild swelling, no ecchymosis, no erythema, skin intact, normothermic. +TTP over groin. LROM 2' pain. Pain with axial compression and log rolling. Moving all toes, sensation intact. DP and PT pulses 2+.    Secondary Survey:  Right/Left knee, ankle and B/L UEs with no swelling, no ecchymoses, no abrasions, no lacerations or any other signs of injury with full painless baseline ROM and no bony TTP. Able to SLR RLE. Sensation intact distally, moving all digits. Distal pulses intact.       Assessment:  81y Female with a left femoral neck hip fracture s/p mechanical fall today.    Plan:  Pt and family advised that surgical fixation of left hip fracture with brad arthroplasty vs. total hip arthroplasty is necessary.  Surgical procedure discussed in detail with pt and family including all R/B/As; Pt/Pts family expressed understanding and consents for surgery obtained.  Admit to Medical service for optimization and medical clearance.  f/u medical clearance.  f/u labs/imaging.  Complete bedrest.  Pain control.  NPO after midnight for OR tomorrow with   DVT ppx with SCDs for now.

## 2022-08-31 NOTE — CHART NOTE - NSCHARTNOTEFT_GEN_A_CORE
Hypertrophic Cardiomyopathy Program  [facility]  [address] [place] [id number]  [email or web address] Phone [phone], Fax [phone]  RE: Sandhya Jade  : 1940  2022    Dear Dr. Antoni Lea:    Ms. Jade’s son Yuniel, has informed us that she requires surgery under your care, specifically  a left hemiarthroplasty vs total hip arthroplasty under general anesthesia. This should be done in a  hospital setting and not an ambulatory care center. She was last seen in our office on 2022.  She has a medical history of HTN, HLD and hypertrophic cardiomyopathy with obstruction. This letter  serves to summarize the precautions to take in patients with obstructive hypertrophic  cardiomyopathy.    Patients with obstructive HCM are at risk for worsening LVOT obstruction when they are admitted  to the hospital. LVOT obstruction can lead to clinical deterioration, shock and death, and must be  avoided as best possible. Circulating catecholamines, pain, anxiety, acute anemia, sedation,  anesthesia and sepsis can result in increased inotropy and vasodilation, which in turn leads to  increased LVOT obstruction and hypotension. Care teams may not be familiar with HCM care and  may not be familiar with the medications we use.    Prior to the Procedure:  1. Ensure patient has taken all of their prescribed HCM-related medications (beta blockers,  verapamil, disopyramide/Norpace) the morning of procedure, despite NPO status    2. The left ventricular ejection fraction is typically normal or high; consequently, patients do  better slightly overhydrated than dehydrated. For longer procedures, starting IV fluids prior  to procedure is recommended.    Perioperatively or in the context of Severe Medical Illness:    1. Resume home medications as soon as possible after the procedure (or after admission for  medical illness). If patient was on beta blocker and cannot take oral medications, give  metoprolol 5-10 mg IVSS every 4 hours. Since some of these meds are unfamiliar  (disopyramide, oral verapamil) and norpace lacks an IV formulation, we will review orders  with you.    2. Avoid hypovolemia. Give at least replacement fluids when NPO. This is not LV systolic  dysfunction (“heart failure” in the usual sense).    3. Meticulous electrolyte management  a. Goal K+ 4.5, Mg+ ~2.5    4. If HYPERtensive:  a. Give metoprolol or beta selective beta blocker, clonidine  b. NO vasodilators – no nicardipine, hydralazine, labetalol, ACE/ARB/-pines/Nitrates    5. For HYPOtension in/after the OR:  a. IVF resuscitation  b. Phenylephrine gtt  c. Urgent echocardiogram    6. Always avoid positive inotropes  a. No epinephrine, milrinone, dobutamine, or dopamine    7. For patients who cannot be weaned off of phenylephrine, consider exacerbation of LVOT  obstruction  a. NS 0.9% 500ml bolus over 2 hours  b. Obtain stat bedside echo to evaluate LVOT obstruction, LV ejection fraction and  IVC. If peak LVOT gradient is > 50 mmHg and EF is normal or high:  c. Give metoprolol 5 mg IVP q5 minutes x 3 as long as HR >55bpm. This must be  given even if MAPs are <60mmHg. As long as EF is normal and and high gradients  >50mmHg are present on echo, administering IV metoprolol is absolutely  necessary. It is reasonable to have house officer standing by while IV metoprolol is  begun to increase phenylephrine rate if needed (though BP usually rises after  metoprolol IV).  d. Start metoprolol 15mg IVPB over 30minutes q4hr, repeat until goal HR 55-65bpm.  e. If IVC is collapsed, give additional IV fluids.  f. Transfuse for hemoglobin < 10 g/dL, even though this is more liberal than for other  patients. It can make the difference.  g. IV metoprolol is preferred over esmolol. It is stronger.    8. Avoid QT prolonging medications such as macrolides or antiemetics like Zofran if the  patient is on Norpace/disopyramide.  When patients are admitted to the hospital, or beforehand we plan to distribute these written  guidelines to the care team. Please consider incorporating these guidelines in your archive of  active care guidelines.    I assume that you, or the internist, will handle preoperative blood testing. She takes aspirin 81mg  PO daily for mild nonobstructive coronary atherosclerosis. She may hold this up to 5 days prior to  procedure and resume when feasible afterwards. In hypertrophic cardiomyopathy, the American  Heart Association guidelines now recommend prophylactic antibiotics to prevent endocarditis only  for HCM patients with prosthetic valves or prior endocarditis. However, you may want to give  antibiotics specifically for your procedural reasons.    She is aware of the small incidence of cardiac and non-cardiac severe adverse effects from  surgery. The benefits of the procedure you are planning seem to outweigh the risks that are always  present in any intervention.  If you have questions, please do not hesitate to call our office.  Sincerely,  BEHZAD Simmons-BC

## 2022-08-31 NOTE — H&P ADULT - ASSESSMENT
#Left femoral neck fracture  - Will likely need OR but needs medical clearance first.   - Tylenol for mild pain morphine for severe  - PT/OT consult  - NPO for possible procedure  - D5/half NS 50 cc/hr  - Ambulates without assistance at baseline.     #Hypertrophic cardiomyopathy  - Follows with Dr. Hightower in [facility] - last saw her in June. Last echo December 2020 showed EF 74% and findings consistent hypertrophic cardiomiopathy  - 3/6 crescendo/decrescendo murmur at right second intercostal space  - Metoprolol 50 mg daily  - Verapamil 120 mg daily     #HTN   - /74 but likely 2/2 pain.   - Reassess after pain meds    DVT prophylaxis: Lovenox after surgery  GI prophylaxis: None  Diet: NPO for procedure  Activity: None weight bearing left hip. F/u OT recs #Left femoral neck fracture  - Will likely go to OR with ortho tomorrow 09/01 but needs medical and cardio clearance first.   - Tylenol 650 Q6 for mild pain, morphine 2mg Q4 for severe pain  - PT/OT consult  - NPO for possible procedure  - D5/half NS 50 cc/hr  - Ambulates without assistance at baseline.   - NPO at midnight    #Hypertrophic cardiomyopathy  - Follows with Dr. Hightower in [facility] - last saw her in June. Last echo December 2020 showed EF 74% and findings consistent hypertrophic cardiomiopathy  - 3/6 crescendo/decrescendo murmur at right second intercostal space  - Metoprolol 50 mg daily  - Verapamil 120 mg daily   - Cardiology consult for clearance    #HTN   - /74 but likely 2/2 pain.   - Reassess after pain meds    DVT prophylaxis: Lovenox after surgery  GI prophylaxis: None  Diet: DASH/TLC (NPO at midnight)   Activity: None weight bearing left hip. F/u OT recs

## 2022-08-31 NOTE — ED PROVIDER NOTE - ATTENDING APP SHARED VISIT CONTRIBUTION OF CARE
81-year-old female history of hypertension aortic stenosis, now status post mechanical fall.  With pain to the left hip.    No head injury.  No vomiting.  No abdominal pain.  No chest pain.    vss, nontoxic, well appearing, airway intact, GCS 15, MMM, no cervical-thoracic-lumbar spine tenderness, neck supple, CTAB, no crepitus over chest wall, RRR, equal radial pulses bilat, abd soft/nt/nd, no fnd. FROMx3, positive pain elicited on logroll of the left lower extremity.  Decreased range of motions., no ecchymosis    Plan:  labs, imaging, meds, reassess

## 2022-08-31 NOTE — ED ADULT NURSE NOTE - SUICIDE SCREENING QUESTION 2
Subjective:       Patient ID: Kobe Soler III is a 51 y.o. male.    Chief Complaint: Follow-up (4 month)    HPI   Pt with HTN, T2DM, Tobacco use, Obesity, LBP, HLD, Depression, insomnia is here for 4 month f/u. Pt father passed away in December. He is doing ok overall.    Review of Systems   Constitutional: Negative for activity change, appetite change, chills, diaphoresis, fatigue, fever and unexpected weight change.   HENT: Negative for postnasal drip, rhinorrhea, sinus pressure, sneezing, sore throat, trouble swallowing and voice change.    Respiratory: Negative for cough, shortness of breath and wheezing.    Cardiovascular: Negative for chest pain, palpitations and leg swelling.   Gastrointestinal: Negative for abdominal pain, blood in stool, constipation, diarrhea, nausea and vomiting.   Genitourinary: Negative for dysuria.   Musculoskeletal: Positive for arthralgias and back pain. Negative for myalgias.   Skin: Negative for rash and wound.   Allergic/Immunologic: Negative for environmental allergies and food allergies.   Hematological: Negative for adenopathy. Does not bruise/bleed easily.   Psychiatric/Behavioral: Positive for dysphoric mood and sleep disturbance. Negative for self-injury and suicidal ideas.       Objective:      Physical Exam   Constitutional: He is oriented to person, place, and time. He appears well-developed and well-nourished. No distress.   HENT:   Head: Normocephalic and atraumatic.   Eyes: Conjunctivae and EOM are normal. Pupils are equal, round, and reactive to light. Right eye exhibits no discharge. Left eye exhibits no discharge. No scleral icterus.   Neck: Normal range of motion. Neck supple. No JVD present.   Cardiovascular: Normal rate, regular rhythm and normal heart sounds.    No murmur heard.  Pulmonary/Chest: Effort normal and breath sounds normal. No respiratory distress. He has no wheezes. He has no rales.   Abdominal: Soft. Bowel sounds are normal. There is no  tenderness.   Musculoskeletal: He exhibits no edema.   Lymphadenopathy:     He has no cervical adenopathy.   Neurological: He is alert and oriented to person, place, and time.   Skin: Skin is warm and dry. No rash noted. He is not diaphoretic. No pallor.       Assessment:       1. Essential hypertension    2. Type 2 diabetes mellitus without complication, without long-term current use of insulin    3. Hyperlipidemia associated with type 2 diabetes mellitus    4. Tobacco use    5. Obesity, diabetes, and hypertension syndrome    6. Mild episode of recurrent major depressive disorder    7. Insomnia, unspecified type    8. Chronic right-sided low back pain with right-sided sciatica    9. Elevated coronary artery calcium score        Plan:    1. HTN- controlled on Hyzaar 50-12.5 mg daily   2. T2DM- last HA1C of 5.6(8/17)<--6.2(10/16)<--5.8(6/16)<--5.8(3/16)       Continue Metformin 500 mg BID   3. Tobacco use- Pt has decreased his use significantly   4. Obesity, diabetes and HTN- continue with diet/exercise for weight loss   5. LBP with right sciatica- stable on Percocet/Flexeril PRN        Pt has had relief with CARLOS   6. Hypertriglyceridemia- stable on Lipitor 40 mg/fenofibrate 160 mg/Lovaza daily   7. Elevated coronary calcium score- continue statin/ASA   8. Depression- stable on Paxil 40 mg daily        Followed by Psyc   9. Insomnia- stable on Trazodone qHS  10. F/u in 4 months      No

## 2022-08-31 NOTE — ED ADULT NURSE NOTE - NSIMPLEMENTINTERV_GEN_ALL_ED
Implemented All Fall with Harm Risk Interventions:  [place] to call system. Call bell, personal items and telephone within reach. Instruct patient to call for assistance. Room bathroom lighting operational. Non-slip footwear when patient is off stretcher. Physically safe environment: no spills, clutter or unnecessary equipment. Stretcher in lowest position, wheels locked, appropriate side rails in place. Provide visual cue, wrist band, yellow gown, etc. Monitor gait and stability. Monitor for mental status changes and reorient to person, place, and time. Review medications for side effects contributing to fall risk. Reinforce activity limits and safety measures with patient and family. Provide visual clues: red socks.

## 2022-08-31 NOTE — PATIENT PROFILE ADULT - FALL HARM RISK - HARM RISK INTERVENTIONS

## 2022-08-31 NOTE — H&P ADULT - NSHPPHYSICALEXAM_GEN_ALL_CORE
LOS:     VITALS:   T(C): 36.6 (08-31-22 @ 09:27), Max: 36.6 (08-31-22 @ 07:55)  HR: 59 (08-31-22 @ 09:27) (59 - 65)  BP: 144/64 (08-31-22 @ 09:27) (144/64 - 154/74)  RR: 18 (08-31-22 @ 09:27) (17 - 18)  SpO2: 97% (08-31-22 @ 09:27) (96% - 97%)    GENERAL: NAD, lying in bed, in mild pain  HEAD:  Atraumatic, Normocephalic  EYES: EOMI, PERRLA, conjunctiva and sclera clear  ENT: Moist mucous membranes  NECK: Supple, No JVD  CHEST/LUNG: CTAB  HEART: 3/6 crescendo/decrescendo murmur at right second intercostal space   ABDOMEN: BSx4; Soft, nontender, nondistended  EXTREMITIES: Pain over left hip  NERVOUS SYSTEM:  A&Ox3, no focal deficits   SKIN: No rashes or lesions

## 2022-08-31 NOTE — ED PROVIDER NOTE - PHYSICAL EXAMINATION
Physical Exam    Vital Signs: I have reviewed the initial vital signs.  Constitutional: appears stated age, no acute distress  Eyes: Conjunctiva pink, Sclera clear  Cardiovascular: S1 and S2, regular rate, regular rhythm, well-perfused extremities, radial pulses equal and 2+, pedal pulses 2+ and equal  Respiratory: unlabored respiratory effort, clear to auscultation bilaterally no wheezing, rales and rhonchi  Gastrointestinal: soft, non-tender abdomen, no pulsatile mass, normal bowl sounds  Musculoskeletal: supple neck, no lower extremity edema, no midline tenderness, left lower leg shortened and externally rotated with ttp to left hip/proximal femur   Integumentary: warm, dry, no rash  Neurologic: awake, alert, nvi

## 2022-08-31 NOTE — CHART NOTE - NSCHARTNOTEFT_GEN_A_CORE
I contacted the office of Dr Jesus Hightower (patient's [facility] cardiologist). Patient had an Echo performed less than one year ago (Sep 2021). They will be faxing the report (please check patient's paper chart) I contacted the office of Dr Jesus Hightower (patient's [facility] cardiologist). Patient had an Echo performed less than one year ago (Sep 2021). They will be faxing the report (please check patient's paper chart)    ECHO REPORT (PERFORMED 9/2/2021)  CONCLUSION:  --There is moderate left atrial dilatation (LA volume index 42 ml/m7?).  --The interventricular septum is severely hypertrophied.  --LV ejection fraction is normal (70 %).  --MARI 2.05 cm, with LV cavity size is normal, with normal wall motion.  --Ascending aorta is normal in size; its diameter is 3.4 cm (2.1 cm/m?).  --There is systolic anterior motion (MEGHAN) of the mitral valve. 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 At rest, the left ventricular outflow gradient is 58  mmHg. With the Valsalva maneuver, the left ventricular outflow gradient is 58 mmHg. In the  standing position, the left ventricular outflow gradient is 85 mmHg.  --There is mitral annular calcification.  --There is no pericardial effusion.  --OHCM.  --| have reviewed prior study or report of prior study in [employer] archive.  --Compared to prior TTE from 2/9/2021, Resting and provoked gradients are lower.    [facility]  Adult Echocardiography Laboratory I contacted the office of Dr Jesus Hightower (patient's [facility] cardiologist). Patient had an Echo performed less than one year ago (Sep 2021). They will be faxing the report (please check patient's paper chart)    ECHO REPORT (PERFORMED 9/7/2021)  CONCLUSION:  --There is moderate left atrial dilatation (LA volume index 42 ml/m7?).  --The interventricular septum is severely hypertrophied.  --LV ejection fraction is normal (70 %).  --MARI 2.05 cm, with LV cavity size is normal, with normal wall motion.  --Ascending aorta is normal in size; its diameter is 3.4 cm (2.1 cm/m?).  --There is systolic anterior motion (MEGHAN) of the mitral valve. 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 At rest, the left ventricular outflow gradient is 58  mmHg. With the Valsalva maneuver, the left ventricular outflow gradient is 58 mmHg. In the  standing position, the left ventricular outflow gradient is 85 mmHg.  --There is mitral annular calcification.  --There is no pericardial effusion.  --OHCM.  --| have reviewed prior study or report of prior study in [employer] archive.  --Compared to prior TTE from 2/9/2021, Resting and provoked gradients are lower.    [facility]  Adult Echocardiography Laboratory I contacted the office of Dr Jesus Hightower (patient's [facility] cardiologist). Patient had an Echo performed less than one year ago (Sep 2021).     ECHO REPORT (PERFORMED 9/7/2021)  CONCLUSION:  --There is moderate left atrial dilatation (LA volume index 42 ml/m7?).  --The interventricular septum is severely hypertrophied.  --LV ejection fraction is normal (70 %).  --MARI 2.05 cm, with LV cavity size is normal, with normal wall motion.  --Ascending aorta is normal in size; its diameter is 3.4 cm (2.1 cm/m?).  --There is systolic anterior motion (MEGHAN) of the mitral valve. 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 At rest, the left ventricular outflow gradient is 58  mmHg. With the Valsalva maneuver, the left ventricular outflow gradient is 58 mmHg. In the  standing position, the left ventricular outflow gradient is 85 mmHg.  --There is mitral annular calcification.  --There is no pericardial effusion.  --OHCM.  --| have reviewed prior study or report of prior study in [email or web address] archive.  --Compared to prior TTE from 2/9/2021, Resting and provoked gradients are lower.    [facility]  Adult Echocardiography Laboratory

## 2022-08-31 NOTE — ED ADULT NURSE NOTE - OBJECTIVE STATEMENT
81 year old female complaining of mechanical fall while carrying something. Pt denies LOC, AC, HT. Pt complains of LLE pain. 81 year old female complaining of mechanical fall while carrying something. Pt denies LOC, AC, HT, chest pain, sob. Pt complains of LLE pain.

## 2022-08-31 NOTE — PRE PROCEDURE NOTE - PRE PROCEDURE EVALUATION
ORTHOPEDIC SURGERY PRE OP NOTE    Diagnosis: Left hip femoral neck fracture     Planned Procedure: left hip brad arthroplasty vs total hip arthroplasty    Consent: TO BE OBTAINED BY ATTENDING                   Risks/benefits/alternatives were discussed with the patient/family and they wish to proceed with surgery.       ANTICIPATED DATE OF PROCEDURE : 9/1/2022  SCHEDULED CASE OR ADD-ON CASE: scheduled    Clearances:   [ ] Medicine: pending  [ ] Cardiology: pending         Allergies:   lidocaine (Rash)      T(C): 36.6 (08-31-22 @ 09:27), Max: 36.6 (08-31-22 @ 07:55)  HR: 59 (08-31-22 @ 09:27) (59 - 65)  BP: 144/64 (08-31-22 @ 09:27) (144/64 - 154/74)  RR: 18 (08-31-22 @ 09:27) (17 - 18)  SpO2: 97% (08-31-22 @ 09:27) (96% - 97%)    Labs:                        12.7   8.10  )-----------( 167      ( 31 Aug 2022 08:06 )             38.6     08-31    138  |  105  |  23<H>  ----------------------------<  153<H>  4.5   |  22  |  0.9    Ca    9.5      31 Aug 2022 08:06  TPro  6.5  /  Alb  4.2  /  TBili  0.3  /  DBili  x   /  AST  38  /  ALT  20  /  AlkPhos  106  08-31  PT/INR - ( 31 Aug 2022 08:06 )   PT: 12.40 sec;   INR: 1.08 ratio     PTT - ( 31 Aug 2022 08:06 )  PTT:29.2 sec    Type and Screen X 2:  O Positive 8/31      [ ]Pregnancy test ( if female of childbearing age) : n/a  [ ]EKG: pending  [x]CXR: 8/31/2022      DIET: NPO after midnight  IVF: per primary team      ANTICOAGULATION STATUS ( include name of anticoagulant) :  Continue LVX and aspirin tonight; hold in AM for surgery      A/P: Patient is a 81y y/o Female Pending left hip brad vs QUIQUE tomorrow    -NPO and IVF @ midnight  -pain control/analgesia prn per primary team   -Incentive Spirometry  -F/U Clearance- medicine and cardiology  -F/U Pending Labs- EKG, second type and screen     Notify Ortho with any questions- spectra [phone]    [ ]DISCUSSED WITH PRIMARY TEAM MEMBER (name of team member): dr. de la torre via teams phonecall   [ ]Date and Time DISCUSSED WITH PRIMARY TEAM MEMBER: 8/31/2022 @ 1150am ORTHOPEDIC SURGERY PRE OP NOTE    Diagnosis: Left hip femoral neck fracture     Planned Procedure: left hip brad arthroplasty vs total hip arthroplasty    Consent: TO BE OBTAINED BY ATTENDING                   Risks/benefits/alternatives were discussed with the patient/family and they wish to proceed with surgery.       ANTICIPATED DATE OF PROCEDURE : 9/1/2022  SCHEDULED CASE OR ADD-ON CASE: scheduled    Clearances:   [ ] Medicine: pending done dr quick           Allergies:   lidocaine (Rash)      T(C): 36.6 (08-31-22 @ 09:27), Max: 36.6 (08-31-22 @ 07:55)  HR: 59 (08-31-22 @ 09:27) (59 - 65)  BP: 144/64 (08-31-22 @ 09:27) (144/64 - 154/74)  RR: 18 (08-31-22 @ 09:27) (17 - 18)  SpO2: 97% (08-31-22 @ 09:27) (96% - 97%)    Labs:                        12.7   8.10  )-----------( 167      ( 31 Aug 2022 08:06 )             38.6     08-31    138  |  105  |  23<H>  ----------------------------<  153<H>  4.5   |  22  |  0.9    Ca    9.5      31 Aug 2022 08:06  TPro  6.5  /  Alb  4.2  /  TBili  0.3  /  DBili  x   /  AST  38  /  ALT  20  /  AlkPhos  106  08-31  PT/INR - ( 31 Aug 2022 08:06 )   PT: 12.40 sec;   INR: 1.08 ratio     PTT - ( 31 Aug 2022 08:06 )  PTT:29.2 sec    Type and Screen X 2:  O Positive 8/31      [ ]Pregnancy test ( if female of childbearing age) : n/a  [ ]EKG:on chart   [x]CXR: 8/31/2022      DIET: NPO after midnight  IVF: per primary team      ANTICOAGULATION STATUS ( include name of anticoagulant) :  Continue LVX and aspirin tonight; hold in AM for surgery      A/P: Patient is a 81y y/o Female Pending left hip brad vs QUIQUE tomorrow    -NPO and IVF @ midnight  -pain control/analgesia prn per primary team   -Incentive Spirometry  -F/U Clearance- medicine and cardiology  -F/U Pending Labs- EKG, second type and screen     Notify Ortho with any questions- spectra [phone]    [ ]DISCUSSED WITH PRIMARY TEAM MEMBER (name of team member): dr. de la torre via teams phonecall   [ ]Date and Time DISCUSSED WITH PRIMARY TEAM MEMBER: 8/31/2022 @ 1150am

## 2022-09-01 ENCOUNTER — RESULT REVIEW (OUTPATIENT)
Age: 82
End: 2022-09-01

## 2022-09-01 PROBLEM — I42.1 OBSTRUCTIVE HYPERTROPHIC CARDIOMYOPATHY: Chronic | Status: ACTIVE | Noted: 2020-12-17

## 2022-09-01 PROBLEM — I35.0 NONRHEUMATIC AORTIC (VALVE) STENOSIS: Chronic | Status: ACTIVE | Noted: 2020-12-17

## 2022-09-01 LAB
ALBUMIN SERPL ELPH-MCNC: 3.6 G/DL — SIGNIFICANT CHANGE UP (ref 3.5–5.2)
ALP SERPL-CCNC: 98 U/L — SIGNIFICANT CHANGE UP (ref 30–115)
ALT FLD-CCNC: 14 U/L — SIGNIFICANT CHANGE UP (ref 0–41)
ANION GAP SERPL CALC-SCNC: 11 MMOL/L — SIGNIFICANT CHANGE UP (ref 7–14)
AST SERPL-CCNC: 20 U/L — SIGNIFICANT CHANGE UP (ref 0–41)
BASOPHILS # BLD AUTO: 0.08 K/UL — SIGNIFICANT CHANGE UP (ref 0–0.2)
BASOPHILS NFR BLD AUTO: 0.9 % — SIGNIFICANT CHANGE UP (ref 0–1)
BILIRUB SERPL-MCNC: 0.4 MG/DL — SIGNIFICANT CHANGE UP (ref 0.2–1.2)
BUN SERPL-MCNC: 18 MG/DL — SIGNIFICANT CHANGE UP (ref 10–20)
CALCIUM SERPL-MCNC: 9.2 MG/DL — SIGNIFICANT CHANGE UP (ref 8.5–10.1)
CHLORIDE SERPL-SCNC: 104 MMOL/L — SIGNIFICANT CHANGE UP (ref 98–110)
CO2 SERPL-SCNC: 22 MMOL/L — SIGNIFICANT CHANGE UP (ref 17–32)
CREAT SERPL-MCNC: 0.7 MG/DL — SIGNIFICANT CHANGE UP (ref 0.7–1.5)
EGFR: 86 ML/MIN/1.73M2 — SIGNIFICANT CHANGE UP
EOSINOPHIL # BLD AUTO: 0.04 K/UL — SIGNIFICANT CHANGE UP (ref 0–0.7)
EOSINOPHIL NFR BLD AUTO: 0.4 % — SIGNIFICANT CHANGE UP (ref 0–8)
GLUCOSE SERPL-MCNC: 118 MG/DL — HIGH (ref 70–99)
HCT VFR BLD CALC: 34.1 % — LOW (ref 37–47)
HGB BLD-MCNC: 11.5 G/DL — LOW (ref 12–16)
IMM GRANULOCYTES NFR BLD AUTO: 0.4 % — HIGH (ref 0.1–0.3)
LYMPHOCYTES # BLD AUTO: 2.11 K/UL — SIGNIFICANT CHANGE UP (ref 1.2–3.4)
LYMPHOCYTES # BLD AUTO: 23.5 % — SIGNIFICANT CHANGE UP (ref 20.5–51.1)
MAGNESIUM SERPL-MCNC: 2 MG/DL — SIGNIFICANT CHANGE UP (ref 1.8–2.4)
MCHC RBC-ENTMCNC: 29.4 PG — SIGNIFICANT CHANGE UP (ref 27–31)
MCHC RBC-ENTMCNC: 33.7 G/DL — SIGNIFICANT CHANGE UP (ref 32–37)
MCV RBC AUTO: 87.2 FL — SIGNIFICANT CHANGE UP (ref 81–99)
MONOCYTES # BLD AUTO: 0.73 K/UL — HIGH (ref 0.1–0.6)
MONOCYTES NFR BLD AUTO: 8.1 % — SIGNIFICANT CHANGE UP (ref 1.7–9.3)
NEUTROPHILS # BLD AUTO: 5.99 K/UL — SIGNIFICANT CHANGE UP (ref 1.4–6.5)
NEUTROPHILS NFR BLD AUTO: 66.7 % — SIGNIFICANT CHANGE UP (ref 42.2–75.2)
NRBC # BLD: 0 /100 WBCS — SIGNIFICANT CHANGE UP (ref 0–0)
PLATELET # BLD AUTO: 175 K/UL — SIGNIFICANT CHANGE UP (ref 130–400)
POTASSIUM SERPL-MCNC: 3.9 MMOL/L — SIGNIFICANT CHANGE UP (ref 3.5–5)
POTASSIUM SERPL-SCNC: 3.9 MMOL/L — SIGNIFICANT CHANGE UP (ref 3.5–5)
PROT SERPL-MCNC: 5.8 G/DL — LOW (ref 6–8)
RBC # BLD: 3.91 M/UL — LOW (ref 4.2–5.4)
RBC # FLD: 14.7 % — HIGH (ref 11.5–14.5)
SODIUM SERPL-SCNC: 137 MMOL/L — SIGNIFICANT CHANGE UP (ref 135–146)
WBC # BLD: 8.99 K/UL — SIGNIFICANT CHANGE UP (ref 4.8–10.8)
WBC # FLD AUTO: 8.99 K/UL — SIGNIFICANT CHANGE UP (ref 4.8–10.8)

## 2022-09-01 PROCEDURE — 88311 DECALCIFY TISSUE: CPT | Mod: 26

## 2022-09-01 PROCEDURE — 88305 TISSUE EXAM BY PATHOLOGIST: CPT | Mod: 26

## 2022-09-01 PROCEDURE — 99233 SBSQ HOSP IP/OBS HIGH 50: CPT

## 2022-09-01 RX ORDER — DISOPYRAMIDE PHOSPHATE 100 MG
1 CAPSULE ORAL
Qty: 0 | Refills: 0 | DISCHARGE

## 2022-09-01 RX ORDER — MORPHINE SULFATE 50 MG/1
1 CAPSULE, EXTENDED RELEASE ORAL
Refills: 0 | Status: DISCONTINUED | OUTPATIENT
Start: 2022-09-01 | End: 2022-09-02

## 2022-09-01 RX ORDER — ENOXAPARIN SODIUM 100 MG/ML
40 INJECTION SUBCUTANEOUS EVERY 24 HOURS
Refills: 0 | Status: DISCONTINUED | OUTPATIENT
Start: 2022-09-02 | End: 2022-09-06

## 2022-09-01 RX ORDER — ONDANSETRON 8 MG/1
4 TABLET, FILM COATED ORAL ONCE
Refills: 0 | Status: DISCONTINUED | OUTPATIENT
Start: 2022-09-01 | End: 2022-09-02

## 2022-09-01 RX ORDER — SODIUM CHLORIDE 9 MG/ML
1000 INJECTION, SOLUTION INTRAVENOUS
Refills: 0 | Status: DISCONTINUED | OUTPATIENT
Start: 2022-09-01 | End: 2022-09-02

## 2022-09-01 RX ORDER — CHOLECALCIFEROL (VITAMIN D3) 125 MCG
1 CAPSULE ORAL
Qty: 0 | Refills: 0 | DISCHARGE

## 2022-09-01 RX ORDER — SIMVASTATIN 20 MG/1
1 TABLET, FILM COATED ORAL
Qty: 0 | Refills: 0 | DISCHARGE

## 2022-09-01 RX ORDER — PREGABALIN 225 MG/1
1 CAPSULE ORAL
Qty: 0 | Refills: 0 | DISCHARGE

## 2022-09-01 RX ORDER — CEFAZOLIN SODIUM 1 G
2000 VIAL (EA) INJECTION EVERY 8 HOURS
Refills: 0 | Status: COMPLETED | OUTPATIENT
Start: 2022-09-02 | End: 2022-09-02

## 2022-09-01 RX ORDER — MORPHINE SULFATE 50 MG/1
2 CAPSULE, EXTENDED RELEASE ORAL
Refills: 0 | Status: DISCONTINUED | OUTPATIENT
Start: 2022-09-01 | End: 2022-09-02

## 2022-09-01 RX ADMIN — Medication 2000 UNIT(S): at 11:30

## 2022-09-01 RX ADMIN — Medication 50 MILLIGRAM(S): at 06:40

## 2022-09-01 RX ADMIN — MORPHINE SULFATE 2 MILLIGRAM(S): 50 CAPSULE, EXTENDED RELEASE ORAL at 07:58

## 2022-09-01 RX ADMIN — Medication 1 PATCH: at 13:33

## 2022-09-01 RX ADMIN — PREGABALIN 1000 MICROGRAM(S): 225 CAPSULE ORAL at 11:30

## 2022-09-01 RX ADMIN — Medication 100 MILLIGRAM(S): at 07:10

## 2022-09-01 RX ADMIN — SODIUM CHLORIDE 50 MILLILITER(S): 9 INJECTION, SOLUTION INTRAVENOUS at 11:36

## 2022-09-01 RX ADMIN — Medication 100 MILLIGRAM(S): at 13:34

## 2022-09-01 RX ADMIN — Medication 120 MILLIGRAM(S): at 07:07

## 2022-09-01 NOTE — PRE-OP CHECKLIST - DNR CLARIFICATION FORM COMPLETED
Subjective: Patient seen and examined. No new events except as noted.   no chest pain, no sob     REVIEW OF SYSTEMS:    CONSTITUTIONAL: + weakness, fevers or chills  EYES/ENT: No visual changes;  No vertigo or throat pain   NECK: No pain or stiffness  RESPIRATORY: No cough, wheezing, hemoptysis; No shortness of breath  CARDIOVASCULAR: No chest pain or palpitations  GASTROINTESTINAL: No abdominal or epigastric pain. No nausea, vomiting, or hematemesis; No diarrhea or constipation. No melena or hematochezia.  GENITOURINARY: No dysuria, frequency or hematuria  NEUROLOGICAL: No numbness or weakness  SKIN: No itching, burning, rashes, or lesions   All other review of systems is negative unless indicated above.    MEDICATIONS:  MEDICATIONS  (STANDING):  allopurinol 100 milliGRAM(s) Oral daily  artificial  tears Solution 1 Drop(s) Both EYES four times a day  bisacodyl 5 milliGRAM(s) Oral at bedtime  docosanol 10% Cream 1 Application(s) Topical five times a day  gabapentin Solution 50 milliGRAM(s) Oral two times a day  heparin   Injectable 5000 Unit(s) SubCutaneous every 8 hours  hydrocortisone hemorrhoidal Suppository 1 Suppository(s) Rectal daily  latanoprost 0.005% Ophthalmic Solution 1 Drop(s) Both EYES at bedtime  levothyroxine 25 MICROGram(s) Oral daily  mesalamine Suppository 1000 milliGRAM(s) Rectal at bedtime  midodrine. 5 milliGRAM(s) Oral <User Schedule>  pantoprazole    Tablet 40 milliGRAM(s) Oral before breakfast  piperacillin/tazobactam IVPB..      piperacillin/tazobactam IVPB.. 3.375 Gram(s) IV Intermittent every 12 hours  polyethylene glycol 3350 17 Gram(s) Oral two times a day  sevelamer carbonate 800 milliGRAM(s) Oral three times a day with meals  simvastatin 40 milliGRAM(s) Oral at bedtime  sodium bicarbonate 650 milliGRAM(s) Oral two times a day  sodium zirconium cyclosilicate 5 Gram(s) Oral daily      PHYSICAL EXAM:  T(C): 37.1 (02-21-22 @ 08:49), Max: 37.1 (02-21-22 @ 08:49)  HR: 80 (02-21-22 @ 08:49) (66 - 80)  BP: 154/67 (02-21-22 @ 08:49) (147/64 - 171/70)  RR: 18 (02-21-22 @ 08:49) (18 - 18)  SpO2: 98% (02-21-22 @ 08:49) (97% - 99%)  Wt(kg): --  I&O's Summary    20 Feb 2022 07:01  -  21 Feb 2022 07:00  --------------------------------------------------------  IN: 840 mL / OUT: 800 mL / NET: 40 mL    21 Feb 2022 07:01  -  21 Feb 2022 12:27  --------------------------------------------------------  IN: 420 mL / OUT: 0 mL / NET: 420 mL      Appearance: NAD, c-collar on   HEENT: Normal oral mucosa, PERRL, EOMI	  Lymphatic: No lymphadenopathy , no edema  Cardiovascular: Normal S1 S2, No JVD, No murmurs , Peripheral pulses palpable 2+ bilaterally  Respiratory: Lungs clear to auscultation, normal effort 	  Gastrointestinal:  Soft, Non-tender, + BS	  Skin: No rashes, No ecchymoses, No cyanosis, warm to touch  Neuro: alert oriented x 3 attention comprehension are fair.  Able to name, repeat.   EOmi fundi not visualized   no nystagmus VFF to confrontation  Tongue is midline  Palate elevates symmetrically   Moving all 4 ext spontaneously no drift appreciated  Gait not assessed.   Ext: No edema      LABS:    CARDIAC MARKERS:                          8.0    8.37  )-----------( 235      ( 21 Feb 2022 08:54 )             26.8     02-21    140  |  109<H>  |  29<H>  ----------------------------<  88  5.0   |  19<L>  |  1.91<H>    Ca    8.9      21 Feb 2022 06:18  Phos  5.9     02-21  Mg     2.1     02-21      proBNP:   Lipid Profile:   HgA1c:   TSH:     TELEMETRY: 	    ECG:  	  RADIOLOGY:   DIAGNOSTIC TESTING:  [ ] Echocardiogram:  [ ]  Catheterization:  [ ] Stress Test:    OTHER: 	 done

## 2022-09-01 NOTE — PHYSICAL THERAPY INITIAL EVALUATION ADULT - SPECIFY REASON(S)
Patient admitted for L femoral neck fx. No activity orders. Patient scheduled for surgery today. Will evaluation patient s/p surgery.

## 2022-09-01 NOTE — OCCUPATIONAL THERAPY INITIAL EVALUATION ADULT - PERTINENT HX OF CURRENT PROBLEM, REHAB EVAL
81 year old female with past medical history of hypertrophic cardiomyopathy and HTN presents for pain in her left hip after falling. This morning the patient was walking from her living room to her kitchen when she fell on her left side. She was unable to get up so she called her . Her son came to help her up but she was in too much pain so they called EMS. Before this incident she was able to ambulate without assistance and only reported some mild pain in her left knee before her fall.

## 2022-09-01 NOTE — PROGRESS NOTE ADULT - ASSESSMENT
..  # Lt femoral neck fracture--it was a mechanical fall--will go to OR today for ORIF  # mild drop in hb-- expected after fracture    #cardiac due to HOCM and AS; please refer to cardiology evaluation.also included in a separate chart note the aimee-operative recommendations provided by patient's outpatient [facility] cardiologist.   .. If uncontrolled BP, please evaluate for uncontrolled pain; may use selective Beta-Blocker therapy or clonidine. Must avoid potent vasodilators like hydralazine or ACE-I/ARBs  .. As per [facility] cardiologist:   "Ensure patient has taken all of their prescribed HCM-related medications (beta blockers, verapamil, disopyramide/Norpace) the morning of procedure, despite NPO status".   .. UH or Low-Molecular Weight Heparin (Lovenox) if OK with ortho/surgery team for DVT prophylaxis  ..  patient cleared for procedure by our cardiology team--will go to OR today-- DNR/DNI

## 2022-09-01 NOTE — OCCUPATIONAL THERAPY INITIAL EVALUATION ADULT - SPECIFY REASON(S)
Attempted OT evaluation. Missing activity orders. MD [phone] notified at 0705. Awaiting orders. Pt scheduled for OR today. Will follow up when appropriate.

## 2022-09-01 NOTE — PROGRESS NOTE ADULT - SUBJECTIVE AND OBJECTIVE BOX
SUBJECTIVE:    Patient is a 81y old Female who presents with a chief complaint of Pain in left hip / fracture (01 Sep 2022 09:34)    Currently admitted to medicine with the primary diagnosis of Subcapital fracture of left hip       Today is hospital day 1d.     PAST MEDICAL & SURGICAL HISTORY  High cholesterol    Hypertrophic obstructive cardiomyopathy (HOCM)    Aortic stenosis    No significant past surgical history      ALLERGIES:  lidocaine (Rash)    MEDICATIONS:  STANDING MEDICATIONS  aspirin enteric coated 81 milliGRAM(s) Oral daily  cholecalciferol 2000 Unit(s) Oral daily  cloNIDine Patch 0.1 mG/24Hr(s) 1 patch Transdermal every 7 days  cyanocobalamin 1000 MICROGram(s) Oral daily  dextrose 5% + sodium chloride 0.45%. 1000 milliLiter(s) IV Continuous <Continuous>  disopyramide 100 milliGRAM(s) Oral three times a day  enoxaparin Injectable 40 milliGRAM(s) SubCutaneous every 24 hours  metoprolol succinate ER 50 milliGRAM(s) Oral daily  polyethylene glycol 3350 17 Gram(s) Oral daily  simvastatin 20 milliGRAM(s) Oral at bedtime  verapamil 120 milliGRAM(s) Oral daily    PRN MEDICATIONS  acetaminophen     Tablet .. 650 milliGRAM(s) Oral every 6 hours PRN  morphine  - Injectable 2 milliGRAM(s) IV Push every 4 hours PRN    VITALS:   T(F): 98.2  HR: 65  BP: 127/59  RR: 18  SpO2: 96%    LABS:                        11.5   8.99  )-----------( 175      ( 01 Sep 2022 05:59 )             34.1     09-01    137  |  104  |  18  ----------------------------<  118<H>  3.9   |  22  |  0.7    Ca    9.2      01 Sep 2022 05:59  Mg     2.0     09-01    TPro  5.8<L>  /  Alb  3.6  /  TBili  0.4  /  DBili  x   /  AST  20  /  ALT  14  /  AlkPhos  98  09-01    PT/INR - ( 31 Aug 2022 08:06 )   PT: 12.40 sec;   INR: 1.08 ratio         PTT - ( 31 Aug 2022 08:06 )  PTT:29.2 sec      Troponin T, Serum: <0.01 ng/mL (08-31-22 @ 16:09)      CARDIAC MARKERS ( 31 Aug 2022 16:09 )  x     / <0.01 ng/mL / x     / x     / 3.9 ng/mL      RADIOLOGY:    PHYSICAL EXAM:  GEN: No acute distress  LUNGS: Clear to auscultation bilaterally   HEART: S1/S2 present. RRR. systolic murmur  ABD/ GI: Soft, non-tender, non-distended. Bowel sounds present  EXT: NC/NC/NE/2+PP/MOORE  NEURO: AAOX3

## 2022-09-01 NOTE — CHART NOTE - NSCHARTNOTEFT_GEN_A_CORE
PACU ANESTHESIA ADMISSION NOTE      Procedure: Total hip replacement      Post op diagnosis:  Fracture of femoral neck, left        ____  Intubated  TV:______       Rate: ______      FiO2: ______    _X___  Patent Airway    __X__  Full return of protective reflexes    ____  Full recovery from anesthesia / back to baseline status    Vitals:  T: 99.3F  HR: 77  BP: 115/57  RR: 16  SpO2: 96%    Mental Status:  X____ Awake   _____ Alert   _____ Drowsy   _____ Sedated    Nausea/Vomiting:  ____ NO  ___X___Yes,   See Post - Op Orders          Pain Scale (0-10):  _____    Treatment: ____ None    __X__ See Post - Op/PCA Orders    Post - Operative Fluids:   ____ Oral   __X__ See Post - Op Orders    Plan: Discharge:   ____Home       _____Floor     __X___Critical Care    _____  Other:_________________    Comments: pt. tolerated procedure well under GA. Suctioned, reversed and extubated. Transported to PACU on 4lFm. Report endorsed to Rn and ICU team. PACU ANESTHESIA ADMISSION NOTE      Procedure: Total hip replacement      Post op diagnosis:  Fracture of femoral neck, left        ____  Intubated  TV:______       Rate: ______      FiO2: ______    _X___  Patent Airway    __X__  Full return of protective reflexes    ____  Full recovery from anesthesia / back to baseline status    Vitals:  T: 99.3F  HR: 77  BP: 115/57  RR: 16  SpO2: 96%    Mental Status:  X____ Awake   _____ Alert   _____ Drowsy   _____ Sedated    Nausea/Vomiting:  ____ NO  ___X___Yes,   See Post - Op Orders          Pain Scale (0-10):  _____    Treatment: ____ None    __X__ See Post - Op/PCA Orders    Post - Operative Fluids:   ____ Oral   __X__ See Post - Op Orders    Plan: Discharge:   ____Home       _____Floor     __X___Critical Care    _____  Other:_________________    Comments: pt. tolerated procedure well under GA. Suctioned, reversed and extubated. Transported to PACU on 4lFm. Report endorsed to Rn and Medical ICU resident.

## 2022-09-01 NOTE — PROGRESS NOTE ADULT - SUBJECTIVE AND OBJECTIVE BOX
24H events:    Patient is a 81y old Female who presents with a chief complaint of Pain in left hip / fracture (31 Aug 2022 12:53)    Primary diagnosis of Subcapital fracture of left hip       Today is hospital day 1d.  complaining from severe left hip pain    PAST MEDICAL & SURGICAL HISTORY  High cholesterol    Hypertrophic obstructive cardiomyopathy (HOCM)    Aortic stenosis    No significant past surgical history      SOCIAL HISTORY:  Negative for smoking/alcohol/drug use.     ALLERGIES:  lidocaine (Rash)    MEDICATIONS:  STANDING MEDICATIONS  aspirin enteric coated 81 milliGRAM(s) Oral daily  cholecalciferol 2000 Unit(s) Oral daily  cloNIDine Patch 0.1 mG/24Hr(s) 1 patch Transdermal every 7 days  cyanocobalamin 1000 MICROGram(s) Oral daily  dextrose 5% + sodium chloride 0.45%. 1000 milliLiter(s) IV Continuous <Continuous>  disopyramide 100 milliGRAM(s) Oral three times a day  enoxaparin Injectable 40 milliGRAM(s) SubCutaneous every 24 hours  metoprolol succinate ER 50 milliGRAM(s) Oral daily  polyethylene glycol 3350 17 Gram(s) Oral daily  simvastatin 20 milliGRAM(s) Oral at bedtime  verapamil 120 milliGRAM(s) Oral daily    PRN MEDICATIONS  acetaminophen     Tablet .. 650 milliGRAM(s) Oral every 6 hours PRN  morphine  - Injectable 2 milliGRAM(s) IV Push every 4 hours PRN    VITALS:   T(F): 98.3  HR: 68  BP: 128/60  RR: 18  SpO2: 96%    LABS:                        11.5   8.99  )-----------( 175      ( 01 Sep 2022 05:59 )             34.1     09-01    137  |  104  |  18  ----------------------------<  118<H>  3.9   |  22  |  0.7    Ca    9.2      01 Sep 2022 05:59  Mg     2.0     09-01    TPro  5.8<L>  /  Alb  3.6  /  TBili  0.4  /  DBili  x   /  AST  20  /  ALT  14  /  AlkPhos  98  09-01    PT/INR - ( 31 Aug 2022 08:06 )   PT: 12.40 sec;   INR: 1.08 ratio         PTT - ( 31 Aug 2022 08:06 )  PTT:29.2 sec      Troponin T, Serum: <0.01 ng/mL (08-31-22 @ 16:09)      CARDIAC MARKERS ( 31 Aug 2022 16:09 )  x     / <0.01 ng/mL / x     / x     / 3.9 ng/mL      RADIOLOGY:    PHYSICAL EXAM:  GENERAL: NAD  EYES: conjunctiva and sclera clear  ENMT: Moist mucous membranes  NERVOUS SYSTEM:  Alert & Oriented X3, Good concentration  CHEST/LUNG: Clear to auscultation bilaterally  HEART: Regular rate and rhythm; crescendo- decrescendo murmur at 2nd right intercostal radiating to carotids   ABDOMEN: Soft, Nontender, Nondistended  EXTREMITIES:  no edema, left deformity

## 2022-09-01 NOTE — PROGRESS NOTE ADULT - SUBJECTIVE AND OBJECTIVE BOX
ORTHOPEDIC POST-OP CHECK    Procedure: Left total hip arthroplasty    Subjective: Patient seen and examined at bedside after above procedure.  Doing well, pain controlled. Denies fevers, numbness/tingling. No other complaints.    MEDICATIONS  (STANDING):  lactated ringers. 1000 milliLiter(s) (120 mL/Hr) IV Continuous <Continuous>    MEDICATIONS  (PRN):  morphine  - Injectable 2 milliGRAM(s) IV Push every 10 minutes PRN Severe Pain (7 - 10)  morphine  - Injectable 1 milliGRAM(s) IV Push every 10 minutes PRN Moderate Pain (4 - 6)  ondansetron Injectable 4 milliGRAM(s) IV Push once PRN Nausea and/or Vomiting      Objective:  T(C): 36.7 (09-01-22 @ 19:50), Max: 36.8 (09-01-22 @ 01:06)  HR: 72 (09-01-22 @ 22:00) (65 - 77)  BP: 98/66 (09-01-22 @ 22:00) (98/66 - 158/70)  RR: 18 (09-01-22 @ 22:00) (17 - 189)  SpO2: 96% (09-01-22 @ 22:00) (95% - 97%)    Gen: Alert, NAD. NLB on RA.    LLE:  Dressing c/d/i  SILT s/s/sp/dp/t  Motor intact TA/EHL/FHL/GSC  Digits wwp, cap refill < 2 sec    Labs:                        11.5   8.99  )-----------( 175      ( 01 Sep 2022 05:59 )             34.1     09-01    137  |  104  |  18  ----------------------------<  118<H>  3.9   |  22  |  0.7    Ca    9.2      01 Sep 2022 05:59  Mg     2.0     09-01    TPro  5.8<L>  /  Alb  3.6  /  TBili  0.4  /  DBili  x   /  AST  20  /  ALT  14  /  AlkPhos  98  09-01      A/P: 81yFemale s/p Left QUIQUE, doing well on POC.    - Per MICU fellow, transfer from Medicine to MICU Stepdown  - Activity: WBAT LLE  - Abx: Ancef q8hr x3 doses for a total of 24hrs post-operatively  - DVT PPx: LVX/SQH per primary team  - Pain control  - IS encouraged  - PT/Rehab

## 2022-09-01 NOTE — PROGRESS NOTE ADULT - ASSESSMENT
81 year old female with past medical history of hypertrophic cardiomyopathy and HTN presents for pain in her left hip after falling.    # Left femoral neck fracture  - Planned for surgery today, waiting for cardiology clearance   - Echocardiogram and detailed note from her cardiology is provided in the notes, describing every step in dealing with her cardiomyopathy   - Tylenol 650 Q6 for mild pain, morphine 2mg Q4 for severe pain  - PT/OT consult, will be reevaluated after the surgery   - D5/half NS 50 cc/hr      # Hypertrophic cardiomyopathy  # HTN   - Follows with Dr. Hightower in [facility] - last saw her in June. Last echo is in September/22.   - continue with Metoprolol 50 mg daily, Verapamil 120 mg daily   - continue with clonidine 0.1 patch, disopyramide 100X3       DVT prophylaxis: Lovenox after surgery  GI prophylaxis: None  Diet: DASH/TLC (NPO at midnight)   Activity: None weight bearing left hip.

## 2022-09-02 LAB
ALBUMIN SERPL ELPH-MCNC: 3.3 G/DL — LOW (ref 3.5–5.2)
ALP SERPL-CCNC: 78 U/L — SIGNIFICANT CHANGE UP (ref 30–115)
ALT FLD-CCNC: 14 U/L — SIGNIFICANT CHANGE UP (ref 0–41)
ANION GAP SERPL CALC-SCNC: 11 MMOL/L — SIGNIFICANT CHANGE UP (ref 7–14)
ANION GAP SERPL CALC-SCNC: 9 MMOL/L — SIGNIFICANT CHANGE UP (ref 7–14)
ANION GAP SERPL CALC-SCNC: 9 MMOL/L — SIGNIFICANT CHANGE UP (ref 7–14)
AST SERPL-CCNC: 25 U/L — SIGNIFICANT CHANGE UP (ref 0–41)
BILIRUB SERPL-MCNC: 0.3 MG/DL — SIGNIFICANT CHANGE UP (ref 0.2–1.2)
BLD GP AB SCN SERPL QL: SIGNIFICANT CHANGE UP
BUN SERPL-MCNC: 21 MG/DL — HIGH (ref 10–20)
BUN SERPL-MCNC: 22 MG/DL — HIGH (ref 10–20)
BUN SERPL-MCNC: 22 MG/DL — HIGH (ref 10–20)
CALCIUM SERPL-MCNC: 8.4 MG/DL — LOW (ref 8.5–10.1)
CALCIUM SERPL-MCNC: 8.4 MG/DL — LOW (ref 8.5–10.1)
CALCIUM SERPL-MCNC: 8.6 MG/DL — SIGNIFICANT CHANGE UP (ref 8.5–10.1)
CHLORIDE SERPL-SCNC: 102 MMOL/L — SIGNIFICANT CHANGE UP (ref 98–110)
CHLORIDE SERPL-SCNC: 107 MMOL/L — SIGNIFICANT CHANGE UP (ref 98–110)
CHLORIDE SERPL-SCNC: 108 MMOL/L — SIGNIFICANT CHANGE UP (ref 98–110)
CK MB CFR SERPL CALC: 7.4 NG/ML — HIGH (ref 0.6–6.3)
CO2 SERPL-SCNC: 20 MMOL/L — SIGNIFICANT CHANGE UP (ref 17–32)
CO2 SERPL-SCNC: 22 MMOL/L — SIGNIFICANT CHANGE UP (ref 17–32)
CO2 SERPL-SCNC: 23 MMOL/L — SIGNIFICANT CHANGE UP (ref 17–32)
CREAT SERPL-MCNC: 0.7 MG/DL — SIGNIFICANT CHANGE UP (ref 0.7–1.5)
CREAT SERPL-MCNC: 0.8 MG/DL — SIGNIFICANT CHANGE UP (ref 0.7–1.5)
CREAT SERPL-MCNC: 0.8 MG/DL — SIGNIFICANT CHANGE UP (ref 0.7–1.5)
D DIMER BLD IA.RAPID-MCNC: 466 NG/ML DDU — HIGH (ref 0–230)
EGFR: 74 ML/MIN/1.73M2 — SIGNIFICANT CHANGE UP
EGFR: 74 ML/MIN/1.73M2 — SIGNIFICANT CHANGE UP
EGFR: 86 ML/MIN/1.73M2 — SIGNIFICANT CHANGE UP
GLUCOSE SERPL-MCNC: 142 MG/DL — HIGH (ref 70–99)
GLUCOSE SERPL-MCNC: 153 MG/DL — HIGH (ref 70–99)
GLUCOSE SERPL-MCNC: 171 MG/DL — HIGH (ref 70–99)
HCT VFR BLD CALC: 29.1 % — LOW (ref 37–47)
HCT VFR BLD CALC: 30 % — LOW (ref 37–47)
HCT VFR BLD CALC: 31.8 % — LOW (ref 37–47)
HGB BLD-MCNC: 10.3 G/DL — LOW (ref 12–16)
HGB BLD-MCNC: 10.9 G/DL — LOW (ref 12–16)
HGB BLD-MCNC: 9.8 G/DL — LOW (ref 12–16)
MAGNESIUM SERPL-MCNC: 1.8 MG/DL — SIGNIFICANT CHANGE UP (ref 1.8–2.4)
MAGNESIUM SERPL-MCNC: 2.8 MG/DL — HIGH (ref 1.8–2.4)
MCHC RBC-ENTMCNC: 30 PG — SIGNIFICANT CHANGE UP (ref 27–31)
MCHC RBC-ENTMCNC: 30.2 PG — SIGNIFICANT CHANGE UP (ref 27–31)
MCHC RBC-ENTMCNC: 30.2 PG — SIGNIFICANT CHANGE UP (ref 27–31)
MCHC RBC-ENTMCNC: 33.7 G/DL — SIGNIFICANT CHANGE UP (ref 32–37)
MCHC RBC-ENTMCNC: 34.3 G/DL — SIGNIFICANT CHANGE UP (ref 32–37)
MCHC RBC-ENTMCNC: 34.3 G/DL — SIGNIFICANT CHANGE UP (ref 32–37)
MCV RBC AUTO: 87.5 FL — SIGNIFICANT CHANGE UP (ref 81–99)
MCV RBC AUTO: 88.1 FL — SIGNIFICANT CHANGE UP (ref 81–99)
MCV RBC AUTO: 89.8 FL — SIGNIFICANT CHANGE UP (ref 81–99)
NRBC # BLD: 0 /100 WBCS — SIGNIFICANT CHANGE UP (ref 0–0)
PLATELET # BLD AUTO: 141 K/UL — SIGNIFICANT CHANGE UP (ref 130–400)
PLATELET # BLD AUTO: 146 K/UL — SIGNIFICANT CHANGE UP (ref 130–400)
PLATELET # BLD AUTO: 152 K/UL — SIGNIFICANT CHANGE UP (ref 130–400)
POTASSIUM SERPL-MCNC: 4 MMOL/L — SIGNIFICANT CHANGE UP (ref 3.5–5)
POTASSIUM SERPL-MCNC: 4.2 MMOL/L — SIGNIFICANT CHANGE UP (ref 3.5–5)
POTASSIUM SERPL-MCNC: 4.5 MMOL/L — SIGNIFICANT CHANGE UP (ref 3.5–5)
POTASSIUM SERPL-SCNC: 4 MMOL/L — SIGNIFICANT CHANGE UP (ref 3.5–5)
POTASSIUM SERPL-SCNC: 4.2 MMOL/L — SIGNIFICANT CHANGE UP (ref 3.5–5)
POTASSIUM SERPL-SCNC: 4.5 MMOL/L — SIGNIFICANT CHANGE UP (ref 3.5–5)
PROT SERPL-MCNC: 4.9 G/DL — LOW (ref 6–8)
RBC # BLD: 3.24 M/UL — LOW (ref 4.2–5.4)
RBC # BLD: 3.43 M/UL — LOW (ref 4.2–5.4)
RBC # BLD: 3.61 M/UL — LOW (ref 4.2–5.4)
RBC # FLD: 14.6 % — HIGH (ref 11.5–14.5)
RBC # FLD: 14.6 % — HIGH (ref 11.5–14.5)
RBC # FLD: 14.9 % — HIGH (ref 11.5–14.5)
SODIUM SERPL-SCNC: 134 MMOL/L — LOW (ref 135–146)
SODIUM SERPL-SCNC: 138 MMOL/L — SIGNIFICANT CHANGE UP (ref 135–146)
SODIUM SERPL-SCNC: 139 MMOL/L — SIGNIFICANT CHANGE UP (ref 135–146)
TROPONIN T SERPL-MCNC: <0.01 NG/ML — SIGNIFICANT CHANGE UP
WBC # BLD: 10.83 K/UL — HIGH (ref 4.8–10.8)
WBC # BLD: 11.75 K/UL — HIGH (ref 4.8–10.8)
WBC # BLD: 16.55 K/UL — HIGH (ref 4.8–10.8)
WBC # FLD AUTO: 10.83 K/UL — HIGH (ref 4.8–10.8)
WBC # FLD AUTO: 11.75 K/UL — HIGH (ref 4.8–10.8)
WBC # FLD AUTO: 16.55 K/UL — HIGH (ref 4.8–10.8)

## 2022-09-02 PROCEDURE — 93306 TTE W/DOPPLER COMPLETE: CPT | Mod: 26

## 2022-09-02 PROCEDURE — 71275 CT ANGIOGRAPHY CHEST: CPT | Mod: 26

## 2022-09-02 PROCEDURE — 99233 SBSQ HOSP IP/OBS HIGH 50: CPT

## 2022-09-02 PROCEDURE — 93010 ELECTROCARDIOGRAM REPORT: CPT

## 2022-09-02 RX ORDER — DISOPYRAMIDE PHOSPHATE 100 MG
100 CAPSULE ORAL THREE TIMES A DAY
Refills: 0 | Status: DISCONTINUED | OUTPATIENT
Start: 2022-09-02 | End: 2022-09-06

## 2022-09-02 RX ORDER — MAGNESIUM SULFATE 500 MG/ML
2 VIAL (ML) INJECTION ONCE
Refills: 0 | Status: COMPLETED | OUTPATIENT
Start: 2022-09-02 | End: 2022-09-02

## 2022-09-02 RX ORDER — POTASSIUM CHLORIDE 20 MEQ
40 PACKET (EA) ORAL ONCE
Refills: 0 | Status: COMPLETED | OUTPATIENT
Start: 2022-09-02 | End: 2022-09-02

## 2022-09-02 RX ORDER — SODIUM CHLORIDE 9 MG/ML
1000 INJECTION, SOLUTION INTRAVENOUS
Refills: 0 | Status: DISCONTINUED | OUTPATIENT
Start: 2022-09-02 | End: 2022-09-02

## 2022-09-02 RX ORDER — MORPHINE SULFATE 50 MG/1
1 CAPSULE, EXTENDED RELEASE ORAL EVERY 4 HOURS
Refills: 0 | Status: DISCONTINUED | OUTPATIENT
Start: 2022-09-02 | End: 2022-09-06

## 2022-09-02 RX ORDER — METOPROLOL TARTRATE 50 MG
50 TABLET ORAL DAILY
Refills: 0 | Status: DISCONTINUED | OUTPATIENT
Start: 2022-09-02 | End: 2022-09-06

## 2022-09-02 RX ORDER — POTASSIUM CHLORIDE 20 MEQ
10 PACKET (EA) ORAL ONCE
Refills: 0 | Status: COMPLETED | OUTPATIENT
Start: 2022-09-02 | End: 2022-09-02

## 2022-09-02 RX ADMIN — Medication 25 GRAM(S): at 02:57

## 2022-09-02 RX ADMIN — MORPHINE SULFATE 1 MILLIGRAM(S): 50 CAPSULE, EXTENDED RELEASE ORAL at 03:31

## 2022-09-02 RX ADMIN — Medication 100 MILLIGRAM(S): at 10:30

## 2022-09-02 RX ADMIN — Medication 10 MILLIEQUIVALENT(S): at 03:02

## 2022-09-02 RX ADMIN — ENOXAPARIN SODIUM 40 MILLIGRAM(S): 100 INJECTION SUBCUTANEOUS at 10:30

## 2022-09-02 RX ADMIN — Medication 50 MILLIGRAM(S): at 13:09

## 2022-09-02 RX ADMIN — Medication 1 PATCH: at 07:08

## 2022-09-02 RX ADMIN — MORPHINE SULFATE 1 MILLIGRAM(S): 50 CAPSULE, EXTENDED RELEASE ORAL at 14:24

## 2022-09-02 RX ADMIN — Medication 100 MILLIGRAM(S): at 21:04

## 2022-09-02 RX ADMIN — SODIUM CHLORIDE 120 MILLILITER(S): 9 INJECTION, SOLUTION INTRAVENOUS at 14:26

## 2022-09-02 RX ADMIN — Medication 40 MILLIEQUIVALENT(S): at 09:01

## 2022-09-02 RX ADMIN — MORPHINE SULFATE 1 MILLIGRAM(S): 50 CAPSULE, EXTENDED RELEASE ORAL at 03:16

## 2022-09-02 RX ADMIN — MORPHINE SULFATE 1 MILLIGRAM(S): 50 CAPSULE, EXTENDED RELEASE ORAL at 15:21

## 2022-09-02 RX ADMIN — Medication 100 MILLIGRAM(S): at 17:53

## 2022-09-02 RX ADMIN — Medication 100 MILLIGRAM(S): at 03:00

## 2022-09-02 RX ADMIN — SODIUM CHLORIDE 120 MILLILITER(S): 9 INJECTION, SOLUTION INTRAVENOUS at 04:36

## 2022-09-02 NOTE — PROVIDER CONTACT NOTE (OTHER) - SITUATION
MOLST Form signed DNR DNI 9/1/22 MD Jennifer Hayes made aware.
Pt has MOLST form in chart - DNR checked, signed by attending. DNI checked on form, not signed by attending,

## 2022-09-02 NOTE — PROGRESS NOTE ADULT - ASSESSMENT
..  # Lt femoral neck fracture--it was a mechanical fall--s/p ORIF--day 1.  # mild drop in hb-- expected after fracture-- hb 9.8    #cardiac due to HOCM and AS; please refer to cardiology evaluation.also included in a separate chart note the aimee-operative recommendations provided by patient's outpatient [facility] cardiologist.   .. If uncontrolled BP, please evaluate for uncontrolled pain; may use selective Beta-Blocker therapy or clonidine. Must avoid potent vasodilators like hydralazine or ACE-I/ARBs  .. As per [facility] cardiologist:   "Ensure patient has taken all of their prescribed HOCM-related medications (beta blockers, verapamil, disopyramide/Norpace) the morning of procedure, despite NPO status".   .. UH or Low-Molecular Weight Heparin (Lovenox) for DVT prophylaxis    ..  # retention of urine-- meehan catheter placed-- can do trial of void after 3-4 days.    patient will go to [facility] for tele monitoring     ..  # Lt femoral neck fracture--it was a mechanical fall--s/p ORIF--day 1.  # mild drop in hb-- expected after fracture-- hb 9.8-- may need PRBC    #cardiac due to HOCM and AS; please refer to cardiology evaluation.also included in a separate chart note the aimee-operative recommendations provided by patient's outpatient [facility] cardiologist.   .. If uncontrolled BP, please evaluate for uncontrolled pain; may use selective Beta-Blocker therapy or clonidine. Must avoid potent vasodilators like hydralazine or ACE-I/ARBs  .. As per [facility] cardiologist:   "Ensure patient has taken all of their prescribed HOCM-related medications (beta blockers, verapamil, disopyramide/Norpace) the morning of procedure, despite NPO status".   .. UH or Low-Molecular Weight Heparin (Lovenox) for DVT prophylaxis  Patient had low BP today in AM  and all meds were held and given bolus of fluid-- please reinstate meds by tomorrow if BP is better and may need transfusion by AM if hb continues to remain less than 10.  ..  # retention of urine-- meehan catheter placed-- can do trial of void after 3-4 days.    patient will go to CEU for tele monitoring     ..  # Lt femoral neck fracture--it was a mechanical fall--s/p ORIF--day 1.  # mild drop in hb-- expected after fracture-- hb 9.8-- may need PRBC    #cardiac due to HOCM and AS; please refer to cardiology evaluation.also included in a separate chart note the aimee-operative recommendations provided by patient's outpatient [facility] cardiologist.   .. If uncontrolled BP, please evaluate for uncontrolled pain; may use selective Beta-Blocker therapy or clonidine. Must avoid potent vasodilators like hydralazine or ACE-I/ARBs  .. As per [facility] cardiologist:   "Ensure patient has taken all of their prescribed HOCM-related medications (beta blockers, verapamil, disopyramide/Norpace) the morning of procedure, despite NPO status".   .. UH or Low-Molecular Weight Heparin (Lovenox) for DVT prophylaxis  Patient had low BP today in AM  and all meds were held and given bolus of fluid-- please reinstate meds by tomorrow if BP is better and may need transfusion by AM if hb continues to remain less than 10.  ..  # retention of urine-- meehan catheter placed-- can do trial of void after 3-4 days.continue fluids for today--post obstructive diuresis    patient will go to CEU for tele monitoring

## 2022-09-02 NOTE — PHYSICAL THERAPY INITIAL EVALUATION ADULT - BED MOBILITY LIMITATIONS, REHAB EVAL
patient and staff decreased ability to use arms for pushing/pulling/decreased ability to use legs for bridging/pushing

## 2022-09-02 NOTE — PHYSICAL THERAPY INITIAL EVALUATION ADULT - PERTINENT HX OF CURRENT PROBLEM, REHAB EVAL
81 year old female with past medical history of hypertrophic cardiomyopathy and HTN presents for pain in her left hip after falling. This morning the patient was walking from her living room to her kitchen when she fell on her left side. She was unable to get up so she called her . Her son came to help her up but she was in too much pain so they called EMS. Before this incident she was able to ambulate without assistance and only reported some mild pain in her left knee before her fall

## 2022-09-02 NOTE — PROGRESS NOTE ADULT - SUBJECTIVE AND OBJECTIVE BOX
Orthopaedic Surgery Progress Note    S&E at bedside this AM. Pain well controlled. Denies fever/chills/CP/SOB. No other complaints      T(C): 36.7 (09-02-22 @ 04:00), Max: 36.8 (09-01-22 @ 13:40)  HR: 67 (09-02-22 @ 04:00) (65 - 77)  BP: 94/53 (09-02-22 @ 04:00) (94/53 - 158/70)  RR: 15 (09-02-22 @ 04:00) (13 - 189)  SpO2: 99% (09-02-22 @ 04:00) (96% - 99%)    P/E:  Alert, NAD  Nonlabored breathing    LLE  Dressing, c/d/i  SILT sp/dp/t/sural/saph  Firing ta/ehl/fhl/gs  Foot WWP, 2+ DP pulse    Labs                        9.8    11.75 )-----------( 141      ( 02 Sep 2022 04:55 )             29.1     09-02    139  |  108  |  22<H>  ----------------------------<  153<H>  4.0   |  22  |  0.7    Ca    8.4<L>      02 Sep 2022 04:55  Mg     2.8     09-02    TPro  4.9<L>  /  Alb  3.3<L>  /  TBili  0.3  /  DBili  x   /  AST  25  /  ALT  14  /  AlkPhos  78  09-02    LIVER FUNCTIONS - ( 02 Sep 2022 04:55 )  Alb: 3.3 g/dL / Pro: 4.9 g/dL / ALK PHOS: 78 U/L / ALT: 14 U/L / AST: 25 U/L / GGT: x           PT/INR - ( 31 Aug 2022 08:06 )   PT: 12.40 sec;   INR: 1.08 ratio         PTT - ( 31 Aug 2022 08:06 )  PTT:29.2 sec    A/P:   82yo Female s/p Left QUIQUE on 9/1 for left femoral neck fracture.    Weightbearing: WBAT LLE  DVT ppx: SCD, and LVX/HSQ per primary team  Abx: Ancef 24 hours  Pain control  Incentive spirometer  Home meds  Trend labs  PT/OT/Rehab

## 2022-09-02 NOTE — CHART NOTE - NSCHARTNOTEFT_GEN_A_CORE
Called to evaluate patient for CCU upgrade. Pt is 81 year old female with past medical history of hypertrophic cardiomyopathy and HTN presented for pain in her left hip after mechanical fall. Pt is s/p left Hip ORIF. Pt needed 2 units PRBCs and Phenylephrine during and post procedure transiently. Has been hemodynamically stable since then, off pressors. Pt is also endorsing some chest pain in the upper chest region, which is continuous stabbing like, radiating to the neck and right hand, associated with tingling sensation, reproducible and TTP in right sternal region. EKG did not show acute ischemic changes, troponins negative, CK-MB 7.4. Echo showed preserved EF, no wall motion abnormalities, severe asymmetric left ventricular hypertrophy involving the septal wall. MEGHAN with LVOT obstruction (Gradient 45 mmHg at rest). Moderate to severe mitral valve regurgitation due to MEGHAN. Mild-moderate tricuspid regurgitation and moderate pulmonary hypertension.    # IMPRESSION  Atypical chest pain  HCM  LVOT obstruction gradient 45 mmHg  Mod to Severe MR, Mod TR, Mod Pulm HTN  Left hip Fx s/p Orif    # PLAN  Pt does not need CCU admission at this time  check serial EKGs, trend Cathy  c/w home medications  avoid volume depletion and dehydration  f/u with Dr. Fisher  recall if worsening clinical status  discussed with CCU attending

## 2022-09-02 NOTE — PHYSICAL THERAPY INITIAL EVALUATION ADULT - LEVEL OF INDEPENDENCE: SIT/SUPINE, REHAB EVAL
initial vitals supine 91/51 HR 81, asymptomatic, able to maintain sitting position unsupported at EOB and dangle for 2-3 min, sitting 78/45 HR 84  CC nausea and dizziness, Pt return back to bed/ B/L LE ROM exercises and DBE, BP 99/56 HR 88, Nurse/MD made aware./maximum assist (25% patients effort)

## 2022-09-02 NOTE — CONSULT NOTE ADULT - SUBJECTIVE AND OBJECTIVE BOX
HPI:  81 year old female with past medical history of hypertrophic cardiomyopathy and HTN presents for pain in her left hip after falling. This morning the patient was walking from her living room to her kitchen when she fell on her left side. She was unable to get up so she called her . Her son came to help her up but she was in too much pain so they called EMS. Before this incident she was able to ambulate without assistance and only reported some mild pain in her left knee before her fall.     In the ED:   Vitals: /74, HR 65, RR 17, T 97.8, SpO2 96% on RA   Labs: WNL  Left hip Xray: Acute impacted fracture of the left femoral neck. Osteopenia.  S/p L THR on 9/1, wbat as per ortho      PAST MEDICAL & SURGICAL HISTORY:  High cholesterol      Hypertrophic obstructive cardiomyopathy (HOCM)      Aortic stenosis      No significant past surgical history          Hospital Course:    TODAY'S SUBJECTIVE & REVIEW OF SYMPTOMS:     Constitutional WNL   Cardio WNL   Resp WNL   GI WNL  Heme WNL  Endo WNL  Skin WNL  MSK pain  Neuro WNL  Cognitive WNL  Psych WNL      MEDICATIONS  (STANDING):  ceFAZolin   IVPB 2000 milliGRAM(s) IV Intermittent every 8 hours  disopyramide 100 milliGRAM(s) Oral three times a day  enoxaparin Injectable 40 milliGRAM(s) SubCutaneous every 24 hours  lactated ringers. 1000 milliLiter(s) (120 mL/Hr) IV Continuous <Continuous>  metoprolol succinate ER 50 milliGRAM(s) Oral daily    MEDICATIONS  (PRN):  morphine  - Injectable 1 milliGRAM(s) IV Push every 4 hours PRN Moderate Pain (4 - 6)  morphine  - Injectable 2 milliGRAM(s) IV Push every 10 minutes PRN Severe Pain (7 - 10)  morphine  - Injectable 1 milliGRAM(s) IV Push every 10 minutes PRN Moderate Pain (4 - 6)  ondansetron Injectable 4 milliGRAM(s) IV Push once PRN Nausea and/or Vomiting      FAMILY HISTORY:      Allergies    lidocaine (Rash)    Intolerances        SOCIAL HISTORY:    [  ] Etoh  [  ] Smoking  [  ] Substance abuse     Home Environment:  [   ] Home Alone  [ x  ] Lives with Family  [   ] Home Health Aid    Dwelling:  [   ] Apartment  [  x ] Private House  [   ] Adult Home  [   ] Skilled Nursing Facility      [   ] Short Term  [   ] Long Term  [  x ] Stairs       Elevator [   ]    FUNCTIONAL STATUS PTA: (Check all that apply)  Ambulation: [ x   ]Independent    [   ] Dependent     [   ] Non-Ambulatory  Assistive Device: [   ] SA Cane  [   ]  Q Cane  [   ] Walker  [   ]  Wheelchair  ADL : [ x  ] Independent  [    ]  Dependent       Vital Signs Last 24 Hrs  T(C): 37.4 (02 Sep 2022 14:26), Max: 37.4 (02 Sep 2022 14:26)  T(F): 99.3 (02 Sep 2022 14:26), Max: 99.3 (02 Sep 2022 14:26)  HR: 93 (02 Sep 2022 15:12) (67 - 93)  BP: 100/52 (02 Sep 2022 15:12) (94/53 - 118/58)  BP(mean): --  RR: 20 (02 Sep 2022 15:12) (13 - 20)  SpO2: 94% (02 Sep 2022 15:12) (94% - 99%)    Parameters below as of 02 Sep 2022 15:12  Patient On (Oxygen Delivery Method): nasal cannula  O2 Flow (L/min): 3        PHYSICAL EXAM: Awake & Alert  GENERAL: NAD  HEAD:  Normocephalic  CHEST/LUNG: Clear   HEART: S1S2+  ABDOMEN: Soft, Nontender  EXTREMITIES:  no calf tenderness    NERVOUS SYSTEM:  Cranial Nerves 2-12 intact [   ] Abnormal  [   ]  ROM: WFL all extremities [   ]  Abnormal [  x ]limited LLE  Motor Strength: WFL all extremities  [   ]  Abnormal [ x  ]limited LLE  Sensation: intact to light touch [ x  ] Abnormal [   ]    FUNCTIONAL STATUS:  Bed Mobility: Independent [   ]  Supervision [   ]  Needs Assistance [  x ]  N/A [   ]  Transfers: Independent [   ]  Supervision [   ]  Needs Assistance [   ]  N/A [   ]   Ambulation: Independent [   ]  Supervision [   ]  Needs Assistance [   ]  N/A [   ]  ADL: Independent [   ] Requires Assistance [   ] N/A [   ]      LABS:                        9.8    11.75 )-----------( 141      ( 02 Sep 2022 04:55 )             29.1     09-02    134<L>  |  102  |  22<H>  ----------------------------<  142<H>  4.5   |  23  |  0.8    Ca    8.4<L>      02 Sep 2022 14:23  Mg     2.8     09-02    TPro  4.9<L>  /  Alb  3.3<L>  /  TBili  0.3  /  DBili  x   /  AST  25  /  ALT  14  /  AlkPhos  78  09-02          RADIOLOGY & ADDITIONAL STUDIES:

## 2022-09-02 NOTE — CHART NOTE - NSCHARTNOTEFT_GEN_A_CORE
Transfer Note: Medical Floor to Intensive Care     Transfer from: Medical Floor    Transfer to: (  ) Medicine    (  ) Telemetry    (  ) RCU                               (  ) Palliative    (  ) Stroke Unit    (  ) MICU    ( x ) Step Down Unit     Accepting Physician: Dr. Rashad Crawford     [facility] / HOSPITAL COURSE:  81 year old female with past medical history of hypertrophic cardiomyopathy and HTN presents for pain in her left hip after falling. This morning the patient was walking from her living room to her kitchen when she fell on her left side. She was unable to get up so she called her . Her son came to help her up but she was in too much pain so they called EMS. Before this incident she was able to ambulate without assistance and only reported some mild pain in her left knee before her fall. Left hip Xray: Acute impacted fracture of the left femoral neck. Osteopenia.    Patient is followed by HOCM specialist, see separate notes from day of admission for recent echo results and recommendations from her cardiologist. Patient underwent total left hip replacement with Dr. Weathers. The patient will be admitted to SDU following the procedure for BP checks q 4 hours and close monitoring given her cardiac history.       Vital Signs Last 24 Hrs  T(C): 36.7 (01 Sep 2022 19:50), Max: 36.8 (01 Sep 2022 01:06)  T(F): 98 (01 Sep 2022 19:50), Max: 98.3 (01 Sep 2022 07:13)  HR: 72 (01 Sep 2022 22:00) (65 - 77)  BP: 98/66 (01 Sep 2022 22:00) (98/66 - 158/70)  BP(mean): 101 (01 Sep 2022 16:10) (101 - 101)  RR: 18 (01 Sep 2022 22:00) (17 - 189)  SpO2: 96% (01 Sep 2022 22:00) (95% - 97%)    Parameters below as of 01 Sep 2022 22:00  Patient On (Oxygen Delivery Method): nasal cannula  O2 Flow (L/min): 3      I&O's Summary    Physical Exam:   General: NAD, awake and alert  Cardiac: RRR, no murmur, no rub, no gallop  Respiratory: Good air exchange bilaterally, no wheezes, no crackles  GI: Abdomen nondistended, nontender, bowel sounds present  Neuro: AAOx3, no tremors, no fasciculations     LABS:   CARDIAC MARKERS ( 31 Aug 2022 16:09 )  x     / <0.01 ng/mL / x     / x     / 3.9 ng/mL             11.5   8.99  )-----------( 175      ( 01 Sep 2022 05:59 )             34.1       09-01    138  |  107  |  21<H>  ----------------------------<  171<H>  4.2   |  20  |  0.8    Ca    8.6      01 Sep 2022 23:28  Mg     1.8     09-01    TPro  5.8<L>  /  Alb  3.6  /  TBili  0.4  /  DBili  x   /  AST  20  /  ALT  14  /  AlkPhos  98  09-01      PT/INR - ( 31 Aug 2022 08:06 )   PT: 12.40 sec;   INR: 1.08 ratio         PTT - ( 31 Aug 2022 08:06 )  PTT:29.2 sec      ASSESSMENT & PLAN:   #Left femoral neck fracture  - S/P left hip replacement on 9/1/22   - Tylenol 650 Q6 for mild pain, morphine 2mg Q4 for severe pain  - PT/OT consult   - D5/half NS 50 cc/hr  - Ambulates without assistance at baseline.     #Hypertrophic cardiomyopathy  - Follows with Dr. Hightower in [facility] - last saw her in June. Last echo December 2020 showed EF 74% and findings consistent hypertrophic cardiomiopathy  - 3/6 crescendo/decrescendo murmur at right second intercostal space  - Metoprolol 50 mg daily  - Verapamil 120 mg daily   - Cardiology consult for clearance    #HTN   - /74 but likely 2/2 pain.   - Reassess after pain meds    FOR FOLLOW UP:  Jennifer Hayes, [phone]

## 2022-09-02 NOTE — PHYSICAL THERAPY INITIAL EVALUATION ADULT - GAIT TRAINING, PT EVAL
Pt will ambulate using RW or least restrictive AD for 150 ft with supervision by discharge to facilitate return to PLOF. Pt will negotiate 4 steps using 1 HR under supervision

## 2022-09-02 NOTE — PHYSICAL THERAPY INITIAL EVALUATION ADULT - GENERAL OBSERVATIONS, REHAB EVAL
8:45-9:15. chart reviewed. Pt received semi-freedman at B/S, alert, oriented, able to follow multi-step instructions and agreeable to PT evaluation. + IV, + O2 2 L via NC, + primafit, + monitoring, + DANE stocking, + L hip aquacell. denies pain or discomfort.

## 2022-09-02 NOTE — PROVIDER CONTACT NOTE (OTHER) - ACTION/TREATMENT ORDERED:
Ok, I will discuss this with the medicine team during rounds today.
Awaiting for MD Jennifer Hayes to place order.

## 2022-09-02 NOTE — PROGRESS NOTE ADULT - SUBJECTIVE AND OBJECTIVE BOX
SUBJECTIVE:    Patient is a 81y old Female who presents with a chief complaint of Pain in left hip / fracture (02 Sep 2022 07:23)    Currently admitted to medicine with the primary diagnosis of Subcapital fracture of left hip       Today is hospital day 2d.     PAST MEDICAL & SURGICAL HISTORY  High cholesterol    Hypertrophic obstructive cardiomyopathy (HOCM)    Aortic stenosis    No significant past surgical history      ALLERGIES:  lidocaine (Rash)    MEDICATIONS:  STANDING MEDICATIONS  ceFAZolin   IVPB 2000 milliGRAM(s) IV Intermittent every 8 hours  enoxaparin Injectable 40 milliGRAM(s) SubCutaneous every 24 hours  lactated ringers. 1000 milliLiter(s) IV Continuous <Continuous>  metoprolol succinate ER 50 milliGRAM(s) Oral daily    PRN MEDICATIONS  morphine  - Injectable 2 milliGRAM(s) IV Push every 10 minutes PRN  morphine  - Injectable 1 milliGRAM(s) IV Push every 10 minutes PRN  morphine  - Injectable 1 milliGRAM(s) IV Push every 4 hours PRN  ondansetron Injectable 4 milliGRAM(s) IV Push once PRN    VITALS:   T(F): 97.9  HR: 68  BP: 95/49  RR: 17  SpO2: 97%    LABS:                        9.8    11.75 )-----------( 141      ( 02 Sep 2022 04:55 )             29.1     09-02    139  |  108  |  22<H>  ----------------------------<  153<H>  4.0   |  22  |  0.7    Ca    8.4<L>      02 Sep 2022 04:55  Mg     2.8     09-02    TPro  4.9<L>  /  Alb  3.3<L>  /  TBili  0.3  /  DBili  x   /  AST  25  /  ALT  14  /  AlkPhos  78  09-02              CARDIAC MARKERS ( 31 Aug 2022 16:09 )  x     / <0.01 ng/mL / x     / x     / 3.9 ng/mL      RADIOLOGY:    PHYSICAL EXAM:  GEN: No acute distress  LUNGS: Clear to auscultation bilaterally   HEART: S1/S2 present. RRR.   ABD/ GI: Soft, non-tender, non-distended. Bowel sounds present  EXT: NC/NC/NE/2+PP/MOORE  NEURO: AAOX3     SUBJECTIVE:    Patient is a 81y old Female who presents with a chief complaint of Pain in left hip / fracture (02 Sep 2022 07:23)    Currently admitted to medicine with the primary diagnosis of Subcapital fracture of left hip       Today is hospital day 2d.     PAST MEDICAL & SURGICAL HISTORY  High cholesterol    Hypertrophic obstructive cardiomyopathy (HOCM)    Aortic stenosis    No significant past surgical history      ALLERGIES:  lidocaine (Rash)    MEDICATIONS:  STANDING MEDICATIONS  ceFAZolin   IVPB 2000 milliGRAM(s) IV Intermittent every 8 hours  enoxaparin Injectable 40 milliGRAM(s) SubCutaneous every 24 hours  lactated ringers. 1000 milliLiter(s) IV Continuous <Continuous>  metoprolol succinate ER 50 milliGRAM(s) Oral daily    PRN MEDICATIONS  morphine  - Injectable 2 milliGRAM(s) IV Push every 10 minutes PRN  morphine  - Injectable 1 milliGRAM(s) IV Push every 10 minutes PRN  morphine  - Injectable 1 milliGRAM(s) IV Push every 4 hours PRN  ondansetron Injectable 4 milliGRAM(s) IV Push once PRN    VITALS:   T(F): 97.9  HR: 68  BP: 95/49  RR: 17  SpO2: 97%    LABS:                        9.8    11.75 )-----------( 141      ( 02 Sep 2022 04:55 )             29.1     09-02    139  |  108  |  22<H>  ----------------------------<  153<H>  4.0   |  22  |  0.7    Ca    8.4<L>      02 Sep 2022 04:55  Mg     2.8     09-02    TPro  4.9<L>  /  Alb  3.3<L>  /  TBili  0.3  /  DBili  x   /  AST  25  /  ALT  14  /  AlkPhos  78  09-02              CARDIAC MARKERS ( 31 Aug 2022 16:09 )  x     / <0.01 ng/mL / x     / x     / 3.9 ng/mL      RADIOLOGY:    PHYSICAL EXAM:  GEN: No acute distress  LUNGS: Clear to auscultation bilaterally   HEART: S1/S2 present. RRR.   ABD/ GI: Soft, non-tender, non-distended. Bowel sounds present  EXT: lt femoral neck fracture--post surgery  NEURO: AAOX3

## 2022-09-03 LAB
ALBUMIN SERPL ELPH-MCNC: 2.8 G/DL — LOW (ref 3.5–5.2)
ALP SERPL-CCNC: 77 U/L — SIGNIFICANT CHANGE UP (ref 30–115)
ALT FLD-CCNC: 8 U/L — SIGNIFICANT CHANGE UP (ref 0–41)
ANION GAP SERPL CALC-SCNC: 10 MMOL/L — SIGNIFICANT CHANGE UP (ref 7–14)
APPEARANCE UR: CLEAR — SIGNIFICANT CHANGE UP
AST SERPL-CCNC: 31 U/L — SIGNIFICANT CHANGE UP (ref 0–41)
BASOPHILS # BLD AUTO: 0.05 K/UL — SIGNIFICANT CHANGE UP (ref 0–0.2)
BASOPHILS NFR BLD AUTO: 0.5 % — SIGNIFICANT CHANGE UP (ref 0–1)
BILIRUB SERPL-MCNC: 0.5 MG/DL — SIGNIFICANT CHANGE UP (ref 0.2–1.2)
BILIRUB UR-MCNC: NEGATIVE — SIGNIFICANT CHANGE UP
BUN SERPL-MCNC: 18 MG/DL — SIGNIFICANT CHANGE UP (ref 10–20)
CALCIUM SERPL-MCNC: 8.2 MG/DL — LOW (ref 8.5–10.1)
CHLORIDE SERPL-SCNC: 103 MMOL/L — SIGNIFICANT CHANGE UP (ref 98–110)
CK MB CFR SERPL CALC: 4.9 NG/ML — SIGNIFICANT CHANGE UP (ref 0.6–6.3)
CK SERPL-CCNC: 370 U/L — HIGH (ref 0–225)
CO2 SERPL-SCNC: 23 MMOL/L — SIGNIFICANT CHANGE UP (ref 17–32)
COLOR SPEC: SIGNIFICANT CHANGE UP
CREAT SERPL-MCNC: 0.7 MG/DL — SIGNIFICANT CHANGE UP (ref 0.7–1.5)
DIFF PNL FLD: ABNORMAL
EGFR: 86 ML/MIN/1.73M2 — SIGNIFICANT CHANGE UP
EOSINOPHIL # BLD AUTO: 0.01 K/UL — SIGNIFICANT CHANGE UP (ref 0–0.7)
EOSINOPHIL NFR BLD AUTO: 0.1 % — SIGNIFICANT CHANGE UP (ref 0–8)
GLUCOSE SERPL-MCNC: 115 MG/DL — HIGH (ref 70–99)
GLUCOSE UR QL: NEGATIVE — SIGNIFICANT CHANGE UP
HCT VFR BLD CALC: 28.9 % — LOW (ref 37–47)
HGB BLD-MCNC: 9.8 G/DL — LOW (ref 12–16)
IMM GRANULOCYTES NFR BLD AUTO: 0.4 % — HIGH (ref 0.1–0.3)
KETONES UR-MCNC: ABNORMAL
LEUKOCYTE ESTERASE UR-ACNC: NEGATIVE — SIGNIFICANT CHANGE UP
LYMPHOCYTES # BLD AUTO: 1.24 K/UL — SIGNIFICANT CHANGE UP (ref 1.2–3.4)
LYMPHOCYTES # BLD AUTO: 12.8 % — LOW (ref 20.5–51.1)
MAGNESIUM SERPL-MCNC: 2 MG/DL — SIGNIFICANT CHANGE UP (ref 1.8–2.4)
MCHC RBC-ENTMCNC: 29.8 PG — SIGNIFICANT CHANGE UP (ref 27–31)
MCHC RBC-ENTMCNC: 33.9 G/DL — SIGNIFICANT CHANGE UP (ref 32–37)
MCV RBC AUTO: 87.8 FL — SIGNIFICANT CHANGE UP (ref 81–99)
MONOCYTES # BLD AUTO: 0.81 K/UL — HIGH (ref 0.1–0.6)
MONOCYTES NFR BLD AUTO: 8.3 % — SIGNIFICANT CHANGE UP (ref 1.7–9.3)
NEUTROPHILS # BLD AUTO: 7.57 K/UL — HIGH (ref 1.4–6.5)
NEUTROPHILS NFR BLD AUTO: 77.9 % — HIGH (ref 42.2–75.2)
NITRITE UR-MCNC: NEGATIVE — SIGNIFICANT CHANGE UP
NRBC # BLD: 0 /100 WBCS — SIGNIFICANT CHANGE UP (ref 0–0)
PH UR: 5.5 — SIGNIFICANT CHANGE UP (ref 5–8)
PLATELET # BLD AUTO: 128 K/UL — LOW (ref 130–400)
POTASSIUM SERPL-MCNC: 4.2 MMOL/L — SIGNIFICANT CHANGE UP (ref 3.5–5)
POTASSIUM SERPL-SCNC: 4.2 MMOL/L — SIGNIFICANT CHANGE UP (ref 3.5–5)
PROT SERPL-MCNC: 4.8 G/DL — LOW (ref 6–8)
PROT UR-MCNC: SIGNIFICANT CHANGE UP
RBC # BLD: 3.29 M/UL — LOW (ref 4.2–5.4)
RBC # FLD: 15.6 % — HIGH (ref 11.5–14.5)
SODIUM SERPL-SCNC: 136 MMOL/L — SIGNIFICANT CHANGE UP (ref 135–146)
SP GR SPEC: 1.03 — SIGNIFICANT CHANGE UP (ref 1.01–1.03)
TROPONIN T SERPL-MCNC: 0.1 NG/ML — CRITICAL HIGH
TROPONIN T SERPL-MCNC: 0.1 NG/ML — CRITICAL HIGH
UROBILINOGEN FLD QL: SIGNIFICANT CHANGE UP
WBC # BLD: 9.72 K/UL — SIGNIFICANT CHANGE UP (ref 4.8–10.8)
WBC # FLD AUTO: 9.72 K/UL — SIGNIFICANT CHANGE UP (ref 4.8–10.8)

## 2022-09-03 PROCEDURE — 93010 ELECTROCARDIOGRAM REPORT: CPT

## 2022-09-03 PROCEDURE — 99233 SBSQ HOSP IP/OBS HIGH 50: CPT

## 2022-09-03 PROCEDURE — 71045 X-RAY EXAM CHEST 1 VIEW: CPT | Mod: 26

## 2022-09-03 PROCEDURE — 99222 1ST HOSP IP/OBS MODERATE 55: CPT

## 2022-09-03 RX ORDER — ACETAMINOPHEN 500 MG
650 TABLET ORAL EVERY 6 HOURS
Refills: 0 | Status: DISCONTINUED | OUTPATIENT
Start: 2022-09-03 | End: 2022-09-06

## 2022-09-03 RX ORDER — SENNA PLUS 8.6 MG/1
2 TABLET ORAL AT BEDTIME
Refills: 0 | Status: DISCONTINUED | OUTPATIENT
Start: 2022-09-03 | End: 2022-09-06

## 2022-09-03 RX ADMIN — Medication 50 MILLIGRAM(S): at 05:33

## 2022-09-03 RX ADMIN — Medication 100 MILLIGRAM(S): at 05:34

## 2022-09-03 RX ADMIN — Medication 100 MILLIGRAM(S): at 15:57

## 2022-09-03 RX ADMIN — SENNA PLUS 2 TABLET(S): 8.6 TABLET ORAL at 21:24

## 2022-09-03 RX ADMIN — ENOXAPARIN SODIUM 40 MILLIGRAM(S): 100 INJECTION SUBCUTANEOUS at 11:39

## 2022-09-03 RX ADMIN — Medication 100 MILLIGRAM(S): at 21:22

## 2022-09-03 NOTE — PROGRESS NOTE ADULT - SUBJECTIVE AND OBJECTIVE BOX
GERRY FRIED  81y  Female      Patient is a 81y old  Female who presents with a chief complaint of Pain in left hip / fracture (03 Sep 2022 07:04)      INTERVAL HPI/OVERNIGHT EVENTS:  Patient seen and examined earlier this morning      REVIEW OF SYSTEMS:  CONSTITUTIONAL: No fever, weight loss, or fatigue  EYES: No eye pain, visual disturbances, or discharge  ENMT:  No difficulty hearing, tinnitus, vertigo; No sinus or throat pain  NECK: No pain or stiffness  RESPIRATORY: No cough, wheezing, chills or hemoptysis; No shortness of breath  CARDIOVASCULAR: No chest pain, palpitations, dizziness, or leg swelling  GASTROINTESTINAL: No abdominal or epigastric pain. No nausea, vomiting, or hematemesis; No diarrhea or constipation. No melena or hematochezia.  GENITOURINARY: No dysuria, frequency, hematuria, or incontinence  NEUROLOGICAL: No headaches, memory loss, loss of strength, numbness, or tremors  SKIN: No itching, burning, rashes, or lesions   LYMPH NODES: No enlarged glands  ENDOCRINE: No heat or cold intolerance; No hair loss  MUSCULOSKELETAL: No joint pain or swelling; No muscle, back, or extremity pain  PSYCHIATRIC: No depression, anxiety, mood swings, or difficulty sleeping  HEME/LYMPH: No easy bruising, or bleeding gums  ALLERY AND IMMUNOLOGIC: No hives or eczema    T(C): 36 (22 @ 11:34), Max: 37.8 (22 @ 07:52)  HR: 76 (22 @ 11:34) (74 - 98)  BP: 128/58 (22 @ 11:34) (100/52 - 173/83)  RR: 19 (22 @ 11:24) (15 - 20)  SpO2: 97% (22 @ 11:24) (93% - 99%) on ra    PHYSICAL EXAM:  GENERAL: NAD, well-groomed, well-developed  HEAD:  Atraumatic, Normocephalic  EYES: EOMI, PERRLA, conjunctiva and sclera clear  ENMT: No tonsillar erythema, exudates, or enlargement; Moist mucous membranes, Good dentition, No lesions  NECK: Supple, No JVD, Normal thyroid  NERVOUS SYSTEM:  Alert & Oriented X3, Good concentration; Motor Strength 5/5 B/L upper and lower extremities; DTRs 2+ intact and symmetric  CHEST/LUNG: Clear to percussion bilaterally; No rales, rhonchi, wheezing, or rubs  HEART: 3/6 crescendo/decrescendo murmur at right second intercostal space  ABDOMEN: Soft, Nontender, Nondistended; Bowel sounds present  EXTREMITIES:  2+ Peripheral Pulses, No clubbing, cyanosis, or edema  LYMPH: No lymphadenopathy noted  SKIN: No rashes or lesions    Consultant(s) Notes Reviewed:  [x ] YES  [ ] NO  Care Discussed with Consultants/Other Providers [ x] YES  [ ] NO    LAB:                        9.8    9.72  )-----------( 128      ( 03 Sep 2022 06:41 )             28.9         136  |  103  |  18  ----------------------------<  115<H>  4.2   |  23  |  0.7    Ca    8.2<L>      03 Sep 2022 06:41  Mg     2.0         TPro  4.8<L>  /  Alb  2.8<L>  /  TBili  0.5  /  DBili  x   /  AST  31  /  ALT  8   /  AlkPhos  77  09    LIVER FUNCTIONS - ( 03 Sep 2022 06:41 )  Alb: 2.8 g/dL / Pro: 4.8 g/dL / ALK PHOS: 77 U/L / ALT: 8 U/L / AST: 31 U/L / GGT: x           CARDIAC MARKERS ( 02 Sep 2022 15:18 )  x     / <0.01 ng/mL / x     / x     / 7.4 ng/mL        Drug Dosing Weight  Height (cm): 149.9 (01 Sep 2022 14:47)  Weight (kg): 66.7 (01 Sep 2022 14:47)  BMI (kg/m2): 29.7 (01 Sep 2022 14:47)  BSA (m2): 1.62 (01 Sep 2022 14:47)      I&O's Summary    02 Sep 2022 07:01  -  03 Sep 2022 07:00  --------------------------------------------------------  IN: 480 mL / OUT: 1925 mL / NET: -1445 mL      Urinalysis Basic - ( 03 Sep 2022 09:52 )    Color: Light Yellow / Appearance: Clear / S.028 / pH: x  Gluc: x / Ketone: Small  / Bili: Negative / Urobili: <2 mg/dL   Blood: x / Protein: Trace / Nitrite: Negative   Leuk Esterase: Negative / RBC: 3 /HPF / WBC 6 /HPF   Sq Epi: x / Non Sq Epi: 1 /HPF / Bacteria: Negative        RADIOLOGY & ADDITIONAL TESTS:  Imaging Personally Reviewed:  [x] YES  [ ] NO    TTE Echo Complete w/o Contrast w/ Doppler (22 @ 16:05) >  Summary:   1. Increased global left ventricular systolic function.   2. Left ventricular ejection fraction, by visual estimation, is >75%.   3. Normal right ventricular size and function.   4. Severe asymmetric left ventricular hypertrophy involving the septal   wall.   5. There is systolic anterior motion of the mitral valve with LVOT   obstruction (Gradient 45 mmHg at rest).   6. Moderate to severe mitral valve regurgitation due to MEGHAN. The MR jet   is posteriorly-directed.   7. Mild-moderate tricuspid regurgitation.   8. Estimated pulmonary artery systolic pressure is 56.7 mmHg assuming a   right atrial pressure of 8 mmHg, which is consistent with moderate   pulmonary hypertension.      MEDS:  acetaminophen     Tablet .. 650 milliGRAM(s) Oral every 6 hours PRN  disopyramide 100 milliGRAM(s) Oral three times a day  enoxaparin Injectable 40 milliGRAM(s) SubCutaneous every 24 hours  metoprolol succinate ER 50 milliGRAM(s) Oral daily  morphine  - Injectable 1 milliGRAM(s) IV Push every 4 hours PRN

## 2022-09-03 NOTE — CONSULT NOTE ADULT - SUBJECTIVE AND OBJECTIVE BOX
Patient is a 81y old  Female who presents with a chief complaint of Pain in left hip / fracture (02 Sep 2022 16:50)    81 year old female with past medical history of hypertrophic cardiomyopathy and HTN presents for pain in her left hip after falling. This morning the patient was walking from her living room to her kitchen when she fell on her left side. She was unable to get up so she called her . Her son came to help her up but she was in too much pain so they called EMS. Before this incident she was able to ambulate without assistance and only reported some mild pain in her left knee before her fall.     In the ED:   Vitals: /74, HR 65, RR 17, T 97.8, SpO2 96% on RA   Labs: WNL  Left hip Xray: Acute impacted fracture of the left femoral neck. Osteopenia.  admitted to floor sp l orif, admitted to SDU, co CP, sp cardio eval, ct angio no PE, this am denies complaints on NC      PAST MEDICAL & SURGICAL HISTORY:  High cholesterol      Hypertrophic obstructive cardiomyopathy (HOCM)      Aortic stenosis      No significant past surgical history          SOCIAL HX:   Smoking  -      REVIEW OF SYSTEMS SEE HPI    Allergies    lidocaine (Rash)    Intolerances        disopyramide 100 milliGRAM(s) Oral three times a day  enoxaparin Injectable 40 milliGRAM(s) SubCutaneous every 24 hours  metoprolol succinate ER 50 milliGRAM(s) Oral daily  morphine  - Injectable 1 milliGRAM(s) IV Push every 4 hours PRN  : Home Meds:      PHYSICAL EXAM    ICU Vital Signs Last 24 Hrs  T(C): 36.2 (03 Sep 2022 06:18), Max: 37.4 (02 Sep 2022 14:26)  T(F): 97.1 (03 Sep 2022 06:18), Max: 99.3 (02 Sep 2022 14:26)  HR: 90 (03 Sep 2022 06:18) (67 - 98)  BP: 123/64 (03 Sep 2022 06:18) (95/49 - 123/64)  RR: 19 (03 Sep 2022 06:18) (15 - 20)  SpO2: 95% (03 Sep 2022 06:18) (93% - 99%)    O2 Parameters below as of 02 Sep 2022 22:00  Patient On (Oxygen Delivery Method): nasal cannula  O2 Flow (L/min): 2          General: comfotable  Lungs: Bilateral BS  Cardiovascular: ROLAN 2.6  Abdomen: Soft, Positive BS  Extremities: No clubbing  Skin: Warm  Neurological: Non focal       09-01-22 @ 07:01  -  09-02-22 @ 07:00  --------------------------------------------------------  IN:    IV PiggyBack: 200 mL    Lactated Ringers: 1200 mL    Oral Fluid: 360 mL  Total IN: 1760 mL    OUT:  Total OUT: 0 mL    Total NET: 1760 mL      09-02-22 @ 07:01  -  09-03-22 @ 06:22  --------------------------------------------------------  IN:    Lactated Ringers: 480 mL  Total IN: 480 mL    OUT:    Indwelling Catheter - Urethral (mL): 1925 mL  Total OUT: 1925 mL    Total NET: -1445 mL          LABS:                          10.3   10.83 )-----------( 146      ( 02 Sep 2022 20:07 )             30.0                                               09-02    134<L>  |  102  |  22<H>  ----------------------------<  142<H>  4.5   |  23  |  0.8    Ca    8.4<L>      02 Sep 2022 14:23  Mg     2.8     09-02    TPro  4.9<L>  /  Alb  3.3<L>  /  TBili  0.3  /  DBili  x   /  AST  25  /  ALT  14  /  AlkPhos  78  09-02                                                 CARDIAC MARKERS ( 02 Sep 2022 15:18 )  x     / <0.01 ng/mL / x     / x     / 7.4 ng/mL                                            LIVER FUNCTIONS - ( 02 Sep 2022 04:55 )  Alb: 3.3 g/dL / Pro: 4.9 g/dL / ALK PHOS: 78 U/L / ALT: 14 U/L / AST: 25 U/L / GGT: x                                                                                                     MEDICATIONS  (STANDING):  disopyramide 100 milliGRAM(s) Oral three times a day  enoxaparin Injectable 40 milliGRAM(s) SubCutaneous every 24 hours  metoprolol succinate ER 50 milliGRAM(s) Oral daily    MEDICATIONS  (PRN):  morphine  - Injectable 1 milliGRAM(s) IV Push every 4 hours PRN Moderate Pain (4 - 6)    ct chest reviewed

## 2022-09-03 NOTE — PROGRESS NOTE ADULT - ASSESSMENT
81y Female who presents to the ED today with c/o left hip pain and inability to bear weight s/p mechanical fall at home this morning. Per patient she ambulates at home with no assistive devices and no pain prior to fall today. Pt is on aspirin daily for cardiac history of HCOM. Patient follows with private cardiologist Dr. Hightower in [facility] who she last saw in June for follow up with no complications. Pt denies any other injuries. Pt denies head trauma or LOC. Pt denies any numbness, tingling or paresthesia Pt denies fever or chills.    #Left femoral neck fracture s/p mechanical fall   - S/P left hip replacement on 9/1/22   - pain control with enoxaparin  - ortho following  - oob to chair daily  - PT/OT following  - pt and her  are agreeable to SNF - [facility]?  - Ambulates without assistance at baseline  - d/c meehan today - TOV    #atypical chest pain on 9/2  #Hypertrophic cardiomyopathy  - Follows with Dr. Hightower in [facility] - last saw her in June. Last echo December 2020 showed EF 74% and findings consistent hypertrophic cardiomyopathy  - < from: TTE Echo Complete w/o Contrast w/ Doppler (09.02.22 @ 16:05) >  Summary:   1. Increased global left ventricular systolic function.   2. Left ventricular ejection fraction, by visual estimation, is >75%.   3. Normal right ventricular size and function.   4. Severe asymmetric left ventricular hypertrophy involving the septal   wall.   5. There is systolic anterior motion of the mitral valve with LVOT   obstruction (Gradient 45 mmHg at rest).   6. Moderate to severe mitral valve regurgitation due to MEGHAN. The MR jet   is posteriorly-directed.   7. Mild-moderate tricuspid regurgitation.   8. Estimated pulmonary artery systolic pressure is 56.7 mmHg assuming a   right atrial pressure of 8 mmHg, which is consistent with moderate   pulmonary hypertension.  < end of copied text >  - currently on disopyramide and metoprolol   - Verapamil on hold due to hypotension on 9/2 - resume in the afternoon if bp improves   - Cardiology consult follow up pending  - trend enzymes - repeat troponin drawn at 11am today    #Anemia  -keep active type and screen  -transfuse to keep hgb over 10     Progress Note Handoff  Pending Consults: cardio follow up  Pending Tests: labs  Pending Results: labs  Family Discussion: discussed meehan removal, ambulation, trending troponin, cardio follow up, PT and eventual SNF placement with pt and her  bedside. Discussed with CM to start ROMARIO for dc next week. Will do COVID on Sunday/Monday   Disposition: Home_____/SNF______/Other_____/Unknown at this time_____  Spent over 45 min reviewing chart and on coordinating patient care during interdisciplinary rounds     Please call me with any questions at extension [phone]

## 2022-09-03 NOTE — PROGRESS NOTE ADULT - SUBJECTIVE AND OBJECTIVE BOX
ORTHO POST OP NOTE    Procedure: Left Total hip replacement, POD2.         Patient seen and examined at bedside. No acute events since OR. Resting without complaints. Pain controlled with medication. Denies chest pain, SOB, N/V.    T(C): 36.2 (09-03-22 @ 06:18), Max: 37.4 (09-02-22 @ 14:26)  HR: 90 (09-03-22 @ 06:18) (67 - 98)  BP: 123/64 (09-03-22 @ 06:18) (95/49 - 123/64)  RR: 19 (09-03-22 @ 06:18) (15 - 20)  SpO2: 95% (09-03-22 @ 06:18) (93% - 99%)  Wt(kg): --    Exam:  Alert and [place], No Acute Distress    LLE   Dressing  C/D/I  Compartments soft/compressible  Calves soft  EHL/FHL Motor intact  SILT distally  Ext wwp                           10.3<L>  10.83<H> )-----------( 146      ( 02 Sep 2022 20:07 )             30.0<L>     09-02    134<L>  |  102  |  22<H>  ----------------------------<  142<H>  4.5   |  23  |  0.8            A/P: 81yF S/p L.  Total hip replacement        -Periop antibiotics  -PT/OT  -WBAT  -OOBTC  -F/U AM Labs  -DVT PPx  -Pain Control  -SCDs  -IS  -Continue Current Tx  -Dispo planning

## 2022-09-04 LAB
ALBUMIN SERPL ELPH-MCNC: 2.8 G/DL — LOW (ref 3.5–5.2)
ALP SERPL-CCNC: 75 U/L — SIGNIFICANT CHANGE UP (ref 30–115)
ALT FLD-CCNC: 27 U/L — SIGNIFICANT CHANGE UP (ref 0–41)
ANION GAP SERPL CALC-SCNC: 9 MMOL/L — SIGNIFICANT CHANGE UP (ref 7–14)
AST SERPL-CCNC: 50 U/L — HIGH (ref 0–41)
BASOPHILS # BLD AUTO: 0.07 K/UL — SIGNIFICANT CHANGE UP (ref 0–0.2)
BASOPHILS NFR BLD AUTO: 0.8 % — SIGNIFICANT CHANGE UP (ref 0–1)
BILIRUB SERPL-MCNC: 0.5 MG/DL — SIGNIFICANT CHANGE UP (ref 0.2–1.2)
BLD GP AB SCN SERPL QL: SIGNIFICANT CHANGE UP
BUN SERPL-MCNC: 17 MG/DL — SIGNIFICANT CHANGE UP (ref 10–20)
CALCIUM SERPL-MCNC: 8.2 MG/DL — LOW (ref 8.5–10.1)
CHLORIDE SERPL-SCNC: 105 MMOL/L — SIGNIFICANT CHANGE UP (ref 98–110)
CO2 SERPL-SCNC: 24 MMOL/L — SIGNIFICANT CHANGE UP (ref 17–32)
CREAT SERPL-MCNC: 0.7 MG/DL — SIGNIFICANT CHANGE UP (ref 0.7–1.5)
CULTURE RESULTS: NO GROWTH — SIGNIFICANT CHANGE UP
EGFR: 86 ML/MIN/1.73M2 — SIGNIFICANT CHANGE UP
EOSINOPHIL # BLD AUTO: 0.06 K/UL — SIGNIFICANT CHANGE UP (ref 0–0.7)
EOSINOPHIL NFR BLD AUTO: 0.7 % — SIGNIFICANT CHANGE UP (ref 0–8)
GLUCOSE SERPL-MCNC: 115 MG/DL — HIGH (ref 70–99)
HCT VFR BLD CALC: 28.3 % — LOW (ref 37–47)
HGB BLD-MCNC: 9.6 G/DL — LOW (ref 12–16)
IMM GRANULOCYTES NFR BLD AUTO: 0.6 % — HIGH (ref 0.1–0.3)
LYMPHOCYTES # BLD AUTO: 1.76 K/UL — SIGNIFICANT CHANGE UP (ref 1.2–3.4)
LYMPHOCYTES # BLD AUTO: 19.7 % — LOW (ref 20.5–51.1)
MAGNESIUM SERPL-MCNC: 2.1 MG/DL — SIGNIFICANT CHANGE UP (ref 1.8–2.4)
MCHC RBC-ENTMCNC: 29.9 PG — SIGNIFICANT CHANGE UP (ref 27–31)
MCHC RBC-ENTMCNC: 33.9 G/DL — SIGNIFICANT CHANGE UP (ref 32–37)
MCV RBC AUTO: 88.2 FL — SIGNIFICANT CHANGE UP (ref 81–99)
MONOCYTES # BLD AUTO: 0.72 K/UL — HIGH (ref 0.1–0.6)
MONOCYTES NFR BLD AUTO: 8.1 % — SIGNIFICANT CHANGE UP (ref 1.7–9.3)
NEUTROPHILS # BLD AUTO: 6.28 K/UL — SIGNIFICANT CHANGE UP (ref 1.4–6.5)
NEUTROPHILS NFR BLD AUTO: 70.1 % — SIGNIFICANT CHANGE UP (ref 42.2–75.2)
NRBC # BLD: 0 /100 WBCS — SIGNIFICANT CHANGE UP (ref 0–0)
PLATELET # BLD AUTO: 139 K/UL — SIGNIFICANT CHANGE UP (ref 130–400)
POTASSIUM SERPL-MCNC: 3.9 MMOL/L — SIGNIFICANT CHANGE UP (ref 3.5–5)
POTASSIUM SERPL-SCNC: 3.9 MMOL/L — SIGNIFICANT CHANGE UP (ref 3.5–5)
PROT SERPL-MCNC: 4.8 G/DL — LOW (ref 6–8)
RBC # BLD: 3.21 M/UL — LOW (ref 4.2–5.4)
RBC # FLD: 15.6 % — HIGH (ref 11.5–14.5)
SODIUM SERPL-SCNC: 138 MMOL/L — SIGNIFICANT CHANGE UP (ref 135–146)
SPECIMEN SOURCE: SIGNIFICANT CHANGE UP
TROPONIN T SERPL-MCNC: 0.1 NG/ML — CRITICAL HIGH
VANCOMYCIN TROUGH SERPL-MCNC: <4 UG/ML — LOW (ref 5–10)
VANCOMYCIN TROUGH SERPL-MCNC: <4 UG/ML — LOW (ref 5–10)
WBC # BLD: 8.94 K/UL — SIGNIFICANT CHANGE UP (ref 4.8–10.8)
WBC # FLD AUTO: 8.94 K/UL — SIGNIFICANT CHANGE UP (ref 4.8–10.8)

## 2022-09-04 PROCEDURE — 99232 SBSQ HOSP IP/OBS MODERATE 35: CPT

## 2022-09-04 PROCEDURE — 93010 ELECTROCARDIOGRAM REPORT: CPT

## 2022-09-04 PROCEDURE — 99233 SBSQ HOSP IP/OBS HIGH 50: CPT

## 2022-09-04 RX ORDER — SODIUM CHLORIDE 9 MG/ML
500 INJECTION, SOLUTION INTRAVENOUS ONCE
Refills: 0 | Status: COMPLETED | OUTPATIENT
Start: 2022-09-04 | End: 2022-09-04

## 2022-09-04 RX ADMIN — Medication 100 MILLIGRAM(S): at 05:01

## 2022-09-04 RX ADMIN — Medication 100 MILLIGRAM(S): at 13:53

## 2022-09-04 RX ADMIN — Medication 50 MILLIGRAM(S): at 05:01

## 2022-09-04 RX ADMIN — Medication 100 MILLIGRAM(S): at 21:39

## 2022-09-04 RX ADMIN — SENNA PLUS 2 TABLET(S): 8.6 TABLET ORAL at 21:39

## 2022-09-04 RX ADMIN — SODIUM CHLORIDE 1000 MILLILITER(S): 9 INJECTION, SOLUTION INTRAVENOUS at 05:37

## 2022-09-04 RX ADMIN — ENOXAPARIN SODIUM 40 MILLIGRAM(S): 100 INJECTION SUBCUTANEOUS at 11:30

## 2022-09-04 NOTE — PROGRESS NOTE ADULT - ASSESSMENT
81y Female who presents to the ED today with c/o left hip pain and inability to bear weight s/p mechanical fall at home this morning. Per patient she ambulates at home with no assistive devices and no pain prior to fall today. Pt is on aspirin daily for cardiac history of HCOM. Patient follows with private cardiologist Dr. Hightower in [facility] who she last saw in June for follow up with no complications. Pt denies any other injuries. Pt denies head trauma or LOC. Pt denies any numbness, tingling or paresthesia Pt denies fever or chills.    #Left femoral neck fracture s/p mechanical fall   - S/P left hip replacement on 9/1/22   - pain control with enoxaparin  - ortho following  - oob to chair daily  - PT/OT following  - pt and her  are agreeable to SNF - discussed with CM - ROMARIO done - await bed offers  - COVID today   - Ambulates without assistance at baseline  - failed TOV - replace meehan today     #atypical chest pain on 9/2  #Hypertrophic cardiomyopathy  - Follows with Dr. Hightower in [facility] - last saw her in June. Last echo December 2020 showed EF 74% and findings consistent hypertrophic cardiomyopathy  - < from: TTE Echo Complete w/o Contrast w/ Doppler (09.02.22 @ 16:05) >  Summary:   1. Increased global left ventricular systolic function.   2. Left ventricular ejection fraction, by visual estimation, is >75%.   3. Normal right ventricular size and function.   4. Severe asymmetric left ventricular hypertrophy involving the septal   wall.   5. There is systolic anterior motion of the mitral valve with LVOT   obstruction (Gradient 45 mmHg at rest).   6. Moderate to severe mitral valve regurgitation due to MEGHAN. The MR jet   is posteriorly-directed.   7. Mild-moderate tricuspid regurgitation.   8. Estimated pulmonary artery systolic pressure is 56.7 mmHg assuming a   right atrial pressure of 8 mmHg, which is consistent with moderate   pulmonary hypertension.  < end of copied text >  - currently on disopyramide and metoprolol   - Verapamil on hold due to hypotension on 9/2 - resume if ok with cardiology   - Cardiology consult follow up pending - Dr. Crabtree   - trend enzymes - 3 sets stable   - ekg in am   - pt denies cp or sob since 9/2    #Anemia  -keep active type and screen  -transfuse to keep hgb over 10     Progress Note Handoff  Pending Consults: cardio follow up  Pending Tests: labs  Pending Results: labs  Family Discussion: discussed meehan replacement, ambulation, trending troponin, cardio follow up, PT and eventual SNF placement with pt yesterday and today. Will do COVID today. Anticipate for dc  Disposition: Home_____/SNF______/Other_____/Unknown at this time_____  Spent over 40 min reviewing chart and on coordinating patient care during interdisciplinary rounds     Please call me with any questions at extension [phone]

## 2022-09-04 NOTE — PROGRESS NOTE ADULT - SUBJECTIVE AND OBJECTIVE BOX
GERRY FRIED  81y  Female      Patient is a 81y old Female who presents with a chief complaint of Pain in left hip / fracture (03 Sep 2022 07:04)      INTERVAL HPI/OVERNIGHT EVENTS:  Patient seen and examined earlier this morning  Lying in bed  off oxygen  no complaints  states she is open to SNF placement if needed - ROMARIO done - discussed with CM       REVIEW OF SYSTEMS:  CONSTITUTIONAL: No fever, weight loss, or fatigue  EYES: No eye pain, visual disturbances, or discharge  ENMT:  No difficulty hearing, tinnitus, vertigo; No sinus or throat pain  NECK: No pain or stiffness  RESPIRATORY: No cough, wheezing, chills or hemoptysis; No shortness of breath  CARDIOVASCULAR: No chest pain, palpitations, dizziness, or leg swelling  GASTROINTESTINAL: No abdominal or epigastric pain. No nausea, vomiting, or hematemesis; No diarrhea or constipation. No melena or hematochezia.  GENITOURINARY: No dysuria, frequency, hematuria, or incontinence  NEUROLOGICAL: No headaches, memory loss, loss of strength, numbness, or tremors  SKIN: No itching, burning, rashes, or lesions   LYMPH NODES: No enlarged glands  ENDOCRINE: No heat or cold intolerance; No hair loss  MUSCULOSKELETAL: No joint pain or swelling; No muscle, back, or extremity pain  PSYCHIATRIC: No depression, anxiety, mood swings, or difficulty sleeping  HEME/LYMPH: No easy bruising, or bleeding gums  ALLERY AND IMMUNOLOGIC: No hives or eczema    Vital Signs Last 24 Hrs  T(C): 36.9 (04 Sep 2022 11:30), Max: 37.2 (04 Sep 2022 08:28)  T(F): 98.5 (04 Sep 2022 11:30), Max: 99 (04 Sep 2022 08:28)  HR: 76 (04 Sep 2022 11:30) (71 - 85)  BP: 130/61 (04 Sep 2022 11:30) (117/57 - 136/77)  BP(mean): 87 (04 Sep 2022 04:57) (81 - 87)  RR: 20 (04 Sep 2022 11:30) (18 - 20)  SpO2: 94% (04 Sep 2022 11:30) (91% - 95%)    Parameters below as of 04 Sep 2022 08:28  Patient On (Oxygen Delivery Method): room air      PHYSICAL EXAM:  GENERAL: NAD, well-groomed, well-developed  HEAD:  Atraumatic, Normocephalic  EYES: EOMI, PERRLA, conjunctiva and sclera clear  ENMT: No tonsillar erythema, exudates, or enlargement; Moist mucous membranes, Good dentition, No lesions  NECK: Supple, No JVD, Normal thyroid  NERVOUS SYSTEM:  Alert & Oriented X3, Good concentration; moves all extremities   CHEST/LUNG: good air entry anteriorly   HEART: 3/6 crescendo/decrescendo murmur at right second intercostal space  ABDOMEN: Soft, Nontender, Nondistended; Bowel sounds present  EXTREMITIES:  2+ Peripheral Pulses, No clubbing, cyanosis, or edema  LYMPH: No lymphadenopathy noted  SKIN: No rashes or lesions    Consultant(s) Notes Reviewed:  [x ] YES  [ ] NO  Care Discussed with Consultants/Other Providers [ x] YES  [ ] NO    LAB:                                   9.6    8.94  )-----------( 139      ( 04 Sep 2022 09:15 )             28.3       09-04    138  |  105  |  17  ----------------------------<  115<H>  3.9   |  24  |  0.7    Ca    8.2<L>      04 Sep 2022 09:15  Mg     2.1     09-04    TPro  4.8<L>  /  Alb  2.8<L>  /  TBili  0.5  /  DBili  x   /  AST  50<H>  /  ALT  27  /  AlkPhos  75  09-04      CARDIAC MARKERS ( 04 Sep 2022 01:46 )  x     / 0.10 ng/mL / x     / x     / x      CARDIAC MARKERS ( 03 Sep 2022 17:46 )  x     / 0.10 ng/mL / 370 U/L / x     / 4.9 ng/mL  CARDIAC MARKERS ( 03 Sep 2022 12:20 )  x     / 0.10 ng/mL / x     / x     / x      CARDIAC MARKERS ( 02 Sep 2022 15:18 )  x     / <0.01 ng/mL / x     / x     / 7.4 ng/mL      Drug Dosing Weight  Height (cm): 149.9 (01 Sep 2022 14:47)  Weight (kg): 66.7 (01 Sep 2022 14:47)  BMI (kg/m2): 29.7 (01 Sep 2022 14:47)  BSA (m2): 1.62 (01 Sep 2022 14:47)      I&O's Summary    02 Sep 2022 07:01  -  03 Sep 2022 07:00  --------------------------------------------------------  IN: 480 mL / OUT: 1925 mL / NET: -1445 mL      Urinalysis Basic - ( 03 Sep 2022 09:52 )    Color: Light Yellow / Appearance: Clear / S.028 / pH: x  Gluc: x / Ketone: Small  / Bili: Negative / Urobili: <2 mg/dL   Blood: x / Protein: Trace / Nitrite: Negative   Leuk Esterase: Negative / RBC: 3 /HPF / WBC 6 /HPF   Sq Epi: x / Non Sq Epi: 1 /HPF / Bacteria: Negative        RADIOLOGY & ADDITIONAL TESTS:  Imaging Personally Reviewed:  [x] YES  [ ] NO    TTE Echo Complete w/o Contrast w/ Doppler (22 @ 16:05) >  Summary:   1. Increased global left ventricular systolic function.   2. Left ventricular ejection fraction, by visual estimation, is >75%.   3. Normal right ventricular size and function.   4. Severe asymmetric left ventricular hypertrophy involving the septal   wall.   5. There is systolic anterior motion of the mitral valve with LVOT   obstruction (Gradient 45 mmHg at rest).   6. Moderate to severe mitral valve regurgitation due to MEGHAN. The MR jet   is posteriorly-directed.   7. Mild-moderate tricuspid regurgitation.   8. Estimated pulmonary artery systolic pressure is 56.7 mmHg assuming a   right atrial pressure of 8 mmHg, which is consistent with moderate   pulmonary hypertension.      MEDICATIONS  (STANDING):  disopyramide 100 milliGRAM(s) Oral three times a day  enoxaparin Injectable 40 milliGRAM(s) SubCutaneous every 24 hours  metoprolol succinate ER 50 milliGRAM(s) Oral daily  senna 2 Tablet(s) Oral at bedtime    MEDICATIONS  (PRN):  acetaminophen     Tablet .. 650 milliGRAM(s) Oral every 6 hours PRN Temp greater or equal to 38C (100.4F), Mild Pain (1 - 3)  morphine  - Injectable 1 milliGRAM(s) IV Push every 4 hours PRN Moderate Pain (4 - 6)

## 2022-09-04 NOTE — PROGRESS NOTE ADULT - SUBJECTIVE AND OBJECTIVE BOX
Orthopaedic Surgery Progress Note    S&E at bedside this AM. Pain well controlled. Denies fever/chills/CP/SOB. No other complaints      T(C): 37.2 (09-04-22 @ 08:28), Max: 37.2 (09-04-22 @ 08:28)  HR: 74 (09-04-22 @ 08:28) (71 - 85)  BP: 136/77 (09-04-22 @ 08:28) (117/57 - 173/83)  RR: 20 (09-04-22 @ 04:57) (18 - 20)  SpO2: 91% (09-04-22 @ 08:28) (91% - 97%)    P/E:  Alert, NAD  Nonlabored breathing    LLE   Dressing  C/D/I  Compartments soft/compressible  Calves soft  EHL/FHL Motor intact  SILT distally  Ext wwp     Labs                        9.8    9.72  )-----------( 128      ( 03 Sep 2022 06:41 )             28.9     09-03    136  |  103  |  18  ----------------------------<  115<H>  4.2   |  23  |  0.7    Ca    8.2<L>      03 Sep 2022 06:41  Mg     2.0     09-03    TPro  4.8<L>  /  Alb  2.8<L>  /  TBili  0.5  /  DBili  x   /  AST  31  /  ALT  8   /  AlkPhos  77  09-03    LIVER FUNCTIONS - ( 03 Sep 2022 06:41 )  Alb: 2.8 g/dL / Pro: 4.8 g/dL / ALK PHOS: 77 U/L / ALT: 8 U/L / AST: 31 U/L / GGT: x             A/P: 81yF S/p L.  Total hip replacement, doing well on POD3    -Periop antibiotics (complete)  -PT/OT  -WBAT  -OOBTC  -F/U AM Labs  -DVT PPx  -Pain Control  -SCDs  -IS  -Continue Current Tx  -Dispo planning

## 2022-09-04 NOTE — CHART NOTE - NSCHARTNOTEFT_GEN_A_CORE
HPI:  81 year old female with past medical history of hypertrophic cardiomyopathy and HTN presents for pain in her left hip after falling. This morning the patient was walking from her living room to her kitchen when she fell on her left side. She was unable to get up so she called her . Her son came to help her up but she was in too much pain so they called EMS. Before this incident she was able to ambulate without assistance and only reported some mild pain in her left knee before her fall.    Hospital Course: HPI:  81 year old female with past medical history of hypertrophic cardiomyopathy and HTN presents for pain in her left hip after falling. This morning the patient was walking from her living room to her kitchen when she fell on her left side. She was unable to get up so she called her . Her son came to help her up but she was in too much pain so they called EMS. Before this incident she was able to ambulate without assistance and only reported some mild pain in her left knee before her fall.    Hospital Course:        ASSESSMENT: 80 y/o female with PMHx of HOCM (on aspirin, following with Dr. Hightower at [facility]) and HTN, currently admitted for left femoral neck fracture (followed by orthopedics) after a fall without head strike or LOC. III/IV systolic murmur is auscultated on cardiac exam.     Plans:  # Left femoral neck fracture  - Patient followed by orthopedics and PT/OT.  - Of note, patient s/p left hip replacement on 9/1.  - Bed to chair QD, as tolerated.  - Continue morphine and Tylenol for pain control.  - Patient and spouse agree to SNF after discharge.  - F/U cardiology clearance for OR.    # HOCM  # Chest pain  - Patient follows with Dr. Hightower at [facility]; last visit in June.  - Echo on 9/2 shows LVEF >75% but severe asymmetric LV hypertrophy involving septum, increased global LV systolic function and moderate-severe mitral valve regurgitation.  - Continue metoprolol succinate and disopyramide.  - Verapamil held due to hypotension.  - Patient had episode of chest pain on 9/2 that self-resolved; troponin still stably elevated at 0.10; cardiology consulted - follow up.    # Anemia  - Maintain active type and screen.  - Transfuse if hemoglobin <10.    # Urinary retention  - Soler discontinued yesterday.   - Bladder scan q4-q6h.  - Continue trial of void.    # DVT PPX  - Lovenox.    # Diet  - DASH/TLC.    # Code Status  - DNR/DNI (MOLST in chart).    # Dispo  - Plan for transfer to telemetry unit. HPI:  81 year old female with past medical history of hypertrophic cardiomyopathy and HTN presents for pain in her left hip after falling. This morning the patient was walking from her living room to her kitchen when she fell on her left side. She was unable to get up so she called her . Her son came to help her up but she was in too much pain so they called EMS. Before this incident she was able to ambulate without assistance and only reported some mild pain in her left knee before her fall. Left hip Xray: Acute impacted fracture of the left femoral neck. Osteopenia.    Hospital Course:  Patient is followed by HOCM specialist, see separate notes from day of admission for recent echo results and recommendations from her cardiologist. Patient underwent total left hip replacement with Dr. Weathers. Patient admitted to CEU following the procedure for BP checks q 4 hours and close monitoring given her cardiac history.   CEU Course: Orthopedics continued to follow patient. Patient also followed by Physiatry and Pulmonary/Critical Care Medicine. Patient had a transient episode of chest pain which self-resolved, with troponin stably elevated at 0.10 - cardiology was consulted for this and her history of HOCM. Echo on 9/2 shows LVEF >75% but severe asymmetric LV hypertrophy involving septum, increased global LV systolic function and moderate-severe mitral valve regurgitation.  Patient denied any other episodes of chest pain and was deemed stable for downgrade to telemetry unit.    ASSESSMENT: 82 y/o female with PMHx of HOCM (on aspirin, following with Dr. Hightower at [facility]) and HTN, currently admitted for left femoral neck fracture (followed by orthopedics) after a fall without head strike or LOC. III/IV systolic murmur is auscultated on cardiac exam.     Plans:  # Left femoral neck fracture  - Patient followed by orthopedics and PT/OT.  - Of note, patient s/p left hip replacement on 9/1.  - Bed to chair QD, as tolerated.  - Continue morphine and Tylenol for pain control.  - Patient and spouse agree to SNF after discharge.  - F/U cardiology clearance for OR.    # HOCM  # Chest pain  - Patient follows with Dr. Hightower at [facility]; last visit in June.  - Echo on 9/2 shows LVEF >75% but severe asymmetric LV hypertrophy involving septum, increased global LV systolic function and moderate-severe mitral valve regurgitation.  - Continue metoprolol succinate and disopyramide.  - Verapamil held due to hypotension.  - Patient had episode of chest pain on 9/2 that self-resolved; troponin still stably elevated at 0.10; cardiology consulted - follow up.    # Anemia  - Maintain active type and screen.  - Transfuse if hemoglobin <10.    # Urinary retention  - Soler discontinued yesterday.   - Bladder scan q4-q6h.  - Continue trial of void.    # DVT PPX  - Lovenox.    # Diet  - DASH/TLC.    # Code Status  - DNR/DNI (MOLST in chart).    # Dispo  - Plan for transfer to telemetry unit. Yes

## 2022-09-04 NOTE — PROGRESS NOTE ADULT - ASSESSMENT
ASSESSMENT: 82 y/o female with PMHx of HOCM (on aspirin) and HTN, currently admitted for left hip fracture (followed by orthopedics). III/IV systolic murmur is auscultated on cardiac exam.    ASSESSMENT: 80 y/o female with PMHx of HOCM (on aspirin, following with Dr. Hightower at [facility]) and HTN, currently admitted for left femoral neck fracture (followed by orthopedics) after a fall without head strike or LOC. III/IV systolic murmur is auscultated on cardiac exam.     Plans:  # Left femoral neck fracture  - Patient followed by orthopedics and PT/OT.  - Bed to chair QD, as tolerated.  - On morphine and Tylenol for pain control. ASSESSMENT: 80 y/o female with PMHx of HOCM (on aspirin, following with Dr. Hightower at [facility]) and HTN, currently admitted for left femoral neck fracture (followed by orthopedics) after a fall without head strike or LOC. III/IV systolic murmur is auscultated on cardiac exam.     Plans:  # Left femoral neck fracture  - Patient followed by orthopedics and PT/OT.  - Of note, patient s/p left hip replacement on 9/1.  - Bed to chair QD, as tolerated.  - Continue morphine and Tylenol for pain control.  - Patient and spouse agree to SNF after discharge.  - F/U cardiology clearance for OR.    # HOCM  # Chest pain  - Patient follows with Dr. Hightower at [facility]; last visit in June.  - Echo on 9/2 shows LVEF >75% but severe asymmetric LV hypertrophy involving septum, increased global LV systolic function and moderate-severe mitral valve regurgitation.  - Continue metoprolol succinate and disopyramide.  - Verapamil held due to hypotension.  - Patient had episode of chest pain on 9/2 that self-resolved; troponin still stably elevated at 0.10; cardiology consulted - follow up.    # Anemia  - Maintain active type and screen.  - Transfuse if hemoglobin <10.    # DVT PPX  - Lovenox.    # Diet  - DASH/TLC.    # Code Status  - DNR/DNI (MOLST in chart).    # Dispo  - Acute.     ASSESSMENT: 82 y/o female with PMHx of HOCM (on aspirin, following with Dr. Hightower at [facility]) and HTN, currently admitted for left femoral neck fracture (followed by orthopedics) after a fall without head strike or LOC. III/IV systolic murmur is auscultated on cardiac exam.     Plans:  # Left femoral neck fracture  - Patient followed by orthopedics and PT/OT.  - Of note, patient s/p left hip replacement on 9/1.  - Bed to chair QD, as tolerated.  - Continue morphine and Tylenol for pain control.  - Patient and spouse agree to SNF after discharge.  - F/U cardiology clearance for OR.    # HOCM  # Chest pain  - Patient follows with Dr. Hightower at [facility]; last visit in June.  - Echo on 9/2 shows LVEF >75% but severe asymmetric LV hypertrophy involving septum, increased global LV systolic function and moderate-severe mitral valve regurgitation.  - Continue metoprolol succinate and disopyramide.  - Verapamil held due to hypotension.  - Patient had episode of chest pain on 9/2 that self-resolved; troponin still stably elevated at 0.10; cardiology consulted - follow up.    # Anemia  - Maintain active type and screen.  - Transfuse if hemoglobin <10.    # Urinary retention  - Soler discontinued yesterday.   - Bladder scan q4-q6h.  - Continue trial of void.    # DVT PPX  - Lovenox.    # Diet  - DASH/TLC.    # Code Status  - DNR/DNI (MOLST in chart).    # Dispo  - Acute.

## 2022-09-04 NOTE — PROGRESS NOTE ADULT - SUBJECTIVE AND OBJECTIVE BOX
Over Night Events: events noted, on RA, afebrile, ortho noted    PHYSICAL EXAM    ICU Vital Signs Last 24 Hrs  T(C): 36.2 (04 Sep 2022 04:57), Max: 36.2 (03 Sep 2022 19:40)  T(F): 97.1 (04 Sep 2022 04:57), Max: 97.1 (03 Sep 2022 19:40)  HR: 80 (04 Sep 2022 04:57) (71 - 85)  BP: 129/60 (04 Sep 2022 04:57) (117/57 - 173/83)  BP(mean): 87 (04 Sep 2022 04:57) (81 - 87)  RR: 20 (04 Sep 2022 04:57) (18 - 20)  SpO2: 93% (04 Sep 2022 04:57) (93% - 97%)    O2 Parameters below as of 04 Sep 2022 04:57  Patient On (Oxygen Delivery Method): room air            General: comfortable  Lungs: dec bs both bases  Cardiovascular: Regular   Abdomen: Soft, Positive BS  Extremities: No clubbing   Skin: Warm  Neurological: Non focal       22 @ 07:01  -  22 @ 07:00  --------------------------------------------------------  IN:  Total IN: 0 mL    OUT:    Intermittent Catheterization - Urethral (mL): 350 mL  Total OUT: 350 mL    Total NET: -350 mL          LABS:                          9.8    9.72  )-----------( 128      ( 03 Sep 2022 06:41 )             28.9                                                   136  |  103  |  18  ----------------------------<  115<H>  4.2   |  23  |  0.7    Ca    8.2<L>      03 Sep 2022 06:41  Mg     2.0         TPro  4.8<L>  /  Alb  2.8<L>  /  TBili  0.5  /  DBili  x   /  AST  31  /  ALT  8   /  AlkPhos  77                                               Urinalysis Basic - ( 03 Sep 2022 09:52 )    Color: Light Yellow / Appearance: Clear / S.028 / pH: x  Gluc: x / Ketone: Small  / Bili: Negative / Urobili: <2 mg/dL   Blood: x / Protein: Trace / Nitrite: Negative   Leuk Esterase: Negative / RBC: 3 /HPF / WBC 6 /HPF   Sq Epi: x / Non Sq Epi: 1 /HPF / Bacteria: Negative        CARDIAC MARKERS ( 04 Sep 2022 01:46 )  x     / 0.10 ng/mL / x     / x     / x      CARDIAC MARKERS ( 03 Sep 2022 17:46 )  x     / 0.10 ng/mL / 370 U/L / x     / 4.9 ng/mL  CARDIAC MARKERS ( 03 Sep 2022 12:20 )  x     / 0.10 ng/mL / x     / x     / x      CARDIAC MARKERS ( 02 Sep 2022 15:18 )  x     / <0.01 ng/mL / x     / x     / 7.4 ng/mL                                            LIVER FUNCTIONS - ( 03 Sep 2022 06:41 )  Alb: 2.8 g/dL / Pro: 4.8 g/dL / ALK PHOS: 77 U/L / ALT: 8 U/L / AST: 31 U/L / GGT: x                                                                                                                                       MEDICATIONS  (STANDING):  disopyramide 100 milliGRAM(s) Oral three times a day  enoxaparin Injectable 40 milliGRAM(s) SubCutaneous every 24 hours  metoprolol succinate ER 50 milliGRAM(s) Oral daily  senna 2 Tablet(s) Oral at bedtime    MEDICATIONS  (PRN):  acetaminophen     Tablet .. 650 milliGRAM(s) Oral every 6 hours PRN Temp greater or equal to 38C (100.4F), Mild Pain (1 - 3)  morphine  - Injectable 1 milliGRAM(s) IV Push every 4 hours PRN Moderate Pain (4 - 6)    CXR reviewed

## 2022-09-04 NOTE — PROGRESS NOTE ADULT - ASSESSMENT
IMPRESSION:    SP fall/ l hip fx sp ORIF  CP SP cardio/ CT Angio no PE  HOCM  hx of GI bleed      PLAN:    CNS: Avoid CNS depressant, pain control    HEENT:  Oral care    PULMONARY:  HOB @ 45 degrees, aspiration precaution, Incentive spirometry    CARDIOVASCULAR: lopressor, cardio fup    GI: GI prophylaxis                                          Feeding po    RENAL:  F/u  lytes.  Correct as needed. accurate I/O    INFECTIOUS DISEASE: no abx    HEMATOLOGICAL:  DVT prophylaxis. trend CBC    ENDOCRINE:  Follow up FS.  Insulin protocol if needed.    PT/ OT    floor

## 2022-09-04 NOTE — PROGRESS NOTE ADULT - SUBJECTIVE AND OBJECTIVE BOX
SUMMARY: 82 y/o female with PMHx of HOCM and HTN, currently admitted for left hip fracture, followed by orthopedics.    SUBJECTIVE:  24HR: No acute events.    REVIEW OF SYSTEMS  General: No fever, chills, change in appetite, change in weight, fatigue or night sweats.  Skin/Breast: No changes in rashes, lesions or moles.  Ophthalmologic: No vision changes, vision loss or double vision.  HEENT: No dysphagia, odynophagia, sore throat or hoarseness.  Respiratory: No coughing, wheezing, hemoptysis, dyspnea on exertion or dyspnea at rest.  Cardiovascular:	No palpitations, chest pain, paroxysmal nocturnal dyspnea, orthopnea or edema.  Gastrointestinal:	No nausea, vomiting, abdominal pain, diarrhea, constipation, hematochezia, melena or change in abdominal girth.  Genitourinary: No dysuria, hematuria or visible pyuria.  Musculoskeletal: No muscle aches, joint pain, recent trauma/injury or limited movement.  Neurological: No difficulty walking, difficulty balancing, lightheadness, vertigo, memory problems or seizures.  Psychiatric: No insomnia, depression, suicidal ideation or homicidal ideation.  Hematology/Lymphatics: No easy bruising, easy bruising or swollen glands.  Endocrine: No polyphagia, polyuria or polydipsia.  Allergic/Immunologic: No allergies or recent travel.    OBJECTIVE:  Vital Signs Last 24 Hrs  T(C): 37.2 (04 Sep 2022 08:28), Max: 37.2 (04 Sep 2022 08:28)  T(F): 99 (04 Sep 2022 08:28), Max: 99 (04 Sep 2022 08:28)  HR: 74 (04 Sep 2022 08:28) (71 - 85)  BP: 136/77 (04 Sep 2022 08:28) (117/57 - 173/83)  BP(mean): 87 (04 Sep 2022 04:57) (81 - 87)  RR: 20 (04 Sep 2022 04:57) (18 - 20)  SpO2: 91% (04 Sep 2022 08:28) (91% - 97%)    Parameters below as of 04 Sep 2022 08:28  Patient On (Oxygen Delivery Method): room air    PHYSICAL EXAM:  CONSTITUTIONAL: Well-appearing and in no apparent distress.    EYES: PERRLA and symmetric, EOMI, No conjunctival injection or pallor. Sclera anicteric.    ENMT: Moist mucous membranes. Normal dentition. No pharyngeal injection or exudates. No gross hearing impairment noted.  	NECK: Supple, symmetric and without tracheal deviation. Thyroid gland not enlarged and without palpable masses.    RESPIRATORY: No respiratory distress. No obvious use of accessory muscles. Lungs CTAB with no crackling, wheezing, rhonchi or rubs.    CARDIOVASCULAR: Regular rate and rhythm. Normal S1 and S2. III/VI systolic murmur auscultated at right upper sternal border. Dorsalis pedis pulses 2+.    GASTROINTESTINAL: Soft, non-tender to palpation in all quadrants. No palpable masses. No hepatosplenomegaly. No appreciable hernias.    MUSCULOSKELETAL: Examination of all four extremities without obvious misalignment, normal range of motion without pain, no spinal tenderness, normal muscle strength/tone.    SKIN: No obvious rashes or ulcers.    NEUROLOGIC: CN II-XII intact; 5/5 strength in all four extremities, both proximally and distally; sensation intact in upper and lower extremities b/l to light touch.    PSYCHIATRIC: Appropriate insight/judgment; A+O x 3. Mood and affect appropriate. Recent/remote memory intact.    .  LABS:                         9.8    9.72  )-----------( 128      ( 03 Sep 2022 06:41 )             28.9         136  |  103  |  18  ----------------------------<  115<H>  4.2   |  23  |  0.7    Ca    8.2<L>      03 Sep 2022 06:41  Mg     2.0         TPro  4.8<L>  /  Alb  2.8<L>  /  TBili  0.5  /  DBili  x   /  AST  31  /  ALT  8   /  AlkPhos  77        Urinalysis Basic - ( 03 Sep 2022 09:52 )    Color: Light Yellow / Appearance: Clear / S.028 / pH: x  Gluc: x / Ketone: Small  / Bili: Negative / Urobili: <2 mg/dL   Blood: x / Protein: Trace / Nitrite: Negative   Leuk Esterase: Negative / RBC: 3 /HPF / WBC 6 /HPF   Sq Epi: x / Non Sq Epi: 1 /HPF / Bacteria: Negative    RADIOLOGY, EKG & ADDITIONAL TESTS: Reviewed.       MEDICATIONS  (STANDING):  disopyramide 100 milliGRAM(s) Oral three times a day  enoxaparin Injectable 40 milliGRAM(s) SubCutaneous every 24 hours  metoprolol succinate ER 50 milliGRAM(s) Oral daily  senna 2 Tablet(s) Oral at bedtime    MEDICATIONS  (PRN):  acetaminophen     Tablet .. 650 milliGRAM(s) Oral every 6 hours PRN Temp greater or equal to 38C (100.4F), Mild Pain (1 - 3)  morphine  - Injectable 1 milliGRAM(s) IV Push every 4 hours PRN Moderate Pain (4 - 6) SUMMARY: 80 y/o female with PMHx of HOCM (on aspirin) and HTN, currently admitted for left hip fracture, followed by orthopedics.    SUBJECTIVE:  24HR: No acute events.    REVIEW OF SYSTEMS  General: No fever, chills, change in appetite, change in weight, fatigue or night sweats.  Skin/Breast: No changes in rashes, lesions or moles.  Ophthalmologic: No vision changes, vision loss or double vision.  HEENT: No dysphagia, odynophagia, sore throat or hoarseness.  Respiratory: No coughing, wheezing, hemoptysis, dyspnea on exertion or dyspnea at rest.  Cardiovascular:	No palpitations, chest pain, paroxysmal nocturnal dyspnea, orthopnea or edema.  Gastrointestinal:	No nausea, vomiting, abdominal pain, diarrhea, constipation, hematochezia, melena or change in abdominal girth.  Genitourinary: No dysuria, hematuria or visible pyuria.  Musculoskeletal: No muscle aches, joint pain, recent trauma/injury or limited movement.  Neurological: No difficulty walking, difficulty balancing, lightheadness, vertigo, memory problems or seizures.  Psychiatric: No insomnia, depression, suicidal ideation or homicidal ideation.  Hematology/Lymphatics: No easy bruising, easy bruising or swollen glands.  Endocrine: No polyphagia, polyuria or polydipsia.  Allergic/Immunologic: No allergies or recent travel.    OBJECTIVE:  Vital Signs Last 24 Hrs  T(C): 37.2 (04 Sep 2022 08:28), Max: 37.2 (04 Sep 2022 08:28)  T(F): 99 (04 Sep 2022 08:28), Max: 99 (04 Sep 2022 08:28)  HR: 74 (04 Sep 2022 08:28) (71 - 85)  BP: 136/77 (04 Sep 2022 08:28) (117/57 - 173/83)  BP(mean): 87 (04 Sep 2022 04:57) (81 - 87)  RR: 20 (04 Sep 2022 04:57) (18 - 20)  SpO2: 91% (04 Sep 2022 08:28) (91% - 97%)    Parameters below as of 04 Sep 2022 08:28  Patient On (Oxygen Delivery Method): room air    PHYSICAL EXAM:  CONSTITUTIONAL: Well-appearing and in no apparent distress.    EYES: PERRLA and symmetric, EOMI, No conjunctival injection or pallor. Sclera anicteric.    ENMT: Moist mucous membranes. Normal dentition. No pharyngeal injection or exudates. No gross hearing impairment noted.  	NECK: Supple, symmetric and without tracheal deviation. Thyroid gland not enlarged and without palpable masses.    RESPIRATORY: No respiratory distress. No obvious use of accessory muscles. Lungs CTAB with no crackling, wheezing, rhonchi or rubs.    CARDIOVASCULAR: Regular rate and rhythm. Normal S1 and S2. III/VI systolic murmur auscultated at right upper sternal border. Dorsalis pedis pulses 2+.    GASTROINTESTINAL: Soft, non-tender to palpation in all quadrants. No palpable masses. No hepatosplenomegaly. No appreciable hernias.    MUSCULOSKELETAL: Examination of all four extremities without obvious misalignment, normal range of motion without pain, no spinal tenderness, normal muscle strength/tone.    SKIN: No obvious rashes or ulcers.    NEUROLOGIC: CN II-XII intact; 5/5 strength in all four extremities, both proximally and distally; sensation intact in upper and lower extremities b/l to light touch.    PSYCHIATRIC: Appropriate insight/judgment; A+O x 3. Mood and affect appropriate. Recent/remote memory intact.    .  LABS:                         9.8    9.72  )-----------( 128      ( 03 Sep 2022 06:41 )             28.9         136  |  103  |  18  ----------------------------<  115<H>  4.2   |  23  |  0.7    Ca    8.2<L>      03 Sep 2022 06:41  Mg     2.0         TPro  4.8<L>  /  Alb  2.8<L>  /  TBili  0.5  /  DBili  x   /  AST  31  /  ALT  8   /  AlkPhos  77        Urinalysis Basic - ( 03 Sep 2022 09:52 )    Color: Light Yellow / Appearance: Clear / S.028 / pH: x  Gluc: x / Ketone: Small  / Bili: Negative / Urobili: <2 mg/dL   Blood: x / Protein: Trace / Nitrite: Negative   Leuk Esterase: Negative / RBC: 3 /HPF / WBC 6 /HPF   Sq Epi: x / Non Sq Epi: 1 /HPF / Bacteria: Negative    RADIOLOGY, EKG & ADDITIONAL TESTS: Reviewed.       MEDICATIONS  (STANDING):  disopyramide 100 milliGRAM(s) Oral three times a day  enoxaparin Injectable 40 milliGRAM(s) SubCutaneous every 24 hours  metoprolol succinate ER 50 milliGRAM(s) Oral daily  senna 2 Tablet(s) Oral at bedtime    MEDICATIONS  (PRN):  acetaminophen     Tablet .. 650 milliGRAM(s) Oral every 6 hours PRN Temp greater or equal to 38C (100.4F), Mild Pain (1 - 3)  morphine  - Injectable 1 milliGRAM(s) IV Push every 4 hours PRN Moderate Pain (4 - 6) SUMMARY: 80 y/o female with PMHx of HOCM (on aspirin, following with Dr. Hightower at [facility]) and HTN, currently admitted for left femoral neck fracture (followed by orthopedics) after a fall without head strike or LOC.    SUBJECTIVE:  24HR: No acute events.    REVIEW OF SYSTEMS  General: No fever, chills, change in appetite, change in weight, fatigue or night sweats.  Skin/Breast: No changes in rashes, lesions or moles.  Ophthalmologic: No vision changes, vision loss or double vision.  HEENT: No dysphagia, odynophagia, sore throat or hoarseness.  Respiratory: No coughing, wheezing, hemoptysis, dyspnea on exertion or dyspnea at rest.  Cardiovascular:	No palpitations, chest pain, paroxysmal nocturnal dyspnea, orthopnea or edema.  Gastrointestinal:	No nausea, vomiting, abdominal pain, diarrhea, constipation, hematochezia, melena or change in abdominal girth.  Genitourinary: No dysuria, hematuria or visible pyuria.  Musculoskeletal: No muscle aches, joint pain, recent trauma/injury or limited movement.  Neurological: No difficulty walking, difficulty balancing, lightheadness, vertigo, memory problems or seizures.  Psychiatric: No insomnia, depression, suicidal ideation or homicidal ideation.  Hematology/Lymphatics: No easy bruising, easy bruising or swollen glands.  Endocrine: No polyphagia, polyuria or polydipsia.  Allergic/Immunologic: No allergies or recent travel.    OBJECTIVE:  Vital Signs Last 24 Hrs  T(C): 37.2 (04 Sep 2022 08:28), Max: 37.2 (04 Sep 2022 08:28)  T(F): 99 (04 Sep 2022 08:28), Max: 99 (04 Sep 2022 08:28)  HR: 74 (04 Sep 2022 08:28) (71 - 85)  BP: 136/77 (04 Sep 2022 08:28) (117/57 - 173/83)  BP(mean): 87 (04 Sep 2022 04:57) (81 - 87)  RR: 20 (04 Sep 2022 04:57) (18 - 20)  SpO2: 91% (04 Sep 2022 08:28) (91% - 97%)    Parameters below as of 04 Sep 2022 08:28  Patient On (Oxygen Delivery Method): room air    PHYSICAL EXAM:  CONSTITUTIONAL: Well-appearing and in no apparent distress.    EYES: PERRLA and symmetric, EOMI, No conjunctival injection or pallor. Sclera anicteric.    ENMT: Moist mucous membranes. Normal dentition. No pharyngeal injection or exudates. No gross hearing impairment noted.  	NECK: Supple, symmetric and without tracheal deviation. Thyroid gland not enlarged and without palpable masses.    RESPIRATORY: No respiratory distress. No obvious use of accessory muscles. Lungs CTAB with no crackling, wheezing, rhonchi or rubs.    CARDIOVASCULAR: Regular rate and rhythm. Normal S1 and S2. III/VI systolic murmur auscultated at right upper sternal border. Dorsalis pedis pulses 2+.    GASTROINTESTINAL: Soft, non-tender to palpation in all quadrants. No palpable masses. No hepatosplenomegaly. No appreciable hernias.    MUSCULOSKELETAL: Examination of all four extremities without obvious misalignment, normal range of motion without pain, no spinal tenderness, normal muscle strength/tone.    SKIN: No obvious rashes or ulcers.    NEUROLOGIC: CN II-XII intact; 5/5 strength in all four extremities, both proximally and distally; sensation intact in upper and lower extremities b/l to light touch.    PSYCHIATRIC: Appropriate insight/judgment; A+O x 3. Mood and affect appropriate. Recent/remote memory intact.    .  LABS:                         9.8    9.72  )-----------( 128      ( 03 Sep 2022 06:41 )             28.9         136  |  103  |  18  ----------------------------<  115<H>  4.2   |  23  |  0.7    Ca    8.2<L>      03 Sep 2022 06:41  Mg     2.0         TPro  4.8<L>  /  Alb  2.8<L>  /  TBili  0.5  /  DBili  x   /  AST  31  /  ALT  8   /  AlkPhos  77        Urinalysis Basic - ( 03 Sep 2022 09:52 )    Color: Light Yellow / Appearance: Clear / S.028 / pH: x  Gluc: x / Ketone: Small  / Bili: Negative / Urobili: <2 mg/dL   Blood: x / Protein: Trace / Nitrite: Negative   Leuk Esterase: Negative / RBC: 3 /HPF / WBC 6 /HPF   Sq Epi: x / Non Sq Epi: 1 /HPF / Bacteria: Negative    RADIOLOGY, EKG & ADDITIONAL TESTS: Reviewed.       MEDICATIONS  (STANDING):  disopyramide 100 milliGRAM(s) Oral three times a day  enoxaparin Injectable 40 milliGRAM(s) SubCutaneous every 24 hours  metoprolol succinate ER 50 milliGRAM(s) Oral daily  senna 2 Tablet(s) Oral at bedtime    MEDICATIONS  (PRN):  acetaminophen     Tablet .. 650 milliGRAM(s) Oral every 6 hours PRN Temp greater or equal to 38C (100.4F), Mild Pain (1 - 3)  morphine  - Injectable 1 milliGRAM(s) IV Push every 4 hours PRN Moderate Pain (4 - 6) SUMMARY: 80 y/o female with PMHx of HOCM (on aspirin, following with Dr. Hightower at [facility]) and HTN, currently admitted for left femoral neck fracture (followed by orthopedics) after a fall without head strike or LOC.    SUBJECTIVE:  24HR: No acute events. Reports chest pain from  has self-resolved and not recurred.    REVIEW OF SYSTEMS  General: No fever, chills, change in appetite, change in weight, fatigue or night sweats.  Skin/Breast: No changes in rashes, lesions or moles.  Ophthalmologic: No vision changes, vision loss or double vision.  HEENT: No dysphagia, odynophagia, sore throat or hoarseness.  Respiratory: No coughing, wheezing, hemoptysis, dyspnea on exertion or dyspnea at rest.  Cardiovascular:	No palpitations, chest pain, paroxysmal nocturnal dyspnea, orthopnea or edema.  Gastrointestinal:	No nausea, vomiting, abdominal pain, diarrhea, constipation, hematochezia, melena or change in abdominal girth.  Genitourinary: No dysuria, hematuria or visible pyuria.  Musculoskeletal: No muscle aches, joint pain, recent trauma/injury or limited movement.  Neurological: No difficulty walking, difficulty balancing, lightheadness, vertigo, memory problems or seizures.  Psychiatric: No insomnia, depression, suicidal ideation or homicidal ideation.  Hematology/Lymphatics: No easy bruising, easy bruising or swollen glands.  Endocrine: No polyphagia, polyuria or polydipsia.  Allergic/Immunologic: No allergies or recent travel.    OBJECTIVE:  Vital Signs Last 24 Hrs  T(C): 37.2 (04 Sep 2022 08:28), Max: 37.2 (04 Sep 2022 08:28)  T(F): 99 (04 Sep 2022 08:28), Max: 99 (04 Sep 2022 08:28)  HR: 74 (04 Sep 2022 08:28) (71 - 85)  BP: 136/77 (04 Sep 2022 08:28) (117/57 - 173/83)  BP(mean): 87 (04 Sep 2022 04:57) (81 - 87)  RR: 20 (04 Sep 2022 04:57) (18 - 20)  SpO2: 91% (04 Sep 2022 08:28) (91% - 97%)    Parameters below as of 04 Sep 2022 08:28  Patient On (Oxygen Delivery Method): room air    PHYSICAL EXAM:  CONSTITUTIONAL: Well-appearing and in no apparent distress.    EYES: PERRLA and symmetric, EOMI, No conjunctival injection or pallor. Sclera anicteric.    ENMT: Moist mucous membranes. Normal dentition. No pharyngeal injection or exudates. No gross hearing impairment noted.  	NECK: Supple, symmetric and without tracheal deviation. Thyroid gland not enlarged and without palpable masses.    RESPIRATORY: No respiratory distress. No obvious use of accessory muscles. Lungs CTAB with no crackling, wheezing, rhonchi or rubs.    CARDIOVASCULAR: Regular rate and rhythm. Normal S1 and S2. III/VI systolic murmur auscultated at right upper sternal border. Dorsalis pedis pulses 2+.    GASTROINTESTINAL: Soft, non-tender to palpation in all quadrants. No palpable masses. No hepatosplenomegaly. No appreciable hernias.    MUSCULOSKELETAL: Examination of all four extremities without obvious misalignment, normal range of motion without pain, no spinal tenderness, normal muscle strength/tone.    SKIN: No obvious rashes or ulcers.    NEUROLOGIC: CN II-XII intact; 5/5 strength in all four extremities, both proximally and distally; sensation intact in upper and lower extremities b/l to light touch.    PSYCHIATRIC: Appropriate insight/judgment; A+O x 3. Mood and affect appropriate. Recent/remote memory intact.    .  LABS:                         9.8    9.72  )-----------( 128      ( 03 Sep 2022 06:41 )             28.9         136  |  103  |  18  ----------------------------<  115<H>  4.2   |  23  |  0.7    Ca    8.2<L>      03 Sep 2022 06:41  Mg     2.0         TPro  4.8<L>  /  Alb  2.8<L>  /  TBili  0.5  /  DBili  x   /  AST  31  /  ALT  8   /  AlkPhos  77        Urinalysis Basic - ( 03 Sep 2022 09:52 )    Color: Light Yellow / Appearance: Clear / S.028 / pH: x  Gluc: x / Ketone: Small  / Bili: Negative / Urobili: <2 mg/dL   Blood: x / Protein: Trace / Nitrite: Negative   Leuk Esterase: Negative / RBC: 3 /HPF / WBC 6 /HPF   Sq Epi: x / Non Sq Epi: 1 /HPF / Bacteria: Negative    RADIOLOGY, EKG & ADDITIONAL TESTS: Reviewed.       MEDICATIONS  (STANDING):  disopyramide 100 milliGRAM(s) Oral three times a day  enoxaparin Injectable 40 milliGRAM(s) SubCutaneous every 24 hours  metoprolol succinate ER 50 milliGRAM(s) Oral daily  senna 2 Tablet(s) Oral at bedtime    MEDICATIONS  (PRN):  acetaminophen     Tablet .. 650 milliGRAM(s) Oral every 6 hours PRN Temp greater or equal to 38C (100.4F), Mild Pain (1 - 3)  morphine  - Injectable 1 milliGRAM(s) IV Push every 4 hours PRN Moderate Pain (4 - 6)

## 2022-09-05 LAB
ALBUMIN SERPL ELPH-MCNC: 2.8 G/DL — LOW (ref 3.5–5.2)
ALP SERPL-CCNC: 78 U/L — SIGNIFICANT CHANGE UP (ref 30–115)
ALT FLD-CCNC: 68 U/L — HIGH (ref 0–41)
ANION GAP SERPL CALC-SCNC: 10 MMOL/L — SIGNIFICANT CHANGE UP (ref 7–14)
AST SERPL-CCNC: 83 U/L — HIGH (ref 0–41)
BASOPHILS # BLD AUTO: 0.09 K/UL — SIGNIFICANT CHANGE UP (ref 0–0.2)
BASOPHILS NFR BLD AUTO: 1 % — SIGNIFICANT CHANGE UP (ref 0–1)
BILIRUB SERPL-MCNC: 0.5 MG/DL — SIGNIFICANT CHANGE UP (ref 0.2–1.2)
BUN SERPL-MCNC: 14 MG/DL — SIGNIFICANT CHANGE UP (ref 10–20)
CALCIUM SERPL-MCNC: 8.3 MG/DL — LOW (ref 8.5–10.1)
CHLORIDE SERPL-SCNC: 104 MMOL/L — SIGNIFICANT CHANGE UP (ref 98–110)
CO2 SERPL-SCNC: 23 MMOL/L — SIGNIFICANT CHANGE UP (ref 17–32)
CREAT SERPL-MCNC: 0.7 MG/DL — SIGNIFICANT CHANGE UP (ref 0.7–1.5)
EGFR: 86 ML/MIN/1.73M2 — SIGNIFICANT CHANGE UP
EOSINOPHIL # BLD AUTO: 0.13 K/UL — SIGNIFICANT CHANGE UP (ref 0–0.7)
EOSINOPHIL NFR BLD AUTO: 1.5 % — SIGNIFICANT CHANGE UP (ref 0–8)
GLUCOSE SERPL-MCNC: 96 MG/DL — SIGNIFICANT CHANGE UP (ref 70–99)
HCT VFR BLD CALC: 27.3 % — LOW (ref 37–47)
HGB BLD-MCNC: 9.3 G/DL — LOW (ref 12–16)
IMM GRANULOCYTES NFR BLD AUTO: 0.8 % — HIGH (ref 0.1–0.3)
LYMPHOCYTES # BLD AUTO: 1.91 K/UL — SIGNIFICANT CHANGE UP (ref 1.2–3.4)
LYMPHOCYTES # BLD AUTO: 21.3 % — SIGNIFICANT CHANGE UP (ref 20.5–51.1)
MAGNESIUM SERPL-MCNC: 2 MG/DL — SIGNIFICANT CHANGE UP (ref 1.8–2.4)
MCHC RBC-ENTMCNC: 30.1 PG — SIGNIFICANT CHANGE UP (ref 27–31)
MCHC RBC-ENTMCNC: 34.1 G/DL — SIGNIFICANT CHANGE UP (ref 32–37)
MCV RBC AUTO: 88.3 FL — SIGNIFICANT CHANGE UP (ref 81–99)
MONOCYTES # BLD AUTO: 0.76 K/UL — HIGH (ref 0.1–0.6)
MONOCYTES NFR BLD AUTO: 8.5 % — SIGNIFICANT CHANGE UP (ref 1.7–9.3)
NEUTROPHILS # BLD AUTO: 6 K/UL — SIGNIFICANT CHANGE UP (ref 1.4–6.5)
NEUTROPHILS NFR BLD AUTO: 66.9 % — SIGNIFICANT CHANGE UP (ref 42.2–75.2)
NRBC # BLD: 0 /100 WBCS — SIGNIFICANT CHANGE UP (ref 0–0)
PLATELET # BLD AUTO: 158 K/UL — SIGNIFICANT CHANGE UP (ref 130–400)
POTASSIUM SERPL-MCNC: 3.8 MMOL/L — SIGNIFICANT CHANGE UP (ref 3.5–5)
POTASSIUM SERPL-SCNC: 3.8 MMOL/L — SIGNIFICANT CHANGE UP (ref 3.5–5)
PROT SERPL-MCNC: 4.8 G/DL — LOW (ref 6–8)
RBC # BLD: 3.09 M/UL — LOW (ref 4.2–5.4)
RBC # FLD: 15.7 % — HIGH (ref 11.5–14.5)
SARS-COV-2 RNA SPEC QL NAA+PROBE: SIGNIFICANT CHANGE UP
SODIUM SERPL-SCNC: 137 MMOL/L — SIGNIFICANT CHANGE UP (ref 135–146)
WBC # BLD: 8.96 K/UL — SIGNIFICANT CHANGE UP (ref 4.8–10.8)
WBC # FLD AUTO: 8.96 K/UL — SIGNIFICANT CHANGE UP (ref 4.8–10.8)

## 2022-09-05 PROCEDURE — 93970 EXTREMITY STUDY: CPT | Mod: 26

## 2022-09-05 PROCEDURE — 99232 SBSQ HOSP IP/OBS MODERATE 35: CPT

## 2022-09-05 RX ORDER — SIMVASTATIN 20 MG/1
20 TABLET, FILM COATED ORAL AT BEDTIME
Refills: 0 | Status: DISCONTINUED | OUTPATIENT
Start: 2022-09-05 | End: 2022-09-06

## 2022-09-05 RX ORDER — TAMSULOSIN HYDROCHLORIDE 0.4 MG/1
0.4 CAPSULE ORAL AT BEDTIME
Refills: 0 | Status: DISCONTINUED | OUTPATIENT
Start: 2022-09-05 | End: 2022-09-06

## 2022-09-05 RX ORDER — POLYETHYLENE GLYCOL 3350 17 G/17G
17 POWDER, FOR SOLUTION ORAL DAILY
Refills: 0 | Status: DISCONTINUED | OUTPATIENT
Start: 2022-09-05 | End: 2022-09-06

## 2022-09-05 RX ORDER — PREGABALIN 225 MG/1
250 CAPSULE ORAL DAILY
Refills: 0 | Status: DISCONTINUED | OUTPATIENT
Start: 2022-09-05 | End: 2022-09-05

## 2022-09-05 RX ORDER — CHOLECALCIFEROL (VITAMIN D3) 125 MCG
2000 CAPSULE ORAL DAILY
Refills: 0 | Status: DISCONTINUED | OUTPATIENT
Start: 2022-09-05 | End: 2022-09-06

## 2022-09-05 RX ADMIN — Medication 10 MILLIGRAM(S): at 11:44

## 2022-09-05 RX ADMIN — Medication 100 MILLIGRAM(S): at 13:49

## 2022-09-05 RX ADMIN — Medication 2000 UNIT(S): at 11:43

## 2022-09-05 RX ADMIN — ENOXAPARIN SODIUM 40 MILLIGRAM(S): 100 INJECTION SUBCUTANEOUS at 11:43

## 2022-09-05 RX ADMIN — Medication 50 MILLIGRAM(S): at 06:50

## 2022-09-05 RX ADMIN — TAMSULOSIN HYDROCHLORIDE 0.4 MILLIGRAM(S): 0.4 CAPSULE ORAL at 21:31

## 2022-09-05 RX ADMIN — Medication 100 MILLIGRAM(S): at 21:32

## 2022-09-05 RX ADMIN — Medication 100 MILLIGRAM(S): at 06:50

## 2022-09-05 RX ADMIN — SIMVASTATIN 20 MILLIGRAM(S): 20 TABLET, FILM COATED ORAL at 21:31

## 2022-09-05 RX ADMIN — SENNA PLUS 2 TABLET(S): 8.6 TABLET ORAL at 21:32

## 2022-09-05 RX ADMIN — POLYETHYLENE GLYCOL 3350 17 GRAM(S): 17 POWDER, FOR SOLUTION ORAL at 11:44

## 2022-09-05 RX ADMIN — TAMSULOSIN HYDROCHLORIDE 0.4 MILLIGRAM(S): 0.4 CAPSULE ORAL at 11:42

## 2022-09-05 NOTE — PROGRESS NOTE ADULT - ASSESSMENT
ORTHO PROGRESS NOTE     81yFemale POD #4      S/P Total hip replacement        Patient seen and examined at bedside . The patient is awake and alert in NAD. No complaints of chest pain, SOB, N/V.    MEDICATIONS  (STANDING):  disopyramide 100 milliGRAM(s) Oral three times a day  enoxaparin Injectable 40 milliGRAM(s) SubCutaneous every 24 hours  metoprolol succinate ER 50 milliGRAM(s) Oral daily  senna 2 Tablet(s) Oral at bedtime    MEDICATIONS  (PRN):  acetaminophen     Tablet .. 650 milliGRAM(s) Oral every 6 hours PRN Temp greater or equal to 38C (100.4F), Mild Pain (1 - 3)  morphine  - Injectable 1 milliGRAM(s) IV Push every 4 hours PRN Moderate Pain (4 - 6)      Vital Signs Last 24 Hrs  T(C): 37.2 (05 Sep 2022 05:00), Max: 37.2 (04 Sep 2022 08:28)  T(F): 99 (05 Sep 2022 05:00), Max: 99 (04 Sep 2022 08:28)  HR: 71 (05 Sep 2022 05:00) (71 - 76)  BP: 143/65 (05 Sep 2022 05:00) (130/61 - 143/65)  BP(mean): 91 (04 Sep 2022 16:27) (91 - 91)  RR: 20 (05 Sep 2022 05:00) (20 - 20)  SpO2: 94% (04 Sep 2022 16:27) (91% - 94%)    PE:   Dressing C/D/I   Compartments soft and compressible  Motor intact distally  SILT distally  CR<2sec  palpable pulses                               9.6    8.94  )-----------( 139      ( 04 Sep 2022 09:15 )             28.3     09-04    138  |  105  |  17  ----------------------------<  115<H>  3.9   |  24  |  0.7    Ca    8.2<L>      04 Sep 2022 09:15  Mg     2.1     09-04    TPro  4.8<L>  /  Alb  2.8<L>  /  TBili  0.5  /  DBili  x   /  AST  50<H>  /  ALT  27  /  AlkPhos  75  09-04      Urinalysis Basic - ( 03 Sep 2022 09:52 )    Color: Light Yellow / Appearance: Clear / S.028 / pH: x  Gluc: x / Ketone: Small  / Bili: Negative / Urobili: <2 mg/dL   Blood: x / Protein: Trace / Nitrite: Negative   Leuk Esterase: Negative / RBC: 3 /HPF / WBC 6 /HPF   Sq Epi: x / Non Sq Epi: 1 /HPF / Bacteria: Negative        22 @ 07:01  -  22 @ 07:00  --------------------------------------------------------  IN: 80 mL / OUT: 625 mL / NET: -545 mL          A/P: 81yFemale POD #4    S/P  Total hip replacement, overall doing well but has yet to ambulate on left side. On DVT ppx and bowel reigmen but yet to have BM, consider enema.     OOB to Chair   WBAT  PT/OT  Pain control   Ext icing/elevation  Incentive Spirometry   DVT Prophylaxis   Discharge planning

## 2022-09-05 NOTE — PROGRESS NOTE ADULT - ASSESSMENT
82 y/o female with PMHx of HOCM (on aspirin, following with Dr. Hightower at [facility]) and HTN, currently admitted for left femoral neck fracture (followed by orthopedics) after a fall without head strike or LOC. III/IV systolic murmur is auscultated on cardiac exam.     # Left femoral neck fracture  - Patient followed by orthopedics and PT/OT.  - Of note, patient s/p left hip replacement on 9/1.  - Bed to chair QD, as tolerated.  - Continue morphine and Tylenol for pain control.  - Patient and spouse agree to SNF after discharge.  - F/U cardiology clearance for OR.    # HOCM  # Chest pain  - Patient follows with Dr. Hightower at [facility]; last visit in June.  - Echo on 9/2 shows LVEF >75% but severe asymmetric LV hypertrophy involving septum, increased global LV systolic function and moderate-severe mitral valve regurgitation.  - Continue metoprolol succinate and disopyramide.  - Verapamil held due to hypotension.  - Patient had episode of chest pain on 9/2 that self-resolved; troponin still stably elevated at 0.10; cardiology consulted - follow up.  - Home meds were not continue here, started today    # Constipation  - on Morphine  - on Senna, added Miralex, Dulcolax Suppository today  - f/u on BM status    # Anemia  - Maintain active type and screen.  - Transfuse if hemoglobin <10.    # Urinary retention  - Soler discontinued yesterday.   - Bladder scan q4-q6h.  - Continue trial of void.  - Flomax started today    # DVT PPX  - Lovenox.    # Diet  - DASH/TLC.    # Code Status  - DNR/DNI (MOLST in chart).    # Dispo  - Acute.  Plan DC soon   80 y/o female with PMHx of HOCM (on aspirin, following with Dr. Hightower at [facility]) and HTN, currently admitted for left femoral neck fracture (followed by orthopedics) after a fall without head strike or LOC. III/IV systolic murmur is auscultated on cardiac exam.     # Left femoral neck fracture  - Patient followed by orthopedics and PT/OT.  - Of note, patient s/p left hip replacement on 9/1.  - Bed to chair QD, as tolerated.  - Continue morphine and Tylenol for pain control.  - Patient and spouse agree to SNF after discharge.  - Ortho following  - started Incentive spirometry today  - PT/OT following    # HOCM  # Chest pain  - Patient follows with Dr. Hightower at [facility]; last visit in June.  - Echo on 9/2 shows LVEF >75% but severe asymmetric LV hypertrophy involving septum, increased global LV systolic function and moderate-severe mitral valve regurgitation.  - Continue metoprolol succinate and disopyramide.  - Verapamil held due to hypotension.  - Patient had episode of chest pain on 9/2 that self-resolved; troponin still stably elevated at 0.10; cardiology consulted - follow up.  - Home meds were not continue here, started today    # Constipation  - on Morphine  - on Senna, added Miralex, Dulcolax Suppository today  - f/u on BM status    # Anemia  - Maintain active type and screen.  - Transfuse if hemoglobin <10.    # Urinary retention  - Soler discontinued yesterday.   - Bladder scan q4-q6h.  - Continue trial of void.  - Flomax started today    # DVT PPX  - Lovenox.    # Diet  - DASH/TLC.    # Code Status  - DNR/DNI (MOLST in chart).    # Dispo  - Acute.  Plan DC soon

## 2022-09-05 NOTE — CONSULT NOTE ADULT - REASON FOR ADMISSION
Pain in left hip / fracture

## 2022-09-05 NOTE — PROGRESS NOTE ADULT - SUBJECTIVE AND OBJECTIVE BOX
GERRY FRIED 81y Female  MRN#: [id number]   Hospital Day: 5d    SUBJECTIVE  Patient is a 81y old Female who presents with a chief complaint of Pain in left hip / fracture (05 Sep 2022 07:54)  Currently admitted to medicine with the primary diagnosis of Subcapital fracture of left hip      INTERVAL HPI AND OVERNIGHT EVENTS:  Patient was examined and seen at bedside. This morning she is resting comfortably in bed. No issues or overnight events.    OBJECTIVE  PAST MEDICAL & SURGICAL HISTORY  High cholesterol    Hypertrophic obstructive cardiomyopathy (HOCM)    Aortic stenosis    No significant past surgical history      ALLERGIES:  lidocaine (Rash)    MEDICATIONS:  STANDING MEDICATIONS  bisacodyl Suppository 10 milliGRAM(s) Rectal once  cholecalciferol 2000 Unit(s) Oral daily  cyanocobalamin 250 MICROGram(s) Oral daily  disopyramide 100 milliGRAM(s) Oral three times a day  enoxaparin Injectable 40 milliGRAM(s) SubCutaneous every 24 hours  metoprolol succinate ER 50 milliGRAM(s) Oral daily  polyethylene glycol 3350 17 Gram(s) Oral daily  senna 2 Tablet(s) Oral at bedtime  simvastatin 20 milliGRAM(s) Oral at bedtime  tamsulosin 0.4 milliGRAM(s) Oral at bedtime    PRN MEDICATIONS  acetaminophen     Tablet .. 650 milliGRAM(s) Oral every 6 hours PRN  morphine  - Injectable 1 milliGRAM(s) IV Push every 4 hours PRN      VITAL SIGNS: Last 24 Hours  T(C): 37.2 (05 Sep 2022 05:00), Max: 37.2 (05 Sep 2022 05:00)  T(F): 99 (05 Sep 2022 05:00), Max: 99 (05 Sep 2022 05:00)  HR: 71 (05 Sep 2022 05:00) (71 - 76)  BP: 143/65 (05 Sep 2022 05:00) (130/61 - 143/65)  BP(mean): 91 (04 Sep 2022 16:27) (91 - 91)  RR: 20 (05 Sep 2022 05:00) (20 - 20)  SpO2: 94% (04 Sep 2022 16:27) (94% - 94%)    LABS:                        9.3    8.96  )-----------( 158      ( 05 Sep 2022 07:30 )             27.3     09-05    137  |  104  |  14  ----------------------------<  96  3.8   |  23  |  0.7    Ca    8.3<L>      05 Sep 2022 07:30  Mg     2.0     09-05    TPro  4.8<L>  /  Alb  2.8<L>  /  TBili  0.5  /  DBili  x   /  AST  83<H>  /  ALT  68<H>  /  AlkPhos  78  09-05              Culture - Blood (collected 03 Sep 2022 12:20)  Source: .Blood None  Preliminary Report (04 Sep 2022 22:01):    No growth to date.    Culture - Urine (collected 03 Sep 2022 09:52)  Source: Clean Catch Clean Catch (Midstream)  Final Report (04 Sep 2022 14:40):    No growth      CARDIAC MARKERS ( 04 Sep 2022 01:46 )  x     / 0.10 ng/mL / x     / x     / x      CARDIAC MARKERS ( 03 Sep 2022 17:46 )  x     / 0.10 ng/mL / 370 U/L / x     / 4.9 ng/mL  CARDIAC MARKERS ( 03 Sep 2022 12:20 )  x     / 0.10 ng/mL / x     / x     / x            PHYSICAL EXAM:  CONSTITUTIONAL: Well-appearing and in no apparent distress.    EYES: PERRLA and symmetric, EOMI, No conjunctival injection or pallor. Sclera anicteric.    ENMT: Moist mucous membranes. Normal dentition. No pharyngeal injection or exudates. No gross hearing impairment noted.  	NECK: Supple, symmetric and without tracheal deviation. Thyroid gland not enlarged and without palpable masses.    RESPIRATORY: No respiratory distress. No obvious use of accessory muscles. Lungs CTAB with no crackling, wheezing, rhonchi or rubs.    CARDIOVASCULAR: Regular rate and rhythm. Normal S1 and S2. III/VI systolic murmur auscultated at right upper sternal border. Dorsalis pedis pulses 2+.    GASTROINTESTINAL: Soft, non-tender to palpation in all quadrants. No palpable masses. No hepatosplenomegaly. No appreciable hernias.    MUSCULOSKELETAL: Examination of all four extremities without obvious misalignment, normal range of motion without pain, no spinal tenderness, normal muscle strength/tone.    SKIN: No obvious rashes or ulcers.    NEUROLOGIC: CN II-XII intact; 5/5 strength in all four extremities, both proximally and distally; sensation intact in upper and lower extremities b/l to light touch.    PSYCHIATRIC: Appropriate insight/judgment; A+O x 3. Mood and affect appropriate. Recent/remote memory intact.

## 2022-09-05 NOTE — CONSULT NOTE ADULT - ASSESSMENT
IMPRESSION:    SP fall/ l hip fx sp ORIF  CP SP cardio/ CT Angio no PE  HOCM  hx of GI bleed      PLAN:    CNS: Avoid CNS depressant, pain control    HEENT:  Oral care    PULMONARY:  HOB @ 45 degrees, aspiration precaution, NC, Incentive spirometry    CARDIOVASCULAR: lopressor, CE, cardio eval    GI: GI prophylaxis                                          Feeding po    RENAL:  F/u  lytes.  Correct as needed. accurate I/O    INFECTIOUS DISEASE: non abx    HEMATOLOGICAL:  DVT prophylaxis. trend CBC    ENDOCRINE:  Follow up FS.  Insulin protocol if needed.    SDU x 24h    PT/ OT
IMPRESSION: Rehab of left hip femoral neck impacted fx, s/p L THR    PRECAUTIONS: [   ] Cardiac  [   ] Respiratory  [   ] Seizures [   ] Contact Isolation  [   ] Droplet Isolation  [   ] Other    Weight Bearing Status: WBAT LLE    RECOMMENDATION:    Out of Bed to Chair     DVT/Decubiti Prophylaxis    REHAB PLAN:     [ x   ] Bedside P/T 3-5 times a week   [  x  ]   Bedside O/T  2-3 times a week             [    ] Speech Therapy               [    ]  No Rehab Therapy Indicated   Conditioning/ROM                                    ADL  Bed Mobility                                               Conditioning/ROM  Transfers                                                     Bed Mobility  Sitting /Standing Balance                         Transfers                                        Gait Training                                               Sitting/Standing Balance  Stair Training [   ]Applicable                    Home equipment Eval                                                                        Splinting  [   ] Only      GOALS:   ADL   [ x   ]   Independent                    Transfers  [  x  ] Independent                          Ambulation  [  x  ] Independent     [   x  ] With device                            [    ]  CG                                                         [    ]  CG                                                                  [    ] CG                            [    ] Min A                                                   [    ] Min A                                                              [    ] Min  A          DISCHARGE PLAN:   [    ]  Good candidate for Intensive Rehabilitation/Hospital based                                             Will tolerate 3hrs Intensive Rehab Daily                                       [     ]  Short Term Rehab in Skilled Nursing Facility                                       [     ]  Home with Outpatient or  services                                         [  x   ]  Possible Candidate for Intensive Hospital based Rehab                                       
Patient with hx of HOCM follows by Dr Hightower with some CP and release of CE.     *****Do not give any meds for afterload reduction do not give any nitrates for pain. ******    The main thing is rate control at this time to keep HR 60s.     Will not plan for any invasive management at this time. Just medical therapy. 
History HOCM       aortic stenosis    preop hip FX    have not seen patient in over a year   needs pre op echo

## 2022-09-05 NOTE — CONSULT NOTE ADULT - SUBJECTIVE AND OBJECTIVE BOX
CHIEF COMPLAINT:Patient is a 81y old  Female who presents with a chief complaint of Pain in left hip / fracture (05 Sep 2022 10:46)      HISTORY OF PRESENT ILLNESS:   HPI:  81 year old female with past medical history of hypertrophic cardiomyopathy and HTN presents for pain in her left hip after falling. This morning the patient was walking from her living room to her kitchen when she fell on her left side. She was unable to get up so she called her . Her son came to help her up but she was in too much pain so they called EMS. Before this incident she was able to ambulate without assistance and only reported some mild pain in her left knee before her fall.     Called to evlauate patient with HX of HCM followed by DR Hightower with elevated CE         In the ED:   Vitals: /74, HR 65, RR 17, T 97.8, SpO2 96% on RA   Labs: WNL  Left hip Xray: Acute impacted fracture of the left femoral neck. Osteopenia.   (31 Aug 2022 10:44)    PAST MEDICAL & SURGICAL HISTORY:  High cholesterol      Hypertrophic obstructive cardiomyopathy (HOCM)      Aortic stenosis      No significant past surgical history        FAMILY HISTORY:    Allergies    lidocaine (Rash)    Intolerances    	  Home Medications:  clonidine topical 0.1 mg/24 hr film, extended release: 1 film(s) transdermal once a week (01 Sep 2022 11:00)  disopyramide 100 mg oral capsule: 1 cap(s) orally 3 times a day (01 Sep 2022 11:00)  simvastatin 20 mg oral tablet: 1 tab(s) orally once a day (at bedtime) (01 Sep 2022 11:00)  verapamil 120 mg/24 hours oral capsule, extended release: 1 cap(s) orally once a day (01 Sep 2022 11:00)  Vitamin B12 250 mcg oral tablet: 1 tab(s) orally once a day (01 Sep 2022 11:00)  Vitamin D3 50 mcg (2000 intl units) oral tablet: 1 tab(s) orally once a day (01 Sep 2022 11:00)    MEDICATIONS  (STANDING):  cholecalciferol 2000 Unit(s) Oral daily  disopyramide 100 milliGRAM(s) Oral three times a day  enoxaparin Injectable 40 milliGRAM(s) SubCutaneous every 24 hours  metoprolol succinate ER 50 milliGRAM(s) Oral daily  polyethylene glycol 3350 17 Gram(s) Oral daily  senna 2 Tablet(s) Oral at bedtime  simvastatin 20 milliGRAM(s) Oral at bedtime  tamsulosin 0.4 milliGRAM(s) Oral at bedtime    MEDICATIONS  (PRN):  acetaminophen     Tablet .. 650 milliGRAM(s) Oral every 6 hours PRN Temp greater or equal to 38C (100.4F), Mild Pain (1 - 3)  morphine  - Injectable 1 milliGRAM(s) IV Push every 4 hours PRN Moderate Pain (4 - 6)        SOCIAL HISTORY:    [ ] Non-smoker  [ ] Smoker  [ ] Alcohol      REVIEW OF SYSTEMS:    PHYSICAL EXAM:  T(C): 37.1 (22 @ 14:18), Max: 37.2 (22 @ 05:00)  HR: 74 (22 @ 14:18) (71 - 74)  BP: 122/60 (22 @ 14:18) (122/60 - 143/65)  RR: 20 (22 @ 14:18) (20 - 20)  SpO2: --  Wt(kg): --  I&O's Summary    04 Sep 2022 07:  -  05 Sep 2022 07:00  --------------------------------------------------------  IN: 80 mL / OUT: 625 mL / NET: -545 mL    05 Sep 2022 07:01  -  05 Sep 2022 16:50  --------------------------------------------------------  IN: 240 mL / OUT: 0 mL / NET: 240 mL      Daily     Daily Weight in k.4 (05 Sep 2022 05:00)    General Appearance: Normal	  Cardiovascular: Normal S1 S2, No JVD, No murmurs, No edema  Respiratory: Lungs clear to auscultation	  Psychiatry: A & O x 3, Mood & affect appropriate  Gastrointestinal:  Soft, Non-tender  Skin: No rashes, No ecchymoses, No cyanosis	  Neurologic: Non-focal  Extremities: Normal range of motion, No clubbing, cyanosis or edema  Vascular: Peripheral pulses palpable 2+ bilaterally        LABS:	 	                        9.3    8.96  )-----------( 158      ( 05 Sep 2022 07:30 )             27.3     -    137  |  104  |  14  ----------------------------<  96  3.8   |  23  |  0.7  -    138  |  105  |  17  ----------------------------<  115<H>  3.9   |  24  |  0.7    Ca    8.3<L>      05 Sep 2022 07:30  Ca    8.2<L>      04 Sep 2022 09:15  Mg     2.0       Mg     2.1         TPro  4.8<L>  /  Alb  2.8<L>  /  TBili  0.5  /  DBili  x   /  AST  83<H>  /  ALT  68<H>  /  AlkPhos  78    TPro  4.8<L>  /  Alb  2.8<L>  /  TBili  0.5  /  DBili  x   /  AST  50<H>  /  ALT  27  /  AlkPhos  75        proBNP:   Lipid Profile:   HgA1c:   TSH:       CARDIAC MARKERS:            TELEMETRY EVENTS: 	    ECG:  	< from: 12 Lead ECG (22 @ 15:49) >  Ventricular Rate 66 BPM    Atrial Rate 66 BPM    P-R Interval 212 ms    QRS Duration 124 ms    Q-T Interval 476 ms    QTC Calculation(Bazett) 499 ms    P Axis 62 degrees    R Axis 0 degrees    T Axis 150 degrees    Diagnosis Line Sinus rhythm with 1st degree A-V block  Left ventricular hypertrophy with QRS widening and repolarization abnormality  Abnormal ECG    < end of copied text >    RADIOLOGY:  	    PREVIOUS DIAGNOSTIC TESTING:    [ ] Echocardiogram:< from: TTE Echo Complete w/o Contrast w/ Doppler (22 @ 16:05) >   1. Increased global left ventricular systolic function.   2. Left ventricular ejection fraction, by visual estimation, is >75%.   3. Normal right ventricular size and function.   4. Severe asymmetric left ventricular hypertrophy involving the septal   wall.   5. There is systolic anterior motion of the mitral valve with LVOT   obstruction (Gradient 45 mmHg at rest).   6. Moderate to severe mitral valve regurgitation due to MEGHAN. The MR jet   is posteriorly-directed.   7. Mild-moderate tricuspid regurgitation.   8. Estimated pulmonary artery systolic pressure is 56.7 mmHg assuming a   right atrial pressure of 8 mmHg, which is consistent with moderate   pulmonary hypertension.    < end of copied text >    [ ]  Catheterization:  [ ] Stress Test:

## 2022-09-06 ENCOUNTER — TRANSCRIPTION ENCOUNTER (OUTPATIENT)
Age: 82
End: 2022-09-06

## 2022-09-06 VITALS
DIASTOLIC BLOOD PRESSURE: 59 MMHG | RESPIRATION RATE: 18 BRPM | HEART RATE: 71 BPM | TEMPERATURE: 99 F | SYSTOLIC BLOOD PRESSURE: 123 MMHG

## 2022-09-06 LAB
ALBUMIN SERPL ELPH-MCNC: 2.9 G/DL — LOW (ref 3.5–5.2)
ALP SERPL-CCNC: 84 U/L — SIGNIFICANT CHANGE UP (ref 30–115)
ALT FLD-CCNC: 74 U/L — HIGH (ref 0–41)
ANION GAP SERPL CALC-SCNC: 9 MMOL/L — SIGNIFICANT CHANGE UP (ref 7–14)
AST SERPL-CCNC: 72 U/L — HIGH (ref 0–41)
BASOPHILS # BLD AUTO: 0.06 K/UL — SIGNIFICANT CHANGE UP (ref 0–0.2)
BASOPHILS NFR BLD AUTO: 0.8 % — SIGNIFICANT CHANGE UP (ref 0–1)
BILIRUB SERPL-MCNC: 0.6 MG/DL — SIGNIFICANT CHANGE UP (ref 0.2–1.2)
BUN SERPL-MCNC: 14 MG/DL — SIGNIFICANT CHANGE UP (ref 10–20)
CALCIUM SERPL-MCNC: 8.5 MG/DL — SIGNIFICANT CHANGE UP (ref 8.5–10.1)
CHLORIDE SERPL-SCNC: 105 MMOL/L — SIGNIFICANT CHANGE UP (ref 98–110)
CO2 SERPL-SCNC: 25 MMOL/L — SIGNIFICANT CHANGE UP (ref 17–32)
CREAT SERPL-MCNC: 0.7 MG/DL — SIGNIFICANT CHANGE UP (ref 0.7–1.5)
EGFR: 86 ML/MIN/1.73M2 — SIGNIFICANT CHANGE UP
EOSINOPHIL # BLD AUTO: 0.17 K/UL — SIGNIFICANT CHANGE UP (ref 0–0.7)
EOSINOPHIL NFR BLD AUTO: 2.2 % — SIGNIFICANT CHANGE UP (ref 0–8)
GLUCOSE SERPL-MCNC: 96 MG/DL — SIGNIFICANT CHANGE UP (ref 70–99)
HCT VFR BLD CALC: 29.5 % — LOW (ref 37–47)
HGB BLD-MCNC: 9.8 G/DL — LOW (ref 12–16)
IMM GRANULOCYTES NFR BLD AUTO: 0.9 % — HIGH (ref 0.1–0.3)
LYMPHOCYTES # BLD AUTO: 1.57 K/UL — SIGNIFICANT CHANGE UP (ref 1.2–3.4)
LYMPHOCYTES # BLD AUTO: 20.6 % — SIGNIFICANT CHANGE UP (ref 20.5–51.1)
MAGNESIUM SERPL-MCNC: 2.1 MG/DL — SIGNIFICANT CHANGE UP (ref 1.8–2.4)
MCHC RBC-ENTMCNC: 29.3 PG — SIGNIFICANT CHANGE UP (ref 27–31)
MCHC RBC-ENTMCNC: 33.2 G/DL — SIGNIFICANT CHANGE UP (ref 32–37)
MCV RBC AUTO: 88.3 FL — SIGNIFICANT CHANGE UP (ref 81–99)
MONOCYTES # BLD AUTO: 0.67 K/UL — HIGH (ref 0.1–0.6)
MONOCYTES NFR BLD AUTO: 8.8 % — SIGNIFICANT CHANGE UP (ref 1.7–9.3)
NEUTROPHILS # BLD AUTO: 5.08 K/UL — SIGNIFICANT CHANGE UP (ref 1.4–6.5)
NEUTROPHILS NFR BLD AUTO: 66.7 % — SIGNIFICANT CHANGE UP (ref 42.2–75.2)
NRBC # BLD: 0 /100 WBCS — SIGNIFICANT CHANGE UP (ref 0–0)
PHOSPHATE SERPL-MCNC: 3 MG/DL — SIGNIFICANT CHANGE UP (ref 2.1–4.9)
PLATELET # BLD AUTO: 177 K/UL — SIGNIFICANT CHANGE UP (ref 130–400)
POTASSIUM SERPL-MCNC: 4 MMOL/L — SIGNIFICANT CHANGE UP (ref 3.5–5)
POTASSIUM SERPL-SCNC: 4 MMOL/L — SIGNIFICANT CHANGE UP (ref 3.5–5)
PROT SERPL-MCNC: 4.9 G/DL — LOW (ref 6–8)
RBC # BLD: 3.34 M/UL — LOW (ref 4.2–5.4)
RBC # FLD: 15.4 % — HIGH (ref 11.5–14.5)
SARS-COV-2 RNA SPEC QL NAA+PROBE: SIGNIFICANT CHANGE UP
SODIUM SERPL-SCNC: 139 MMOL/L — SIGNIFICANT CHANGE UP (ref 135–146)
SURGICAL PATHOLOGY STUDY: SIGNIFICANT CHANGE UP
TROPONIN T SERPL-MCNC: 0.18 NG/ML — CRITICAL HIGH
WBC # BLD: 7.62 K/UL — SIGNIFICANT CHANGE UP (ref 4.8–10.8)
WBC # FLD AUTO: 7.62 K/UL — SIGNIFICANT CHANGE UP (ref 4.8–10.8)

## 2022-09-06 PROCEDURE — 99239 HOSP IP/OBS DSCHRG MGMT >30: CPT

## 2022-09-06 PROCEDURE — 71045 X-RAY EXAM CHEST 1 VIEW: CPT | Mod: 26

## 2022-09-06 PROCEDURE — 93010 ELECTROCARDIOGRAM REPORT: CPT

## 2022-09-06 RX ORDER — CHLORHEXIDINE GLUCONATE 213 G/1000ML
1 SOLUTION TOPICAL
Refills: 0 | Status: DISCONTINUED | OUTPATIENT
Start: 2022-09-06 | End: 2022-09-06

## 2022-09-06 RX ORDER — TAMSULOSIN HYDROCHLORIDE 0.4 MG/1
1 CAPSULE ORAL
Qty: 0 | Refills: 0 | DISCHARGE
Start: 2022-09-06

## 2022-09-06 RX ADMIN — Medication 100 MILLIGRAM(S): at 06:00

## 2022-09-06 RX ADMIN — Medication 50 MILLIGRAM(S): at 06:00

## 2022-09-06 RX ADMIN — Medication 100 MILLIGRAM(S): at 14:33

## 2022-09-06 RX ADMIN — ENOXAPARIN SODIUM 40 MILLIGRAM(S): 100 INJECTION SUBCUTANEOUS at 11:41

## 2022-09-06 RX ADMIN — Medication 2000 UNIT(S): at 11:41

## 2022-09-06 NOTE — PROGRESS NOTE ADULT - SUBJECTIVE AND OBJECTIVE BOX
Orthopaedic Surgery Progress Note    S&E at bedside this AM. Pain well controlled. No other complaints    T(C): 37.4 (09-06-22 @ 05:28), Max: 37.4 (09-06-22 @ 05:28)  HR: 76 (09-06-22 @ 05:28) (74 - 76)  BP: 131/60 (09-06-22 @ 05:28) (122/60 - 132/58)  RR: 19 (09-06-22 @ 05:28) (19 - 20)  SpO2: --    P/E:  Alert, NAD  Nonlabored breathing    LLE  Dressing in place, c/d/i  SILT sp/dp/t/sural/saph  Firing ta/ehl/fhl/gs  Foot WWP, 2+ DP pulse    Labs                        9.3    8.96  )-----------( 158      ( 05 Sep 2022 07:30 )             27.3     09-05    137  |  104  |  14  ----------------------------<  96  3.8   |  23  |  0.7    Ca    8.3<L>      05 Sep 2022 07:30  Mg     2.0     09-05    TPro  4.8<L>  /  Alb  2.8<L>  /  TBili  0.5  /  DBili  x   /  AST  83<H>  /  ALT  68<H>  /  AlkPhos  78  09-05    LIVER FUNCTIONS - ( 05 Sep 2022 07:30 )  Alb: 2.8 g/dL / Pro: 4.8 g/dL / ALK PHOS: 78 U/L / ALT: 68 U/L / AST: 83 U/L / GGT: x           A/P:   82yo Female POD#5 left QUIQUE, patient doing well.    OOB to Chair   WBAT  PT/OT  Pain control   Ext icing/elevation  Incentive Spirometry   Please follow-up with Dr. Lea, please call [phone] for appointment in 1-2 weeks.

## 2022-09-06 NOTE — DISCHARGE NOTE NURSING/CASE MANAGEMENT/SOCIAL WORK - PATIENT PORTAL LINK FT
You can access the FollowMyHealth Patient Portal offered by [facility] by registering at the following website: [email or web address]. By joining [facility]’s FollowMyHealth portal, you will also be able to view your health information using other applications (apps) compatible with our system.

## 2022-09-06 NOTE — DISCHARGE NOTE PROVIDER - CARE PROVIDER_API CALL
Gatito Lilly)  [address]  [address] [place] [address]  Phone: [phone]  Fax: [phone]  Follow Up Time:     Antoni Alvarado)  Orthopaedic Surgery  [address] [place] [address]  Phone: [phone]  Fax: [phone]  Follow Up Time:

## 2022-09-06 NOTE — PROGRESS NOTE ADULT - ATTENDING COMMENTS
82 y/o female with PMHx of HOCM (on aspirin, following with Dr. Hightower at [facility]) and HTN, currently admitted for left femoral neck fracture (followed by orthopedics) after a fall without head strike or LOC. III/IV systolic murmur is auscultated on cardiac exam.     # Left femoral neck fracture s/p surgical fixation POD 4  - Of note, patient s/p left hip replacement on 9/1.  - Pain control   - Ortho following   - Incentive spirometry   - PT/OT following  - dvt ppx     # HOCM  - Patient follows with Dr. Hightower at [facility]; last visit in June.  - Echo on 9/2 shows LVEF >75% but severe asymmetric LV hypertrophy involving septum, increased global LV systolic function and moderate-severe mitral valve regurgitation.  - Continue metoprolol succinate and disopyramide  - Verapamil held due to hypotension  - Patient had episode of chest pain on 9/2 that self-resolved; troponin still stably elevated at 0.10; cardiology consulted - no intervention     # Constipation  - on Senna, added Miralax, Dulcolax Suppository today     # Anemia  - Maintain active type and screen.  - Transfuse if hemoglobin <10.    # Urinary retention  - TOV once ambulating and constipation resolves   - start Flomax     # DVT PPX  - Lovenox    Pending: STR placement  Plan of care d/w patient
as below

## 2022-09-06 NOTE — PROGRESS NOTE ADULT - SUBJECTIVE AND OBJECTIVE BOX
GERRY FRIED 81y Female  MRN#: [id number]   Hospital Day: 6d    SUBJECTIVE  Patient is a 81y old Female who presents with a chief complaint of Pain in left hip / fracture (06 Sep 2022 07:13)  Currently admitted to medicine with the primary diagnosis of Subcapital fracture of left hip      INTERVAL HPI AND OVERNIGHT EVENTS:  Patient was examined and seen at bedside. This morning she is resting comfortably in bed. No issues or overnight events.    OBJECTIVE  PAST MEDICAL & SURGICAL HISTORY  High cholesterol    Hypertrophic obstructive cardiomyopathy (HOCM)    Aortic stenosis    No significant past surgical history      ALLERGIES:  lidocaine (Rash)    MEDICATIONS:  STANDING MEDICATIONS  chlorhexidine 2% Cloths 1 Application(s) Topical <User Schedule>  cholecalciferol 2000 Unit(s) Oral daily  disopyramide 100 milliGRAM(s) Oral three times a day  enoxaparin Injectable 40 milliGRAM(s) SubCutaneous every 24 hours  metoprolol succinate ER 50 milliGRAM(s) Oral daily  polyethylene glycol 3350 17 Gram(s) Oral daily  senna 2 Tablet(s) Oral at bedtime  simvastatin 20 milliGRAM(s) Oral at bedtime  tamsulosin 0.4 milliGRAM(s) Oral at bedtime    PRN MEDICATIONS  acetaminophen     Tablet .. 650 milliGRAM(s) Oral every 6 hours PRN  morphine  - Injectable 1 milliGRAM(s) IV Push every 4 hours PRN      VITAL SIGNS: Last 24 Hours  T(C): 37.4 (06 Sep 2022 05:28), Max: 37.4 (06 Sep 2022 05:28)  T(F): 99.3 (06 Sep 2022 05:28), Max: 99.3 (06 Sep 2022 05:28)  HR: 76 (06 Sep 2022 05:28) (74 - 76)  BP: 131/60 (06 Sep 2022 05:28) (122/60 - 132/58)  BP(mean): --  RR: 19 (06 Sep 2022 05:28) (19 - 20)  SpO2: --    LABS:                        9.8    7.62  )-----------( 177      ( 06 Sep 2022 07:00 )             29.5     09-06    139  |  105  |  14  ----------------------------<  96  4.0   |  25  |  0.7    Ca    8.5      06 Sep 2022 07:00  Phos  3.0     09-06  Mg     2.1     09-06    TPro  4.9<L>  /  Alb  2.9<L>  /  TBili  0.6  /  DBili  x   /  AST  72<H>  /  ALT  74<H>  /  AlkPhos  84  09-06          Troponin T, Serum: 0.18 ng/mL *HH* (09-06-22 @ 07:00)      Culture - Blood (collected 03 Sep 2022 12:20)  Source: .Blood None  Preliminary Report (04 Sep 2022 22:01):    No growth to date.      CARDIAC MARKERS ( 06 Sep 2022 07:00 )  x     / 0.18 ng/mL / x     / x     / x          PHYSICAL EXAM:  CONSTITUTIONAL: Well-appearing and in no apparent distress.    EYES: PERRLA and symmetric, EOMI, No conjunctival injection or pallor. Sclera anicteric.    ENMT: Moist mucous membranes. Normal dentition. No pharyngeal injection or exudates. No gross hearing impairment noted.  	NECK: Supple, symmetric and without tracheal deviation. Thyroid gland not enlarged and without palpable masses.    RESPIRATORY: No respiratory distress. No obvious use of accessory muscles. Lungs CTAB with no crackling, wheezing, rhonchi or rubs.    CARDIOVASCULAR: Regular rate and rhythm. Normal S1 and S2. III/VI systolic murmur auscultated at right upper sternal border. Dorsalis pedis pulses 2+.    GASTROINTESTINAL: Soft, non-tender to palpation in all quadrants. No palpable masses. No hepatosplenomegaly. No appreciable hernias.    MUSCULOSKELETAL: Examination of all four extremities without obvious misalignment, normal range of motion without pain, no spinal tenderness, normal muscle strength/tone.    SKIN: No obvious rashes or ulcers.    NEUROLOGIC: CN II-XII intact; 5/5 strength in all four extremities, both proximally and distally; sensation intact in upper and lower extremities b/l to light touch.    PSYCHIATRIC: Appropriate insight/judgment; A+O x 3. Mood and affect appropriate. Recent/remote memory intact.

## 2022-09-06 NOTE — DISCHARGE NOTE NURSING/CASE MANAGEMENT/SOCIAL WORK - NSDCPEFALRISK_GEN_ALL_CORE
For information on Fall & Injury Prevention, visit: [email or web address] OR  [email or web address] OR  https://www.cdc.gov/steadi/patient.html

## 2022-09-06 NOTE — DISCHARGE NOTE PROVIDER - CARE PROVIDERS DIRECT ADDRESSES
,DirectAddress_Unknown,[email or web address]

## 2022-09-06 NOTE — PROGRESS NOTE ADULT - REASON FOR ADMISSION
Left hip fracture
Pain in left hip / fracture

## 2022-09-06 NOTE — PROGRESS NOTE ADULT - ASSESSMENT
80 y/o female with PMHx of HOCM (on aspirin, following with Dr. Hightower at [facility]) and HTN, currently admitted for left femoral neck fracture (followed by orthopedics) after a fall without head strike or LOC. III/IV systolic murmur is auscultated on cardiac exam.     # Left femoral neck fracture  - Patient followed by orthopedics and PT/OT.  - Of note, patient s/p left hip replacement on 9/1.  - Bed to chair QD, as tolerated.  - Continue morphine and Tylenol for pain control.  - Patient and spouse agree to SNF after discharge.  - Ortho following  - started Incentive spirometry today  - PT/OT following    # HOCM  # Chest pain  - Patient follows with Dr. Hightower at [facility]; last visit in June.  - Echo on 9/2 shows LVEF >75% but severe asymmetric LV hypertrophy involving septum, increased global LV systolic function and moderate-severe mitral valve regurgitation.  - Continue metoprolol succinate and disopyramide.  - Verapamil held due to hypotension.  - Patient had episode of chest pain on 9/2 that self-resolved; troponin still stably elevated at 0.10; cardiology consulted - follow up.  - Home meds were not continue here, started today    # Constipation  - on Morphine  - on Senna, added Miralex, Dulcolax Suppository today  - f/u on BM status    # Anemia  - Maintain active type and screen.  - Transfuse if hemoglobin <10.    # Urinary retention  - Soler discontinued yesterday.   - Bladder scan q4-q6h.  - Continue trial of void.  - Flomax started today    # DVT PPX  - Lovenox.    # Diet  - DASH/TLC.    # Code Status  - DNR/DNI (MOLST in chart).    # Dispo  - Acute.  Plan DC soon   82 y/o female with PMHx of HOCM (on aspirin, following with Dr. Hightower at [facility]) and HTN, currently admitted for left femoral neck fracture (followed by orthopedics) after a fall without head strike or LOC. III/IV systolic murmur is auscultated on cardiac exam.     # Left femoral neck fracture  - Patient followed by orthopedics and PT/OT.  - Of note, patient s/p left hip replacement on 9/1.  - Bed to chair QD, as tolerated.  - Continue morphine and Tylenol for pain control.  - Patient and spouse agree to SNF after discharge.  - Ortho following  - started Incentive spirometry today  - PT f/u    # HOCM  # Chest pain  - Patient follows with Dr. Hightower at [facility]; last visit in June.  - Echo on 9/2 shows LVEF >75% but severe asymmetric LV hypertrophy involving septum, increased global LV systolic function and moderate-severe mitral valve regurgitation.  - Continue metoprolol succinate and disopyramide.  - Verapamil held due to hypotension.  - Patient had episode of chest pain on 9/2 that self-resolved; troponin still stably elevated at 0.10; cardiology consulted - follow up.    # Constipation  - on Morphine  - on Senna, added Miralex, Dulcolax Suppository today  - had a BM yesterday    # Anemia  - Maintain active type and screen.  - Transfuse if hemoglobin <10.    # Urinary retention  - Soler discontinued yesterday.   - Bladder scan q4-q6h.  - Continue trial of void.  - Flomax started    # DVT PPX  - Lovenox.    # Diet  - DASH/TLC.    # Code Status  - DNR/DNI (MOLST in chart).    # Dispo  - Acute.  Plan DC soon

## 2022-09-06 NOTE — PROGRESS NOTE ADULT - PROVIDER SPECIALTY LIST ADULT
Hospitalist
Internal Medicine
Orthopedics
Hospitalist
Internal Medicine
Pulmonology
Internal Medicine
Internal Medicine

## 2022-09-06 NOTE — DISCHARGE NOTE PROVIDER - NSDCMRMEDTOKEN_GEN_ALL_CORE_FT
aspirin 81 mg oral delayed release tablet: 1 tab(s) orally once a day   clonidine topical 0.1 mg/24 hr film, extended release: 1 film(s) transdermal once a week  disopyramide 100 mg oral capsule: 1 cap(s) orally 3 times a day  simvastatin 20 mg oral tablet: 1 tab(s) orally once a day (at bedtime)  Toprol-XL 50 mg oral tablet, extended release: 1 tab(s) orally once a day  verapamil 120 mg/24 hours oral capsule, extended release: 1 cap(s) orally once a day  Vitamin B12 250 mcg oral tablet: 1 tab(s) orally once a day  Vitamin D3 50 mcg (2000 intl units) oral tablet: 1 tab(s) orally once a day   aspirin 81 mg oral delayed release tablet: 1 tab(s) orally once a day   clonidine topical 0.1 mg/24 hr film, extended release: 1 film(s) transdermal once a week  disopyramide 100 mg oral capsule: 1 cap(s) orally 3 times a day  simvastatin 20 mg oral tablet: 1 tab(s) orally once a day (at bedtime)  tamsulosin 0.4 mg oral capsule: 1 cap(s) orally once a day (at bedtime)  Toprol-XL 50 mg oral tablet, extended release: 1 tab(s) orally once a day  verapamil 120 mg/24 hours oral capsule, extended release: 1 cap(s) orally once a day  Vitamin B12 250 mcg oral tablet: 1 tab(s) orally once a day  Vitamin D3 50 mcg (2000 intl units) oral tablet: 1 tab(s) orally once a day   aspirin 81 mg oral delayed release tablet: 1 tab(s) orally once a day   disopyramide 100 mg oral capsule: 1 cap(s) orally 3 times a day  simvastatin 20 mg oral tablet: 1 tab(s) orally once a day (at bedtime)  tamsulosin 0.4 mg oral capsule: 1 cap(s) orally once a day (at bedtime)  Toprol-XL 50 mg oral tablet, extended release: 1 tab(s) orally once a day  verapamil 120 mg/24 hours oral capsule, extended release: 1 cap(s) orally once a day  Vitamin B12 250 mcg oral tablet: 1 tab(s) orally once a day  Vitamin D3 50 mcg (2000 intl units) oral tablet: 1 tab(s) orally once a day

## 2022-09-06 NOTE — DISCHARGE NOTE PROVIDER - NSDCCPCAREPLAN_GEN_ALL_CORE_FT
PRINCIPAL DISCHARGE DIAGNOSIS  Diagnosis: Subcapital fracture of left hip  Assessment and Plan of Treatment: You had a hip fracture when you came here. Orthopedics did a surgery. Please follow up with Dr. Palafox in 1-2 weeks. You can make appointment on [phone]. PLease also follow up with your cardiologist in 1 week as your heart enzyme was elevated. Take all prescirbed medications.

## 2022-09-06 NOTE — DISCHARGE NOTE PROVIDER - HOSPITAL COURSE
80 y/o female with PMHx of HOCM (on aspirin, following with Dr. Hightower at [facility]) and HTN, currently admitted for left femoral neck fracture (followed by orthopedics) after a fall without head strike or LOC. III/IV systolic murmur is auscultated on cardiac exam.     # Left femoral neck fracture  - Patient followed by orthopedics and PT/OT.  - Of note, patient s/p left hip replacement on 9/1.  - Bed to chair QD, as tolerated.  - Continue morphine and Tylenol for pain control.  - Patient and spouse agree to SNF after discharge.  - Ortho following  - started Incentive spirometry today  - PT f/u  - out pt ortho follow up    # HOCM  # Chest pain  - Patient follows with Dr. Hightower at [facility]; last visit in June.  - Echo on 9/2 shows LVEF >75% but severe asymmetric LV hypertrophy involving septum, increased global LV systolic function and moderate-severe mitral valve regurgitation.  - Continue metoprolol succinate and disopyramide.  - Verapamil held due to hypotension.  - Patient had episode of chest pain on 9/2 that self-resolved; troponin still stably elevated at 0.10; cardiology consulted - follow up.    # Constipation  - on Morphine  - on Senna, added Miralex, Dulcolax Suppository today  - had a BM yesterday    # Anemia  - Maintain active type and screen.  - Transfuse if hemoglobin <10.    # Urinary retention  - Soler discontinued yesterday.   - Bladder scan q4-q6h.  - Continue trial of void.  - Flomax started    # DVT PPX  - Lovenox.    # Diet  - DASH/TLC.    # Code Status  - DNR/DNI (MOLST in chart).    # Dispo  - Acute.  DC today

## 2022-09-06 NOTE — PROGRESS NOTE ADULT - TIME BILLING
#Chest pain  ce elevated  ekg noted  tte without wall motion abnl  no intervention per cards  resolving  #HOCM  tte c/w hocm, high ef  avoid vasodilators  toprol 50  disopyramide 100 tid  f/u cards  #Fall, c/b L hip fx  xray with L fem neck fx  s/p repair 9/1  pain control  PT  f/u ortho    #Progress Note Handoff:  Pending (specify):  Consults_________, Tests________, Test Results_______, Other___trend ce, f/u cards______  Family discussion: d/w pt at bedside re: treatment plan, primary dx  Disposition: Home___/SNF___/Other________/Unknown at this time___x_____

## 2022-09-08 LAB
CULTURE RESULTS: SIGNIFICANT CHANGE UP
SPECIMEN SOURCE: SIGNIFICANT CHANGE UP

## 2022-09-09 ENCOUNTER — EMERGENCY (EMERGENCY)
Facility: HOSPITAL | Age: 82
LOS: 0 days | Discharge: HOME | End: 2022-09-10
Attending: EMERGENCY MEDICINE | Admitting: EMERGENCY MEDICINE

## 2022-09-09 VITALS
HEART RATE: 63 BPM | OXYGEN SATURATION: 98 % | HEIGHT: 59 IN | SYSTOLIC BLOOD PRESSURE: 105 MMHG | DIASTOLIC BLOOD PRESSURE: 58 MMHG | RESPIRATION RATE: 18 BRPM | TEMPERATURE: 97 F

## 2022-09-09 DIAGNOSIS — Z79.82 LONG TERM (CURRENT) USE OF ASPIRIN: ICD-10-CM

## 2022-09-09 DIAGNOSIS — I35.0 NONRHEUMATIC AORTIC (VALVE) STENOSIS: ICD-10-CM

## 2022-09-09 DIAGNOSIS — I42.1 OBSTRUCTIVE HYPERTROPHIC CARDIOMYOPATHY: ICD-10-CM

## 2022-09-09 DIAGNOSIS — R07.9 CHEST PAIN, UNSPECIFIED: ICD-10-CM

## 2022-09-09 DIAGNOSIS — Z91.041 RADIOGRAPHIC DYE ALLERGY STATUS: ICD-10-CM

## 2022-09-09 DIAGNOSIS — R77.8 OTHER SPECIFIED ABNORMALITIES OF PLASMA PROTEINS: ICD-10-CM

## 2022-09-09 DIAGNOSIS — E78.00 PURE HYPERCHOLESTEROLEMIA, UNSPECIFIED: ICD-10-CM

## 2022-09-09 LAB
BASOPHILS # BLD AUTO: 0.04 K/UL — SIGNIFICANT CHANGE UP (ref 0–0.2)
BASOPHILS NFR BLD AUTO: 0.5 % — SIGNIFICANT CHANGE UP (ref 0–1)
EOSINOPHIL # BLD AUTO: 0.13 K/UL — SIGNIFICANT CHANGE UP (ref 0–0.7)
EOSINOPHIL NFR BLD AUTO: 1.5 % — SIGNIFICANT CHANGE UP (ref 0–8)
HCT VFR BLD CALC: 26.6 % — LOW (ref 37–47)
HGB BLD-MCNC: 8.9 G/DL — LOW (ref 12–16)
IMM GRANULOCYTES NFR BLD AUTO: 1.5 % — HIGH (ref 0.1–0.3)
LYMPHOCYTES # BLD AUTO: 1.65 K/UL — SIGNIFICANT CHANGE UP (ref 1.2–3.4)
LYMPHOCYTES # BLD AUTO: 19.6 % — LOW (ref 20.5–51.1)
MCHC RBC-ENTMCNC: 29.7 PG — SIGNIFICANT CHANGE UP (ref 27–31)
MCHC RBC-ENTMCNC: 33.5 G/DL — SIGNIFICANT CHANGE UP (ref 32–37)
MCV RBC AUTO: 88.7 FL — SIGNIFICANT CHANGE UP (ref 81–99)
MONOCYTES # BLD AUTO: 0.8 K/UL — HIGH (ref 0.1–0.6)
MONOCYTES NFR BLD AUTO: 9.5 % — HIGH (ref 1.7–9.3)
NEUTROPHILS # BLD AUTO: 5.66 K/UL — SIGNIFICANT CHANGE UP (ref 1.4–6.5)
NEUTROPHILS NFR BLD AUTO: 67.4 % — SIGNIFICANT CHANGE UP (ref 42.2–75.2)
NRBC # BLD: 0 /100 WBCS — SIGNIFICANT CHANGE UP (ref 0–0)
PLATELET # BLD AUTO: 223 K/UL — SIGNIFICANT CHANGE UP (ref 130–400)
RBC # BLD: 3 M/UL — LOW (ref 4.2–5.4)
RBC # FLD: 15.6 % — HIGH (ref 11.5–14.5)
WBC # BLD: 8.41 K/UL — SIGNIFICANT CHANGE UP (ref 4.8–10.8)
WBC # FLD AUTO: 8.41 K/UL — SIGNIFICANT CHANGE UP (ref 4.8–10.8)

## 2022-09-09 PROCEDURE — 93010 ELECTROCARDIOGRAM REPORT: CPT

## 2022-09-09 PROCEDURE — 99285 EMERGENCY DEPT VISIT HI MDM: CPT | Mod: FS

## 2022-09-09 NOTE — ED CLERICAL - NS ED CLERK NOTE PRE-ARRIVAL INFORMATION; ADDITIONAL PRE-ARRIVAL INFORMATION
This patient is enrolled in the comprehensive joint replacement (CJR) program and has active care navigation.  This patient can be followed up by the care navigation team within 24 hours. To arrange close follow-up or to obtain additional clinical information about this patient, please call the contact number above.   Please call the orthopedic resident for ALL patients who are admitted or placed in observation.

## 2022-09-10 VITALS
SYSTOLIC BLOOD PRESSURE: 100 MMHG | HEART RATE: 61 BPM | OXYGEN SATURATION: 95 % | DIASTOLIC BLOOD PRESSURE: 58 MMHG | RESPIRATION RATE: 18 BRPM

## 2022-09-10 LAB
ALBUMIN SERPL ELPH-MCNC: 2.9 G/DL — LOW (ref 3.5–5.2)
ALP SERPL-CCNC: 87 U/L — SIGNIFICANT CHANGE UP (ref 30–115)
ALT FLD-CCNC: 50 U/L — HIGH (ref 0–41)
ANION GAP SERPL CALC-SCNC: 7 MMOL/L — SIGNIFICANT CHANGE UP (ref 7–14)
AST SERPL-CCNC: 46 U/L — HIGH (ref 0–41)
BILIRUB SERPL-MCNC: 0.8 MG/DL — SIGNIFICANT CHANGE UP (ref 0.2–1.2)
BLD GP AB SCN SERPL QL: SIGNIFICANT CHANGE UP
BUN SERPL-MCNC: 20 MG/DL — SIGNIFICANT CHANGE UP (ref 10–20)
CALCIUM SERPL-MCNC: 8.6 MG/DL — SIGNIFICANT CHANGE UP (ref 8.5–10.1)
CHLORIDE SERPL-SCNC: 101 MMOL/L — SIGNIFICANT CHANGE UP (ref 98–110)
CO2 SERPL-SCNC: 25 MMOL/L — SIGNIFICANT CHANGE UP (ref 17–32)
CREAT SERPL-MCNC: 0.8 MG/DL — SIGNIFICANT CHANGE UP (ref 0.7–1.5)
EGFR: 74 ML/MIN/1.73M2 — SIGNIFICANT CHANGE UP
GLUCOSE SERPL-MCNC: 85 MG/DL — SIGNIFICANT CHANGE UP (ref 70–99)
POTASSIUM SERPL-MCNC: 4.1 MMOL/L — SIGNIFICANT CHANGE UP (ref 3.5–5)
POTASSIUM SERPL-SCNC: 4.1 MMOL/L — SIGNIFICANT CHANGE UP (ref 3.5–5)
PROT SERPL-MCNC: 4.9 G/DL — LOW (ref 6–8)
SODIUM SERPL-SCNC: 133 MMOL/L — LOW (ref 135–146)
TROPONIN T SERPL-MCNC: 0.03 NG/ML — CRITICAL HIGH

## 2022-09-10 PROCEDURE — 71045 X-RAY EXAM CHEST 1 VIEW: CPT | Mod: 26

## 2022-09-10 NOTE — ED PROVIDER NOTE - CLINICAL SUMMARY MEDICAL DECISION MAKING FREE TEXT BOX
81-year-old female history of recent hip/admission and now at rehab, hypertension HOCM, presenting for evaluation from rehab for evaluation of elevated troponin.  While in hospital, patient was noted to have elevated troponin, patient states that last night had episode of chest discomfort, had blood work done today showing troponin 1.04.  Patient states that she has been asymptomatic all day.  No other acute complaints at this time.  No shortness of breath, lower extremity pain or swelling.  Has been able to participate in PT.  Well appearing, NAD, non toxic. NCAT PERRLA EOMI neck supple non tender normal wob cta bl rrr abdomen s nt nd no rebound no guarding WWPx4 neuro non focal 2+ equal pulses, less than 2-second capillary refill, EKG unchanged from prior.  Troponin downtrending.  Comfortable with discharge and follow-up outpatient, strict return precautions given. Endorses understanding of all of this and aware that they can return at any time for new or concerning symptoms. No further questions or concerns at this time

## 2022-09-10 NOTE — ED PROVIDER NOTE - NS ED ROS FT
Constitutional: no fever, chills, no recent weight loss, change in appetite or malaise  Eyes: no redness/discharge/pain/vision changes  ENT: no rhinorrhea/ear pain/sore throat  Cardiac: see HPI. No chest pain, SOB or edema.  Respiratory: No cough or respiratory distress  GI: No nausea, vomiting, diarrhea or abdominal pain.  : No dysuria, frequency, urgency or hematuria  MS: no pain to back or extremities, no loss of ROM, no weakness  Neuro: No headache or weakness. No LOC.  Skin: No skin rash.  Endocrine: No history of thyroid disease or diabetes.

## 2022-09-10 NOTE — ED PROVIDER NOTE - NSFOLLOWUPINSTRUCTIONS_ED_ALL_ED_FT
Abnormal lab result    Patient was found to have troponin level at 0.04 at Nursing home and it is 0.03 this evening at emergency department. Patient was found to have elevated troponin post operatively up to 0.18 on 09/06/2022, It can take up to 5 days for the level to be back to baseline. Patient was evaluated by cardiologist during last admission and recommend rate control (keep rate <60) for the elevated troponin. Patient's EKG at ED was LBBB which was her baseline. Patient also denies any ACS symptoms. No further cardiology workup/intervention is immediately needed.   Please follow up with her cardiologist as outpatient.  Return to ED immediately if patient developed chest pain/ shortness of breath and diaphoresis.

## 2022-09-10 NOTE — ED ADULT NURSE REASSESSMENT NOTE - NS ED NURSE REASSESS COMMENT FT1
patient picked up by ems for transport back to [facility]. iv access removed. discharge papers given to ems

## 2022-09-10 NOTE — ED PROVIDER NOTE - PATIENT PORTAL LINK FT
You can access the FollowMyHealth Patient Portal offered by [facility] by registering at the following website: [email or web address]. By joining [facility]’s FollowMyHealth portal, you will also be able to view your health information using other applications (apps) compatible with our system.

## 2022-09-10 NOTE — ED ADULT NURSE NOTE - NSIMPLEMENTINTERV_GEN_ALL_ED
Implemented All Universal Safety Interventions:  [place] to call system. Call bell, personal items and telephone within reach. Instruct patient to call for assistance. Room bathroom lighting operational. Non-slip footwear when patient is off stretcher. Physically safe environment: no spills, clutter or unnecessary equipment. Stretcher in lowest position, wheels locked, appropriate side rails in place.

## 2022-09-10 NOTE — ED ADULT NURSE NOTE - NSICDXPASTMEDICALHX_GEN_ALL_CORE_FT
PAST MEDICAL HISTORY:  Aortic stenosis     High cholesterol     Hypertrophic obstructive cardiomyopathy (HOCM)      Cheek Interpolation Flap Text: A decision was made to reconstruct the defect utilizing an interpolation axial flap and a staged reconstruction.  A telfa template was made of the defect.  This telfa template was then used to outline the Cheek Interpolation flap.  The donor area for the pedicle flap was then injected with anesthesia.  The flap was excised through the skin and subcutaneous tissue down to the layer of the underlying musculature.  The interpolation flap was carefully excised within this deep plane to maintain its blood supply.  The edges of the donor site were undermined.   The donor site was closed in a primary fashion.  The pedicle was then rotated into position and sutured.  Once the tube was sutured into place, adequate blood supply was confirmed with blanching and refill.  The pedicle was then wrapped with xeroform gauze and dressed appropriately with a telfa and gauze bandage to ensure continued blood supply and protect the attached pedicle.

## 2022-09-10 NOTE — ED PROVIDER NOTE - OBJECTIVE STATEMENT
81 years old female history of high cholesterol, HOCM, aortic stenosis, recent left hip fracture sent from nursing home secondary to elevated troponin.  As per patient's, patient troponin was elevated (peak 0.18) on 09/06/22 but this she was discharged the following day.  Patient was evaluated by cardiology during hospital stay.  Troponin was elevated after surgery.  Patient had chest pain at the time but denies chest pain over the past couple days.  Patient further denies shortness of breath, weakness, and diaphoresis.  Denies fever, chills, abdominal pain, vomiting, diarrhea, leg pain and swelling.

## 2022-09-12 DIAGNOSIS — Z79.82 LONG TERM (CURRENT) USE OF ASPIRIN: ICD-10-CM
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
## 2022-09-12 DIAGNOSIS — I10 ESSENTIAL (PRIMARY) HYPERTENSION: ICD-10-CM

## 2022-09-12 DIAGNOSIS — I42.1 OBSTRUCTIVE HYPERTROPHIC CARDIOMYOPATHY: ICD-10-CM

## 2022-09-12 DIAGNOSIS — Z88.8 ALLERGY STATUS TO OTHER DRUGS, MEDICAMENTS AND BIOLOGICAL SUBSTANCES STATUS: ICD-10-CM

## 2022-09-12 DIAGNOSIS — M25.552 PAIN IN LEFT HIP: ICD-10-CM

## 2022-09-12 DIAGNOSIS — K59.00 CONSTIPATION, UNSPECIFIED: ICD-10-CM

## 2022-09-12 DIAGNOSIS — Z66 DO NOT RESUSCITATE: ICD-10-CM

## 2022-09-12 DIAGNOSIS — Z20.822 CONTACT WITH AND (SUSPECTED) EXPOSURE TO COVID-19: ICD-10-CM
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
## 2022-09-12 DIAGNOSIS — D64.9 ANEMIA, UNSPECIFIED: ICD-10-CM

## 2022-09-12 DIAGNOSIS — E78.5 HYPERLIPIDEMIA, UNSPECIFIED: ICD-10-CM

## 2022-09-12 DIAGNOSIS — R33.9 RETENTION OF URINE, UNSPECIFIED: ICD-10-CM

## 2022-09-12 DIAGNOSIS — S72.002A FRACTURE OF UNSPECIFIED PART OF NECK OF LEFT FEMUR, INITIAL ENCOUNTER FOR CLOSED FRACTURE: ICD-10-CM

## 2022-09-12 DIAGNOSIS — I35.0 NONRHEUMATIC AORTIC (VALVE) STENOSIS: ICD-10-CM

## 2022-10-13 PROBLEM — Z00.00 ENCOUNTER FOR PREVENTIVE HEALTH EXAMINATION: Status: ACTIVE | Noted: 2022-10-13

## 2022-10-15 ENCOUNTER — FORM ENCOUNTER (OUTPATIENT)
Age: 82
End: 2022-10-15

## 2022-11-01 ENCOUNTER — APPOINTMENT (OUTPATIENT)
Dept: ORTHOPEDIC SURGERY | Facility: CLINIC | Age: 82
End: 2022-11-01

## 2022-11-23 ENCOUNTER — APPOINTMENT (OUTPATIENT)
Dept: ORTHOPEDIC SURGERY | Facility: CLINIC | Age: 82
End: 2022-11-23

## 2022-11-23 PROCEDURE — 73502 X-RAY EXAM HIP UNI 2-3 VIEWS: CPT

## 2022-11-23 PROCEDURE — 99024 POSTOP FOLLOW-UP VISIT: CPT

## 2022-11-23 NOTE — HISTORY OF PRESENT ILLNESS
[de-identified] : s/p Left QUIQUE for femoral neck fracture 9/1\par doing well\par postop course uncomplicated, home PT complete, progressing appropriately. \par pain controlled\par (-)n/v/cp/sob\par \par Left hip exam shows well healed incision\par NTTP\par No pain with PROM\par \par Imaging: \par AP and Lateral views were obtained of the hips, including the contralateral right hip for comparison and assessment of leg length.\par X-rays are negative for acute bone or soft tissue trauma.\par Left hip replacement in good alignment without radiographic evidence of complication.\par \par Plan:\par continue home exercise\par activities as tolerated\par f/u as needed\par

## 2022-12-03 NOTE — ED PROVIDER NOTE - NS_BEDUNITTYPES_ED_ALL_ED
MED/SURG
39 y/o F presenting with difficulty breathing and swallowing 2/2 sensations of swelling in the neck over the past 2 months. Exam is significant for fullness to the R side of the neck. No venous dilatation. Airway patent. EKG is non-ischemic. Plan for labs and CT neck. Will reassess clinically after results have been obtained.

## 2023-01-06 ENCOUNTER — NON-APPOINTMENT (OUTPATIENT)
Age: 83
End: 2023-01-06

## 2023-01-16 NOTE — DISCHARGE NOTE PROVIDER - DATE OF DISCHARGE SERVICE:
Retention Suture Text: Retention sutures were placed to support the closure and prevent dehiscence. 06-Sep-2022

## 2023-06-09 ENCOUNTER — INPATIENT (INPATIENT)
Facility: HOSPITAL | Age: 83
LOS: 5 days | Discharge: HOME | DRG: 393 | End: 2023-06-15
Attending: STUDENT IN AN ORGANIZED HEALTH CARE EDUCATION/TRAINING PROGRAM | Admitting: INTERNAL MEDICINE
Payer: MEDICARE

## 2023-06-09 ENCOUNTER — TRANSCRIPTION ENCOUNTER (OUTPATIENT)
Age: 83
End: 2023-06-09

## 2023-06-09 VITALS
TEMPERATURE: 98 F | HEART RATE: 67 BPM | DIASTOLIC BLOOD PRESSURE: 46 MMHG | WEIGHT: 143.08 LBS | RESPIRATION RATE: 18 BRPM | OXYGEN SATURATION: 100 % | SYSTOLIC BLOOD PRESSURE: 106 MMHG | HEIGHT: 63 IN

## 2023-06-09 DIAGNOSIS — H35.3190 NONEXUDATIVE AGE-RELATED MACULAR DEGENERATION, UNSPECIFIED EYE, STAGE UNSPECIFIED: ICD-10-CM

## 2023-06-09 DIAGNOSIS — K59.00 CONSTIPATION, UNSPECIFIED: ICD-10-CM

## 2023-06-09 DIAGNOSIS — E78.00 PURE HYPERCHOLESTEROLEMIA, UNSPECIFIED: ICD-10-CM

## 2023-06-09 DIAGNOSIS — K63.3 ULCER OF INTESTINE: ICD-10-CM

## 2023-06-09 DIAGNOSIS — K29.70 GASTRITIS, UNSPECIFIED, WITHOUT BLEEDING: ICD-10-CM

## 2023-06-09 DIAGNOSIS — Z79.899 OTHER LONG TERM (CURRENT) DRUG THERAPY: ICD-10-CM

## 2023-06-09 DIAGNOSIS — I50.32 CHRONIC DIASTOLIC (CONGESTIVE) HEART FAILURE: ICD-10-CM

## 2023-06-09 DIAGNOSIS — I42.1 OBSTRUCTIVE HYPERTROPHIC CARDIOMYOPATHY: ICD-10-CM

## 2023-06-09 DIAGNOSIS — R07.9 CHEST PAIN, UNSPECIFIED: ICD-10-CM

## 2023-06-09 DIAGNOSIS — I34.0 NONRHEUMATIC MITRAL (VALVE) INSUFFICIENCY: ICD-10-CM

## 2023-06-09 DIAGNOSIS — I95.9 HYPOTENSION, UNSPECIFIED: ICD-10-CM

## 2023-06-09 DIAGNOSIS — Z79.818 LONG TERM (CURRENT) USE OF OTHER AGENTS AFFECTING ESTROGEN RECEPTORS AND ESTROGEN LEVELS: ICD-10-CM

## 2023-06-09 DIAGNOSIS — I42.2 OTHER HYPERTROPHIC CARDIOMYOPATHY: ICD-10-CM

## 2023-06-09 DIAGNOSIS — I44.7 LEFT BUNDLE-BRANCH BLOCK, UNSPECIFIED: ICD-10-CM

## 2023-06-09 DIAGNOSIS — E78.5 HYPERLIPIDEMIA, UNSPECIFIED: ICD-10-CM

## 2023-06-09 DIAGNOSIS — Z79.82 LONG TERM (CURRENT) USE OF ASPIRIN: ICD-10-CM

## 2023-06-09 DIAGNOSIS — D53.9 NUTRITIONAL ANEMIA, UNSPECIFIED: ICD-10-CM

## 2023-06-09 DIAGNOSIS — I95.1 ORTHOSTATIC HYPOTENSION: ICD-10-CM

## 2023-06-09 DIAGNOSIS — G47.00 INSOMNIA, UNSPECIFIED: ICD-10-CM

## 2023-06-09 DIAGNOSIS — K55.21 ANGIODYSPLASIA OF COLON WITH HEMORRHAGE: ICD-10-CM

## 2023-06-09 DIAGNOSIS — D64.9 ANEMIA, UNSPECIFIED: ICD-10-CM

## 2023-06-09 DIAGNOSIS — I35.0 NONRHEUMATIC AORTIC (VALVE) STENOSIS: ICD-10-CM

## 2023-06-09 DIAGNOSIS — I27.20 PULMONARY HYPERTENSION, UNSPECIFIED: ICD-10-CM

## 2023-06-09 DIAGNOSIS — I11.0 HYPERTENSIVE HEART DISEASE WITH HEART FAILURE: ICD-10-CM

## 2023-06-09 LAB
ALBUMIN SERPL ELPH-MCNC: 3.7 G/DL — SIGNIFICANT CHANGE UP (ref 3.5–5.2)
ALP SERPL-CCNC: 79 U/L — SIGNIFICANT CHANGE UP (ref 30–115)
ALT FLD-CCNC: 11 U/L — SIGNIFICANT CHANGE UP (ref 0–41)
ANION GAP SERPL CALC-SCNC: 11 MMOL/L — SIGNIFICANT CHANGE UP (ref 7–14)
ANISOCYTOSIS BLD QL: SLIGHT — SIGNIFICANT CHANGE UP
AST SERPL-CCNC: 18 U/L — SIGNIFICANT CHANGE UP (ref 0–41)
BASOPHILS # BLD AUTO: 0 K/UL — SIGNIFICANT CHANGE UP (ref 0–0.2)
BASOPHILS NFR BLD AUTO: 0 % — SIGNIFICANT CHANGE UP (ref 0–1)
BILIRUB SERPL-MCNC: <0.2 MG/DL — SIGNIFICANT CHANGE UP (ref 0.2–1.2)
BLD GP AB SCN SERPL QL: SIGNIFICANT CHANGE UP
BUN SERPL-MCNC: 65 MG/DL — CRITICAL HIGH (ref 10–20)
CALCIUM SERPL-MCNC: 9 MG/DL — SIGNIFICANT CHANGE UP (ref 8.4–10.4)
CHLORIDE SERPL-SCNC: 107 MMOL/L — SIGNIFICANT CHANGE UP (ref 98–110)
CO2 SERPL-SCNC: 20 MMOL/L — SIGNIFICANT CHANGE UP (ref 17–32)
CREAT SERPL-MCNC: 1.2 MG/DL — SIGNIFICANT CHANGE UP (ref 0.7–1.5)
DACRYOCYTES BLD QL SMEAR: SLIGHT — SIGNIFICANT CHANGE UP
EGFR: 45 ML/MIN/1.73M2 — LOW
EOSINOPHIL # BLD AUTO: 0.09 K/UL — SIGNIFICANT CHANGE UP (ref 0–0.7)
EOSINOPHIL NFR BLD AUTO: 0.9 % — SIGNIFICANT CHANGE UP (ref 0–8)
GIANT PLATELETS BLD QL SMEAR: PRESENT — SIGNIFICANT CHANGE UP
GLUCOSE SERPL-MCNC: 157 MG/DL — HIGH (ref 70–99)
HCT VFR BLD CALC: 20.6 % — LOW (ref 37–47)
HGB BLD-MCNC: 6.5 G/DL — CRITICAL LOW (ref 12–16)
LYMPHOCYTES # BLD AUTO: 2.27 K/UL — SIGNIFICANT CHANGE UP (ref 1.2–3.4)
LYMPHOCYTES # BLD AUTO: 21.7 % — SIGNIFICANT CHANGE UP (ref 20.5–51.1)
MAGNESIUM SERPL-MCNC: 2 MG/DL — SIGNIFICANT CHANGE UP (ref 1.8–2.4)
MANUAL SMEAR VERIFICATION: SIGNIFICANT CHANGE UP
MCHC RBC-ENTMCNC: 31 PG — SIGNIFICANT CHANGE UP (ref 27–31)
MCHC RBC-ENTMCNC: 31.6 G/DL — LOW (ref 32–37)
MCV RBC AUTO: 98.1 FL — SIGNIFICANT CHANGE UP (ref 81–99)
MICROCYTES BLD QL: SLIGHT — SIGNIFICANT CHANGE UP
MONOCYTES # BLD AUTO: 0.18 K/UL — SIGNIFICANT CHANGE UP (ref 0.1–0.6)
MONOCYTES NFR BLD AUTO: 1.7 % — SIGNIFICANT CHANGE UP (ref 1.7–9.3)
NEUTROPHILS # BLD AUTO: 7.92 K/UL — HIGH (ref 1.4–6.5)
NEUTROPHILS NFR BLD AUTO: 71.3 % — SIGNIFICANT CHANGE UP (ref 42.2–75.2)
NEUTS BAND # BLD: 4.4 % — SIGNIFICANT CHANGE UP (ref 0–6)
OVALOCYTES BLD QL SMEAR: SLIGHT — SIGNIFICANT CHANGE UP
PLAT MORPH BLD: NORMAL — SIGNIFICANT CHANGE UP
PLATELET # BLD AUTO: 182 K/UL — SIGNIFICANT CHANGE UP (ref 130–400)
PMV BLD: 12.1 FL — HIGH (ref 7.4–10.4)
POIKILOCYTOSIS BLD QL AUTO: SLIGHT — SIGNIFICANT CHANGE UP
POLYCHROMASIA BLD QL SMEAR: SLIGHT — SIGNIFICANT CHANGE UP
POTASSIUM SERPL-MCNC: 4.5 MMOL/L — SIGNIFICANT CHANGE UP (ref 3.5–5)
POTASSIUM SERPL-SCNC: 4.5 MMOL/L — SIGNIFICANT CHANGE UP (ref 3.5–5)
PROT SERPL-MCNC: 5.7 G/DL — LOW (ref 6–8)
RBC # BLD: 2.1 M/UL — LOW (ref 4.2–5.4)
RBC # FLD: 16.5 % — HIGH (ref 11.5–14.5)
RBC BLD AUTO: ABNORMAL
SODIUM SERPL-SCNC: 138 MMOL/L — SIGNIFICANT CHANGE UP (ref 135–146)
TROPONIN T SERPL-MCNC: <0.01 NG/ML — SIGNIFICANT CHANGE UP
WBC # BLD: 10.46 K/UL — SIGNIFICANT CHANGE UP (ref 4.8–10.8)
WBC # FLD AUTO: 10.46 K/UL — SIGNIFICANT CHANGE UP (ref 4.8–10.8)

## 2023-06-09 PROCEDURE — 71045 X-RAY EXAM CHEST 1 VIEW: CPT | Mod: 26

## 2023-06-09 PROCEDURE — 83735 ASSAY OF MAGNESIUM: CPT

## 2023-06-09 PROCEDURE — 80061 LIPID PANEL: CPT

## 2023-06-09 PROCEDURE — 36415 COLL VENOUS BLD VENIPUNCTURE: CPT

## 2023-06-09 PROCEDURE — 91110 GI TRC IMG INTRAL ESOPH-ILE: CPT

## 2023-06-09 PROCEDURE — 93010 ELECTROCARDIOGRAM REPORT: CPT | Mod: 76

## 2023-06-09 PROCEDURE — 85027 COMPLETE CBC AUTOMATED: CPT

## 2023-06-09 PROCEDURE — 82962 GLUCOSE BLOOD TEST: CPT

## 2023-06-09 PROCEDURE — P9016: CPT

## 2023-06-09 PROCEDURE — 83550 IRON BINDING TEST: CPT

## 2023-06-09 PROCEDURE — 85045 AUTOMATED RETICULOCYTE COUNT: CPT

## 2023-06-09 PROCEDURE — 83540 ASSAY OF IRON: CPT

## 2023-06-09 PROCEDURE — 93306 TTE W/DOPPLER COMPLETE: CPT

## 2023-06-09 PROCEDURE — 86900 BLOOD TYPING SEROLOGIC ABO: CPT

## 2023-06-09 PROCEDURE — 99285 EMERGENCY DEPT VISIT HI MDM: CPT

## 2023-06-09 PROCEDURE — 82746 ASSAY OF FOLIC ACID SERUM: CPT

## 2023-06-09 PROCEDURE — 86901 BLOOD TYPING SEROLOGIC RH(D): CPT

## 2023-06-09 PROCEDURE — 93005 ELECTROCARDIOGRAM TRACING: CPT

## 2023-06-09 PROCEDURE — 85025 COMPLETE CBC W/AUTO DIFF WBC: CPT

## 2023-06-09 PROCEDURE — 85610 PROTHROMBIN TIME: CPT

## 2023-06-09 PROCEDURE — 86850 RBC ANTIBODY SCREEN: CPT

## 2023-06-09 PROCEDURE — 83615 LACTATE (LD) (LDH) ENZYME: CPT

## 2023-06-09 PROCEDURE — 36430 TRANSFUSION BLD/BLD COMPNT: CPT

## 2023-06-09 PROCEDURE — 82607 VITAMIN B-12: CPT

## 2023-06-09 PROCEDURE — 85730 THROMBOPLASTIN TIME PARTIAL: CPT

## 2023-06-09 PROCEDURE — 86923 COMPATIBILITY TEST ELECTRIC: CPT

## 2023-06-09 PROCEDURE — 82728 ASSAY OF FERRITIN: CPT

## 2023-06-09 PROCEDURE — 80053 COMPREHEN METABOLIC PANEL: CPT

## 2023-06-09 PROCEDURE — 83010 ASSAY OF HAPTOGLOBIN QUANT: CPT

## 2023-06-09 PROCEDURE — 71045 X-RAY EXAM CHEST 1 VIEW: CPT

## 2023-06-09 PROCEDURE — C9113: CPT

## 2023-06-09 PROCEDURE — 84484 ASSAY OF TROPONIN QUANT: CPT

## 2023-06-09 RX ORDER — MAGNESIUM SULFATE 500 MG/ML
1 VIAL (ML) INJECTION ONCE
Refills: 0 | Status: COMPLETED | OUTPATIENT
Start: 2023-06-09 | End: 2023-06-09

## 2023-06-09 RX ADMIN — Medication 100 GRAM(S): at 17:28

## 2023-06-09 NOTE — ED PROVIDER NOTE - PHYSICAL EXAMINATION
VITAL SIGNS: I have reviewed nursing notes and confirm.  CONSTITUTIONAL: Well-appearing, non-toxic, in NAD  SKIN: Warm dry, normal skin turgor  HEAD: NCAT  EYES: No conjunctival injection, scleral anicteric  ENT: MMM  NECK: Supple; FROM.   CARD: RRR  RESP: CTAB  ABD: Soft, NDNT  EXT: No pedal edema, no calf tenderness  NEURO: Grossly normal examination, CN grossly intact  PSYCH: Cooperative, appropriate

## 2023-06-09 NOTE — ED PROVIDER NOTE - OBJECTIVE STATEMENT
83yo female PMHx HOCM, aortic stenosis, and HLD presenting with intermittent chest "pressure" since yesterday, associated with generalized fatigue and COWART, without any SOB, n/v, or recent f/c/ap. Pt is followed by DR Hightower.

## 2023-06-09 NOTE — ED ADULT TRIAGE NOTE - CHIEF COMPLAINT QUOTE
c/o chest pain and weakness x 2 days c/o chest pain and weakness x 2 days, +hypotension this morning, STEMI called in triage

## 2023-06-09 NOTE — ED ADULT TRIAGE NOTE - NS ED TRIAGE EKG FT
Indication: post-operative immobilization Detail Level: Simple Location Applied: the right leg Removal Of Previous Unna Boot (Yes) Text: The previous Unna Boot and then the site was cleaned in preparation for another Unna Boot application. Removal Of Previous Unna Boot (No) Text: The site was cleaned in preparation for an Unna Boot application. Was A Previous Unna Boot Removed?: No Medication Occluded Under Unnaboot: Bactroban After Unna Boot Application Text: Coban was used to wrap the outside of Unna Boot and we ensured the Unna Boot wasn't too tight prior to the patient leaving the office. shoaib pritchett

## 2023-06-09 NOTE — CHART NOTE - NSCHARTNOTEFT_GEN_A_CORE
STEMI activated in the ED. 83yo F hx of LVH presented with chest pain from home. Upon arrival, patient noted LBBB on EKG unchanged from prior. Patient remains HD stable and cp at the time of this eval. STEMI cancelled after discussion with on-call IC attending.    Jim Lynn

## 2023-06-09 NOTE — ED PROVIDER NOTE - CLINICAL SUMMARY MEDICAL DECISION MAKING FREE TEXT BOX
82-year-old female presented today with chest pressure and weakness.  STEMI code was initially called.  Cardiology evaluated patient at bedside and canceled STEMI alert.  Patient was admitted for further evaluation and care.  Labs indicative of hemoglobin of 6.5.  Patient started on PRBC.    Attending Statement: I have personally provided the amount of critical care time documented below excluding time spent on separate procedures.     Critical Care Time Spent (min) Must be 30 or more minutes to qualify: 35.

## 2023-06-09 NOTE — ED ADULT NURSE NOTE - NSFALLRISKINTERV_ED_ALL_ED

## 2023-06-10 LAB
ALBUMIN SERPL ELPH-MCNC: 3.4 G/DL — LOW (ref 3.5–5.2)
ALP SERPL-CCNC: 68 U/L — SIGNIFICANT CHANGE UP (ref 30–115)
ALT FLD-CCNC: 10 U/L — SIGNIFICANT CHANGE UP (ref 0–41)
ANION GAP SERPL CALC-SCNC: 12 MMOL/L — SIGNIFICANT CHANGE UP (ref 7–14)
APTT BLD: 22.8 SEC — CRITICAL LOW (ref 27–39.2)
AST SERPL-CCNC: 19 U/L — SIGNIFICANT CHANGE UP (ref 0–41)
BASOPHILS # BLD AUTO: 0.07 K/UL — SIGNIFICANT CHANGE UP (ref 0–0.2)
BASOPHILS # BLD AUTO: 0.08 K/UL — SIGNIFICANT CHANGE UP (ref 0–0.2)
BASOPHILS NFR BLD AUTO: 0.4 % — SIGNIFICANT CHANGE UP (ref 0–1)
BASOPHILS NFR BLD AUTO: 0.7 % — SIGNIFICANT CHANGE UP (ref 0–1)
BILIRUB SERPL-MCNC: 0.3 MG/DL — SIGNIFICANT CHANGE UP (ref 0.2–1.2)
BUN SERPL-MCNC: 53 MG/DL — HIGH (ref 10–20)
CALCIUM SERPL-MCNC: 8.5 MG/DL — SIGNIFICANT CHANGE UP (ref 8.4–10.5)
CHLORIDE SERPL-SCNC: 110 MMOL/L — SIGNIFICANT CHANGE UP (ref 98–110)
CHOLEST SERPL-MCNC: 114 MG/DL — SIGNIFICANT CHANGE UP
CO2 SERPL-SCNC: 20 MMOL/L — SIGNIFICANT CHANGE UP (ref 17–32)
CREAT SERPL-MCNC: 0.9 MG/DL — SIGNIFICANT CHANGE UP (ref 0.7–1.5)
EGFR: 63 ML/MIN/1.73M2 — SIGNIFICANT CHANGE UP
EOSINOPHIL # BLD AUTO: 0 K/UL — SIGNIFICANT CHANGE UP (ref 0–0.7)
EOSINOPHIL # BLD AUTO: 0.04 K/UL — SIGNIFICANT CHANGE UP (ref 0–0.7)
EOSINOPHIL NFR BLD AUTO: 0 % — SIGNIFICANT CHANGE UP (ref 0–8)
EOSINOPHIL NFR BLD AUTO: 0.4 % — SIGNIFICANT CHANGE UP (ref 0–8)
GLUCOSE SERPL-MCNC: 136 MG/DL — HIGH (ref 70–99)
HCT VFR BLD CALC: 21.1 % — LOW (ref 37–47)
HCT VFR BLD CALC: 26 % — LOW (ref 37–47)
HDLC SERPL-MCNC: 29 MG/DL — LOW
HGB BLD-MCNC: 6.7 G/DL — CRITICAL LOW (ref 12–16)
HGB BLD-MCNC: 8.3 G/DL — LOW (ref 12–16)
IMM GRANULOCYTES NFR BLD AUTO: 0.6 % — HIGH (ref 0.1–0.3)
IMM GRANULOCYTES NFR BLD AUTO: 0.7 % — HIGH (ref 0.1–0.3)
INR BLD: 1.08 RATIO — SIGNIFICANT CHANGE UP (ref 0.65–1.3)
IRON SATN MFR SERPL: 100 UG/DL — SIGNIFICANT CHANGE UP (ref 35–150)
IRON SATN MFR SERPL: 30 % — SIGNIFICANT CHANGE UP (ref 15–50)
LDH SERPL L TO P-CCNC: 162 — SIGNIFICANT CHANGE UP (ref 50–242)
LIPID PNL WITH DIRECT LDL SERPL: 44 MG/DL — SIGNIFICANT CHANGE UP
LYMPHOCYTES # BLD AUTO: 1.51 K/UL — SIGNIFICANT CHANGE UP (ref 1.2–3.4)
LYMPHOCYTES # BLD AUTO: 2.44 K/UL — SIGNIFICANT CHANGE UP (ref 1.2–3.4)
LYMPHOCYTES # BLD AUTO: 22.2 % — SIGNIFICANT CHANGE UP (ref 20.5–51.1)
LYMPHOCYTES # BLD AUTO: 8.5 % — LOW (ref 20.5–51.1)
MCHC RBC-ENTMCNC: 30.2 PG — SIGNIFICANT CHANGE UP (ref 27–31)
MCHC RBC-ENTMCNC: 30.6 PG — SIGNIFICANT CHANGE UP (ref 27–31)
MCHC RBC-ENTMCNC: 31.8 G/DL — LOW (ref 32–37)
MCHC RBC-ENTMCNC: 31.9 G/DL — LOW (ref 32–37)
MCV RBC AUTO: 94.5 FL — SIGNIFICANT CHANGE UP (ref 81–99)
MCV RBC AUTO: 96.3 FL — SIGNIFICANT CHANGE UP (ref 81–99)
MONOCYTES # BLD AUTO: 0.75 K/UL — HIGH (ref 0.1–0.6)
MONOCYTES # BLD AUTO: 0.86 K/UL — HIGH (ref 0.1–0.6)
MONOCYTES NFR BLD AUTO: 4.9 % — SIGNIFICANT CHANGE UP (ref 1.7–9.3)
MONOCYTES NFR BLD AUTO: 6.8 % — SIGNIFICANT CHANGE UP (ref 1.7–9.3)
NEUTROPHILS # BLD AUTO: 15.18 K/UL — HIGH (ref 1.4–6.5)
NEUTROPHILS # BLD AUTO: 7.58 K/UL — HIGH (ref 1.4–6.5)
NEUTROPHILS NFR BLD AUTO: 69.2 % — SIGNIFICANT CHANGE UP (ref 42.2–75.2)
NEUTROPHILS NFR BLD AUTO: 85.6 % — HIGH (ref 42.2–75.2)
NON HDL CHOLESTEROL: 85 MG/DL — SIGNIFICANT CHANGE UP
NRBC # BLD: 0 /100 WBCS — SIGNIFICANT CHANGE UP (ref 0–0)
NRBC # BLD: 0 /100 WBCS — SIGNIFICANT CHANGE UP (ref 0–0)
PLATELET # BLD AUTO: 142 K/UL — SIGNIFICANT CHANGE UP (ref 130–400)
PLATELET # BLD AUTO: 158 K/UL — SIGNIFICANT CHANGE UP (ref 130–400)
PMV BLD: 12.2 FL — HIGH (ref 7.4–10.4)
PMV BLD: 12.6 FL — HIGH (ref 7.4–10.4)
POTASSIUM SERPL-MCNC: 3.7 MMOL/L — SIGNIFICANT CHANGE UP (ref 3.5–5)
POTASSIUM SERPL-SCNC: 3.7 MMOL/L — SIGNIFICANT CHANGE UP (ref 3.5–5)
PROT SERPL-MCNC: 5.1 G/DL — LOW (ref 6–8)
PROTHROM AB SERPL-ACNC: 12.3 SEC — SIGNIFICANT CHANGE UP (ref 9.95–12.87)
RBC # BLD: 2.19 M/UL — LOW (ref 4.2–5.4)
RBC # BLD: 2.75 M/UL — LOW (ref 4.2–5.4)
RBC # BLD: 2.75 M/UL — LOW (ref 4.2–5.4)
RBC # FLD: 15.6 % — HIGH (ref 11.5–14.5)
RBC # FLD: 16.5 % — HIGH (ref 11.5–14.5)
RETICS #: 163.6 K/UL — HIGH (ref 25–125)
RETICS/RBC NFR: 6 % — HIGH (ref 0.5–1.5)
SODIUM SERPL-SCNC: 142 MMOL/L — SIGNIFICANT CHANGE UP (ref 135–146)
TIBC SERPL-MCNC: 338 UG/DL — SIGNIFICANT CHANGE UP (ref 220–430)
TRIGL SERPL-MCNC: 207 MG/DL — HIGH
TROPONIN T SERPL-MCNC: <0.01 NG/ML — SIGNIFICANT CHANGE UP
TROPONIN T SERPL-MCNC: <0.01 NG/ML — SIGNIFICANT CHANGE UP
UIBC SERPL-MCNC: 238 UG/DL — SIGNIFICANT CHANGE UP (ref 110–370)
WBC # BLD: 10.97 K/UL — HIGH (ref 4.8–10.8)
WBC # BLD: 17.72 K/UL — HIGH (ref 4.8–10.8)
WBC # FLD AUTO: 10.97 K/UL — HIGH (ref 4.8–10.8)
WBC # FLD AUTO: 17.72 K/UL — HIGH (ref 4.8–10.8)

## 2023-06-10 PROCEDURE — 99223 1ST HOSP IP/OBS HIGH 75: CPT

## 2023-06-10 PROCEDURE — 93010 ELECTROCARDIOGRAM REPORT: CPT

## 2023-06-10 PROCEDURE — 71045 X-RAY EXAM CHEST 1 VIEW: CPT | Mod: 26

## 2023-06-10 RX ORDER — DISOPYRAMIDE PHOSPHATE 100 MG
100 CAPSULE ORAL THREE TIMES A DAY
Refills: 0 | Status: DISCONTINUED | OUTPATIENT
Start: 2023-06-10 | End: 2023-06-15

## 2023-06-10 RX ORDER — ASPIRIN/CALCIUM CARB/MAGNESIUM 324 MG
81 TABLET ORAL DAILY
Refills: 0 | Status: DISCONTINUED | OUTPATIENT
Start: 2023-06-10 | End: 2023-06-10

## 2023-06-10 RX ORDER — CHOLECALCIFEROL (VITAMIN D3) 125 MCG
2000 CAPSULE ORAL DAILY
Refills: 0 | Status: DISCONTINUED | OUTPATIENT
Start: 2023-06-10 | End: 2023-06-15

## 2023-06-10 RX ORDER — SENNA PLUS 8.6 MG/1
2 TABLET ORAL AT BEDTIME
Refills: 0 | Status: DISCONTINUED | OUTPATIENT
Start: 2023-06-10 | End: 2023-06-15

## 2023-06-10 RX ORDER — VERAPAMIL HCL 240 MG
120 CAPSULE, EXTENDED RELEASE PELLETS 24 HR ORAL DAILY
Refills: 0 | Status: DISCONTINUED | OUTPATIENT
Start: 2023-06-10 | End: 2023-06-10

## 2023-06-10 RX ORDER — METOPROLOL TARTRATE 50 MG
50 TABLET ORAL DAILY
Refills: 0 | Status: DISCONTINUED | OUTPATIENT
Start: 2023-06-10 | End: 2023-06-10

## 2023-06-10 RX ORDER — LANOLIN ALCOHOL/MO/W.PET/CERES
3 CREAM (GRAM) TOPICAL AT BEDTIME
Refills: 0 | Status: DISCONTINUED | OUTPATIENT
Start: 2023-06-10 | End: 2023-06-15

## 2023-06-10 RX ORDER — PANTOPRAZOLE SODIUM 20 MG/1
40 TABLET, DELAYED RELEASE ORAL EVERY 12 HOURS
Refills: 0 | Status: DISCONTINUED | OUTPATIENT
Start: 2023-06-10 | End: 2023-06-15

## 2023-06-10 RX ORDER — SIMVASTATIN 20 MG/1
20 TABLET, FILM COATED ORAL AT BEDTIME
Refills: 0 | Status: DISCONTINUED | OUTPATIENT
Start: 2023-06-10 | End: 2023-06-15

## 2023-06-10 RX ORDER — FUROSEMIDE 40 MG
20 TABLET ORAL DAILY
Refills: 0 | Status: DISCONTINUED | OUTPATIENT
Start: 2023-06-10 | End: 2023-06-10

## 2023-06-10 RX ORDER — METOPROLOL TARTRATE 50 MG
50 TABLET ORAL DAILY
Refills: 0 | Status: DISCONTINUED | OUTPATIENT
Start: 2023-06-10 | End: 2023-06-15

## 2023-06-10 RX ORDER — POLYETHYLENE GLYCOL 3350 17 G/17G
17 POWDER, FOR SOLUTION ORAL
Refills: 0 | Status: DISCONTINUED | OUTPATIENT
Start: 2023-06-10 | End: 2023-06-15

## 2023-06-10 RX ORDER — PANTOPRAZOLE SODIUM 20 MG/1
80 TABLET, DELAYED RELEASE ORAL ONCE
Refills: 0 | Status: COMPLETED | OUTPATIENT
Start: 2023-06-10 | End: 2023-06-10

## 2023-06-10 RX ADMIN — Medication 100 MILLIGRAM(S): at 15:42

## 2023-06-10 RX ADMIN — PANTOPRAZOLE SODIUM 40 MILLIGRAM(S): 20 TABLET, DELAYED RELEASE ORAL at 15:44

## 2023-06-10 RX ADMIN — SENNA PLUS 2 TABLET(S): 8.6 TABLET ORAL at 21:30

## 2023-06-10 RX ADMIN — PANTOPRAZOLE SODIUM 80 MILLIGRAM(S): 20 TABLET, DELAYED RELEASE ORAL at 03:53

## 2023-06-10 RX ADMIN — Medication 2000 UNIT(S): at 15:43

## 2023-06-10 RX ADMIN — SIMVASTATIN 20 MILLIGRAM(S): 20 TABLET, FILM COATED ORAL at 21:30

## 2023-06-10 RX ADMIN — Medication 100 MILLIGRAM(S): at 21:31

## 2023-06-10 RX ADMIN — Medication 50 MILLIGRAM(S): at 07:22

## 2023-06-10 RX ADMIN — Medication 20 MILLIGRAM(S): at 07:22

## 2023-06-10 NOTE — CONSULT NOTE ADULT - ATTENDING COMMENTS
Patient seen and examined during the GI-Advanced endoscopy rounds, case discussed during rounds, plan communicated to the primary team, assessment and plan as above

## 2023-06-10 NOTE — H&P ADULT - ASSESSMENT
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  - No melena, bloody BM, coffee ground emesis  - RO negative,   - s/p 1 unit PRBC, F/u Rpt CBC   - Hx Rt colon AVM (Heyde syndrome?)   - BUN/Cr > 30 favors UGIB   - GI consult for endoscopy/colonoscopy  - CBC q12, active type and screen   - F/u iron profile, haptoglobin, retic count, B12/folate   - CT Angio r/o retroperitoneal bleed    - Holding anticoagulation and aspirin      # Atypical chest pain - likely symptomatic anemia, Hgb 6.5  - Trop  neg x1, f/u serial cardiac enzymes  - EKG showed  LBBB unchanged form prior-> assessed by Cardio,  STEMI code canceled   - Follow up Echo     #Hypertrophic cardiomyopathy  - Follows with Dr. Hightower in [facility] -  Last echo June 2022 showed EF 75% and findings consistent hypertrophic cardiomiopathy  - 3/6 crescendo/decrescendo murmur at right second intercostal space  - Metoprolol 50 mg daily  - Verapamil 120 mg daily   - Disopyramide 100mg TID     #Hx of constipation   - Miralax and Senna PRN     #DLD  - c/w statin    #Age related macular degeneration   - C/w areds 2 BID     #hx of CHFpEF  - Diastolic CHF  - no shortness of breath  - no lower extremity edema    DVt ppx: SCD  Diet: DASH  Code: Full        81yo female PMHx HOCM, HFpEF, aortic stenosis, and HLD presenting with intermittent chest "pressure" and generalized weakness for 2 days found to have hgb 6.5 s/p 1 unit prbc     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  - No melena, bloody BM, coffee ground emesis  - RO negative in the ED   - s/p 1 unit PRBC, F/u Rpt CBC   - Hx Rt colon AVM (Heyde syndrome?)   - BUN/Cr > 30 favors UGIB   - GI consult for endoscopy/colonoscopy  - CBC q12, active type and screen   - F/u iron profile, haptoglobin, retic count, B12/folate   - Consider CT Angio r/o retroperitoneal bleed   - Holding anticoagulation and aspirin      # Atypical chest pain - likely symptomatic anemia, Hgb 6.5  - R/o ACS, Trop  neg x1, f/u serial cardiac enzymes  - EKG showed  LBBB unchanged form prior-> assessed by Cardio,  STEMI code canceled   - Follow up Echo     #Hypertrophic cardiomyopathy  - Follows with Dr. Hightower in [facility] -  Last echo June 2022 showed EF 75% and findings consistent hypertrophic cardiomiopathy  - 3/6 crescendo/decrescendo murmur at right second intercostal space  - Metoprolol 50 mg daily  - Verapamil 120 mg daily   - Disopyramide 100mg TID     #Hx of constipation   - Miralax and Senna PRN     #DLD  - c/w statin    #Age related macular degeneration   - C/w areds 2 BID     #hx of CHFpEF  - Diastolic CHF  - no shortness of breath  - no lower extremity edema    DVt ppx: SCD  Diet: DASH  Code: Full        81yo female PMHx HOCM, HFpEF, aortic stenosis, and HLD presenting with intermittent chest "pressure" and generalized weakness for 2 days found to have hgb 6.5 s/p 1 unit prbc     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  - No melena, bloody BM, coffee ground emesis  - RO negative in the ED   - s/p 1 unit PRBC, F/u Rpt CBC   - Hx Rt colon AVM (Heyde syndrome?)   - BUN/Cr > 30 favors UGIB   - GI consult for endoscopy/colonoscopy  - CBC q12, active type and screen   - F/u iron profile, haptoglobin, retic count, B12/folate   - Consider CT Angio r/o retroperitoneal bleed   - Holding anticoagulation and aspirin      # Atypical chest pain - likely symptomatic anemia, Hgb 6.5  - R/o ACS, Trop  neg x1, f/u serial cardiac enzymes  - EKG showed  LBBB unchanged form prior-> assessed by Cardio,  STEMI code canceled   - Follow up Echo     #Hypertrophic cardiomyopathy  - Follows with Dr. Hightower in [facility] -  Last echo June 2022 showed EF 75% and findings consistent hypertrophic cardiomiopathy  - 3/6 crescendo/decrescendo murmur at right second intercostal space  - Metoprolol 50 mg daily  - Verapamil 120 mg daily   - Disopyramide 100mg TID     #Hx of constipation   - Miralax and Senna PRN     #DLD  - c/w statin    #Age related macular degeneration   - C/w areds 2 BID     #hx of CHFpEF  - Diastolic CHF  - no shortness of breath  - no lower extremity edema    DVt ppx: SCD  Diet: NPO  Code: Full        81yo female PMHx HOCM, HFpEF, aortic stenosis, and HLD presenting with intermittent chest "pressure" and generalized weakness for 2 days found to have hgb 6.5 s/p 1 unit prbc     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  - No melena, bloody BM, coffee ground emesis  - RO negative in the ED   - s/p 1 unit PRBC, F/u Rpt CBC   - Hx Rt colon AVM (Heyde syndrome?)   - BUN/Cr > 30 favors UGIB   - GI consult for endoscopy/colonoscopy  - PPI bid, CBC q12, active type and screen   - F/u iron profile, haptoglobin, retic count, B12/folate   - Consider CT Angio r/o retroperitoneal bleed   - Holding anticoagulation and aspirin      # Atypical chest pain - likely symptomatic anemia, Hgb 6.5  - R/o ACS, Trop  neg x1, f/u serial cardiac enzymes  - EKG showed  LBBB unchanged form prior-> assessed by Cardio,  STEMI code canceled   - Follow up Echo     #Hypertrophic cardiomyopathy  - Follows with Dr. Hightower in [facility] -  Last echo June 2022 showed EF 75% and findings consistent hypertrophic cardiomiopathy  - 3/6 crescendo/decrescendo murmur at right second intercostal space  - Metoprolol 50 mg daily  - Verapamil 120 mg daily   - Disopyramide 100mg TID     #Hx of constipation   - Miralax and Senna PRN     #DLD  - c/w statin    #Age related macular degeneration   - C/w areds 2 BID     #hx of CHFpEF  - Diastolic CHF  - no shortness of breath  - no lower extremity edema    DVt ppx: SCD  Diet: NPO  Code: Full        81yo female PMHx HOCM, HFpEF, aortic stenosis, and HLD presenting with intermittent chest "pressure" and generalized weakness for 2 days found to have hgb 6.5 s/p 1 unit prbc     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  - No melena, bloody BM, coffee ground emesis  - RO negative in the ED   - s/p 1 unit PRBC, F/u Rpt CBC   - Hx Rt colon AVM (Heyde syndrome?)   - BUN/Cr > 30 favors UGIB   - GI consult for endoscopy/colonoscopy  - PPI bid, CBC q12, active type and screen   - F/u iron profile, haptoglobin, retic count, B12/folate   - Consider CT Angio r/o retroperitoneal bleed   - Holding anticoagulation and aspirin      # Atypical chest pain - likely symptomatic anemia, Hgb 6.5  - R/o ACS, Trop  neg x1, f/u serial cardiac enzymes  - EKG showed  LBBB unchanged form prior-> assessed by Cardio,  STEMI code canceled   - Follow up Echo     #hx of CHFpEF  - Diastolic CHF  - no shortness of breath, no lower extremity edema  - C/w PO lasix  - Rpt CXR in am     #Hypertrophic cardiomyopathy  - Follows with Dr. Hightower in [facility] -  Last echo June 2022 showed EF 75% and findings consistent hypertrophic cardiomiopathy  - 3/6 crescendo/decrescendo murmur at right second intercostal space  - Metoprolol 50 mg daily  - Verapamil 120 mg daily   - Disopyramide 100mg TID     #Hx of constipation   - Miralax and Senna PRN     #DLD  - c/w statin    #Age related macular degeneration   - C/w areds 2 BID     DVt ppx: SCD  Diet: NPO  Code: Full

## 2023-06-10 NOTE — H&P ADULT - ATTENDING COMMENTS
Patient seen and examined at bedside independently of the residents. I read the resident's note and agree with the plan with the additions and corrections as noted below. My note supersedes the resident's note.     REVIEW OF SYSTEMS:  CONSTITUTIONAL: No weakness, fevers or chills  EYES/ENT: No visual changes;  No vertigo or throat pain   NECK: No pain or stiffness  RESPIRATORY: No cough, wheezing, hemoptysis; No shortness of breath  CARDIOVASCULAR: + chest pain.   GASTROINTESTINAL: No abdominal or epigastric pain. No nausea, vomiting, or hematemesis; No diarrhea or constipation. No melena or hematochezia.  GENITOURINARY: No dysuria, frequency or hematuria  NEUROLOGICAL: No numbness or weakness  MSK: No pain. No weakness.   SKIN: No itching, rashes.     PMH: HOCM, HTN, Aortic stenosis? (last TTE in Sep 2022 neg for AS), HLD    FHx: Reviewed. No fhx of asthma/copd, No fhx of liver and pulmonary disease. No fhx of hematological disorder.     Physical Exam:  GEN: No acute distress. Awake, Alert and oriented x 3.   Head: Atraumatic, Normocephalic.   Eye: PEERLA. No sclera icterus. EOMI.   ENT: Normal oropharynx, no thyromegaly, no mass, no lymphadenopathy.   LUNGS: Clear to auscultation bilaterally. No wheeze/rales/crackles.   HEART: Normal. S1/S2 present. RRR. No murmur/gallops.   ABD: Soft, non-tender, non-distended. Bowel sounds present.   EXT: No pitting edema. No erythema. No tenderness.  Integumentary: No rash, No sore, No petechia.   NEURO: CN III-XII intact. Strength: 5/5 b/l ULE. Sensory intact b/l ULE.     Vital Signs Last 24 Hrs  T(C): 36.9 (2023 20:56), Max: 36.9 (2023 20:56)  T(F): 98.4 (2023 20:56), Max: 98.4 (2023 20:56)  HR: 75 (2023 20:56) (67 - 75)  BP: 105/51 (2023 20:56) (105/51 - 112/53)  BP(mean): 77 (2023 17:30) (77 - 77)  RR: 16 (2023 20:56) (16 - 18)  SpO2: 98% (2023 20:56) (98% - 100%)    Parameters below as of 2023 20:56  Patient On (Oxygen Delivery Method): room air      Please see the above notes for Labs and radiology.     Assessment and Plan:     81 yo F with hx of HOCM, HTN, Aortic stenosis? (last TTE in Sep 2022 neg for AS), HLD presents to ED for intermittent chest pain on exertion a/w COWART and fatigue x 2-3 days.     Chest pain (r/o ACS)  - s/p code STEMI in ED, subsequently cancelled by Cardio.   - EKG shows NSR. LBBB which is unchanged from prior.   - Currently denied any chest pain.   - Troponin neg x 1. Check 2nd troponin.   - check TTE  - monitor on Telemetry.   - f/u Cardiology.     Symptomatic anemia/ Acute chronic anemia.   - Rectal exam done in ED --> neg for melena/hematochezia/ hemorrhoid.   - Last BM was 3 days ago. Patient normally constipated.   - Hb 6.5. Baseline Hb 10-11 in Oct 2022.   - s/p 1u PRBC in ED.    - monitor CBC and transfuse if Hb < 7.   - active T and S.   - check Fe studies, B12, folate.   - check hemolysis panel.     HOCM - c/w home med with parameter.   Aortic stenosis?- TTE in Sep 2022 neg for AS.     DVT ppx: SCD  GI ppx: PPI   Diet: NPO for now.   Activity: as tolerated.     Date seen by the attendin/10/2023  Total time spent: 75 minutes. Patient seen and examined at bedside independently of the residents. I read the resident's note and agree with the plan with the additions and corrections as noted below. My note supersedes the resident's note.     REVIEW OF SYSTEMS:  CONSTITUTIONAL: No weakness, fevers or chills  EYES/ENT: No visual changes;  No vertigo or throat pain   NECK: No pain or stiffness  RESPIRATORY: No cough, wheezing, hemoptysis; No shortness of breath  CARDIOVASCULAR: + chest pain.   GASTROINTESTINAL: No abdominal or epigastric pain. No nausea, vomiting, or hematemesis; No diarrhea or constipation. No melena or hematochezia.  GENITOURINARY: No dysuria, frequency or hematuria  NEUROLOGICAL: No numbness or weakness  MSK: No pain. No weakness.   SKIN: No itching, rashes.     PMH: HOCM, HTN, Aortic stenosis? (last TTE in Sep 2022 neg for AS), HLD    FHx: Reviewed. No fhx of asthma/copd, No fhx of liver and pulmonary disease. No fhx of hematological disorder.     Physical Exam:  GEN: No acute distress. Awake, Alert and oriented x 3.   Head: Atraumatic, Normocephalic.   Eye: PEERLA. No sclera icterus. EOMI.   ENT: Normal oropharynx, no thyromegaly, no mass, no lymphadenopathy.   LUNGS: Clear to auscultation bilaterally. No wheeze/rales/crackles.   HEART: Normal. S1/S2 present. RRR. No murmur/gallops.   ABD: Soft, non-tender, non-distended. Bowel sounds present.   EXT: No pitting edema. No erythema. No tenderness.  Integumentary: No rash, No sore, No petechia.   NEURO: CN III-XII intact. Strength: 5/5 b/l ULE. Sensory intact b/l ULE.     Vital Signs Last 24 Hrs  T(C): 36.9 (2023 20:56), Max: 36.9 (2023 20:56)  T(F): 98.4 (2023 20:56), Max: 98.4 (2023 20:56)  HR: 75 (2023 20:56) (67 - 75)  BP: 105/51 (2023 20:56) (105/51 - 112/53)  BP(mean): 77 (2023 17:30) (77 - 77)  RR: 16 (2023 20:56) (16 - 18)  SpO2: 98% (2023 20:56) (98% - 100%)    Parameters below as of 2023 20:56  Patient On (Oxygen Delivery Method): room air      Please see the above notes for Labs and radiology.     Assessment and Plan:     83 yo F with hx of HOCM, HTN, Aortic stenosis? (last TTE in Sep 2022 neg for AS), HLD presents to ED for intermittent chest pain on exertion a/w COWART and fatigue x 2-3 days.     Chest pain (r/o ACS)  - s/p code STEMI in ED, subsequently cancelled by Cardio.   - EKG shows NSR. LBBB which is unchanged from prior.   - Currently denied any chest pain.   - Troponin neg x 1. Check 2nd troponin.   - check TTE  - monitor on Telemetry.   - f/u Cardiology.     Symptomatic anemia/ Acute chronic anemia.   - Rectal exam done in ED --> neg for melena/hematochezia/ hemorrhoid.   - Last BM was 3 days ago. Patient normally constipated.   - Hb 6.5. Baseline Hb 10-11 in Oct 2022.   - s/p 1u PRBC in ED.    - monitor CBC and transfuse if Hb < 7.   - active T and S.   - check Fe studies, B12, folate.   - check hemolysis panel.   - GI consult.     HOCM - c/w home med with parameter.   Aortic stenosis?- TTE in Sep 2022 neg for AS.     DVT ppx: SCD  GI ppx: PPI   Diet: NPO for now.   Activity: as tolerated.     Date seen by the attendin/10/2023  Total time spent: 75 minutes. Patient seen and examined at bedside independently of the residents. I read the resident's note and agree with the plan with the additions and corrections as noted below. My note supersedes the resident's note.     REVIEW OF SYSTEMS:  CONSTITUTIONAL: No weakness, fevers or chills  EYES/ENT: No visual changes;  No vertigo or throat pain   NECK: No pain or stiffness  RESPIRATORY: No cough, wheezing, hemoptysis; No shortness of breath  CARDIOVASCULAR: + chest pain.   GASTROINTESTINAL: No abdominal or epigastric pain. No nausea, vomiting, or hematemesis; No diarrhea or constipation. No melena or hematochezia.  GENITOURINARY: No dysuria, frequency or hematuria  NEUROLOGICAL: No numbness or weakness  MSK: No pain. No weakness.   SKIN: No itching, rashes.     PMH: HOCM, HTN, Aortic stenosis? (last TTE in Sep 2022 neg for AS), HLD    FHx: Reviewed. No fhx of asthma/copd, No fhx of liver and pulmonary disease. No fhx of hematological disorder.     Physical Exam:  GEN: No acute distress. Awake, Alert and oriented x 3.   Head: Atraumatic, Normocephalic.   Eye: PEERLA. No sclera icterus. EOMI.   ENT: Normal oropharynx, no thyromegaly, no mass, no lymphadenopathy.   LUNGS: Clear to auscultation bilaterally. No wheeze/rales/crackles.   HEART: Normal. S1/S2 present. RRR. No murmur/gallops.   ABD: Soft, non-tender, non-distended. Bowel sounds present.   EXT: No pitting edema. No erythema. No tenderness.  Integumentary: No rash, No sore, No petechia.   NEURO: CN III-XII intact. Strength: 5/5 b/l ULE. Sensory intact b/l ULE.     Vital Signs Last 24 Hrs  T(C): 36.9 (2023 20:56), Max: 36.9 (2023 20:56)  T(F): 98.4 (2023 20:56), Max: 98.4 (2023 20:56)  HR: 75 (2023 20:56) (67 - 75)  BP: 105/51 (2023 20:56) (105/51 - 112/53)  BP(mean): 77 (2023 17:30) (77 - 77)  RR: 16 (2023 20:56) (16 - 18)  SpO2: 98% (2023 20:56) (98% - 100%)    Parameters below as of 2023 20:56  Patient On (Oxygen Delivery Method): room air      Please see the above notes for Labs and radiology.     Assessment and Plan:     83 yo F with hx of HOCM, HTN, Aortic stenosis? (last TTE in Sep 2022 neg for AS), HLD presents to ED for intermittent chest pain on exertion a/w COWART and fatigue x 2-3 days.     Chest pain (r/o ACS)  - s/p code STEMI in ED, subsequently cancelled by Cardio.   - EKG shows NSR. LBBB which is unchanged from prior.   - Currently denied any chest pain.   - Troponin neg x 1. Check 2nd troponin.   - check TTE  - monitor on Telemetry.   - f/u Cardiology.     Symptomatic anemia/ Acute chronic anemia.   - Rectal exam done in ED --> neg for melena/hematochezia/ hemorrhoid.   - Last BM was 3 days ago. Patient normally constipated.   - Hb 6.5. Baseline Hb 10-11 in Oct 2022.   - s/p 1u PRBC in ED.    - monitor CBC and transfuse if Hb < 7.   - active T and S.   - check Fe studies, B12, folate.   - check hemolysis panel.   - GI consult.     HOCM - on PO lasix 20mg qd at home. Will repeat CXR in AM after 2u PRBC.   HTN - on Toprol and diltiazem with paraemters.   Aortic stenosis?- TTE in Sep 2022 neg for AS.     DVT ppx: SCD  GI ppx: PPI   Diet: NPO for now.   Activity: as tolerated.     Date seen by the attendin/10/2023  Total time spent: 75 minutes. Patient seen and examined at bedside independently of the residents. I read the resident's note and agree with the plan with the additions and corrections as noted below. My note supersedes the resident's note.     REVIEW OF SYSTEMS:  CONSTITUTIONAL: No fevers or chills. Generalized weakness.   EYES/ENT: No visual changes;  No vertigo or throat pain   NECK: No pain or stiffness  RESPIRATORY: No cough, wheezing, hemoptysis; + shortness of breath on exertion.   CARDIOVASCULAR: + chest pain.   GASTROINTESTINAL: No abdominal or epigastric pain. No nausea, vomiting, or hematemesis; No diarrhea or constipation. No melena or hematochezia.  GENITOURINARY: No dysuria, frequency or hematuria  NEUROLOGICAL: No numbness or focal weakness  MSK: No pain. No weakness.   SKIN: No itching, rashes.     PMH: HOCM, HTN, Aortic stenosis? (last TTE in Sep 2022 neg for AS), HLD    FHx: Reviewed. No fhx of asthma/copd, No fhx of liver and pulmonary disease. No fhx of hematological disorder.     Physical Exam:  GEN: No acute distress. Awake, Alert and oriented x 3.   Head: Atraumatic, Normocephalic.   Eye: PEERLA. No sclera icterus. EOMI.   ENT: Normal oropharynx, no thyromegaly, no mass, no lymphadenopathy.   LUNGS: Clear to auscultation bilaterally. No wheeze/rales/crackles.   HEART: Normal. S1/S2 present. RRR. No murmur/gallops.   ABD: Soft, non-tender, non-distended. Bowel sounds present.   EXT: No pitting edema. No erythema. No tenderness.  Integumentary: No rash, No sore, No petechia.   NEURO: CN III-XII intact. Strength: 5/5 b/l ULE. Sensory intact b/l ULE.     Vital Signs Last 24 Hrs  T(C): 36.9 (2023 20:56), Max: 36.9 (2023 20:56)  T(F): 98.4 (2023 20:56), Max: 98.4 (2023 20:56)  HR: 75 (2023 20:56) (67 - 75)  BP: 105/51 (2023 20:56) (105/51 - 112/53)  BP(mean): 77 (2023 17:30) (77 - 77)  RR: 16 (2023 20:56) (16 - 18)  SpO2: 98% (2023 20:56) (98% - 100%)    Parameters below as of 2023 20:56  Patient On (Oxygen Delivery Method): room air      Please see the above notes for Labs and radiology.     Assessment and Plan:     81 yo F with hx of HOCM, HTN, Aortic stenosis? (last TTE in Sep 2022 neg for AS), HLD presents to ED for intermittent chest pain on exertion a/w COWART and fatigue x 2-3 days.     Chest pain (r/o ACS)  - s/p code STEMI in ED, subsequently cancelled by Cardio.   - EKG shows NSR. LBBB which is unchanged from prior.   - Currently denied any chest pain.   - Troponin neg x 1. Check 2nd troponin.   - check TTE  - monitor on Telemetry.   - Cardiology consult with Dr. Wilkins.     Symptomatic anemia/ Acute chronic anemia.   - Rectal exam done in ED --> neg for melena/hematochezia/ hemorrhoid.   - Last BM was 3 days ago. Patient normally constipated.   - Hb 6.5. Baseline Hb 10-11 in Oct 2022.   - s/p 1u PRBC in ED.    - monitor CBC and transfuse if Hb < 7.   - active T and S.   - check Fe studies, B12, folate.   - check hemolysis panel.   - GI consult.     HOCM - on PO lasix 20mg qd at home. Will repeat CXR in AM after 2u PRBC.   HTN - on Toprol and diltiazem with parameters.   Aortic stenosis?- TTE in Sep 2022 neg for AS.     DVT ppx: SCD  GI ppx: PPI   Diet: NPO for now.   Activity: as tolerated.     Date seen by the attendin/10/2023  Total time spent: 75 minutes. Patient seen and examined at bedside independently of the residents. I read the resident's note and agree with the plan with the additions and corrections as noted below. My note supersedes the resident's note.     REVIEW OF SYSTEMS:  CONSTITUTIONAL: No fevers or chills. Generalized weakness.   EYES/ENT: No visual changes;  No vertigo or throat pain   NECK: No pain or stiffness  RESPIRATORY: No cough, wheezing, hemoptysis; + shortness of breath on exertion.   CARDIOVASCULAR: + chest pain.   GASTROINTESTINAL: No abdominal or epigastric pain. No nausea, vomiting, or hematemesis; No diarrhea or constipation. No melena or hematochezia.  GENITOURINARY: No dysuria, frequency or hematuria  NEUROLOGICAL: No numbness or focal weakness  MSK: No pain. No weakness.   SKIN: No itching, rashes.     PMH: HOCM, HTN, Aortic stenosis? (last TTE in Sep 2022 neg for AS), HLD    FHx: Reviewed. No fhx of asthma/copd, No fhx of liver and pulmonary disease. No fhx of hematological disorder.     Physical Exam:  GEN: No acute distress. Awake, Alert and oriented x 3.   Head: Atraumatic, Normocephalic.   Eye: PEERLA. No sclera icterus. EOMI.   ENT: Normal oropharynx, no thyromegaly, no mass, no lymphadenopathy.   LUNGS: Clear to auscultation bilaterally. No wheeze/rales/crackles.   HEART: Normal. S1/S2 present. RRR. No murmur/gallops.   ABD: Soft, non-tender, non-distended. Bowel sounds present.   EXT: No pitting edema. No erythema. No tenderness.  Integumentary: No rash, No sore, No petechia.   NEURO: CN III-XII intact. Strength: 5/5 b/l ULE. Sensory intact b/l ULE.     Vital Signs Last 24 Hrs  T(C): 36.9 (2023 20:56), Max: 36.9 (2023 20:56)  T(F): 98.4 (2023 20:56), Max: 98.4 (2023 20:56)  HR: 75 (2023 20:56) (67 - 75)  BP: 105/51 (2023 20:56) (105/51 - 112/53)  BP(mean): 77 (2023 17:30) (77 - 77)  RR: 16 (2023 20:56) (16 - 18)  SpO2: 98% (2023 20:56) (98% - 100%)    Parameters below as of 2023 20:56  Patient On (Oxygen Delivery Method): room air      Please see the above notes for Labs and radiology.     Assessment and Plan:     83 yo F with hx of HOCM, HTN, Aortic stenosis? (last TTE in Sep 2022 neg for AS), HLD presents to ED for intermittent chest pain on exertion a/w COWART and fatigue x 2-3 days.     Chest pain (r/o ACS)  - s/p code STEMI in ED, subsequently cancelled by Cardio.   - EKG shows NSR. LBBB which is unchanged from prior.   - Currently denied any chest pain.   - Troponin neg x 1. Check 2nd troponin.   - check TTE  - monitor on Telemetry.   - Cardiology consult with Dr. Wilkins.     Symptomatic anemia/ Acute chronic anemia.   - Rectal exam done in ED --> neg for melena/hematochezia/ hemorrhoid.   - Last BM was 3 days ago. Patient normally constipated.   - Hb 6.5. Baseline Hb 10-11 in Oct 2022.   - s/p 1u PRBC in ED --> repeat Hb 6.7. Will give 1 more unit of PRBC.    - monitor CBC and transfuse if Hb < 7.    - active T and S.   - check Fe studies, B12, folate.   - check hemolysis panel.   - GI consult.     HOCM - on PO lasix 20mg qd at home. Will repeat CXR in AM after 2u PRBC.   HTN - on Toprol and diltiazem with parameters.   Aortic stenosis?- TTE in Sep 2022 neg for AS.     DVT ppx: SCD  GI ppx: PPI   Diet: NPO for now.   Activity: as tolerated.     Date seen by the attendin/10/2023  Total time spent: 75 minutes. Patient seen and examined at bedside independently of the residents. I read the resident's note and agree with the plan with the additions and corrections as noted below. My note supersedes the resident's note.     REVIEW OF SYSTEMS:  CONSTITUTIONAL: No fevers or chills. Generalized weakness.   EYES/ENT: No visual changes;  No vertigo or throat pain   NECK: No pain or stiffness  RESPIRATORY: No cough, wheezing, hemoptysis; + shortness of breath on exertion.   CARDIOVASCULAR: + chest pain.   GASTROINTESTINAL: No abdominal or epigastric pain. No nausea, vomiting, or hematemesis; No diarrhea or constipation. No melena or hematochezia.  GENITOURINARY: No dysuria, frequency or hematuria  NEUROLOGICAL: No numbness or focal weakness  MSK: No pain. No weakness.   SKIN: No itching, rashes.     PMH: HOCM, HTN, Aortic stenosis? (last TTE in Sep 2022 neg for AS), HLD    FHx: Reviewed. No fhx of asthma/copd, No fhx of liver and pulmonary disease. No fhx of hematological disorder.     Physical Exam:  GEN: No acute distress. Awake, Alert and oriented x 3.   Head: Atraumatic, Normocephalic.   Eye: PEERLA. No sclera icterus. EOMI.   ENT: Normal oropharynx, no thyromegaly, no mass, no lymphadenopathy.   LUNGS: Clear to auscultation bilaterally. No wheeze/rales/crackles.   HEART: Normal. S1/S2 present. RRR. No murmur/gallops.   ABD: Soft, non-tender, non-distended. Bowel sounds present.   EXT: No pitting edema. No erythema. No tenderness.  Integumentary: No rash, No sore, No petechia.   NEURO: CN III-XII intact. Strength: 5/5 b/l ULE. Sensory intact b/l ULE.     Vital Signs Last 24 Hrs  T(C): 36.9 (2023 20:56), Max: 36.9 (2023 20:56)  T(F): 98.4 (2023 20:56), Max: 98.4 (2023 20:56)  HR: 75 (2023 20:56) (67 - 75)  BP: 105/51 (2023 20:56) (105/51 - 112/53)  BP(mean): 77 (2023 17:30) (77 - 77)  RR: 16 (2023 20:56) (16 - 18)  SpO2: 98% (2023 20:56) (98% - 100%)    Parameters below as of 2023 20:56  Patient On (Oxygen Delivery Method): room air      Please see the above notes for Labs and radiology.     Assessment and Plan:     81 yo F with hx of HOCM, HTN, Aortic stenosis? (last TTE in Sep 2022 neg for AS), HLD presents to ED for intermittent chest pain on exertion a/w COWART and fatigue x 2-3 days.     Chest pain (r/o ACS)  - s/p code STEMI in ED, subsequently cancelled by Cardio.   - EKG shows NSR. LBBB which is unchanged from prior.   - Currently denied any chest pain.   - Troponin neg x 1. Check 2nd troponin.   - check TTE  - monitor on Telemetry.   - Cardiology consult with Dr. Wilkins.     Symptomatic anemia/ Acute chronic anemia.   - Rectal exam done in ED --> neg for melena/hematochezia/ hemorrhoid.   - Last BM was 3 days ago. Patient normally constipated.   - Hb 6.5. Baseline Hb 10-11 in Oct 2022.   - s/p 1u PRBC in ED --> repeat Hb 6.7. Will give 1 more unit of PRBC.    - monitor CBC and transfuse if Hb < 7.    - active T and S.   - check Fe studies, B12, folate.   - check hemolysis panel.   - GI consult.     HOCM - on PO lasix 20mg qd at home. Will repeat CXR in AM after 2u PRBC.   HTN - on Toprol and diltiazem at home. Hold diltiazem due to borderline BP. c/w Toprol with parameter.   Aortic stenosis?- TTE in Sep 2022 neg for AS.     DVT ppx: SCD  GI ppx: PPI   Diet: NPO for now.   Activity: as tolerated.     Date seen by the attendin/10/2023  Total time spent: 75 minutes.

## 2023-06-10 NOTE — H&P ADULT - NSHPPHYSICALEXAM_GEN_ALL_CORE
CONSTITUTIONAL: Well-appearing, non-toxic, in NAD  SKIN: Warm dry, normal skin turgor  HEAD: NCAT  EYES: No conjunctival injection, scleral anicteric  ENT: MMM  NECK: Supple; FROM.   CARD: RRR, 3/6 systolic murmur   RESP: CTAB  ABD: Soft, NDNT  EXT: No pedal edema, no calf tenderness  NEURO: Grossly normal examination, CN grossly intact  PSYCH: Cooperative, appropriate

## 2023-06-10 NOTE — CONSULT NOTE ADULT - ASSESSMENT
83yo female PMHx HOCM, HFpEF, Moderate/SEvere TVR, Severe MVR, Moderate PTHN, and HLD presenting with intermittent chest "pressure" and generalized weakness for 2 days, associated with COWART, without any SOB, n/v.  mid sternal chest 'pressure', non radiating. No  fevers, cough, HA.   In ED vitals wnl, EKG LBBB unchanged from prior, Trop neg x 1. Code STEMI activated and cancelled by cardiology. Labs significant for Hgb 6.5 s/p 1 unit PRBC  Pt endorses no active bleeding and has been having brown stools. She states occassionaly when constipated and when she has to strain she notices some bright red blood on the tissue when she wipes or in the toilet bowl.  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 Pt did not follow up with GI thereafter (Dr. Aparicio). Pt states she does not want to undergo any endoscopic intervention unless its considered as a life saving intervention. She prefers conservative management with medication at this time.    #Acute on Chronic Macrocytic Anemia - no overt bleeding  #Constipation   - Hb baseline 9-10  - On admission 6.5    Plan  - clear liquid diet. if hb stable and no further evidence of bleeding please advance as tolerated  - target hb >8  - monitor cbc bid  - send iron studies, b12, folate, tsh  - protonix 40mg bid   - please start pt on miralax and senna x2 tabs  - pt will benefit from egd/colonsocopy but is adamantly refusing at this time. She states she may agree to have at least inpatient EGD done but wants to think about it but does not want to pursue colonoscopy. RIsks of missed recurrent AVMs, bleeding ulcers, large polyps or even malignancy was explained to the patient and she is willing to accept this risk.   - once pt is agreeable for EGD please notify GI. Will need cardiology risk stratification and optimization prior to procedure.

## 2023-06-10 NOTE — H&P ADULT - NSHPLABSRESULTS_GEN_ALL_CORE
LABS:                          6.5    10.46 )-----------( 182      ( 09 Jun 2023 16:15 )             20.6     06-09    138  |  107  |  65<HH>  ----------------------------<  157<H>  4.5   |  20  |  1.2    Ca    9.0      09 Jun 2023 16:15  Mg     2.0     06-09    TPro  5.7<L>  /  Alb  3.7  /  TBili  <0.2  /  DBili  x   /  AST  18  /  ALT  11  /  AlkPhos  79  06-09

## 2023-06-10 NOTE — PATIENT PROFILE ADULT - FALL HARM RISK - HARM RISK INTERVENTIONS

## 2023-06-10 NOTE — H&P ADULT - HISTORY OF PRESENT ILLNESS
83yo female PMHx HOCM, HFpEF, aortic stenosis, and HLD presenting with intermittent chest "pressure" and generalized weakness for 2 days, associated with COWART, without any SOB, n/v.  mid sternal chest 'pressure', non radiating. No  fevers, cough, HA.   In ED vitals wnl, EKG LBBB unchanged from prior, Trop neg x 1. Code STEMI activated and cancelled by cardiology. 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  mid sternal chest 'pressure', non radiating. No  fevers, cough, HA.   In ED vitals wnl, EKG LBBB unchanged from prior, Trop neg x 1. Code STEMI activated and cancelled by cardiology. 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 Last BM was 3 days ago (pt usually constipated).   Patient will be admitted for management of symptomatic anemia

## 2023-06-10 NOTE — CHART NOTE - NSCHARTNOTEFT_GEN_A_CORE
INTERVAL HPI/OVERNIGHT EVENTS:    SUBJECTIVE: Patient seen and examined at bedside.     cc: cp  no cp, sob, abd pain, fever  no cp, palpitations, escalante, orthopnea    OBJECTIVE:    VITAL SIGNS:  Vital Signs Last 24 Hrs  T(C): 37.7 (10 Maurilio 2023 07:48), Max: 37.7 (10 Maurilio 2023 07:48)  T(F): 99.8 (10 Maurilio 2023 07:48), Max: 99.8 (10 Maurilio 2023 07:48)  HR: 81 (10 Maurilio 2023 07:48) (67 - 81)  BP: 118/56 (10 Maurilio 2023 07:48) (101/51 - 124/60)  BP(mean): 72 (10 Maurilio 2023 07:48) (72 - 77)  RR: 18 (10 Maurilio 2023 07:48) (16 - 18)  SpO2: 96% (10 Maurilio 2023 07:48) (96% - 100%)    Parameters below as of 10 Maurilio 2023 07:48  Patient On (Oxygen Delivery Method): room air          PHYSICAL EXAM:    General: NAD  HEENT: NC/AT; PERRL, clear conjunctiva  Neck: supple  Respiratory: CTA b/l  Cardiovascular: +S1/S2; RRR  Abdomen: soft, NT/ND; +BS x4  Extremities: WWP, 2+ peripheral pulses b/l; no LE edema  Skin: normal color and turgor; no rash  Neurological:    MEDICATIONS:  MEDICATIONS  (STANDING):  Areds 2 1 Capsule(s) 1 Capsule(s) Oral two times a day  cholecalciferol 2000 Unit(s) Oral daily  disopyramide 100 milliGRAM(s) Oral three times a day  metoprolol succinate ER 50 milliGRAM(s) Oral daily  pantoprazole  Injectable 40 milliGRAM(s) IV Push every 12 hours  senna 2 Tablet(s) Oral at bedtime  simvastatin 20 milliGRAM(s) Oral at bedtime    MEDICATIONS  (PRN):  melatonin 3 milliGRAM(s) Oral at bedtime PRN Insomnia  polyethylene glycol 3350 17 Gram(s) Oral two times a day PRN Constipation      ALLERGIES:  Allergies    lidocaine (Rash)    Intolerances        LABS:                        6.7    10.97 )-----------( 142      ( 10 Maurilio 2023 01:47 )             21.1     Hemoglobin: 6.7 g/dL (06-10 @ 01:47)  Hemoglobin: 6.5 g/dL (06-09 @ 16:15)    CBC Full  -  ( 10 Maurilio 2023 01:47 )  WBC Count : 10.97 K/uL  RBC Count : 2.19 M/uL  Hemoglobin : 6.7 g/dL  Hematocrit : 21.1 %  Platelet Count - Automated : 142 K/uL  Mean Cell Volume : 96.3 fL  Mean Cell Hemoglobin : 30.6 pg  Mean Cell Hemoglobin Concentration : 31.8 g/dL  Auto Neutrophil # : 7.58 K/uL  Auto Lymphocyte # : 2.44 K/uL  Auto Monocyte # : 0.75 K/uL  Auto Eosinophil # : 0.04 K/uL  Auto Basophil # : 0.08 K/uL  Auto Neutrophil % : 69.2 %  Auto Lymphocyte % : 22.2 %  Auto Monocyte % : 6.8 %  Auto Eosinophil % : 0.4 %  Auto Basophil % : 0.7 %    06-09    138  |  107  |  65<HH>  ----------------------------<  157<H>  4.5   |  20  |  1.2    Ca    9.0      09 Jun 2023 16:15  Mg     2.0     06-09    TPro  5.7<L>  /  Alb  3.7  /  TBili  <0.2  /  DBili  x   /  AST  18  /  ALT  11  /  AlkPhos  79  06-09    Creatinine Trend: 1.2<--  LIVER FUNCTIONS - ( 09 Jun 2023 16:15 )  Alb: 3.7 g/dL / Pro: 5.7 g/dL / ALK PHOS: 79 U/L / ALT: 11 U/L / AST: 18 U/L / GGT: x           PT/INR - ( 10 Maurilio 2023 01:47 )   PT: 12.30 sec;   INR: 1.08 ratio         PTT - ( 10 Maurilio 2023 01:47 )  PTT:22.8 sec    hs Troponin:              CSF:                      EKG:   MICROBIOLOGY:    IMAGING:      Labs, imaging, EKG personally reviewed    RADIOLOGY & ADDITIONAL TESTS: Reviewed.    a/p  #Normocytic anemia  pt noted blood in toilet about one week prior  s/p 2 units prbcs  check post transfusion cbc  protonix 40 iv bid  gi eval    #Chest pain  possible demand due to anemia  check ekg  ce neg  tte    #Progress Note Handoff:  Pending (specify):  Consults_________, Tests________, Test Results_______, Other____f/u gi_____  Family discussion: d/w pt at bedside  Disposition: Home___/SNF___/Other________/Unknown at this time_____x___

## 2023-06-11 LAB
ALBUMIN SERPL ELPH-MCNC: 3.1 G/DL — LOW (ref 3.5–5.2)
ALP SERPL-CCNC: 59 U/L — SIGNIFICANT CHANGE UP (ref 30–115)
ALT FLD-CCNC: 6 U/L — SIGNIFICANT CHANGE UP (ref 0–41)
ANION GAP SERPL CALC-SCNC: 11 MMOL/L — SIGNIFICANT CHANGE UP (ref 7–14)
AST SERPL-CCNC: 19 U/L — SIGNIFICANT CHANGE UP (ref 0–41)
BASOPHILS # BLD AUTO: 0.08 K/UL — SIGNIFICANT CHANGE UP (ref 0–0.2)
BASOPHILS NFR BLD AUTO: 0.7 % — SIGNIFICANT CHANGE UP (ref 0–1)
BILIRUB SERPL-MCNC: 0.3 MG/DL — SIGNIFICANT CHANGE UP (ref 0.2–1.2)
BLD GP AB SCN SERPL QL: SIGNIFICANT CHANGE UP
BUN SERPL-MCNC: 51 MG/DL — HIGH (ref 10–20)
CALCIUM SERPL-MCNC: 8.7 MG/DL — SIGNIFICANT CHANGE UP (ref 8.4–10.5)
CHLORIDE SERPL-SCNC: 113 MMOL/L — HIGH (ref 98–110)
CO2 SERPL-SCNC: 22 MMOL/L — SIGNIFICANT CHANGE UP (ref 17–32)
CREAT SERPL-MCNC: 1 MG/DL — SIGNIFICANT CHANGE UP (ref 0.7–1.5)
EGFR: 56 ML/MIN/1.73M2 — LOW
EOSINOPHIL # BLD AUTO: 0.07 K/UL — SIGNIFICANT CHANGE UP (ref 0–0.7)
EOSINOPHIL NFR BLD AUTO: 0.7 % — SIGNIFICANT CHANGE UP (ref 0–8)
FERRITIN SERPL-MCNC: 53 NG/ML — SIGNIFICANT CHANGE UP (ref 15–150)
FOLATE SERPL-MCNC: 8.9 NG/ML — SIGNIFICANT CHANGE UP
GLUCOSE SERPL-MCNC: 117 MG/DL — HIGH (ref 70–99)
HAPTOGLOB SERPL-MCNC: 51 MG/DL — SIGNIFICANT CHANGE UP (ref 34–200)
HCT VFR BLD CALC: 22.6 % — LOW (ref 37–47)
HCT VFR BLD CALC: 26.4 % — LOW (ref 37–47)
HGB BLD-MCNC: 7.1 G/DL — LOW (ref 12–16)
HGB BLD-MCNC: 8.5 G/DL — LOW (ref 12–16)
IMM GRANULOCYTES NFR BLD AUTO: 0.6 % — HIGH (ref 0.1–0.3)
LYMPHOCYTES # BLD AUTO: 2.58 K/UL — SIGNIFICANT CHANGE UP (ref 1.2–3.4)
LYMPHOCYTES # BLD AUTO: 24 % — SIGNIFICANT CHANGE UP (ref 20.5–51.1)
MAGNESIUM SERPL-MCNC: 2.3 MG/DL — SIGNIFICANT CHANGE UP (ref 1.8–2.4)
MCHC RBC-ENTMCNC: 30.6 PG — SIGNIFICANT CHANGE UP (ref 27–31)
MCHC RBC-ENTMCNC: 31 PG — SIGNIFICANT CHANGE UP (ref 27–31)
MCHC RBC-ENTMCNC: 31.4 G/DL — LOW (ref 32–37)
MCHC RBC-ENTMCNC: 32.2 G/DL — SIGNIFICANT CHANGE UP (ref 32–37)
MCV RBC AUTO: 96.4 FL — SIGNIFICANT CHANGE UP (ref 81–99)
MCV RBC AUTO: 97.4 FL — SIGNIFICANT CHANGE UP (ref 81–99)
MONOCYTES # BLD AUTO: 0.69 K/UL — HIGH (ref 0.1–0.6)
MONOCYTES NFR BLD AUTO: 6.4 % — SIGNIFICANT CHANGE UP (ref 1.7–9.3)
NEUTROPHILS # BLD AUTO: 7.27 K/UL — HIGH (ref 1.4–6.5)
NEUTROPHILS NFR BLD AUTO: 67.6 % — SIGNIFICANT CHANGE UP (ref 42.2–75.2)
NRBC # BLD: 0 /100 WBCS — SIGNIFICANT CHANGE UP (ref 0–0)
NRBC # BLD: 0 /100 WBCS — SIGNIFICANT CHANGE UP (ref 0–0)
PLATELET # BLD AUTO: 122 K/UL — LOW (ref 130–400)
PLATELET # BLD AUTO: 139 K/UL — SIGNIFICANT CHANGE UP (ref 130–400)
PMV BLD: 11.9 FL — HIGH (ref 7.4–10.4)
PMV BLD: 12.2 FL — HIGH (ref 7.4–10.4)
POTASSIUM SERPL-MCNC: 3.9 MMOL/L — SIGNIFICANT CHANGE UP (ref 3.5–5)
POTASSIUM SERPL-SCNC: 3.9 MMOL/L — SIGNIFICANT CHANGE UP (ref 3.5–5)
PROT SERPL-MCNC: 4.8 G/DL — LOW (ref 6–8)
RBC # BLD: 2.32 M/UL — LOW (ref 4.2–5.4)
RBC # BLD: 2.74 M/UL — LOW (ref 4.2–5.4)
RBC # FLD: 16.5 % — HIGH (ref 11.5–14.5)
RBC # FLD: 17.5 % — HIGH (ref 11.5–14.5)
SODIUM SERPL-SCNC: 146 MMOL/L — SIGNIFICANT CHANGE UP (ref 135–146)
VIT B12 SERPL-MCNC: 388 PG/ML — SIGNIFICANT CHANGE UP (ref 232–1245)
WBC # BLD: 10.15 K/UL — SIGNIFICANT CHANGE UP (ref 4.8–10.8)
WBC # BLD: 10.75 K/UL — SIGNIFICANT CHANGE UP (ref 4.8–10.8)
WBC # FLD AUTO: 10.15 K/UL — SIGNIFICANT CHANGE UP (ref 4.8–10.8)
WBC # FLD AUTO: 10.75 K/UL — SIGNIFICANT CHANGE UP (ref 4.8–10.8)

## 2023-06-11 PROCEDURE — 99233 SBSQ HOSP IP/OBS HIGH 50: CPT

## 2023-06-11 RX ADMIN — Medication 50 MILLIGRAM(S): at 06:10

## 2023-06-11 RX ADMIN — SENNA PLUS 2 TABLET(S): 8.6 TABLET ORAL at 21:48

## 2023-06-11 RX ADMIN — Medication 2000 UNIT(S): at 12:52

## 2023-06-11 RX ADMIN — SIMVASTATIN 20 MILLIGRAM(S): 20 TABLET, FILM COATED ORAL at 21:48

## 2023-06-11 RX ADMIN — Medication 100 MILLIGRAM(S): at 13:12

## 2023-06-11 RX ADMIN — PANTOPRAZOLE SODIUM 40 MILLIGRAM(S): 20 TABLET, DELAYED RELEASE ORAL at 18:13

## 2023-06-11 RX ADMIN — Medication 100 MILLIGRAM(S): at 21:46

## 2023-06-11 RX ADMIN — PANTOPRAZOLE SODIUM 40 MILLIGRAM(S): 20 TABLET, DELAYED RELEASE ORAL at 06:08

## 2023-06-11 RX ADMIN — Medication 3 MILLIGRAM(S): at 21:48

## 2023-06-11 RX ADMIN — Medication 100 MILLIGRAM(S): at 06:10

## 2023-06-11 NOTE — PROGRESS NOTE ADULT - SUBJECTIVE AND OBJECTIVE BOX
GERRY FRIED  82y Female    CHIEF COMPLAINT:    Patient is a 82y old  Female who presents with a chief complaint of Anemia (10 Maurilio 2023 12:33)      INTERVAL HPI/OVERNIGHT EVENTS:    Patient seen and examined.    ROS: All other systems are negative.    Vital Signs:    T(F): 97 (06-11-23 @ 05:43), Max: 99.8 (06-10-23 @ 07:48)  HR: 80 (06-11-23 @ 05:43) (72 - 81)  BP: 91/48 (06-11-23 @ 05:43) (86/42 - 118/56)  RR: 18 (06-11-23 @ 05:43) (18 - 18)  SpO2: 99% (06-11-23 @ 05:43) (96% - 99%)  I&O's Summary    Daily Height in cm: 157.48 (10 Maurilio 2023 22:08)    Daily   CAPILLARY BLOOD GLUCOSE          PHYSICAL EXAM:    GENERAL:  NAD  SKIN: No rashes or lesions  HENT: Atraumatic. Normocephalic. PERRL. Moist membranes.  NECK: Supple, No JVD. No lymphadenopathy.  PULMONARY: CTA B/L. No wheezing. No rales  CVS: Normal S1, S2. Rate and Rhythm are regular. No murmurs.  ABDOMEN/GI: Soft, Nontender, Nondistended; BS present  EXTREMITIES: Peripheral pulses intact. No edema B/L LE.  NEUROLOGIC:  No motor or sensory deficit.  PSYCH: Alert & oriented x 3    Consultant(s) Notes Reviewed:  [x ] YES  [ ] NO  Care Discussed with Consultants/Other Providers [ x] YES  [ ] NO    EKG reviewed  Telemetry reviewed    LABS:                        8.3    17.72 )-----------( 158      ( 10 Maurilio 2023 11:24 )             26.0   Hemoglobin: 8.3 g/dL (06-10 @ 11:24)  Hemoglobin: 6.7 g/dL (06-10 @ 01:47)  Hemoglobin: 6.5 g/dL (06-09 @ 16:15)    06-10    142  |  110  |  53<H>  ----------------------------<  136<H>  3.7   |  20  |  0.9    Ca    8.5      10 Maurilio 2023 11:24  Mg     2.0     06-09    TPro  5.1<L>  /  Alb  3.4<L>  /  TBili  0.3  /  DBili  x   /  AST  19  /  ALT  10  /  AlkPhos  68  06-10    PT/INR - ( 10 Maurilio 2023 01:47 )   PT: 12.30 sec;   INR: 1.08 ratio         PTT - ( 10 Maurilio 2023 01:47 )  PTT:22.8 sec    Trop <0.01, CKMB --, CK --, 06-10-23 @ 11:24  Trop <0.01, CKMB --, CK --, 06-10-23 @ 01:47  Trop <0.01, CKMB --, CK --, 06-09-23 @ 16:15        RADIOLOGY & ADDITIONAL TESTS:    < from: Xray Chest 1 View- PORTABLE-Urgent (06.09.23 @ 17:31) >    Impression:    No radiographic evidence of acute cardiopulmonary disease. Stable   cardiomegaly    < end of copied text >    Imaging or report Personally Reviewed:  [ ] YES  [ ] NO    Medications:  Standing  Areds 2 1 Capsule(s) 1 Capsule(s) Oral two times a day  cholecalciferol 2000 Unit(s) Oral daily  disopyramide 100 milliGRAM(s) Oral three times a day  metoprolol succinate ER 50 milliGRAM(s) Oral daily  pantoprazole  Injectable 40 milliGRAM(s) IV Push every 12 hours  senna 2 Tablet(s) Oral at bedtime  simvastatin 20 milliGRAM(s) Oral at bedtime    PRN Meds  melatonin 3 milliGRAM(s) Oral at bedtime PRN  polyethylene glycol 3350 17 Gram(s) Oral two times a day PRN      Case discussed with resident    Care discussed with pt/family           GERRY FRIED  82y Female    CHIEF COMPLAINT:    Patient is a 82y old  Female who presents with a chief complaint of Anemia (10 Maurilio 2023 12:33)      INTERVAL HPI/OVERNIGHT EVENTS:    Patient seen and examined. Pt states that she had dark stools for 2-3 days and now she has constipation. Denies any BRBPR. No abdominal pain. Hypotensive.     ROS: All other systems are negative.    Vital Signs:    T(F): 97 (06-11-23 @ 05:43), Max: 99.8 (06-10-23 @ 07:48)  HR: 80 (06-11-23 @ 05:43) (72 - 81)  BP: 91/48 (06-11-23 @ 05:43) (86/42 - 118/56)  RR: 18 (06-11-23 @ 05:43) (18 - 18)  SpO2: 99% (06-11-23 @ 05:43) (96% - 99%)  I&O's Summary    Daily Height in cm: 157.48 (10 Maurilio 2023 22:08)    Daily   CAPILLARY BLOOD GLUCOSE          PHYSICAL EXAM:    GENERAL:  NAD  SKIN: No rashes or lesions  HENT: Atraumatic. Normocephalic. PERRL. Moist membranes. Pale Conjunctivae.   NECK: Supple, No JVD. No lymphadenopathy.  PULMONARY: CTA B/L. No wheezing. No rales  CVS: Normal S1, S2. Rate and Rhythm are regular. A III/VI systolic mm over aortic area and LPSB and mitral area.   ABDOMEN/GI: Soft, Nontender, Nondistended; BS present  EXTREMITIES: Peripheral pulses intact. No edema B/L LE.  NEUROLOGIC:  No motor or sensory deficit.  PSYCH: Alert & oriented x 3    Consultant(s) Notes Reviewed:  [x ] YES  [ ] NO  Care Discussed with Consultants/Other Providers [ x] YES  [ ] NO    EKG reviewed  Telemetry reviewed    LABS:                        8.3    17.72 )-----------( 158      ( 10 Maurilio 2023 11:24 )             26.0   Hemoglobin: 8.3 g/dL (06-10 @ 11:24)  Hemoglobin: 6.7 g/dL (06-10 @ 01:47)  Hemoglobin: 6.5 g/dL (06-09 @ 16:15)    06-10    142  |  110  |  53<H>  ----------------------------<  136<H>  3.7   |  20  |  0.9    Ca    8.5      10 Maurilio 2023 11:24  Mg     2.0     06-09    TPro  5.1<L>  /  Alb  3.4<L>  /  TBili  0.3  /  DBili  x   /  AST  19  /  ALT  10  /  AlkPhos  68  06-10    PT/INR - ( 10 Maurilio 2023 01:47 )   PT: 12.30 sec;   INR: 1.08 ratio         PTT - ( 10 Maurilio 2023 01:47 )  PTT:22.8 sec    Trop <0.01, CKMB --, CK --, 06-10-23 @ 11:24  Trop <0.01, CKMB --, CK --, 06-10-23 @ 01:47  Trop <0.01, CKMB --, CK --, 06-09-23 @ 16:15        RADIOLOGY & ADDITIONAL TESTS:    < from: Xray Chest 1 View- PORTABLE-Urgent (06.09.23 @ 17:31) >    Impression:    No radiographic evidence of acute cardiopulmonary disease. Stable   cardiomegaly    < end of copied text >    Imaging or report Personally Reviewed:  [ ] YES  [ ] NO    Medications:  Standing  Areds 2 1 Capsule(s) 1 Capsule(s) Oral two times a day  cholecalciferol 2000 Unit(s) Oral daily  disopyramide 100 milliGRAM(s) Oral three times a day  metoprolol succinate ER 50 milliGRAM(s) Oral daily  pantoprazole  Injectable 40 milliGRAM(s) IV Push every 12 hours  senna 2 Tablet(s) Oral at bedtime  simvastatin 20 milliGRAM(s) Oral at bedtime    PRN Meds  melatonin 3 milliGRAM(s) Oral at bedtime PRN  polyethylene glycol 3350 17 Gram(s) Oral two times a day PRN      Case discussed with resident    Care discussed with pt/family

## 2023-06-11 NOTE — CHART NOTE - NSCHARTNOTEFT_GEN_A_CORE
MICU/Stepdown UPGRADE NOTE      Patient is a 82y old  Female who presents with a chief complaint of Anemia (11 Jun 2023 07:28)      HPI:  81yo female PMHx HOCM, HFpEF, aortic stenosis, and HLD presenting with intermittent chest "pressure" and generalized weakness for 2 days, associated with COWART, without any SOB, n/v.  mid sternal chest 'pressure', non radiating. No  fevers, cough, HA.   In ED vitals wnl, EKG LBBB unchanged from prior, Trop neg x 1. Code STEMI activated and cancelled by cardiology. 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 Last BM was 3 days ago (pt usually constipated).    She states occassionaly when constipated and when she has to strain she notices some bright red blood on the tissue when she wipes or in the toilet bowl.  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 Pt did not follow up with GI thereafter (Dr. Aparicio)      Patient will be admitted for management of symptomatic anemia             INTERVAL HPI/OVERNIGHT EVENTS:  3C Course:  - patient monitored on telemetry; s/p 2 PRBCs, no recent BMs, no overt signs of bleeding, hgb trended 6-->8-->7  - pt hypotensive with systolic 90's-100's with orthostatic hypotension and dizziness   - ordered 3rd unit of RBCs  - patient approved for closer monitoring in step down  - GI on board; pending cardiac clearance for possible scope. On clear liquid diet w/ 40 iv protonix twice daily      To follow up:  - Keep hgb >8, monitor BP, keep T&S active  - pending Cardio risk-stratification  - f/u with GI when EGD will occur    81yo female PMHx HOCM, HFpEF, aortic stenosis, and HLD presenting with intermittent chest "pressure" and generalized weakness for 2 days found to have hgb 6.5 s/p 2 units prbc; receiving 3rd unit of hgb given hemodynamic instability and decreasing hgb w/o overt signs of bleeding.     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  - No melena, bloody BM, coffee ground emesis  - RO negative in the ED   - s/p 2 unit PRBC, F/u Rpt CBC   - Hx Rt colon AVM (Heyde syndrome?)   - BUN/Cr > 30 favors UGIB   - GI consulted for endoscopy/colonoscopy  - PPI bid, CBC q12, active type and screen   - Consider CT Angio r/o retroperitoneal bleed   - Holding anticoagulation and aspirin      # Atypical chest pain - likely symptomatic anemia, Hgb 6.5  - R/o ACS, Trop  neg x1, f/u serial cardiac enzymes  - EKG showed  LBBB unchanged form prior-> assessed by Cardio,  STEMI code canceled   - Follow up Echo     #hx of CHFpEF  - Diastolic CHF  - no shortness of breath, no lower extremity edema  - C/w PO lasix      #Hypertrophic cardiomyopathy  - Follows with Dr. Hightower in [facility] -  Last echo June 2022 showed EF 75% and findings consistent hypertrophic cardiomiopathy  - 3/6 crescendo/decrescendo murmur at right second intercostal space  - Metoprolol 50 mg daily  - Verapamil 120 mg daily   - Disopyramide 100mg TID     #Hx of constipation   - Miralax and Senna PRN     #DLD  - c/w statin    #Age related macular degeneration   - C/w areds 2 BID     DVt ppx: SCD  Diet: NPO  Code: Full             REVIEW OF SYSTEMS:  CONSTITUTIONAL: No fever, chills  HEENT:  No blurry vision No sinus or throat pain  NECK: No pain or stiffness  RESPIRATORY: No cough, wheezing, chills or hemoptysis; No shortness of breath  CARDIOVASCULAR: No chest pain, palpitations,   GASTROINTESTINAL: Reports Constipation. No abdominal pain. No nausea, vomiting, or diarrhea  GENITOURINARY: No dysuria  NEUROLOGICAL: No HA, No focal weakness  SKIN: No itching, burning, rashes, or lesions   MUSCULOSKELETAL: No joint pain or swelling; No muscle, back, or extremity pain    MEDICATIONS:  Areds 2 1 Capsule(s) 1 Capsule(s) Oral two times a day  cholecalciferol 2000 Unit(s) Oral daily  disopyramide 100 milliGRAM(s) Oral three times a day  melatonin 3 milliGRAM(s) Oral at bedtime PRN  metoprolol succinate ER 50 milliGRAM(s) Oral daily  pantoprazole  Injectable 40 milliGRAM(s) IV Push every 12 hours  polyethylene glycol 3350 17 Gram(s) Oral two times a day PRN  senna 2 Tablet(s) Oral at bedtime  simvastatin 20 milliGRAM(s) Oral at bedtime      T(C): 36.5 (06-11-23 @ 11:42), Max: 36.8 (06-10-23 @ 20:07)  HR: 66 (06-11-23 @ 11:42) (66 - 80)  BP: 96/46 (06-11-23 @ 11:42) (86/42 - 117/52)  RR: 18 (06-11-23 @ 11:42) (18 - 18)  SpO2: 98% (06-11-23 @ 11:42) (98% - 99%)  Wt(kg): --Vital Signs Last 24 Hrs  T(C): 36.5 (11 Jun 2023 11:42), Max: 36.8 (10 Maurilio 2023 20:07)  T(F): 97.7 (11 Jun 2023 11:42), Max: 98.2 (10 Maurilio 2023 20:07)  HR: 66 (11 Jun 2023 11:42) (66 - 80)  BP: 96/46 (11 Jun 2023 11:42) (86/42 - 117/52)  BP(mean): --  RR: 18 (11 Jun 2023 11:42) (18 - 18)  SpO2: 98% (11 Jun 2023 11:42) (98% - 99%)    Parameters below as of 11 Jun 2023 11:42  Patient On (Oxygen Delivery Method): room air        PHYSICAL EXAM:  GENERAL: NAD, well-groomed, well-developed  HEAD:  Atraumatic, Normocephalic  EYES: EOMI, PERRLA, conjunctiva and sclera clear  ENMT:  Moist mucous membranes  NECK: Supple, No JVD,  CHEST/LUNG: Clear to auscultation bilaterally; No rales, rhonchi, wheezing, or rubs  HEART: Regular rate and rhythm; Holosystolic murmur present  ABDOMEN: Soft, Nontender, Nondistended; Bowel sounds present  NEURO: Alert & Oriented X3  EXTREMITIES: No LE edema, no calf tenderness  LYMPH: No lymphadenopathy noted  SKIN: Pale skin; No rashes or lesions    Consultant(s) Notes Reviewed:  [x ] YES  [ ] NO  Care Discussed with Consultants/Other Providers [ x] YES  [ ] NO    LABS:                        7.1    10.75 )-----------( 139      ( 11 Jun 2023 06:55 )             22.6     06-11    146  |  113<H>  |  51<H>  ----------------------------<  117<H>  3.9   |  22  |  1.0    Ca    8.7      11 Jun 2023 06:55  Mg     2.3     06-11    TPro  4.8<L>  /  Alb  3.1<L>  /  TBili  0.3  /  DBili  x   /  AST  19  /  ALT  6   /  AlkPhos  59  06-11    PT/INR - ( 10 Maurilio 2023 01:47 )   PT: 12.30 sec;   INR: 1.08 ratio         PTT - ( 10 Maurilio 2023 01:47 )  PTT:22.8 sec    CAPILLARY BLOOD GLUCOSE    81yo female PMHx HOCM, HFpEF, aortic stenosis, and HLD presenting with intermittent chest "pressure" and generalized weakness for 2 days, associated with COWART.     In ED vitals wnl, EKG LBBB unchanged from prior, Trop neg x 1. Code STEMI activated and cancelled by cardiology. Labs significant for Hgb 6.5 s/p 1 unit PRBC. 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    1.Anemia likely due to GIB  2.CP likely due to demand ischemia  3.Chronic HFpEF  4.HOCM  5.Moderate to sever MR              PLAN:    ·Tele reviewed.   ·CE x 3 are negative.   ·Pt is hypotensive and dropped her Hb again. Will transfuse one more unit of blood and transfer her to ICU  ·Spoke with the IOC to get critical care consult for transfer to ICU  ·Called the GI fellow Dr. Davis and spoke with him. Informed him that pt is hemodynamically unstable and I am transferring the pt to unit.   ·Monitor the H/H and keep Hb >8  ·Cont Protonix 40 mg iv q 12h  ·Check i's and o's and daily wt  ·Low salt diet and water restriction to 1.5 L/D  ·Cardiology eval for medical clearance for the procedure.

## 2023-06-11 NOTE — PROGRESS NOTE ADULT - ASSESSMENT
83yo female PMHx HOCM, HFpEF, aortic stenosis, and HLD presenting with intermittent chest "pressure" and generalized weakness for 2 days, associated with COWART.     In ED vitals wnl, EKG LBBB unchanged from prior, Trop neg x 1. Code STEMI activated and cancelled by cardiology. Labs significant for Hgb 6.5 s/p 1 unit PRBC. 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    Anemia likely due to GIB  CP likely due to demand ischemia  Chronic HFpEF  HOCM  Moderate to sever MR              PLAN:    ·	Tele reviewed.   ·	CE x 3 are negative.  81yo female PMHx HOCM, HFpEF, aortic stenosis, and HLD presenting with intermittent chest "pressure" and generalized weakness for 2 days, associated with COWART.     In ED vitals wnl, EKG LBBB unchanged from prior, Trop neg x 1. Code STEMI activated and cancelled by cardiology. Labs significant for Hgb 6.5 s/p 1 unit PRBC. 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    Anemia likely due to GIB  CP likely due to demand ischemia  Chronic HFpEF  HOCM  Moderate to sever MR              PLAN:    ·	Tele reviewed.   ·	CE x 3 are negative.   ·	Pt is hypotensive and dropped her Hb again. Will transfuse one more unit of blood and transfer her to ICU  ·	Spoke with the IOC to get critical care consult for transfer to ICU  ·	Called the GI fellow Dr. Davis and spoke with him. Informed him that pt is hemodynamically unstable and I am transferring the pt to unit.   ·	Monitor the H/H and keep Hb >8  ·	Cont Protonix 40 mg iv q 12h  ·	Check i's and o's and daily wt  ·	Low salt diet and water restriction to 1.5 L/D  ·	Cardiology eval for medical clearance for the procedure.     Progress Note Handoff    Pending (specify):  Consults_Critical care for transfer to ICU________, Tests________, Test Results_______, Other_________  Family discussion:  Disposition: Home___/SNF___/Other________/Unknown at this time________    Salo Fernandez MD  Spectra: [phone]

## 2023-06-12 LAB
ALBUMIN SERPL ELPH-MCNC: 3.1 G/DL — LOW (ref 3.5–5.2)
ALP SERPL-CCNC: 68 U/L — SIGNIFICANT CHANGE UP (ref 30–115)
ALT FLD-CCNC: 9 U/L — SIGNIFICANT CHANGE UP (ref 0–41)
ANION GAP SERPL CALC-SCNC: 11 MMOL/L — SIGNIFICANT CHANGE UP (ref 7–14)
AST SERPL-CCNC: 18 U/L — SIGNIFICANT CHANGE UP (ref 0–41)
BASOPHILS # BLD AUTO: 0.05 K/UL — SIGNIFICANT CHANGE UP (ref 0–0.2)
BASOPHILS # BLD AUTO: 0.06 K/UL — SIGNIFICANT CHANGE UP (ref 0–0.2)
BASOPHILS NFR BLD AUTO: 0.5 % — SIGNIFICANT CHANGE UP (ref 0–1)
BASOPHILS NFR BLD AUTO: 0.7 % — SIGNIFICANT CHANGE UP (ref 0–1)
BILIRUB SERPL-MCNC: 0.4 MG/DL — SIGNIFICANT CHANGE UP (ref 0.2–1.2)
BUN SERPL-MCNC: 35 MG/DL — HIGH (ref 10–20)
CALCIUM SERPL-MCNC: 8.7 MG/DL — SIGNIFICANT CHANGE UP (ref 8.4–10.5)
CHLORIDE SERPL-SCNC: 110 MMOL/L — SIGNIFICANT CHANGE UP (ref 98–110)
CO2 SERPL-SCNC: 20 MMOL/L — SIGNIFICANT CHANGE UP (ref 17–32)
CREAT SERPL-MCNC: 0.9 MG/DL — SIGNIFICANT CHANGE UP (ref 0.7–1.5)
EGFR: 63 ML/MIN/1.73M2 — SIGNIFICANT CHANGE UP
EOSINOPHIL # BLD AUTO: 0.17 K/UL — SIGNIFICANT CHANGE UP (ref 0–0.7)
EOSINOPHIL # BLD AUTO: 0.18 K/UL — SIGNIFICANT CHANGE UP (ref 0–0.7)
EOSINOPHIL NFR BLD AUTO: 1.8 % — SIGNIFICANT CHANGE UP (ref 0–8)
EOSINOPHIL NFR BLD AUTO: 2.2 % — SIGNIFICANT CHANGE UP (ref 0–8)
GLUCOSE SERPL-MCNC: 81 MG/DL — SIGNIFICANT CHANGE UP (ref 70–99)
HCT VFR BLD CALC: 26.9 % — LOW (ref 37–47)
HCT VFR BLD CALC: 27.5 % — LOW (ref 37–47)
HGB BLD-MCNC: 8.7 G/DL — LOW (ref 12–16)
HGB BLD-MCNC: 8.8 G/DL — LOW (ref 12–16)
IMM GRANULOCYTES NFR BLD AUTO: 0.4 % — HIGH (ref 0.1–0.3)
IMM GRANULOCYTES NFR BLD AUTO: 0.4 % — HIGH (ref 0.1–0.3)
LYMPHOCYTES # BLD AUTO: 1.62 K/UL — SIGNIFICANT CHANGE UP (ref 1.2–3.4)
LYMPHOCYTES # BLD AUTO: 1.89 K/UL — SIGNIFICANT CHANGE UP (ref 1.2–3.4)
LYMPHOCYTES # BLD AUTO: 17.5 % — LOW (ref 20.5–51.1)
LYMPHOCYTES # BLD AUTO: 23 % — SIGNIFICANT CHANGE UP (ref 20.5–51.1)
MAGNESIUM SERPL-MCNC: 2.3 MG/DL — SIGNIFICANT CHANGE UP (ref 1.8–2.4)
MCHC RBC-ENTMCNC: 31 PG — SIGNIFICANT CHANGE UP (ref 27–31)
MCHC RBC-ENTMCNC: 31.6 G/DL — LOW (ref 32–37)
MCHC RBC-ENTMCNC: 32 PG — HIGH (ref 27–31)
MCHC RBC-ENTMCNC: 32.7 G/DL — SIGNIFICANT CHANGE UP (ref 32–37)
MCV RBC AUTO: 97.8 FL — SIGNIFICANT CHANGE UP (ref 81–99)
MCV RBC AUTO: 97.9 FL — SIGNIFICANT CHANGE UP (ref 81–99)
MONOCYTES # BLD AUTO: 0.67 K/UL — HIGH (ref 0.1–0.6)
MONOCYTES # BLD AUTO: 0.76 K/UL — HIGH (ref 0.1–0.6)
MONOCYTES NFR BLD AUTO: 7.3 % — SIGNIFICANT CHANGE UP (ref 1.7–9.3)
MONOCYTES NFR BLD AUTO: 9.2 % — SIGNIFICANT CHANGE UP (ref 1.7–9.3)
NEUTROPHILS # BLD AUTO: 5.3 K/UL — SIGNIFICANT CHANGE UP (ref 1.4–6.5)
NEUTROPHILS # BLD AUTO: 6.69 K/UL — HIGH (ref 1.4–6.5)
NEUTROPHILS NFR BLD AUTO: 64.5 % — SIGNIFICANT CHANGE UP (ref 42.2–75.2)
NEUTROPHILS NFR BLD AUTO: 72.5 % — SIGNIFICANT CHANGE UP (ref 42.2–75.2)
NRBC # BLD: 0 /100 WBCS — SIGNIFICANT CHANGE UP (ref 0–0)
NRBC # BLD: 0 /100 WBCS — SIGNIFICANT CHANGE UP (ref 0–0)
PLATELET # BLD AUTO: 121 K/UL — LOW (ref 130–400)
PLATELET # BLD AUTO: 133 K/UL — SIGNIFICANT CHANGE UP (ref 130–400)
PMV BLD: 11.8 FL — HIGH (ref 7.4–10.4)
PMV BLD: 12.4 FL — HIGH (ref 7.4–10.4)
POTASSIUM SERPL-MCNC: 3.7 MMOL/L — SIGNIFICANT CHANGE UP (ref 3.5–5)
POTASSIUM SERPL-SCNC: 3.7 MMOL/L — SIGNIFICANT CHANGE UP (ref 3.5–5)
PROT SERPL-MCNC: 5 G/DL — LOW (ref 6–8)
RBC # BLD: 2.75 M/UL — LOW (ref 4.2–5.4)
RBC # BLD: 2.81 M/UL — LOW (ref 4.2–5.4)
RBC # FLD: 17.1 % — HIGH (ref 11.5–14.5)
RBC # FLD: 17.1 % — HIGH (ref 11.5–14.5)
SODIUM SERPL-SCNC: 141 MMOL/L — SIGNIFICANT CHANGE UP (ref 135–146)
WBC # BLD: 8.22 K/UL — SIGNIFICANT CHANGE UP (ref 4.8–10.8)
WBC # BLD: 9.24 K/UL — SIGNIFICANT CHANGE UP (ref 4.8–10.8)
WBC # FLD AUTO: 8.22 K/UL — SIGNIFICANT CHANGE UP (ref 4.8–10.8)
WBC # FLD AUTO: 9.24 K/UL — SIGNIFICANT CHANGE UP (ref 4.8–10.8)

## 2023-06-12 PROCEDURE — 99233 SBSQ HOSP IP/OBS HIGH 50: CPT

## 2023-06-12 PROCEDURE — 93306 TTE W/DOPPLER COMPLETE: CPT | Mod: 26

## 2023-06-12 PROCEDURE — 99223 1ST HOSP IP/OBS HIGH 75: CPT

## 2023-06-12 RX ORDER — SOD SULF/SODIUM/NAHCO3/KCL/PEG
2000 SOLUTION, RECONSTITUTED, ORAL ORAL ONCE
Refills: 0 | Status: COMPLETED | OUTPATIENT
Start: 2023-06-12 | End: 2023-06-12

## 2023-06-12 RX ORDER — CHLORHEXIDINE GLUCONATE 213 G/1000ML
1 SOLUTION TOPICAL DAILY
Refills: 0 | Status: DISCONTINUED | OUTPATIENT
Start: 2023-06-12 | End: 2023-06-15

## 2023-06-12 RX ADMIN — Medication 100 MILLIGRAM(S): at 21:35

## 2023-06-12 RX ADMIN — SIMVASTATIN 20 MILLIGRAM(S): 20 TABLET, FILM COATED ORAL at 21:33

## 2023-06-12 RX ADMIN — Medication 50 MILLIGRAM(S): at 05:05

## 2023-06-12 RX ADMIN — Medication 100 MILLIGRAM(S): at 15:06

## 2023-06-12 RX ADMIN — PANTOPRAZOLE SODIUM 40 MILLIGRAM(S): 20 TABLET, DELAYED RELEASE ORAL at 05:05

## 2023-06-12 RX ADMIN — Medication 100 MILLIGRAM(S): at 05:06

## 2023-06-12 RX ADMIN — Medication 2000 UNIT(S): at 12:36

## 2023-06-12 RX ADMIN — Medication 2000 MILLILITER(S): at 18:00

## 2023-06-12 RX ADMIN — CHLORHEXIDINE GLUCONATE 1 APPLICATION(S): 213 SOLUTION TOPICAL at 12:36

## 2023-06-12 RX ADMIN — SENNA PLUS 2 TABLET(S): 8.6 TABLET ORAL at 21:33

## 2023-06-12 RX ADMIN — Medication 20 MILLIGRAM(S): at 21:34

## 2023-06-12 RX ADMIN — PANTOPRAZOLE SODIUM 40 MILLIGRAM(S): 20 TABLET, DELAYED RELEASE ORAL at 18:01

## 2023-06-12 NOTE — CONSULT NOTE ADULT - SUBJECTIVE AND OBJECTIVE BOX
Patient is a 82y old  Female who presents with a chief complaint of Anemia (11 Jun 2023 07:28)      HPI:  81yo female PMHx HOCM, HFpEF, aortic stenosis, and HLD presenting with intermittent chest "pressure" and generalized weakness for 2 days, associated with COWART, without any SOB, n/v.  mid sternal chest 'pressure', non radiating. No  fevers, cough, HA.   In ED vitals wnl, EKG LBBB unchanged from prior, Trop neg x 1. Code STEMI activated and cancelled by cardiology. 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 Last BM was 3 days ago (pt usually constipated).   Patient will be admitted for management of symptomatic anemia    (10 Maurilio 2023 01:34)      PAST MEDICAL & SURGICAL HISTORY:  High cholesterol      Hypertrophic obstructive cardiomyopathy (HOCM)      Aortic stenosis      No significant past surgical history            FAMILY HISTORY:  :  No known cardiovacular family hisotry     Review Of Systems:     All ROS are negative except per HPI       Allergies    lidocaine (Rash)    Intolerances          PHYSICAL EXAM    ICU Vital Signs Last 24 Hrs  T(C): 36.7 (12 Jun 2023 04:00), Max: 36.9 (11 Jun 2023 20:00)  T(F): 98.1 (12 Jun 2023 04:00), Max: 98.5 (11 Jun 2023 20:00)  HR: 60 (12 Jun 2023 04:00) (58 - 92)  BP: 93/46 (12 Jun 2023 04:00) (93/46 - 157/80)  BP(mean): 66 (12 Jun 2023 04:00) (66 - 66)  ABP: --  ABP(mean): --  RR: 19 (12 Jun 2023 04:00) (18 - 19)  SpO2: 98% (12 Jun 2023 04:00) (98% - 98%)    O2 Parameters below as of 12 Jun 2023 04:00  Patient On (Oxygen Delivery Method): room air            CONSTITUTIONAL:  NAD    ENT:   Airway patent,   Mouth with normal mucosa.       CARDIAC:   Normal rate,   Regular rhythm.        RESPIRATORY:   No wheezing  Bilateral BS   Not tachypneic,  No use of accessory muscles    GASTROINTESTINAL:  Abdomen soft,   Non-tender,   No guarding,   + BS      NEUROLOGICAL:   Alert and oriented   No motor deficits.              06-11-23 @ 07:01  -  06-12-23 @ 07:00  --------------------------------------------------------  IN:    Oral Fluid: 660 mL  Total IN: 660 mL    OUT:    Voided (mL): 670 mL  Total OUT: 670 mL    Total NET: -10 mL          LABS:                          8.8    9.24  )-----------( 121      ( 12 Jun 2023 07:02 )             26.9                                               06-12    141  |  110  |  35<H>  ----------------------------<  81  3.7   |  20  |  0.9    Ca    8.7      12 Jun 2023 07:02  Mg     2.3     06-12    TPro  5.0<L>  /  Alb  3.1<L>  /  TBili  0.4  /  DBili  x   /  AST  18  /  ALT  9   /  AlkPhos  68  06-12                                                 CARDIAC MARKERS ( 10 Maurilio 2023 11:24 )  x     / <0.01 ng/mL / x     / x     / x                                                LIVER FUNCTIONS - ( 12 Jun 2023 07:02 )  Alb: 3.1 g/dL / Pro: 5.0 g/dL / ALK PHOS: 68 U/L / ALT: 9 U/L / AST: 18 U/L / GGT: x                                                                                                                                       X-Rays reviewed                                                                                     ECHO        MEDICATIONS  (STANDING):  Areds 2 1 Capsule(s) 1 Capsule(s) Oral two times a day  chlorhexidine 2% Cloths 1 Application(s) Topical daily  cholecalciferol 2000 Unit(s) Oral daily  disopyramide 100 milliGRAM(s) Oral three times a day  metoprolol succinate ER 50 milliGRAM(s) Oral daily  pantoprazole  Injectable 40 milliGRAM(s) IV Push every 12 hours  senna 2 Tablet(s) Oral at bedtime  simvastatin 20 milliGRAM(s) Oral at bedtime    MEDICATIONS  (PRN):  melatonin 3 milliGRAM(s) Oral at bedtime PRN Insomnia  polyethylene glycol 3350 17 Gram(s) Oral two times a day PRN Constipation        
Gastroenterology Consultation:    Patient is a 82y old  Female who presents with a chief complaint of Anemia (10 Maurilio 2023 01:34)        Admitted on: 06-09-23      HPI:    83yo female PMHx HOCM, HFpEF, Moderate/SEvere TVR, Severe MVR, Moderate PTHN, and HLD presenting with intermittent chest "pressure" and generalized weakness for 2 days, associated with COWART, without any SOB, n/v.  mid sternal chest 'pressure', non radiating. No  fevers, cough, HA.   In ED vitals wnl, EKG LBBB unchanged from prior, Trop neg x 1. Code STEMI activated and cancelled by cardiology. Labs significant for Hgb 6.5 s/p 1 unit PRBC  Pt endorses no active bleeding and has been having brown stools. She states occassionaly when constipated and when she has to strain she notices some bright red blood on the tissue when she wipes or in the toilet bowl.  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 Pt did not follow up with GI thereafter (Dr. Aparicio). Pt states she does not want to undergo any endoscopic intervention unless its considered as a life saving intervention. She prefers conservative management with medication at this time.          PAST MEDICAL & SURGICAL HISTORY:  High cholesterol      Hypertrophic obstructive cardiomyopathy (HOCM)      Aortic stenosis      No significant past surgical history            FAMILY HISTORY:  denies family hx of gastric or colon cancer    Social History:  Tobacco: denies   Alcohol: denies   Drugs: denies    Home Medications:  disopyramide 100 mg oral capsule: 1 cap(s) orally 3 times a day (01 Sep 2022 11:00)  simvastatin 20 mg oral tablet: 1 tab(s) orally once a day (at bedtime) (01 Sep 2022 11:00)  verapamil 120 mg/24 hours oral capsule, extended release: 1 cap(s) orally once a day (01 Sep 2022 11:00)  Vitamin B12 250 mcg oral tablet: 1 tab(s) orally once a day (01 Sep 2022 11:00)  Vitamin D3 50 mcg (2000 intl units) oral tablet: 1 tab(s) orally once a day (01 Sep 2022 11:00)        MEDICATIONS  (STANDING):  Areds 2 1 Capsule(s) 1 Capsule(s) Oral two times a day  cholecalciferol 2000 Unit(s) Oral daily  disopyramide 100 milliGRAM(s) Oral three times a day  metoprolol succinate ER 50 milliGRAM(s) Oral daily  pantoprazole  Injectable 40 milliGRAM(s) IV Push every 12 hours  senna 2 Tablet(s) Oral at bedtime  simvastatin 20 milliGRAM(s) Oral at bedtime    MEDICATIONS  (PRN):  melatonin 3 milliGRAM(s) Oral at bedtime PRN Insomnia  polyethylene glycol 3350 17 Gram(s) Oral two times a day PRN Constipation      Allergies  lidocaine (Rash)      Review of Systems:   Constitutional:  No Fever, No Chills  ENT/Mouth:  No Hearing Changes,  No Difficulty Swallowing  Eyes:  No Eye Pain, No Vision Changes  Cardiovascular:  No Chest Pain, No Palpitations  Respiratory:  No Cough, No Dyspnea  Gastrointestinal:  As described in HPI          Physical Examination:  T(C): 37.7 (06-10-23 @ 07:48), Max: 37.7 (06-10-23 @ 07:48)  HR: 81 (06-10-23 @ 07:48) (67 - 81)  BP: 118/56 (06-10-23 @ 07:48) (101/51 - 124/60)  RR: 18 (06-10-23 @ 07:48) (16 - 18)  SpO2: 96% (06-10-23 @ 07:48) (96% - 100%)  Height (cm): 160 (06-09-23 @ 15:43)  Weight (kg): 64.9 (06-09-23 @ 15:43)        GENERAL: AAOx3, no acute distress.  HEAD:  Atraumatic, Normocephalic  EYES: conjunctiva and sclera clear  CHEST/LUNG: Clear to auscultation bilaterally; No wheeze, rhonchi, or rales  HEART: Regular rate and rhythm  ABDOMEN: Soft, nontender, nondistended; Bowel sounds present  SKIN: Intact, no jaundice        Data:                        6.7    10.97 )-----------( 142      ( 10 Maurilio 2023 01:47 )             21.1     Hgb Trend:  6.7  06-10-23 @ 01:47  6.5  06-09-23 @ 16:15      06-09    138  |  107  |  65<HH>  ----------------------------<  157<H>  4.5   |  20  |  1.2    Ca    9.0      09 Jun 2023 16:15  Mg     2.0     06-09    TPro  5.7<L>  /  Alb  3.7  /  TBili  <0.2  /  DBili  x   /  AST  18  /  ALT  11  /  AlkPhos  79  06-09    Liver panel trend:  TBili <0.2   /   AST 18   /   ALT 11   /   AlkP 79   /   Tptn 5.7   /   Alb 3.7    /   DBili --      06-09      PT/INR - ( 10 Maurilio 2023 01:47 )   PT: 12.30 sec;   INR: 1.08 ratio         PTT - ( 10 Maurilio 2023 01:47 )  PTT:22.8 sec        Radiology:

## 2023-06-12 NOTE — PROGRESS NOTE ADULT - ASSESSMENT
83yo female PMHx HOCM, HFpEF, aortic stenosis, and HLD presenting with intermittent chest "pressure" and generalized weakness for 2 days found to have hgb 6.5 s/p 2 units prbc; receiving 3rd unit of hgb given hemodynamic instability and decreasing hgb w/o overt signs of bleeding.     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  - No melena, bloody BM, coffee ground emesis  - RO negative in the ED   - s/p 2 unit PRBC, F/u Rpt CBC   - Hx Rt colon AVM (Heyde syndrome?)   - BUN/Cr > 30 favors UGIB   - GI consulted for endoscopy/colonoscopy, patient currently refusing colonoscopy, will f/u with GI regarding EGD  - PPI bid, CBC q12, active type and screen   - Consider CT Angio r/o retroperitoneal bleed if pt has bleeding  - Holding anticoagulation and aspirin      # Atypical chest pain - likely symptomatic anemia, Hgb 6.5  - R/o ACS, Trop  neg x1, f/u serial cardiac enzymes  - EKG showed  LBBB unchanged form prior-> assessed by Cardio,  STEMI code canceled   - Follow up Echo     #hx of CHFpEF  - Diastolic CHF  - no shortness of breath, no lower extremity edema  - C/w PO lasix      #Hypertrophic cardiomyopathy  - Follows with Dr. Hightower in [facility] -  Last echo June 2022 showed EF 75% and findings consistent hypertrophic cardiomiopathy  - 3/6 crescendo/decrescendo murmur at right second intercostal space  - Metoprolol 50 mg daily  - Verapamil 120 mg daily   - Disopyramide 100mg TID     #Hx of constipation   - Miralax and Senna PRN     #DLD  - c/w statin    #Age related macular degeneration   - C/w areds 2 BID     DVt ppx: SCD  Diet: clears  Code: Full

## 2023-06-12 NOTE — PROGRESS NOTE ADULT - ASSESSMENT
81yo female PMHx HOCM, HFpEF, aortic stenosis, and HLD presenting with intermittent chest "pressure" and generalized weakness for 2 days, associated with COWART, without any SOB, n/v.  mid sternal chest 'pressure', non radiating. In ED. Code STEMI activated and cancelled by cardiology. Labs significant for Hgb 6.5 s/p 1 unit PRBC Pt admitted for acute symptomatic anemia , r/o GIB  Pt was admitted to 3C , Upgraded to CEU.    []Acute symptomatic  normocytic  anemia / r/o GIB   [] 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  - No melena, bloody BM, coffee ground emesis  - RO negative in the ED   - s/p 3 nit PRBC,  Hgb 6.5 on admission received 2 units --> 8,3 then dropped to 7.1 yesterday , reeived one unite -->today 8.8  - Hx Rt colon AVM (Heyde syndrome?)   - BUN/Cr > 30 favors UGIB   - GI consulted for endoscopy/colonoscopy  - PPI bid, CBC q12, active type and screen   - Consider CT Angio if active bleeding or persistent hgb drop   - Holding anticoagulation and aspirin    GI on board - f/u requested , need cardiac clearance if the patient agreed to procedure     []chest pain / tightness - likely symptomatic anemia, Hgb 6.5  - R/o ACS, Trop  neg x3   no current chest pain   - EKG showed  LBBB unchanged form prior-> assessed by Cardio,  STEMI code canceled   - Follow up Echo done today     []HOCM   []MR , MV  LVOT  obstruction     - Follows with [facility]. Jesus [facility]ightower at [facility]   -  Last echo June 2022 showed EF 75% and findings consistent hypertrophic cardiomiopathy  - Metoprolol 50 mg daily  - Verapamil 120 mg daily ON hold   - Disopyramide 100mg TID     #Hx of constipation   - Miralax and Senna PRN     #DLD  - c/w statin    #Age related macular degeneration   - C/w areds 2 BID non formulary     Discussed with the patient and her  at bedside  83yo female PMHx HOCM, HFpEF, aortic stenosis, and HLD presenting with intermittent chest "pressure" and generalized weakness for 2 days, associated with COWART, without any SOB, n/v.  mid sternal chest 'pressure', non radiating. In ED. Code STEMI activated and cancelled by cardiology. Labs significant for Hgb 6.5 s/p 1 unit PRBC Pt admitted for acute symptomatic anemia , r/o GIB  Pt was admitted to 3C , Upgraded to CEU.    []Acute symptomatic  normocytic  anemia / r/o GIB   [] 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  - No melena, bloody BM, coffee ground emesis  - RO negative in the ED   - s/p 3 nit PRBC,  Hgb 6.5 on admission received 2 units --> 8,3 then dropped to 7.1 yesterday , reeived one unite -->today 8.8  - Hx Rt colon AVM (Heyde syndrome?)   - BUN/Cr > 30 favors UGIB   - GI consulted for endoscopy/colonoscopy  - PPI bid, CBC q12, active type and screen   - Consider CT Angio if active bleeding or persistent hgb drop   - Holding anticoagulation and aspirin    GI on board - f/u requested , need cardiac clearance if the patient agreed to procedure     []chest pain / tightness - likely symptomatic anemia, Hgb 6.5  - R/o ACS, Trop  neg x3   no current chest pain   - EKG showed  LBBB unchanged form prior-> assessed by Cardio,  STEMI code canceled   - Follow up Echo done today     []HOCM   []MR , MV  LVOT  obstruction     - Follows with Dr. Jesus Hightower at [facility]   -  Last echo June 2022 showed EF 75% and findings consistent hypertrophic cardiomiopathy  - Metoprolol 50 mg daily  - Verapamil 120 mg daily ON hold   - Disopyramide 100mg TID     #Hx of constipation   - Miralax and Senna PRN     #DLD  - c/w statin    #Age related macular degeneration   - C/w areds 2 BID non formulary - discuss with the patient to bring it     Discussed with the patient and her  at bedside     Pending; GI/   Dr. Jesus Hightower at [facility] office contacted anaesthesia bending recc for clearance

## 2023-06-12 NOTE — PROGRESS NOTE ADULT - SUBJECTIVE AND OBJECTIVE BOX
SUBJECTIVE:  Patient is a 82y old Female who presents with a chief complaint of Anemia (12 Jun 2023 09:24)   Anemia     Today is hospital day 3d. There are no new issues or overnight events.     HPI  HPI:  81yo female PMHx HOCM, HFpEF, aortic stenosis, and HLD presenting with intermittent chest "pressure" and generalized weakness for 2 days, associated with COWART, without any SOB, n/v.  mid sternal chest 'pressure', non radiating. No  fevers, cough, HA.   In ED vitals wnl, EKG LBBB unchanged from prior, Trop neg x 1. Code STEMI activated and cancelled by cardiology. 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 Last BM was 3 days ago (pt usually constipated).   Patient will be admitted for management of symptomatic anemia    (10 Maurilio 2023 01:34)      PAST MEDICAL & SURGICAL HISTORY  High cholesterol    Hypertrophic obstructive cardiomyopathy (HOCM)    Aortic stenosis    No significant past surgical history      ALLERGIES:  lidocaine (Rash)    MEDICATIONS:  HOME MEDICATIONS  disopyramide 100 mg oral capsule: 1 cap(s) orally 3 times a day  simvastatin 20 mg oral tablet: 1 tab(s) orally once a day (at bedtime)  Toprol-XL 50 mg oral tablet, extended release: 1 tab(s) orally once a day  verapamil 120 mg/24 hours oral capsule, extended release: 1 cap(s) orally once a day  Vitamin B12 250 mcg oral tablet: 1 tab(s) orally once a day  Vitamin D3 50 mcg (2000 intl units) oral tablet: 1 tab(s) orally once a day    STANDING MEDICATIONS  Areds 2 1 Capsule(s) 1 Capsule(s) Oral two times a day  chlorhexidine 2% Cloths 1 Application(s) Topical daily  cholecalciferol 2000 Unit(s) Oral daily  disopyramide 100 milliGRAM(s) Oral three times a day  metoprolol succinate ER 50 milliGRAM(s) Oral daily  pantoprazole  Injectable 40 milliGRAM(s) IV Push every 12 hours  senna 2 Tablet(s) Oral at bedtime  simvastatin 20 milliGRAM(s) Oral at bedtime    PRN MEDICATIONS  melatonin 3 milliGRAM(s) Oral at bedtime PRN  polyethylene glycol 3350 17 Gram(s) Oral two times a day PRN    VITALS:   T(C): 36.7 (06-12-23 @ 04:00), Max: 36.9 (06-11-23 @ 20:00)  T(F): 98.1 (06-12-23 @ 04:00), Max: 98.5 (06-11-23 @ 20:00)  HR: 60 (06-12-23 @ 04:00) (58 - 92)  BP: 93/46 (06-12-23 @ 04:00) (93/46 - 157/80)  BP(mean): 66 (06-12-23 @ 04:00) (66 - 66)  ABP: --  ABP(mean): --  RR: 19 (06-12-23 @ 04:00) (18 - 19)  SpO2: 98% (06-12-23 @ 04:00) (98% - 98%)  LABS:                        8.8    9.24  )-----------( 121      ( 12 Jun 2023 07:02 )             26.9     06-12    141  |  110  |  35<H>  ----------------------------<  81  3.7   |  20  |  0.9    Ca    8.7      12 Jun 2023 07:02  Mg     2.3     06-12    TPro  5.0<L>  /  Alb  3.1<L>  /  TBili  0.4  /  DBili  x   /  AST  18  /  ALT  9   /  AlkPhos  68  06-12        I&O's Detail    11 Jun 2023 07:01  -  12 Jun 2023 07:00  --------------------------------------------------------  IN:    Oral Fluid: 660 mL  Total IN: 660 mL    OUT:    Voided (mL): 670 mL  Total OUT: 670 mL    Total NET: -10 mL                CARDIAC MARKERS ( 10 Maurilio 2023 11:24 )  x     / <0.01 ng/mL / x     / x     / x          RADIOLOGY:  EKG  12 Lead ECG:   Ventricular Rate 81 BPM    Atrial Rate 81 BPM    P-R Interval 194 ms    QRS Duration 180 ms    Q-T Interval 480 ms    QTC Calculation(Bazett) 557 ms    P Axis 81 degrees    R Axis -40 degrees    T Axis 130 degrees    Diagnosis Line Sinus rhythm with 1st degree A-V block  Left axis deviation  Left bundle branch block  Abnormal ECG    Confirmed by Cortney Meyre MD ([id number]) on 6/10/2023 6:02:47 PM (06-10-23 @ 11:42)  12 Lead ECG:   Ventricular Rate 68 BPM    Atrial Rate 68 BPM    P-R Interval 196 ms    QRS Duration 190 ms    Q-T Interval 540 ms    QTC Calculation(Bazett) 574 ms    P Axis 29 degrees    R Axis -21 degrees    T Axis 139 degrees    Diagnosis Line Sinus rhythm with 1st degree A-V block  Left bundle branch block  Abnormal ECG    Confirmed by Cortney Meyer MD ([id number]) on 6/10/2023 5:52:09 PM (06-09-23 @ 15:47)    Xray Chest 1 View- PORTABLE-Routine:   ACC: [id number] EXAM:  XR CHEST PORTABLE ROUTINE 1V   ORDERED BY: KALA COWAN     PROCEDURE DATE:  06/10/2023          INTERPRETATION:  Clinical History / Reason for exam: Shortness of breath    Comparison : Chest radiograph 2/9/2023.    Technique/Positioning: Single AP view of the chest.    Findings:    Support devices: None.    Cardiac/mediastinum/hilum: Cardiomegaly, unchanged    Lung parenchyma/Pleura: Within normal limits.    Skeleton/soft tissues: Unchanged    Impression:    No acute pulmonary process.        --- End of Report ---            ELLIOT LANDAU MD; Attending Radiologist  This document has been electronically signed. Jun 11 2023  4:42PM (06-10-23 @ 14:29)  Xray Chest 1 View- PORTABLE-Urgent:   ACC: [id number] EXAM:  XR CHEST PORTABLE URGENT 1V   ORDERED BY: BRENNAN LOAIZA     PROCEDURE DATE:  06/09/2023          INTERPRETATION:  Clinical History / Reason for exam: Pain    Comparison : Chest radiograph September 10, 2022.    Technique/Positioning: Frontal chest, low lung volumes.    Findings:    Support devices: Monitoring device.    Cardiac/mediastinum/hilum: Stable cardiomegaly, thoracic aortic   calcification.    Lung parenchyma/Pleura: Within normal limits.    Skeleton/soft tissues: Stable.    Impression:    No radiographic evidence of acute cardiopulmonary disease. Stable   cardiomegaly        --- End of Report ---            CIERA MURDOCK MD; Attending Radiologist  This document has been electronically signed. Maurilio 10 2023  6:19AM (06-09-23 @ 17:31)    Physical Exam:  General: no acute distress, lying in bed  Head: normocephalic and atraumatic  Neck: supple  Heart: regular rate and rhythm, S1 and S2 normal, no murmurs, rubs or gallops noted on exam  Lungs: Symmetric chest expansion bilaterally, clear lungs bilaterally, no wheezes rhonchi or crackles heard  Abdomen: Bowel sounds present, non tender on light and deep palpation  Extremities: No edema, no clubbing or cyanosis noted

## 2023-06-12 NOTE — PROGRESS NOTE ADULT - ASSESSMENT
83yo female PMHx HOCM, HFpEF, Moderate/SEvere TVR, Severe MVR, Moderate PTHN, and HLD presenting with intermittent chest "pressure" and generalized weakness for 2 days, associated with COWART, without any SOB, n/v.  mid sternal chest 'pressure', non radiating. No  fevers, cough, HA.   In ED vitals wnl, EKG LBBB unchanged from prior, Trop neg x 1. Code STEMI activated and cancelled by cardiology. Labs significant for Hgb 6.5 s/p 1 unit PRBC  Pt endorses no active bleeding and has been having brown stools. She states occassionaly when constipated and when she has to strain she notices some bright red blood on the tissue when she wipes or in the toilet bowl.  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 Pt did not follow up with GI thereafter (Dr. Aparicio). Pt states she does not want to undergo any endoscopic intervention unless its considered as a life saving intervention. She prefers conservative management with medication at this time.    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 Will need cardiology clearance 81yo female PMHx HOCM, HFpEF, Moderate/SEvere TVR, Severe MVR, Moderate PTHN, and HLD presenting with intermittent chest "pressure" and generalized weakness for 2 days, associated with COWART, without any SOB, n/v.  mid sternal chest 'pressure', non radiating. No  fevers, cough, HA.   In ED vitals wnl, EKG LBBB unchanged from prior, Trop neg x 1. Code STEMI activated and cancelled by cardiology. Labs significant for Hgb 6.5 s/p 1 unit PRBC  Pt endorses no active bleeding and has been having brown stools. She states occassionaly when constipated and when she has to strain she notices some bright red blood on the tissue when she wipes or in the toilet bowl.  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 Pt did not follow up with GI thereafter (Dr. Aparicio). Pt states she does not want to undergo any endoscopic intervention unless its considered as a life saving intervention. She prefers conservative management with medication at this time.    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 Advance as tolerated   - target hb >8  - monitor cbc bid  - c/w protonix 40mg bid  - c/w miralax qd and senna x2 tabs qhs, enema as needed  - Recommend outpatient EGD colonoscopy given absence of active bleed and stable hemoglobin. Will need cardiology clearance. Followed Dr. Aparicio in the past   - If does not follow with Dr. Aparicio, can follow up with our [facility] located at [address] [place] [address]. Phone Number: [phone]    - Please recall GI if evidence of active/unstable bleed   81yo female PMHx HOCM, HFpEF, Moderate/SEvere TVR, Severe MVR, Moderate PTHN, and HLD presenting with intermittent chest "pressure" and generalized weakness for 2 days, associated with COWART, without any SOB, n/v.  mid sternal chest 'pressure', non radiating. No  fevers, cough, HA.   In ED vitals wnl, EKG LBBB unchanged from prior, Trop neg x 1. Code STEMI activated and cancelled by cardiology. Labs significant for Hgb 6.5 s/p 1 unit PRBC  Pt endorses no active bleeding and has been having brown stools. She states occassionaly when constipated and when she has to strain she notices some bright red blood on the tissue when she wipes or in the toilet bowl.  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 Pt did not follow up with GI thereafter (Dr. Aparicio). Pt states she does not want to undergo any endoscopic intervention unless its considered as a life saving intervention. She prefers conservative management with medication at this time.    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 Advance as tolerated   - target hb >8  - monitor cbc bid  - c/w protonix 40mg qd  - c/w miralax qd and senna x2 tabs qhs, tap water enema as needed  - Recommend outpatient EGD colonoscopy given absence of active bleed and stable hemoglobin. Will need cardiology clearance. Followed Dr. Aparicio in the past   - If does not follow with Dr. Aparicio, can follow up with our [facility] located at [address] [place] [address]. Phone Number: [phone]    - Please recall GI if evidence of active/unstable bleed   83yo female PMHx HOCM, HFpEF, Moderate/SEvere TVR, Severe MVR, Moderate PTHN, and HLD presenting with intermittent chest "pressure", generalized weakness and COWART for 2 days. Code STEMI activated and cancelled by cardiology. Labs significant for Hgb 6.5 s/p 1 unit PRBC  Pt endorses no active bleeding and has been having brown stools. She states occasionally when constipated and when she has to strain she notices some bright red blood on the tissue when she wipes or in the toilet bowl.  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 Pt did not follow up with GI thereafter (Dr. Aparicio). Pt states she does not want to undergo any endoscopic intervention unless its considered as a life saving intervention. She prefers conservative management with medication at this time.    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 Advance as tolerated   - target hb >8  - monitor cbc bid  - c/w protonix 40mg qd  - c/w miralax qd and senna x2 tabs qhs, tap water enema as needed  - Recommend outpatient EGD colonoscopy given absence of active bleed and stable hemoglobin. Will need cardiology clearance. Followed Dr. Aparicio in the past   - If does not follow with Dr. Aparicio, can follow up with our [facility] located at [address] [place] [address]. Phone Number: [phone]    - Please recall GI if evidence of active/unstable bleed   83yo female PMHx HOCM, HFpEF, Moderate/SEvere TVR, Severe MVR, Moderate PTHN, and HLD presenting with intermittent chest "pressure", generalized weakness and COWART for 2 days. Code STEMI activated and cancelled by cardiology. Labs significant for Hgb 6.5 s/p 1 unit PRBC  Pt endorses no active bleeding and has been having brown stools. She states occasionally when constipated and when she has to strain she notices some bright red blood on the tissue when she wipes or in the toilet bowl.  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 Pt did not follow up with GI thereafter (Dr. Aparicio). Pt states she does not want to undergo any endoscopic intervention unless its considered as a life saving intervention. She prefers conservative management with medication at this time.    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 Discussed risk/benefits of procedure with patient and son in setting of stable hemoglobin after blood transfusions and absence of signs of active bleeding. Patient and son would like to discuss further with private cardiologist if they would like to proceed with inpatient EGD/coplonoscopy  - Will plan for capsule endoscopy tomorrow  -NPO after midnight, 1/2 GOLYTELY tonight followed by dulcolax. Monitor for clear stools, if not clear will need additional tap water enemas  - target hb >8  - monitor cbc bid  - c/w protonix 40mg qd  - c/w miralax qd and senna x2 tabs qhs, tap water enema as needed  - Please call GI if evidence of active/unstable bleed   83yo female PMHx HOCM, HFpEF, Moderate/SEvere TVR, Severe MVR, Moderate PTHN, and HLD presenting with intermittent chest "pressure", generalized weakness and COWART for 2 days. Code STEMI activated and cancelled by cardiology. Labs significant for Hgb 6.5 s/p 1 unit PRBC  Pt endorses no active bleeding and has been having brown stools. She states occasionally when constipated and when she has to strain she notices some bright red blood on the tissue when she wipes or in the toilet bowl.  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 Pt did not follow up with GI thereafter (Dr. Aparicio). Pt states she does not want to undergo any endoscopic intervention unless its considered as a life saving intervention. She prefers conservative management with medication at this time.    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 Discussed risk/benefits of procedure with patient and son in setting of stable hemoglobin after blood transfusions and absence of signs of active bleeding. Patient and son would like to discuss further with private cardiologist if they would like to proceed with inpatient EGD/colonoscopy  - Will plan for capsule endoscopy tomorrow  -Clear liquid diet. NPO after midnight, 1/2 GOLYTELY tonight followed by dulcolax  - target hb >8  - monitor cbc bid  - c/w protonix 40mg qd  - c/w miralax qd and senna x2 tabs qhs, tap water enema as needed  - Please call GI if evidence of active/unstable bleed   83yo female PMHx HOCM, HFpEF, Moderate/SEvere TVR, Severe MVR, Moderate PTHN, and HLD presenting with intermittent chest "pressure", generalized weakness and COWART for 2 days. Code STEMI activated and cancelled by cardiology. Labs significant for Hgb 6.5 s/p 1 unit PRBC  Pt endorses no active bleeding and has been having brown stools. She states occasionally when constipated and when she has to strain she notices some bright red blood on the tissue when she wipes or in the toilet bowl.  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 Pt did not follow up with GI thereafter (Dr. Aparicio). Pt states she does not want to undergo any endoscopic intervention unless its considered as a life saving intervention. She prefers conservative management with medication at this time.    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 Discussed risk/benefits of procedure with patient and son in setting of stable hemoglobin after blood transfusions and absence of signs of active bleeding. Patient and son would like to discuss further with private cardiologist if they would like to proceed with inpatient EGD/colonoscopy  - Please follow up private cardiologist   - Will plan for capsule endoscopy tomorrow  - Clear liquid diet. NPO after midnight, 1/2 GOLYTELY tonight followed by dulcolax  - target hb >8  - monitor cbc bid  - c/w protonix 40mg qd  - Please call GI if evidence of active/unstable bleed    #Constipation   - c/w miralax qd and senna x2 tabs qhs, tap water enema as needed   81yo female PMHx HOCM, HFpEF, Moderate/SEvere TVR, Severe MVR, Moderate PTHN, and HLD presenting with intermittent chest "pressure", generalized weakness and COWART for 2 days. Code STEMI activated and cancelled by cardiology. Labs significant for Hgb 6.5 s/p 1 unit PRBC  Pt endorses no active bleeding and has been having brown stools. She states occasionally when constipated and when she has to strain she notices some bright red blood on the tissue when she wipes or in the toilet bowl.  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 Pt did not follow up with GI thereafter (Dr. Aparicio).     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 Both would prefer to avoid invasive procedures requiring anesthesia for now. Benefit, risks and limitations of capsule endoscopy was discussed, both opted to proceed with capsule endoscopy understanding  its limited role for evaluation of colon, and the need for EGD/ colon if it showed active bleeding.   - Will plan for capsule endoscopy tomorrow  - Clear liquid diet. NPO after midnight, 1/2 GOLYTELY tonight followed by dulcolax  - target hb >8  - monitor cbc bid  - c/w Protonix 40mg qd  - Please call GI if evidence of active/unstable bleed  - please obtain cardiology risk stratification for EGD/ colon     #Constipation   - c/w Miralax qd and senna x2 tabs qhs, tap water enema as needed

## 2023-06-12 NOTE — PROGRESS NOTE ADULT - SUBJECTIVE AND OBJECTIVE BOX
Gastroenterology progress note:     81yo female PMHx HOCM, HFpEF, Moderate/SEvere TVR, Severe MVR, Moderate PTHN, and HLD presenting with intermittent chest "pressure" and generalized weakness for 2 days, associated with COWART, without any SOB, n/v.  mid sternal chest 'pressure', non radiating. No  fevers, cough, HA.   In ED vitals wnl, EKG LBBB unchanged from prior, Trop neg x 1. Code STEMI activated and cancelled by cardiology. Labs significant for Hgb 6.5 s/p 1 unit PRBC  Pt endorses no active bleeding and has been having brown stools. She states occassionaly when constipated and when she has to strain she notices some bright red blood on the tissue when she wipes or in the toilet bowl.  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 Pt did not follow up with GI thereafter (Dr. Aparicio). Pt states she does not want to undergo any endoscopic intervention unless its considered as a life saving intervention. She prefers conservative management with medication at this time.    Interval History: no bleed, no recent BM     No acute events overnight.   - Diet - CLD  - last BM - 6/7  - Abdominal pain - denies      PAST MEDICAL & SURGICAL HISTORY:  High cholesterol      Hypertrophic obstructive cardiomyopathy (HOCM)      Aortic stenosis      No significant past surgical history          MEDICATIONS  (STANDING):  Areds 2 1 Capsule(s) 1 Capsule(s) Oral two times a day  chlorhexidine 2% Cloths 1 Application(s) Topical daily  cholecalciferol 2000 Unit(s) Oral daily  disopyramide 100 milliGRAM(s) Oral three times a day  metoprolol succinate ER 50 milliGRAM(s) Oral daily  pantoprazole  Injectable 40 milliGRAM(s) IV Push every 12 hours  senna 2 Tablet(s) Oral at bedtime  simvastatin 20 milliGRAM(s) Oral at bedtime    MEDICATIONS  (PRN):  melatonin 3 milliGRAM(s) Oral at bedtime PRN Insomnia  polyethylene glycol 3350 17 Gram(s) Oral two times a day PRN Constipation      Allergies  lidocaine (Rash)      Review of Systems:   Cardiovascular:  No Chest Pain, No Palpitations  Respiratory:  No Cough, No Dyspnea  Gastrointestinal:  As described in HPI  Skin:  No Skin Lesions, No Jaundice  Neuro:  No Syncope, No Dizziness    Physical Examination:  T(C): 36.1 (06-12-23 @ 13:07), Max: 36.9 (06-11-23 @ 20:00)  HR: 63 (06-12-23 @ 13:07) (58 - 92)  BP: 102/57 (06-12-23 @ 13:07) (93/46 - 157/80)  RR: 19 (06-12-23 @ 13:07) (18 - 19)  SpO2: 100% (06-12-23 @ 13:07) (98% - 100%)  Weight (kg): 68.6 (06-12-23 @ 04:00)    06-11-23 @ 07:01  -  06-12-23 @ 07:00  --------------------------------------------------------  IN: 660 mL / OUT: 670 mL / NET: -10 mL        GENERAL: AAOx3, no acute distress.  HEAD:  Atraumatic, Normocephalic  EYES: conjunctiva and sclera clear  NECK: Supple, no JVD or thyromegaly  CHEST/LUNG: Clear to auscultation bilaterally; No wheeze, rhonchi, or rales  HEART: Regular rate and rhythm; normal S1, S2, No murmurs.  ABDOMEN: Soft, nontender, nondistended; Bowel sounds present  NEUROLOGY: No asterixis or tremor.   SKIN: Intact, no jaundice  RO: brown stool      Data:                        8.8    9.24  )-----------( 121      ( 12 Jun 2023 07:02 )             26.9     Hgb trend:  8.8  06-12-23 @ 07:02  8.5  06-11-23 @ 21:37  7.1  06-11-23 @ 06:55  8.3  06-10-23 @ 11:24  6.7  06-10-23 @ 01:47  6.5  06-09-23 @ 16:15        06-12    141  |  110  |  35<H>  ----------------------------<  81  3.7   |  20  |  0.9    Ca    8.7      12 Jun 2023 07:02  Mg     2.3     06-12    TPro  5.0<L>  /  Alb  3.1<L>  /  TBili  0.4  /  DBili  x   /  AST  18  /  ALT  9   /  AlkPhos  68  06-12    Liver panel trend:  TBili 0.4   /   AST 18   /   ALT 9   /   AlkP 68   /   Tptn 5.0   /   Alb 3.1    /   DBili --      06-12  TBili 0.3   /   AST 19   /   ALT 6   /   AlkP 59   /   Tptn 4.8   /   Alb 3.1    /   DBili --      06-11  TBili 0.3   /   AST 19   /   ALT 10   /   AlkP 68   /   Tptn 5.1   /   Alb 3.4    /   DBili --      06-10  TBili <0.2   /   AST 18   /   ALT 11   /   AlkP 79   /   Tptn 5.7   /   Alb 3.7    /   DBili --      06-09             Radiology:       Gastroenterology progress note:     81yo female PMHx HOCM, HFpEF, Moderate/SEvere TVR, Severe MVR, Moderate PTHN, and HLD presenting with intermittent chest "pressure" and generalized weakness for 2 days, associated with COWART, without any SOB, n/v.  mid sternal chest 'pressure', non radiating. No  fevers, cough, HA.   In ED vitals wnl, EKG LBBB unchanged from prior, Trop neg x 1. Code STEMI activated and cancelled by cardiology. Labs significant for Hgb 6.5 s/p 1 unit PRBC  Pt endorses no active bleeding and has been having brown stools. She states occasionally when constipated and when she has to strain she notices some bright red blood on the tissue when she wipes or in the toilet bowl.  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 Pt did not follow up with GI thereafter (Dr. Aparicio).  Interval History: no bleed, no recent BM     No acute events overnight.   - Diet - CLD  - last BM - 6/7  - Abdominal pain - denies  - No reported GI bleed in the last 24 hrs       PAST MEDICAL & SURGICAL HISTORY:  High cholesterol      Hypertrophic obstructive cardiomyopathy (HOCM)      Aortic stenosis      No significant past surgical history          MEDICATIONS  (STANDING):  Areds 2 1 Capsule(s) 1 Capsule(s) Oral two times a day  chlorhexidine 2% Cloths 1 Application(s) Topical daily  cholecalciferol 2000 Unit(s) Oral daily  disopyramide 100 milliGRAM(s) Oral three times a day  metoprolol succinate ER 50 milliGRAM(s) Oral daily  pantoprazole  Injectable 40 milliGRAM(s) IV Push every 12 hours  senna 2 Tablet(s) Oral at bedtime  simvastatin 20 milliGRAM(s) Oral at bedtime    MEDICATIONS  (PRN):  melatonin 3 milliGRAM(s) Oral at bedtime PRN Insomnia  polyethylene glycol 3350 17 Gram(s) Oral two times a day PRN Constipation      Allergies  lidocaine (Rash)      Review of Systems:   Cardiovascular:  No Chest Pain, No Palpitations  Respiratory:  No Cough, No Dyspnea  Gastrointestinal:  As described in HPI  Skin:  No Skin Lesions, No Jaundice  Neuro:  No Syncope, No Dizziness    Physical Examination:  T(C): 36.1 (06-12-23 @ 13:07), Max: 36.9 (06-11-23 @ 20:00)  HR: 63 (06-12-23 @ 13:07) (58 - 92)  BP: 102/57 (06-12-23 @ 13:07) (93/46 - 157/80)  RR: 19 (06-12-23 @ 13:07) (18 - 19)  SpO2: 100% (06-12-23 @ 13:07) (98% - 100%)  Weight (kg): 68.6 (06-12-23 @ 04:00)    06-11-23 @ 07:01  -  06-12-23 @ 07:00  --------------------------------------------------------  IN: 660 mL / OUT: 670 mL / NET: -10 mL        GENERAL: AAOx3, no acute distress.  HEAD:  Atraumatic, Normocephalic  EYES: conjunctiva and sclera clear  NECK: Supple, no JVD or thyromegaly  CHEST/LUNG: Clear to auscultation bilaterally; No wheeze, rhonchi, or rales  HEART: Regular rate and rhythm; normal S1, S2  ABDOMEN: Soft, nontender, nondistended; Bowel sounds present  NEUROLOGY: No asterixis or tremor.   SKIN: Intact, no jaundice  RO: brown stool      Data:                        8.8    9.24  )-----------( 121      ( 12 Jun 2023 07:02 )             26.9     Hgb trend:  8.8  06-12-23 @ 07:02  8.5  06-11-23 @ 21:37  7.1  06-11-23 @ 06:55  8.3  06-10-23 @ 11:24  6.7  06-10-23 @ 01:47  6.5  06-09-23 @ 16:15        06-12    141  |  110  |  35<H>  ----------------------------<  81  3.7   |  20  |  0.9    Ca    8.7      12 Jun 2023 07:02  Mg     2.3     06-12    TPro  5.0<L>  /  Alb  3.1<L>  /  TBili  0.4  /  DBili  x   /  AST  18  /  ALT  9   /  AlkPhos  68  06-12    Liver panel trend:  TBili 0.4   /   AST 18   /   ALT 9   /   AlkP 68   /   Tptn 5.0   /   Alb 3.1    /   DBili --      06-12  TBili 0.3   /   AST 19   /   ALT 6   /   AlkP 59   /   Tptn 4.8   /   Alb 3.1    /   DBili --      06-11  TBili 0.3   /   AST 19   /   ALT 10   /   AlkP 68   /   Tptn 5.1   /   Alb 3.4    /   DBili --      06-10  TBili <0.2   /   AST 18   /   ALT 11   /   AlkP 79   /   Tptn 5.7   /   Alb 3.7    /   DBili --      06-09             Radiology:

## 2023-06-12 NOTE — PROGRESS NOTE ADULT - SUBJECTIVE AND OBJECTIVE BOX
GERRY FRIED  82y, Female  Allergy: lidocaine (Rash)    Hospital Day: 3d    Patient seen and examined earlier today. her  at bedside, the patient stated that she came to the ED for feeling low energy, dizzy, low blood pressure , she also reported chest tightness sensation at that time,  she did not notice any blood or black stool. she denies current chest pain or tightness or sob     PMH/PSH:  PAST MEDICAL & SURGICAL HISTORY:  High cholesterol      Hypertrophic obstructive cardiomyopathy (HOCM)      Aortic stenosis      No significant past surgical history          LAST 24-Hr EVENTS:    VITALS:  T(F): 97 (06-12-23 @ 13:07), Max: 98.5 (06-11-23 @ 20:00)  HR: 63 (06-12-23 @ 13:07)  BP: 102/57 (06-12-23 @ 13:07) (93/46 - 157/80)  RR: 19 (06-12-23 @ 13:07)  SpO2: 100% (06-12-23 @ 13:07)          TESTS & MEASUREMENTS:  Weight/BMI  68.6 (06-12-23 @ 04:00)  27.7 (06-12-23 @ 04:00)    06-11-23 @ 07:01  -  06-12-23 @ 07:00  --------------------------------------------------------  IN: 660 mL / OUT: 670 mL / NET: -10 mL                            8.8    9.24  )-----------( 121      ( 12 Jun 2023 07:02 )             26.9       INR: 1.08 ratio (06-10-23 @ 01:47)    06-12    141  |  110  |  35<H>  ----------------------------<  81  3.7   |  20  |  0.9    Ca    8.7      12 Jun 2023 07:02  Mg     2.3     06-12    TPro  5.0<L>  /  Alb  3.1<L>  /  TBili  0.4  /  DBili  x   /  AST  18  /  ALT  9   /  AlkPhos  68  06-12    LIVER FUNCTIONS - ( 12 Jun 2023 07:02 )  Alb: 3.1 g/dL / Pro: 5.0 g/dL / ALK PHOS: 68 U/L / ALT: 9 U/L / AST: 18 U/L / GGT: x                       Ferritin, Serum: 53 ng/mL (06-10-23 @ 11:24)                    RADIOLOGY, ECG, & ADDITIONAL TESTS:  12 Lead ECG:   Ventricular Rate 81 BPM    Atrial Rate 81 BPM    P-R Interval 194 ms    QRS Duration 180 ms    Q-T Interval 480 ms    QTC Calculation(Bazett) 557 ms    P Axis 81 degrees    R Axis -40 degrees    T Axis 130 degrees    Diagnosis Line Sinus rhythm with 1st degree A-V block  Left axis deviation  Left bundle branch block  Abnormal ECG    Confirmed by Cortney Meyer MD ([id number]) on 6/10/2023 6:02:47 PM (06-10-23 @ 11:42)      RECENT DIAGNOSTIC ORDERS:  Complete Blood Count + Automated Diff: 16:00 (06-12-23 @ 13:03)  Type + Screen: AM  Sched. Collection:13-Jun-2023 04:30 (06-12-23 @ 11:42)  Activated Partial Thromboplastin Time:  Start Date:12-Jun-2023. AM Sched. Collection:13-Jun-2023 04:30 (06-12-23 @ 11:33)  Prothrombin Time and INR, Plasma:  Start Date:12-Jun-2023. AM Sched. Collection:13-Jun-2023 04:30 (06-12-23 @ 11:33)  Magnesium, Serum: AM Sched. Collection: 13-Jun-2023 04:30 (06-12-23 @ 11:33)  Comprehensive Metabolic Panel: AM Sched. Collection: 13-Jun-2023 04:30 (06-12-23 @ 11:33)  Complete Blood Count + Automated Diff: AM Sched. Collection: 13-Jun-2023 04:30 (06-12-23 @ 11:33)  Complete Blood Count + Automated Diff: 11:00 (06-12-23 @ 09:29)      MEDICATIONS:  MEDICATIONS  (STANDING):  Areds 2 1 Capsule(s) 1 Capsule(s) Oral two times a day  chlorhexidine 2% Cloths 1 Application(s) Topical daily  cholecalciferol 2000 Unit(s) Oral daily  disopyramide 100 milliGRAM(s) Oral three times a day  metoprolol succinate ER 50 milliGRAM(s) Oral daily  pantoprazole  Injectable 40 milliGRAM(s) IV Push every 12 hours  senna 2 Tablet(s) Oral at bedtime  simvastatin 20 milliGRAM(s) Oral at bedtime    MEDICATIONS  (PRN):  melatonin 3 milliGRAM(s) Oral at bedtime PRN Insomnia  polyethylene glycol 3350 17 Gram(s) Oral two times a day PRN Constipation      HOME MEDICATIONS:  disopyramide 100 mg oral capsule (09-01)  simvastatin 20 mg oral tablet (09-01)  Toprol-XL 50 mg oral tablet, extended release (09-01)  verapamil 120 mg/24 hours oral capsule, extended release (09-01)  Vitamin B12 250 mcg oral tablet (09-01)  Vitamin D3 50 mcg (2000 intl units) oral tablet (09-01)      PHYSICAL EXAM:  On exam  General: awake, alert, NAD,  Lungs:  clear to ausculations b/l, normal resp effort  Heart: regular ryhthm , + murmur   Abdomen: soft, non tender non distended  Ext: no edema, can move all  his extremities

## 2023-06-12 NOTE — CONSULT NOTE ADULT - ASSESSMENT
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  Aspiration precautions.  On RA     CARDIOVASCULAR:  Avoid overload.  trend CE.  Cardiology follow up     GI: GI prophylaxis.  Feeding clears.  Bowel regimen.  GI follow up     RENAL:  Follow up lytes.  Correct as needed    INFECTIOUS DISEASE: Follow up cultures.  Procal     HEMATOLOGICAL:  DVT prophylaxis seq.  Trend CBC.      ENDOCRINE:  Follow up FS.      MUSCULOSKELETAL: bed chair position     DGTF if Hg stable

## 2023-06-13 LAB
ALBUMIN SERPL ELPH-MCNC: 3.2 G/DL — LOW (ref 3.5–5.2)
ALP SERPL-CCNC: 71 U/L — SIGNIFICANT CHANGE UP (ref 30–115)
ALT FLD-CCNC: 13 U/L — SIGNIFICANT CHANGE UP (ref 0–41)
ANION GAP SERPL CALC-SCNC: 8 MMOL/L — SIGNIFICANT CHANGE UP (ref 7–14)
APTT BLD: 26.5 SEC — LOW (ref 27–39.2)
AST SERPL-CCNC: 25 U/L — SIGNIFICANT CHANGE UP (ref 0–41)
BASOPHILS # BLD AUTO: 0.03 K/UL — SIGNIFICANT CHANGE UP (ref 0–0.2)
BASOPHILS NFR BLD AUTO: 0.5 % — SIGNIFICANT CHANGE UP (ref 0–1)
BILIRUB SERPL-MCNC: 0.4 MG/DL — SIGNIFICANT CHANGE UP (ref 0.2–1.2)
BLD GP AB SCN SERPL QL: SIGNIFICANT CHANGE UP
BUN SERPL-MCNC: 19 MG/DL — SIGNIFICANT CHANGE UP (ref 10–20)
CALCIUM SERPL-MCNC: 8.6 MG/DL — SIGNIFICANT CHANGE UP (ref 8.4–10.5)
CHLORIDE SERPL-SCNC: 109 MMOL/L — SIGNIFICANT CHANGE UP (ref 98–110)
CO2 SERPL-SCNC: 22 MMOL/L — SIGNIFICANT CHANGE UP (ref 17–32)
CREAT SERPL-MCNC: 0.9 MG/DL — SIGNIFICANT CHANGE UP (ref 0.7–1.5)
EGFR: 63 ML/MIN/1.73M2 — SIGNIFICANT CHANGE UP
EOSINOPHIL # BLD AUTO: 0.12 K/UL — SIGNIFICANT CHANGE UP (ref 0–0.7)
EOSINOPHIL NFR BLD AUTO: 2.1 % — SIGNIFICANT CHANGE UP (ref 0–8)
GLUCOSE BLDC GLUCOMTR-MCNC: 69 MG/DL — LOW (ref 70–99)
GLUCOSE BLDC GLUCOMTR-MCNC: 70 MG/DL — SIGNIFICANT CHANGE UP (ref 70–99)
GLUCOSE SERPL-MCNC: 86 MG/DL — SIGNIFICANT CHANGE UP (ref 70–99)
HCT VFR BLD CALC: 28.1 % — LOW (ref 37–47)
HGB BLD-MCNC: 9 G/DL — LOW (ref 12–16)
IMM GRANULOCYTES NFR BLD AUTO: 0.9 % — HIGH (ref 0.1–0.3)
INR BLD: 1.05 RATIO — SIGNIFICANT CHANGE UP (ref 0.65–1.3)
LYMPHOCYTES # BLD AUTO: 1.23 K/UL — SIGNIFICANT CHANGE UP (ref 1.2–3.4)
LYMPHOCYTES # BLD AUTO: 21.5 % — SIGNIFICANT CHANGE UP (ref 20.5–51.1)
MAGNESIUM SERPL-MCNC: 2.3 MG/DL — SIGNIFICANT CHANGE UP (ref 1.8–2.4)
MCHC RBC-ENTMCNC: 31 PG — SIGNIFICANT CHANGE UP (ref 27–31)
MCHC RBC-ENTMCNC: 32 G/DL — SIGNIFICANT CHANGE UP (ref 32–37)
MCV RBC AUTO: 96.9 FL — SIGNIFICANT CHANGE UP (ref 81–99)
MONOCYTES # BLD AUTO: 0.38 K/UL — SIGNIFICANT CHANGE UP (ref 0.1–0.6)
MONOCYTES NFR BLD AUTO: 6.6 % — SIGNIFICANT CHANGE UP (ref 1.7–9.3)
NEUTROPHILS # BLD AUTO: 3.91 K/UL — SIGNIFICANT CHANGE UP (ref 1.4–6.5)
NEUTROPHILS NFR BLD AUTO: 68.4 % — SIGNIFICANT CHANGE UP (ref 42.2–75.2)
NRBC # BLD: 0 /100 WBCS — SIGNIFICANT CHANGE UP (ref 0–0)
PLATELET # BLD AUTO: 137 K/UL — SIGNIFICANT CHANGE UP (ref 130–400)
PMV BLD: 12.2 FL — HIGH (ref 7.4–10.4)
POTASSIUM SERPL-MCNC: 4.5 MMOL/L — SIGNIFICANT CHANGE UP (ref 3.5–5)
POTASSIUM SERPL-SCNC: 4.5 MMOL/L — SIGNIFICANT CHANGE UP (ref 3.5–5)
PROT SERPL-MCNC: 5 G/DL — LOW (ref 6–8)
PROTHROM AB SERPL-ACNC: 12 SEC — SIGNIFICANT CHANGE UP (ref 9.95–12.87)
RBC # BLD: 2.9 M/UL — LOW (ref 4.2–5.4)
RBC # FLD: 16.8 % — HIGH (ref 11.5–14.5)
SODIUM SERPL-SCNC: 139 MMOL/L — SIGNIFICANT CHANGE UP (ref 135–146)
WBC # BLD: 5.72 K/UL — SIGNIFICANT CHANGE UP (ref 4.8–10.8)
WBC # FLD AUTO: 5.72 K/UL — SIGNIFICANT CHANGE UP (ref 4.8–10.8)

## 2023-06-13 PROCEDURE — 99233 SBSQ HOSP IP/OBS HIGH 50: CPT

## 2023-06-13 PROCEDURE — 91110 GI TRC IMG INTRAL ESOPH-ILE: CPT | Mod: 26

## 2023-06-13 RX ADMIN — Medication 100 MILLIGRAM(S): at 06:52

## 2023-06-13 RX ADMIN — SIMVASTATIN 20 MILLIGRAM(S): 20 TABLET, FILM COATED ORAL at 21:34

## 2023-06-13 RX ADMIN — Medication 100 MILLIGRAM(S): at 21:33

## 2023-06-13 RX ADMIN — CHLORHEXIDINE GLUCONATE 1 APPLICATION(S): 213 SOLUTION TOPICAL at 11:18

## 2023-06-13 RX ADMIN — PANTOPRAZOLE SODIUM 40 MILLIGRAM(S): 20 TABLET, DELAYED RELEASE ORAL at 06:52

## 2023-06-13 RX ADMIN — Medication 50 MILLIGRAM(S): at 06:52

## 2023-06-13 RX ADMIN — Medication 100 MILLIGRAM(S): at 14:39

## 2023-06-13 RX ADMIN — Medication 3 MILLIGRAM(S): at 21:34

## 2023-06-13 RX ADMIN — PANTOPRAZOLE SODIUM 40 MILLIGRAM(S): 20 TABLET, DELAYED RELEASE ORAL at 17:38

## 2023-06-13 RX ADMIN — SENNA PLUS 2 TABLET(S): 8.6 TABLET ORAL at 21:34

## 2023-06-13 NOTE — PROGRESS NOTE ADULT - SUBJECTIVE AND OBJECTIVE BOX
SUBJECTIVE:  Patient is a 82y old Female who presents with a chief complaint of Anemia (13 Jun 2023 08:52)   Anemia     Today is hospital day 4d. There are no new issues or overnight events.     HPI  HPI:  81yo female PMHx HOCM, HFpEF, aortic stenosis, and HLD presenting with intermittent chest "pressure" and generalized weakness for 2 days, associated with COWART, without any SOB, n/v.  mid sternal chest 'pressure', non radiating. No  fevers, cough, HA.   In ED vitals wnl, EKG LBBB unchanged from prior, Trop neg x 1. Code STEMI activated and cancelled by cardiology. 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 Last BM was 3 days ago (pt usually constipated).   Patient will be admitted for management of symptomatic anemia    (10 Maurilio 2023 01:34)      PAST MEDICAL & SURGICAL HISTORY  High cholesterol    Hypertrophic obstructive cardiomyopathy (HOCM)    Aortic stenosis    No significant past surgical history      ALLERGIES:  lidocaine (Rash)    MEDICATIONS:  HOME MEDICATIONS  disopyramide 100 mg oral capsule: 1 cap(s) orally 3 times a day  simvastatin 20 mg oral tablet: 1 tab(s) orally once a day (at bedtime)  Toprol-XL 50 mg oral tablet, extended release: 1 tab(s) orally once a day  verapamil 120 mg/24 hours oral capsule, extended release: 1 cap(s) orally once a day  Vitamin B12 250 mcg oral tablet: 1 tab(s) orally once a day  Vitamin D3 50 mcg (2000 intl units) oral tablet: 1 tab(s) orally once a day    STANDING MEDICATIONS  Areds 2 1 Capsule(s) 1 Capsule(s) Oral two times a day  chlorhexidine 2% Cloths 1 Application(s) Topical daily  cholecalciferol 2000 Unit(s) Oral daily  disopyramide 100 milliGRAM(s) Oral three times a day  metoprolol succinate ER 50 milliGRAM(s) Oral daily  pantoprazole  Injectable 40 milliGRAM(s) IV Push every 12 hours  senna 2 Tablet(s) Oral at bedtime  simvastatin 20 milliGRAM(s) Oral at bedtime    PRN MEDICATIONS  melatonin 3 milliGRAM(s) Oral at bedtime PRN  polyethylene glycol 3350 17 Gram(s) Oral two times a day PRN    VITALS:   T(C): 36.7 (06-13-23 @ 07:27), Max: 36.7 (06-12-23 @ 16:00)  T(F): 98 (06-13-23 @ 07:27), Max: 98 (06-12-23 @ 16:00)  HR: 63 (06-13-23 @ 07:27) (60 - 63)  BP: 145/62 (06-13-23 @ 07:27) (100/51 - 145/63)  BP(mean): 72 (06-12-23 @ 20:00) (72 - 73)  ABP: --  ABP(mean): --  RR: 20 (06-13-23 @ 07:27) (18 - 20)  SpO2: 99% (06-13-23 @ 07:27) (99% - 100%)  LABS:                        9.0    5.72  )-----------( 137      ( 13 Jun 2023 06:24 )             28.1     06-13    139  |  109  |  19  ----------------------------<  86  4.5   |  22  |  0.9    Ca    8.6      13 Jun 2023 06:24  Mg     2.3     06-13    TPro  5.0<L>  /  Alb  3.2<L>  /  TBili  0.4  /  DBili  x   /  AST  25  /  ALT  13  /  AlkPhos  71  06-13    PT/INR - ( 13 Jun 2023 06:24 )   PT: 12.00 sec;   INR: 1.05 ratio         PTT - ( 13 Jun 2023 06:24 )  PTT:26.5 sec    I&O's Detail    12 Jun 2023 07:01  -  13 Jun 2023 07:00  --------------------------------------------------------  IN:    Oral Fluid: 660 mL  Total IN: 660 mL    OUT:    Stool (mL): 1 mL  Total OUT: 1 mL    Total NET: 659 mL                    RADIOLOGY:  EKG  12 Lead ECG:   Ventricular Rate 81 BPM    Atrial Rate 81 BPM    P-R Interval 194 ms    QRS Duration 180 ms    Q-T Interval 480 ms    QTC Calculation(Bazett) 557 ms    P Axis 81 degrees    R Axis -40 degrees    T Axis 130 degrees    Diagnosis Line Sinus rhythm with 1st degree A-V block  Left axis deviation  Left bundle branch block  Abnormal ECG    Confirmed by Cortney Meyer MD [id number]) on 6/10/2023 6:02:47 PM (06-10-23 @ 11:42)  12 Lead ECG:   Ventricular Rate 68 BPM    Atrial Rate 68 BPM    P-R Interval 196 ms    QRS Duration 190 ms    Q-T Interval 540 ms    QTC Calculation(Bazett) 574 ms    P Axis 29 degrees    R Axis -21 degrees    T Axis 139 degrees    Diagnosis Line Sinus rhythm with 1st degree A-V block  Left bundle branch block  Abnormal ECG    Confirmed by Cortney Meyer MD ([id number]) on 6/10/2023 5:52:09 PM (06-09-23 @ 15:47)    Xray Chest 1 View- PORTABLE-Routine:   ACC: [id number] EXAM:  XR CHEST PORTABLE ROUTINE 1V   ORDERED BY: KALA COWAN     PROCEDURE DATE:  06/10/2023          INTERPRETATION:  Clinical History / Reason for exam: Shortness of breath    Comparison : Chest radiograph 2/9/2023.    Technique/Positioning: Single AP view of the chest.    Findings:    Support devices: None.    Cardiac/mediastinum/hilum: Cardiomegaly, unchanged    Lung parenchyma/Pleura: Within normal limits.    Skeleton/soft tissues: Unchanged    Impression:    No acute pulmonary process.        --- End of Report ---            ELLIOT LANDAU MD; Attending Radiologist  This document has been electronically signed. Jun 11 2023  4:42PM (06-10-23 @ 14:29)    Physical Exam:  General: no acute distress, lying in bed  Head: normocephalic and atraumatic  Neck: supple  Heart: regular rate and rhythm, S1 and S2 normal, no murmurs, rubs or gallops noted on exam  Lungs: Symmetric chest expansion bilaterally, clear lungs bilaterally, no wheezes rhonchi or crackles heard  Abdomen: Bowel sounds present, non tender on light and deep palpation  Extremities: No edema, no clubbing or cyanosis noted

## 2023-06-13 NOTE — PROGRESS NOTE ADULT - SUBJECTIVE AND OBJECTIVE BOX
Patient is a 82y old  Female who presents with a chief complaint of Anemia (12 Jun 2023 13:21)        Over Night Events:  Off pressor.s  On RA.  No overt bleeding.         ROS:     All ROS are negative except HPI         PHYSICAL EXAM    ICU Vital Signs Last 24 Hrs  T(C): 36.7 (13 Jun 2023 07:27), Max: 36.7 (12 Jun 2023 16:00)  T(F): 98 (13 Jun 2023 07:27), Max: 98 (12 Jun 2023 16:00)  HR: 63 (13 Jun 2023 07:27) (60 - 63)  BP: 145/62 (13 Jun 2023 07:27) (100/51 - 145/63)  BP(mean): 72 (12 Jun 2023 20:00) (72 - 73)  ABP: --  ABP(mean): --  RR: 20 (13 Jun 2023 07:27) (18 - 20)  SpO2: 99% (13 Jun 2023 07:27) (99% - 100%)    O2 Parameters below as of 13 Jun 2023 00:07  Patient On (Oxygen Delivery Method): room air            CONSTITUTIONAL:  In NAD    ENT:   Airway patent,   Mouth with normal mucosa.       EYES:   Pupils equal,   Round and reactive to light.    CARDIAC:   Normal rate,   Regular rhythm.    No edema    RESPIRATORY:   No wheezing  Bilateral BS  Normal chest expansion  Not tachypneic,  No use of accessory muscles    GASTROINTESTINAL:  Abdomen soft,   Non-tender,   No guarding,   + BS    MUSCULOSKELETAL:   Range of motion is not limited,  No clubbing, cyanosis    NEUROLOGICAL:   Alert and oriented   No motor  deficits.    SKIN:   Skin normal color for race,   No evidence of rash.    PSYCHIATRIC:   No apparent risk to self or others.        06-12-23 @ 07:01  -  06-13-23 @ 07:00  --------------------------------------------------------  IN:    Oral Fluid: 660 mL  Total IN: 660 mL    OUT:    Stool (mL): 1 mL  Total OUT: 1 mL    Total NET: 659 mL          LABS:                            9.0    5.72  )-----------( 137      ( 13 Jun 2023 06:24 )             28.1                    9.0    5.72  )-----------( 137      ( 06-13 @ 06:24 )             28.1                8.7    8.22  )-----------( 133      ( 06-12 @ 18:38 )             27.5                8.8    9.24  )-----------( 121      ( 06-12 @ 07:02 )             26.9                8.5    10.15 )-----------( 122      ( 06-11 @ 21:37 )             26.4                7.1    10.75 )-----------( 139      ( 06-11 @ 06:55 )             22.6                8.3    17.72 )-----------( 158      ( 06-10 @ 11:24 )             26.0                                               06-13    139  |  109  |  19  ----------------------------<  86  4.5   |  22  |  0.9    Ca    8.6      13 Jun 2023 06:24  Mg     2.3     06-13    TPro  5.0<L>  /  Alb  3.2<L>  /  TBili  0.4  /  DBili  x   /  AST  25  /  ALT  13  /  AlkPhos  71  06-13      PT/INR - ( 13 Jun 2023 06:24 )   PT: 12.00 sec;   INR: 1.05 ratio         PTT - ( 13 Jun 2023 06:24 )  PTT:26.5 sec                                                                                     LIVER FUNCTIONS - ( 13 Jun 2023 06:24 )  Alb: 3.2 g/dL / Pro: 5.0 g/dL / ALK PHOS: 71 U/L / ALT: 13 U/L / AST: 25 U/L / GGT: x                                                                                                                                       MEDICATIONS  (STANDING):  Areds 2 1 Capsule(s) 1 Capsule(s) Oral two times a day  chlorhexidine 2% Cloths 1 Application(s) Topical daily  cholecalciferol 2000 Unit(s) Oral daily  disopyramide 100 milliGRAM(s) Oral three times a day  metoprolol succinate ER 50 milliGRAM(s) Oral daily  pantoprazole  Injectable 40 milliGRAM(s) IV Push every 12 hours  senna 2 Tablet(s) Oral at bedtime  simvastatin 20 milliGRAM(s) Oral at bedtime    MEDICATIONS  (PRN):  melatonin 3 milliGRAM(s) Oral at bedtime PRN Insomnia  polyethylene glycol 3350 17 Gram(s) Oral two times a day PRN Constipation      New X-rays reviewed:                                                                                  ECHO

## 2023-06-13 NOTE — PROGRESS NOTE ADULT - ASSESSMENT
81yo female PMHx HOCM, HFpEF, aortic stenosis, and HLD presenting with intermittent chest "pressure" and generalized weakness for 2 days found to have hgb 6.5 s/p 2 units prbc; receiving 3rd unit of hgb given hemodynamic instability and decreasing hgb w/o overt signs of bleeding.     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  - No melena, bloody BM, coffee ground emesis  - RO negative in the ED   - s/p 2 unit PRBC, F/u Rpt CBC   - Hx Rt colon AVM (Heyde syndrome?)   - BUN/Cr > 30 favors UGIB   - PPI bid, CBC q12, active type and screen   - Consider CT Angio r/o retroperitoneal bleed if pt has bleeding  - Holding anticoagulation and aspirin    - capsule endoscopy today, no plan for egd/colonoscopy as patient is too high risk for procedure per GI    # Atypical chest pain - likely symptomatic anemia, Hgb 6.5  - R/o ACS, Trop  neg x1, f/u serial cardiac enzymes  - EKG showed  LBBB unchanged form prior-> assessed by Cardio,  STEMI code canceled   - Follow up Echo     #hx of CHFpEF  - Diastolic CHF  - no shortness of breath, no lower extremity edema  - C/w PO lasix      #Hypertrophic cardiomyopathy  - Follows with Dr. Hightower in [facility] -  Last echo June 2022 showed EF 75% and findings consistent hypertrophic cardiomiopathy  - 3/6 crescendo/decrescendo murmur at right second intercostal space  - Metoprolol 50 mg daily  - Verapamil 120 mg daily   - Disopyramide 100mg TID     #Hx of constipation   - Miralax and Senna PRN     #DLD  - c/w statin    #Age related macular degeneration   - C/w areds 2 BID     DVt ppx: SCD  Diet: npo  Code: Full

## 2023-06-13 NOTE — PROGRESS NOTE ADULT - ASSESSMENT
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  Aspiration precautions.  On RA     CARDIOVASCULAR:  Avoid overload.  Cardiology follow up     GI: GI prophylaxis.  Feeding per GI.  GI follow up     RENAL:  Follow up lytes.  Correct as needed    INFECTIOUS DISEASE: Follow up cultures.  Procal     HEMATOLOGICAL:  DVT prophylaxis seq.  Trend CBC.      ENDOCRINE:  Follow up FS.      MUSCULOSKELETAL:  OOB to chair     DGTF if Hg stable

## 2023-06-14 LAB
ALBUMIN SERPL ELPH-MCNC: 3.2 G/DL — LOW (ref 3.5–5.2)
ALP SERPL-CCNC: 74 U/L — SIGNIFICANT CHANGE UP (ref 30–115)
ALT FLD-CCNC: 11 U/L — SIGNIFICANT CHANGE UP (ref 0–41)
ANION GAP SERPL CALC-SCNC: 12 MMOL/L — SIGNIFICANT CHANGE UP (ref 7–14)
AST SERPL-CCNC: 22 U/L — SIGNIFICANT CHANGE UP (ref 0–41)
BASOPHILS # BLD AUTO: 0.04 K/UL — SIGNIFICANT CHANGE UP (ref 0–0.2)
BASOPHILS NFR BLD AUTO: 0.6 % — SIGNIFICANT CHANGE UP (ref 0–1)
BILIRUB SERPL-MCNC: 0.4 MG/DL — SIGNIFICANT CHANGE UP (ref 0.2–1.2)
BLD GP AB SCN SERPL QL: SIGNIFICANT CHANGE UP
BUN SERPL-MCNC: 14 MG/DL — SIGNIFICANT CHANGE UP (ref 10–20)
CALCIUM SERPL-MCNC: 8.6 MG/DL — SIGNIFICANT CHANGE UP (ref 8.4–10.5)
CHLORIDE SERPL-SCNC: 106 MMOL/L — SIGNIFICANT CHANGE UP (ref 98–110)
CO2 SERPL-SCNC: 20 MMOL/L — SIGNIFICANT CHANGE UP (ref 17–32)
CREAT SERPL-MCNC: 0.7 MG/DL — SIGNIFICANT CHANGE UP (ref 0.7–1.5)
EGFR: 86 ML/MIN/1.73M2 — SIGNIFICANT CHANGE UP
EOSINOPHIL # BLD AUTO: 0.12 K/UL — SIGNIFICANT CHANGE UP (ref 0–0.7)
EOSINOPHIL NFR BLD AUTO: 1.7 % — SIGNIFICANT CHANGE UP (ref 0–8)
GLUCOSE SERPL-MCNC: 69 MG/DL — LOW (ref 70–99)
HCT VFR BLD CALC: 27.2 % — LOW (ref 37–47)
HGB BLD-MCNC: 8.8 G/DL — LOW (ref 12–16)
IMM GRANULOCYTES NFR BLD AUTO: 0.4 % — HIGH (ref 0.1–0.3)
LYMPHOCYTES # BLD AUTO: 1.23 K/UL — SIGNIFICANT CHANGE UP (ref 1.2–3.4)
LYMPHOCYTES # BLD AUTO: 17.5 % — LOW (ref 20.5–51.1)
MAGNESIUM SERPL-MCNC: 2.1 MG/DL — SIGNIFICANT CHANGE UP (ref 1.8–2.4)
MCHC RBC-ENTMCNC: 30.9 PG — SIGNIFICANT CHANGE UP (ref 27–31)
MCHC RBC-ENTMCNC: 32.4 G/DL — SIGNIFICANT CHANGE UP (ref 32–37)
MCV RBC AUTO: 95.4 FL — SIGNIFICANT CHANGE UP (ref 81–99)
MONOCYTES # BLD AUTO: 0.53 K/UL — SIGNIFICANT CHANGE UP (ref 0.1–0.6)
MONOCYTES NFR BLD AUTO: 7.5 % — SIGNIFICANT CHANGE UP (ref 1.7–9.3)
NEUTROPHILS # BLD AUTO: 5.07 K/UL — SIGNIFICANT CHANGE UP (ref 1.4–6.5)
NEUTROPHILS NFR BLD AUTO: 72.3 % — SIGNIFICANT CHANGE UP (ref 42.2–75.2)
NRBC # BLD: 0 /100 WBCS — SIGNIFICANT CHANGE UP (ref 0–0)
PLATELET # BLD AUTO: 149 K/UL — SIGNIFICANT CHANGE UP (ref 130–400)
PMV BLD: 11.8 FL — HIGH (ref 7.4–10.4)
POTASSIUM SERPL-MCNC: 3.9 MMOL/L — SIGNIFICANT CHANGE UP (ref 3.5–5)
POTASSIUM SERPL-SCNC: 3.9 MMOL/L — SIGNIFICANT CHANGE UP (ref 3.5–5)
PROT SERPL-MCNC: 4.9 G/DL — LOW (ref 6–8)
RBC # BLD: 2.85 M/UL — LOW (ref 4.2–5.4)
RBC # FLD: 16 % — HIGH (ref 11.5–14.5)
SODIUM SERPL-SCNC: 138 MMOL/L — SIGNIFICANT CHANGE UP (ref 135–146)
WBC # BLD: 7.02 K/UL — SIGNIFICANT CHANGE UP (ref 4.8–10.8)
WBC # FLD AUTO: 7.02 K/UL — SIGNIFICANT CHANGE UP (ref 4.8–10.8)

## 2023-06-14 PROCEDURE — 99232 SBSQ HOSP IP/OBS MODERATE 35: CPT

## 2023-06-14 PROCEDURE — 99233 SBSQ HOSP IP/OBS HIGH 50: CPT

## 2023-06-14 RX ORDER — FUROSEMIDE 40 MG
20 TABLET ORAL DAILY
Refills: 0 | Status: DISCONTINUED | OUTPATIENT
Start: 2023-06-14 | End: 2023-06-15

## 2023-06-14 RX ADMIN — Medication 100 MILLIGRAM(S): at 22:09

## 2023-06-14 RX ADMIN — PANTOPRAZOLE SODIUM 40 MILLIGRAM(S): 20 TABLET, DELAYED RELEASE ORAL at 18:41

## 2023-06-14 RX ADMIN — SIMVASTATIN 20 MILLIGRAM(S): 20 TABLET, FILM COATED ORAL at 21:23

## 2023-06-14 RX ADMIN — Medication 100 MILLIGRAM(S): at 14:33

## 2023-06-14 RX ADMIN — Medication 2000 UNIT(S): at 12:07

## 2023-06-14 RX ADMIN — Medication 50 MILLIGRAM(S): at 05:33

## 2023-06-14 RX ADMIN — Medication 100 MILLIGRAM(S): at 05:34

## 2023-06-14 RX ADMIN — CHLORHEXIDINE GLUCONATE 1 APPLICATION(S): 213 SOLUTION TOPICAL at 12:07

## 2023-06-14 RX ADMIN — PANTOPRAZOLE SODIUM 40 MILLIGRAM(S): 20 TABLET, DELAYED RELEASE ORAL at 05:33

## 2023-06-14 NOTE — PROGRESS NOTE ADULT - ASSESSMENT
83yo female PMHx HOCM, HFpEF, aortic stenosis, and HLD presenting with intermittent chest "pressure" and generalized weakness for 2 days found to have hgb 6.5 s/p 2 units prbc; receiving 3rd unit of hgb given hemodynamic instability and decreasing hgb w/o overt signs of bleeding.     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  - No melena, bloody BM, coffee ground emesis  - RO negative in the ED   - s/p 2 unit PRBC, F/u Rpt CBC   - Hx Rt colon AVM (Heyde syndrome?)   - BUN/Cr > 30 favors UGIB   - PPI bid, CBC q12, active type and screen   - Consider CT Angio r/o retroperitoneal bleed if pt has bleeding  - Holding anticoagulation and aspirin    - capsule endoscopy today, no plan for egd/colonoscopy as patient is too high risk for procedure per GI 6/13  - patient currently with capsule endoscopy, will follow up on results    # Atypical chest pain - likely symptomatic anemia, Hgb 6.5  - R/o ACS, Trop  neg x1, f/u serial cardiac enzymes  - EKG showed  LBBB unchanged form prior-> assessed by Cardio,  STEMI code canceled   - Echo: EF 75%    #hx of CHFpEF  - Diastolic CHF  - no shortness of breath, no lower extremity edema  - C/w PO lasix    #Hypertrophic cardiomyopathy  - Follows with Dr. Hightower in [facility] -  Last echo June 2022 showed EF 75% and findings consistent hypertrophic cardiomiopathy  - 3/6 crescendo/decrescendo murmur at right second intercostal space  - Metoprolol 50 mg daily  - Verapamil 120 mg daily   - Disopyramide 100mg TID     #Hx of constipation   - Miralax and Senna PRN     #DLD  - c/w statin    #Age related macular degeneration   - C/w areds 2 BID     DVt ppx: SCD  Diet: npo  Code: Full

## 2023-06-14 NOTE — CHART NOTE - NSCHARTNOTEFT_GEN_A_CORE
Patient was seen this morning for consult: MST score of 2 or >;     She is 81 yo female with PMHx HOCM, HFpEF, aortic stenosis, and HLD presenting with intermittent chest "pressure" and generalized weakness for 2 days. Patient with Acute normocytic anemia, hx of GI bleed secondary to Rt colon AVM - no melena, bloody BM, coffee ground emesis.     Patient was previously on Clear Liquids --- was upgraded this morning to DASH/TLC diet. Patient reports good appetite and PO intake PTA - at baseline does not eat big portions, but eats 3 meals/day. UBW: 140 - 142 lbs - endorses no weight loss. Patient had a 4 lb weight gain x 9 months.  NKFA, no food intolerances reported.    Patient is at low nutrition risk at this time, RD to f/u in  7-10 days or PRN    RD to remain available: Romana Thompson, YESSI [phone] or via Teams Patient was seen this morning for NPO/Clears x 3 days or more.    She is 81 yo female with PMHx HOCM, HFpEF, aortic stenosis, and HLD presenting with intermittent chest "pressure" and generalized weakness for 2 days. Patient with Acute normocytic anemia, hx of GI bleed secondary to Rt colon AVM - no melena, bloody BM, coffee ground emesis.     Patient was previously on Clear Liquids --- was upgraded this morning to DASH/TLC diet. Patient reports good appetite and PO intake PTA - at baseline does not eat big portions, but eats 3 meals/day. UBW: 140 - 142 lbs - endorses no weight loss. Patient had a 4 lb weight gain x 9 months.  NKFA, no food intolerances reported.    Patient is at low nutrition risk at this time, RD to f/u in  7-10 days or PRN    RD to remain available: Romana Thompson, YESSI [phone] or via Teams

## 2023-06-14 NOTE — CHART NOTE - NSCHARTNOTESELECT_GEN_ALL_CORE
STEMI cancellation/Event Note
Nutrition - RD Limited Note/Event Note
Off Service Note
Transfer Note
Transfer Note

## 2023-06-14 NOTE — PROGRESS NOTE ADULT - SUBJECTIVE AND OBJECTIVE BOX
Patient is a 82y old  Female who presents with a chief complaint of Anemia (14 Jun 2023 09:02)        Over Night Events:        ROS:     All ROS are negative except HPI         PHYSICAL EXAM    ICU Vital Signs Last 24 Hrs  T(C): 36.4 (14 Jun 2023 07:26), Max: 36.6 (13 Jun 2023 16:00)  T(F): 97.6 (14 Jun 2023 07:26), Max: 97.9 (13 Jun 2023 16:00)  HR: 86 (14 Jun 2023 07:26) (58 - 86)  BP: 114/55 (14 Jun 2023 07:26) (97/53 - 136/60)  BP(mean): 87 (13 Jun 2023 20:00) (87 - 89)  ABP: --  ABP(mean): --  RR: 20 (14 Jun 2023 07:26) (20 - 20)  SpO2: 96% (14 Jun 2023 07:26) (95% - 98%)    O2 Parameters below as of 14 Jun 2023 00:55  Patient On (Oxygen Delivery Method): room air              ENT: Airway patent,  EYES: Pupils equal, Round and reactive to light.  CARDIAC: Normal rate, Regular rhythm.    RESPIRATORY: No wheezing, Bilateral BS, Not tachypneic, No use of accessory muscles  GASTROINTESTINAL: Abdomen soft, Non-tender, No guarding,   NEUROLOGICAL: Alert and oriented   SKIN: Skin normal color for race, Warm and dry and intact.           LABS:                            8.8    7.02  )-----------( 149      ( 14 Jun 2023 05:50 )             27.2                                               06-14    138  |  106  |  14  ----------------------------<  69<L>  3.9   |  20  |  0.7    Ca    8.6      14 Jun 2023 05:50  Mg     2.1     06-14    TPro  4.9<L>  /  Alb  3.2<L>  /  TBili  0.4  /  DBili  x   /  AST  22  /  ALT  11  /  AlkPhos  74  06-14      PT/INR - ( 13 Jun 2023 06:24 )   PT: 12.00 sec;   INR: 1.05 ratio         PTT - ( 13 Jun 2023 06:24 )  PTT:26.5 sec                                                                                     LIVER FUNCTIONS - ( 14 Jun 2023 05:50 )  Alb: 3.2 g/dL / Pro: 4.9 g/dL / ALK PHOS: 74 U/L / ALT: 11 U/L / AST: 22 U/L / GGT: x                                                             MEDICATIONS  (STANDING):  Areds 2 1 Capsule(s) 1 Capsule(s) Oral two times a day  chlorhexidine 2% Cloths 1 Application(s) Topical daily  cholecalciferol 2000 Unit(s) Oral daily  disopyramide 100 milliGRAM(s) Oral three times a day  metoprolol succinate ER 50 milliGRAM(s) Oral daily  pantoprazole  Injectable 40 milliGRAM(s) IV Push every 12 hours  senna 2 Tablet(s) Oral at bedtime  simvastatin 20 milliGRAM(s) Oral at bedtime    MEDICATIONS  (PRN):  melatonin 3 milliGRAM(s) Oral at bedtime PRN Insomnia  polyethylene glycol 3350 17 Gram(s) Oral two times a day PRN Constipation

## 2023-06-14 NOTE — PROGRESS NOTE ADULT - SUBJECTIVE AND OBJECTIVE BOX
SUBJECTIVE:  Patient is a 82y old Female who presents with a chief complaint of Anemia (13 Jun 2023 11:22)   Anemia     Today is hospital day 5d. There are no new issues or overnight events.     HPI  HPI:  81yo female PMHx HOCM, HFpEF, aortic stenosis, and HLD presenting with intermittent chest "pressure" and generalized weakness for 2 days, associated with COWART, without any SOB, n/v.  mid sternal chest 'pressure', non radiating. No  fevers, cough, HA.   In ED vitals wnl, EKG LBBB unchanged from prior, Trop neg x 1. Code STEMI activated and cancelled by cardiology. 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 Last BM was 3 days ago (pt usually constipated).   Patient will be admitted for management of symptomatic anemia    (10 Maurilio 2023 01:34)      PAST MEDICAL & SURGICAL HISTORY  High cholesterol    Hypertrophic obstructive cardiomyopathy (HOCM)    Aortic stenosis    No significant past surgical history      ALLERGIES:  lidocaine (Rash)    MEDICATIONS:  HOME MEDICATIONS  disopyramide 100 mg oral capsule: 1 cap(s) orally 3 times a day  simvastatin 20 mg oral tablet: 1 tab(s) orally once a day (at bedtime)  Toprol-XL 50 mg oral tablet, extended release: 1 tab(s) orally once a day  verapamil 120 mg/24 hours oral capsule, extended release: 1 cap(s) orally once a day  Vitamin B12 250 mcg oral tablet: 1 tab(s) orally once a day  Vitamin D3 50 mcg (2000 intl units) oral tablet: 1 tab(s) orally once a day    STANDING MEDICATIONS  Areds 2 1 Capsule(s) 1 Capsule(s) Oral two times a day  chlorhexidine 2% Cloths 1 Application(s) Topical daily  cholecalciferol 2000 Unit(s) Oral daily  disopyramide 100 milliGRAM(s) Oral three times a day  metoprolol succinate ER 50 milliGRAM(s) Oral daily  pantoprazole  Injectable 40 milliGRAM(s) IV Push every 12 hours  senna 2 Tablet(s) Oral at bedtime  simvastatin 20 milliGRAM(s) Oral at bedtime    PRN MEDICATIONS  melatonin 3 milliGRAM(s) Oral at bedtime PRN  polyethylene glycol 3350 17 Gram(s) Oral two times a day PRN    VITALS:   T(C): 36.4 (06-14-23 @ 07:26), Max: 36.6 (06-13-23 @ 16:00)  T(F): 97.6 (06-14-23 @ 07:26), Max: 97.9 (06-13-23 @ 16:00)  HR: 86 (06-14-23 @ 07:26) (58 - 86)  BP: 114/55 (06-14-23 @ 07:26) (97/53 - 136/60)  BP(mean): 87 (06-13-23 @ 20:00) (87 - 89)  ABP: --  ABP(mean): --  RR: 20 (06-14-23 @ 07:26) (20 - 20)  SpO2: 96% (06-14-23 @ 07:26) (95% - 98%)  LABS:                        8.8    7.02  )-----------( 149      ( 14 Jun 2023 05:50 )             27.2     06-14    138  |  106  |  14  ----------------------------<  69<L>  3.9   |  20  |  0.7    Ca    8.6      14 Jun 2023 05:50  Mg     2.1     06-14    TPro  4.9<L>  /  Alb  3.2<L>  /  TBili  0.4  /  DBili  x   /  AST  22  /  ALT  11  /  AlkPhos  74  06-14    PT/INR - ( 13 Jun 2023 06:24 )   PT: 12.00 sec;   INR: 1.05 ratio         PTT - ( 13 Jun 2023 06:24 )  PTT:26.5 sec    I&O's Detail                RADIOLOGY:  EKG  12 Lead ECG:   Ventricular Rate 81 BPM    Atrial Rate 81 BPM    P-R Interval 194 ms    QRS Duration 180 ms    Q-T Interval 480 ms    QTC Calculation(Bazett) 557 ms    P Axis 81 degrees    R Axis -40 degrees    T Axis 130 degrees    Diagnosis Line Sinus rhythm with 1st degree A-V block  Left axis deviation  Left bundle branch block  Abnormal ECG    Confirmed by Cortney Meyer MD [id number]) on 6/10/2023 6:02:47 PM (06-10-23 @ 11:42)  12 Lead ECG:   Ventricular Rate 68 BPM    Atrial Rate 68 BPM    P-R Interval 196 ms    QRS Duration 190 ms    Q-T Interval 540 ms    QTC Calculation(Bazett) 574 ms    P Axis 29 degrees    R Axis -21 degrees    T Axis 139 degrees    Diagnosis Line Sinus rhythm with 1st degree A-V block  Left bundle branch block  Abnormal ECG    Confirmed by Cortney Meyer MD ([id number]) on 6/10/2023 5:52:09 PM (06-09-23 @ 15:47)      Physical Exam:  General: no acute distress, lying in bed  Head: normocephalic and atraumatic  Neck: supple  Heart: regular rate and rhythm, S1 and S2 normal, no murmurs, rubs or gallops noted on exam  Lungs: Symmetric chest expansion bilaterally, clear lungs bilaterally, no wheezes rhonchi or crackles heard  Abdomen: Bowel sounds present, non tender on light and deep palpation  Extremities: No edema, no clubbing or cyanosis noted

## 2023-06-14 NOTE — PROGRESS NOTE ADULT - PROVIDER SPECIALTY LIST ADULT
Gastroenterology
Internal Medicine
Critical Care
Critical Care
Gastroenterology
Hospitalist
Internal Medicine
Hospitalist
Internal Medicine
Internal Medicine

## 2023-06-14 NOTE — CHART NOTE - NSCHARTNOTEFT_GEN_A_CORE
DOWNGRADE NOTE    In CEU patient had capsule endoscopy (EGD/colonoscopy was too high risk given patient's cardiac history), please follow up on the results and trend CBC.  Patient is hemodynamically stable and ready for downgrade to floors.     81yo female PMHx HOCM, HFpEF, aortic stenosis, and HLD presenting with intermittent chest "pressure" and generalized weakness for 2 days found to have hgb 6.5 s/p 2 units prbc; receiving 3rd unit of hgb given hemodynamic instability and decreasing hgb w/o overt signs of bleeding.     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  - No melena, bloody BM, coffee ground emesis  - RO negative in the ED   - s/p 2 unit PRBC, F/u Rpt CBC   - Hx Rt colon AVM (Heyde syndrome?)   - BUN/Cr > 30 favors UGIB   - PPI bid, CBC q12, active type and screen   - Consider CT Angio r/o retroperitoneal bleed if pt has bleeding  - Holding anticoagulation and aspirin    - capsule endoscopy today, no plan for egd/colonoscopy as patient is too high risk for procedure per GI 6/13  - patient currently with capsule endoscopy, will follow up on results    # Atypical chest pain - likely symptomatic anemia, Hgb 6.5  - R/o ACS, Trop  neg x1, f/u serial cardiac enzymes  - EKG showed  LBBB unchanged form prior-> assessed by Cardio,  STEMI code canceled   - Echo: EF 75%    #hx of CHFpEF  - Diastolic CHF  - no shortness of breath, no lower extremity edema  - C/w PO lasix    #Hypertrophic cardiomyopathy  - Follows with Dr. Hightower in [facility] -  Last echo June 2022 showed EF 75% and findings consistent hypertrophic cardiomiopathy  - 3/6 crescendo/decrescendo murmur at right second intercostal space  - Metoprolol 50 mg daily  - Verapamil 120 mg daily   - Disopyramide 100mg TID     #Hx of constipation   - Miralax and Senna PRN     #DLD  - c/w statin    #Age related macular degeneration   - C/w areds 2 BID     DVt ppx: SCD  Diet: Regular  Code: Full     MEDICATIONS:  Areds 2 1 Capsule(s) 1 Capsule(s) Oral two times a day  chlorhexidine 2% Cloths 1 Application(s) Topical daily  cholecalciferol 2000 Unit(s) Oral daily  disopyramide 100 milliGRAM(s) Oral three times a day  melatonin 3 milliGRAM(s) Oral at bedtime PRN  metoprolol succinate ER 50 milliGRAM(s) Oral daily  pantoprazole  Injectable 40 milliGRAM(s) IV Push every 12 hours  polyethylene glycol 3350 17 Gram(s) Oral two times a day PRN  senna 2 Tablet(s) Oral at bedtime  simvastatin 20 milliGRAM(s) Oral at bedtime      T(C): 36.4 (06-14-23 @ 07:26), Max: 36.6 (06-13-23 @ 16:00)  HR: 86 (06-14-23 @ 07:26) (58 - 86)  BP: 114/55 (06-14-23 @ 07:26) (97/53 - 136/60)  RR: 20 (06-14-23 @ 07:26) (20 - 20)  SpO2: 96% (06-14-23 @ 07:26) (95% - 98%)  Wt(kg): --Vital Signs Last 24 Hrs  T(C): 36.4 (14 Jun 2023 07:26), Max: 36.6 (13 Jun 2023 16:00)  T(F): 97.6 (14 Jun 2023 07:26), Max: 97.9 (13 Jun 2023 16:00)  HR: 86 (14 Jun 2023 07:26) (58 - 86)  BP: 114/55 (14 Jun 2023 07:26) (97/53 - 136/60)  BP(mean): 87 (13 Jun 2023 20:00) (87 - 89)  RR: 20 (14 Jun 2023 07:26) (20 - 20)  SpO2: 96% (14 Jun 2023 07:26) (95% - 98%)    Parameters below as of 14 Jun 2023 00:55  Patient On (Oxygen Delivery Method): room air    PHYSICAL EXAM:  GENERAL: NAD, well-groomed, well-developed  HEAD:  Atraumatic, Normocephalic  EYES: EOMI, PERRLA, conjunctiva and sclera clear  ENMT:  Moist mucous membranes  NECK: Supple, No JVD,  CHEST/LUNG: Clear to auscultation bilaterally; No rales, rhonchi, wheezing, or rubs  HEART: Regular rate and rhythm; No murmurs, rubs, or gallops  ABDOMEN: Soft, Nontender, Nondistended; Bowel sounds present  NEURO: Alert & Oriented X3  EXTREMITIES: No LE edema, no calf tenderness  LYMPH: No lymphadenopathy noted  SKIN: No rashes or lesions    Consultant(s) Notes Reviewed:  [x ] YES  [ ] NO  Care Discussed with Consultants/Other Providers [ x] YES  [ ] NO    LABS:                        8.8    7.02  )-----------( 149      ( 14 Jun 2023 05:50 )             27.2     06-14    138  |  106  |  14  ----------------------------<  69<L>  3.9   |  20  |  0.7    Ca    8.6      14 Jun 2023 05:50  Mg     2.1     06-14    TPro  4.9<L>  /  Alb  3.2<L>  /  TBili  0.4  /  DBili  x   /  AST  22  /  ALT  11  /  AlkPhos  74  06-14    PT/INR - ( 13 Jun 2023 06:24 )   PT: 12.00 sec;   INR: 1.05 ratio         PTT - ( 13 Jun 2023 06:24 )  PTT:26.5 sec    CAPILLARY BLOOD GLUCOSE      POCT Blood Glucose.: 70 mg/dL (13 Jun 2023 21:07)  POCT Blood Glucose.: 69 mg/dL (13 Jun 2023 16:11)

## 2023-06-14 NOTE — PROGRESS NOTE ADULT - ATTENDING COMMENTS
My note supersedes all residents notes that I sign, My correction for their notes are in my notes   the patient son at bedside early then her  at bedside later , patient stated that she feels ok, she denies any bleeding  but as per nursing she had dark bm early   On exam  General: awake, alert, NAD, chronic ill appearance  Lungs:  clear to ausculation b/l, normal resp effort  Heart: regular rhythm   Abdomen: soft, non tender non distended  Ext: no edema, can move all  his extremities     81yo female PMHx HOCM, HFpEF, aortic stenosis, and HLD presenting with intermittent chest "pressure" and generalized weakness for 2 days, associated with COWART, without any SOB, n/v.  mid sternal chest 'pressure', non radiating. In ED. Code STEMI activated and cancelled by cardiology. Labs significant for Hgb 6.5 s/p 1 unit PRBC Pt admitted for acute symptomatic anemia , r/o GIB  Pt was admitted to 3C , Upgraded to CEU.    []Acute symptomatic  normocytic  anemia / r/o GIB   [] 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  - No melena, bloody BM, coffee ground emesis  - RO negative in the ED   - s/p 3 nit PRBC,  Hgb 6.5 on admission received 3 units   - Hx Rt colon AVM (Heyde syndrome?)   - BUN/Cr > 30 favors UGIB   - GI consulted for endoscopy/colonoscopy  - PPI bid, CBC q12, active type and screen   - Consider CT Angio if active bleeding or persistent hgb drop   - Holding anticoagulation and aspirin    GI on board - as per GI the patient and family opted to proceed only with capsule endoscopy for now - plan to have it today / NPO for now   I also discussed with the patient and her  and they confirmed that they want the capsule endoscopy for now   f/u requested ,   need cardiac clearance if the patient agreed to EGD/Colonscopy  Dr. Jesus Hightower at [facility] office contacted anaesthesia bending recc for clearance if needed      []chest pain / tightness - resolved   likely symptomatic anemia, Hgb 6.5  - R/o ACS, Trop  neg x3   no current chest pain   - EKG showed  LBBB unchanged form prior-> assessed by Cardio,  STEMI code canceled   -  Echo EF  Hyperdynamic / Severe asymmetric septal hypertrophy with MEGHAN and LVOT obstrution   with a resting gradient of 60 mmHg.  Spectral Doppler shows pseudonormal pattern of left ventricular   myocardial filling (Grade II diastolic dysfunction). Severe mitral regurgitation.   Compared to prior study on 9/2/2022, there is no significant change.        []HOCM   []MR , MV  LVOT  obstruction     - Follows with Dr. Jesus Hightower at [facility]   -  Last echo June 2022 showed EF 75% and findings consistent hypertrophic cardiomyopathy  - Metoprolol 50 mg daily  - Verapamil 120 mg daily ON hold   - Disopyramide 100mg TID     #Hx of constipation   - Miralax and Senna PRN     #DLD  - c/w statin    #Age related macular degeneration   - C/w areds 2 BID non formulary - discuss with the patient to bring it     Discussed with the patient and her  at bedside     Pending; monitor CBC/ capsule endoscopy
I edited the note
I edited the note

## 2023-06-14 NOTE — PROGRESS NOTE ADULT - SUBJECTIVE AND OBJECTIVE BOX
GERRY FRIED  82y Female    CHIEF COMPLAINT:    Patient is a 82y old  Female who presents with a chief complaint of Anemia (2023 10:25)    INTERVAL HPI/OVERNIGHT EVENTS:    Patient seen and examined. No acute events overnight. Feels well, denies any complaints     ROS: All other systems are negative.    Vital Signs:    T(F): 97.7 (23 @ 12:14), Max: 97.9 (23 @ 16:00)  HR: 67 (23 @ 12:14) (58 - 86)  BP: 88/53 (23 @ 12:14) (88/53 - 136/60)  RR: 20 (23 @ 12:14) (20 - 20)  SpO2: 98% (23 @ 12:14) (95% - 98%)    Daily Weight in k.8 (2023 00:55)    POCT Blood Glucose.: 70 mg/dL (2023 21:07)  POCT Blood Glucose.: 69 mg/dL (2023 16:11)    PHYSICAL EXAM:    GENERAL:  NAD pale  SKIN: No rashes or lesions  HEENT: Atraumatic. Normocephalic.    NECK: Supple, No JVD.     PULMONARY: CTA B/L. No wheezing.    CVS: Normal S1, S2. Rate and Rhythm are regular.    ABDOMEN/GI: Soft, Nontender, Nondistended   MSK:  No clubbing or cyanosis   NEUROLOGIC:  moves all extremities   PSYCH: Alert & oriented x 3, answers questions appropriately     Consultant(s) Notes Reviewed:  [x ] YES  [ ] NO  Care Discussed with Consultants/Other Providers [ x] YES  [ ] NO    LABS:                        8.8    7.02  )-----------( 149      ( 2023 05:50 )             27.2     138  |  106  |  14  ----------------------------<  69<L>  3.9   |  20  |  0.7    Ca    8.6      2023 05:50  Mg     2.1         TPro  4.9<L>  /  Alb  3.2<L>  /  TBili  0.4  /  DBili  x   /  AST  22  /  ALT  11  /  AlkPhos  74      PT/INR - ( 2023 06:24 )   PT: 12.00 sec;   INR: 1.05 ratio       PTT - ( 2023 06:24 )  PTT:26.5 sec    RADIOLOGY & ADDITIONAL TESTS:  Imaging or report Personally Reviewed:  [x] YES  [ ] NO  EKG reviewed: [x] YES  [ ] NO    Medications:  Standing  Areds 2 1 Capsule(s) 1 Capsule(s) Oral two times a day  chlorhexidine 2% Cloths 1 Application(s) Topical daily  cholecalciferol 2000 Unit(s) Oral daily  disopyramide 100 milliGRAM(s) Oral three times a day  metoprolol succinate ER 50 milliGRAM(s) Oral daily  pantoprazole  Injectable 40 milliGRAM(s) IV Push every 12 hours  senna 2 Tablet(s) Oral at bedtime  simvastatin 20 milliGRAM(s) Oral at bedtime    PRN Meds  melatonin 3 milliGRAM(s) Oral at bedtime PRN  polyethylene glycol 3350 17 Gram(s) Oral two times a day PRN

## 2023-06-14 NOTE — PROGRESS NOTE ADULT - ASSESSMENT
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  Aspiration precautions.  On RA     CARDIOVASCULAR:  Avoid overload.  Cardiology follow up     GI: GI prophylaxis.  Feeding per GI.  GI follow up     RENAL:  Follow up lytes.  Correct as needed    INFECTIOUS DISEASE: Follow up cultures.      HEMATOLOGICAL:  DVT prophylaxis seq.  Trend CBC.      ENDOCRINE:  Follow up FS.      MUSCULOSKELETAL:  OOB to chair     DG

## 2023-06-14 NOTE — PROGRESS NOTE ADULT - TIME BILLING
Patient seen at bedside time spent evaluating and treating the patient's acute illness and medical conditions as well as time spent reviewing labs, radiology, discussing with patient and/or patient's family, and discussing the case on multidisciplinary rounds.
coordination of care
coordination of care

## 2023-06-14 NOTE — PROGRESS NOTE ADULT - ASSESSMENT
83yo female PMHx HOCM, HFpEF, Moderate/SEvere TVR, Severe MVR, Moderate PTHN, and HLD presenting with intermittent chest "pressure", generalized weakness and COWART for 2 days. Code STEMI activated and cancelled by cardiology. Labs significant for Hgb 6.5 s/p 1 unit PRBC  Pt endorses no active bleeding and has been having brown stools. She states occasionally when constipated and when she has to strain she notices some bright red blood on the tissue when she wipes or in the toilet bowl.  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 Pt did not follow up with GI thereafter (Dr. Aparicio).     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 Severe pan diverticulosis and AVM in cecum/AC s/p APC  - Hb baseline 9-10  - On admission 6.5  - s/p 3 unit PRBC on this admission  - hemoglobin stable 8.8  - no evidence of active bleed and HD stable   - not on AC  - VCE 6/14: no active bleeding throughout GI tract. Noted small ulcers in small intestine/colon without evidence of active bleed. Evaluation of the colon limited due to prep. FU official report    Plan  - Plan for outpatient colonoscopy given findings on capsule with private GI Dr. Aparicio. Will need cardiac clearance prior to procedure  - c/w Protonix 40mg qd  - avoid NSAIDs   - Can be discharged from GI perspective     #Constipation   - c/w Miralax qd and senna x2 tabs qhs, tap water enema as needed   83yo female PMHx HOCM, HFpEF, Moderate/SEvere TVR, Severe MVR, Moderate PTHN, and HLD presenting with intermittent chest "pressure", generalized weakness and COWART for 2 days. Labs significant for Hgb 6.5 s/p 1 unit PRBC  Pt endorses no active bleeding and has been having brown stools. GI consulted for anemia       #Acute on Chronic Macrocytic Anemia - no overt bleeding  #Painless hematochezia - last BRBPR 2 weeks prior to presentation   #Hx AVM in cecum/AC s/p APC, GII internal hemorrhoids, sigmoid and ascending diverticulosis  - EGD/ CF done in 2018 for anemia and was found to have large internal hemorrhoids. Severe pan diverticulosis and AVM in cecum/AC s/p APC  - s/p 3 unit PRBC on this admission  - hemoglobin stable 8.8   - no evidence of active bleed and HD stable   - not on AC  - VCE 6/14: No active bleeding throughout GI tract. Noted multiple aphthous  ulcers in small intestine at 65 % and 80 % without evidence of active bleed. R/o NSAID induced ulcers versus others.   Evaluation of the colon limited due to prep.       Plan  - Capsule findings discussed with the patient   - avoid NSAIDs   - c/w Protonix 40mg qd  - Will benefit from outpatient EGD/ colon within one month. Patient opted to follow up with Dr. Aparicio. Importance of compliance with GI followup was stressed. Patient aware that capsule is not a good test for colon evaluation. Will need cardiac clearance prior to endoscopic intervention. Patient will follow with her private cardiologist.   - No further inpatient GI workup, advised t to seek ER evaluation if patient developed overt GI bleed    #Constipation   - c/w Miralax qd and senna x2 tabs qhs, tap water enema as needed

## 2023-06-14 NOTE — PROGRESS NOTE ADULT - SUBJECTIVE AND OBJECTIVE BOX
Gastroenterology progress note:     Patient is a 82y old  Female who presents with a chief complaint of Anemia (14 Jun 2023 13:39)       Admitted on: 06-09-23    We are following the patient for:       Interval History:    No acute events overnight.   - Diet - regular   - last BM - AM  - Abdominal pain - denies      PAST MEDICAL & SURGICAL HISTORY:  High cholesterol      Hypertrophic obstructive cardiomyopathy (HOCM)      Aortic stenosis      No significant past surgical history          MEDICATIONS  (STANDING):  Areds 2 1 Capsule(s) 1 Capsule(s) Oral two times a day  chlorhexidine 2% Cloths 1 Application(s) Topical daily  cholecalciferol 2000 Unit(s) Oral daily  disopyramide 100 milliGRAM(s) Oral three times a day  furosemide    Tablet 20 milliGRAM(s) Oral daily  metoprolol succinate ER 50 milliGRAM(s) Oral daily  pantoprazole  Injectable 40 milliGRAM(s) IV Push every 12 hours  senna 2 Tablet(s) Oral at bedtime  simvastatin 20 milliGRAM(s) Oral at bedtime    MEDICATIONS  (PRN):  melatonin 3 milliGRAM(s) Oral at bedtime PRN Insomnia  polyethylene glycol 3350 17 Gram(s) Oral two times a day PRN Constipation      Allergies  lidocaine (Rash)      Review of Systems:   Cardiovascular:  No Chest Pain, No Palpitations  Respiratory:  No Cough, No Dyspnea  Gastrointestinal:  As described in HPI  Skin:  No Skin Lesions, No Jaundice  Neuro:  No Syncope, No Dizziness    Physical Examination:  T(C): 36.9 (06-14-23 @ 17:30), Max: 36.9 (06-14-23 @ 17:30)  HR: 61 (06-14-23 @ 17:30) (58 - 86)  BP: 105/55 (06-14-23 @ 17:30) (88/53 - 136/60)  RR: 18 (06-14-23 @ 17:30) (18 - 20)  SpO2: 99% (06-14-23 @ 17:30) (95% - 99%)      06-13-23 @ 07:01  -  06-14-23 @ 07:00  --------------------------------------------------------  IN: 210 mL / OUT: 0 mL / NET: 210 mL    06-14-23 @ 07:01  -  06-14-23 @ 18:26  --------------------------------------------------------  IN: 210 mL / OUT: 375 mL / NET: -165 mL        GENERAL: AAOx3, no acute distress.  HEAD:  Atraumatic, Normocephalic  EYES: conjunctiva and sclera clear  NECK: Supple, no JVD or thyromegaly  CHEST/LUNG: Clear to auscultation bilaterally; No wheeze, rhonchi, or rales  HEART: Regular rate and rhythm; normal S1, S2, No murmurs.  ABDOMEN: Soft, nontender, nondistended; Bowel sounds present  NEUROLOGY: No asterixis or tremor.   SKIN: Intact, no jaundice     Data:                        8.8    7.02  )-----------( 149      ( 14 Jun 2023 05:50 )             27.2     Hgb trend:  8.8  06-14-23 @ 05:50  9.0  06-13-23 @ 06:24  8.7  06-12-23 @ 18:38  8.8  06-12-23 @ 07:02  8.5  06-11-23 @ 21:37        06-14    138  |  106  |  14  ----------------------------<  69<L>  3.9   |  20  |  0.7    Ca    8.6      14 Jun 2023 05:50  Mg     2.1     06-14    TPro  4.9<L>  /  Alb  3.2<L>  /  TBili  0.4  /  DBili  x   /  AST  22  /  ALT  11  /  AlkPhos  74  06-14    Liver panel trend:  TBili 0.4   /   AST 22   /   ALT 11   /   AlkP 74   /   Tptn 4.9   /   Alb 3.2    /   DBili --      06-14  TBili 0.4   /   AST 25   /   ALT 13   /   AlkP 71   /   Tptn 5.0   /   Alb 3.2    /   DBili --      06-13  TBili 0.4   /   AST 18   /   ALT 9   /   AlkP 68   /   Tptn 5.0   /   Alb 3.1    /   DBili --      06-12  TBili 0.3   /   AST 19   /   ALT 6   /   AlkP 59   /   Tptn 4.8   /   Alb 3.1    /   DBili --      06-11  TBili 0.3   /   AST 19   /   ALT 10   /   AlkP 68   /   Tptn 5.1   /   Alb 3.4    /   DBili --      06-10  TBili <0.2   /   AST 18   /   ALT 11   /   AlkP 79   /   Tptn 5.7   /   Alb 3.7    /   DBili --      06-09      PT/INR - ( 13 Jun 2023 06:24 )   PT: 12.00 sec;   INR: 1.05 ratio         PTT - ( 13 Jun 2023 06:24 )  PTT:26.5 sec       Radiology:       Gastroenterology progress note:     Patient is a 82y old  Female who presents with a chief complaint of Anemia (14 Jun 2023 13:39)       Admitted on: 06-09-23    We are following the patient for: Anemia        Interval History:    No acute events overnight.   - Diet - regular   - last BM - patient reported two bowel movements today, no melena, no hematochezia , no hematemesis   - Abdominal pain - denies, tolerating diet       PAST MEDICAL & SURGICAL HISTORY:  High cholesterol      Hypertrophic obstructive cardiomyopathy (HOCM)      Aortic stenosis      No significant past surgical history          MEDICATIONS  (STANDING):  Areds 2 1 Capsule(s) 1 Capsule(s) Oral two times a day  chlorhexidine 2% Cloths 1 Application(s) Topical daily  cholecalciferol 2000 Unit(s) Oral daily  disopyramide 100 milliGRAM(s) Oral three times a day  furosemide    Tablet 20 milliGRAM(s) Oral daily  metoprolol succinate ER 50 milliGRAM(s) Oral daily  pantoprazole  Injectable 40 milliGRAM(s) IV Push every 12 hours  senna 2 Tablet(s) Oral at bedtime  simvastatin 20 milliGRAM(s) Oral at bedtime    MEDICATIONS  (PRN):  melatonin 3 milliGRAM(s) Oral at bedtime PRN Insomnia  polyethylene glycol 3350 17 Gram(s) Oral two times a day PRN Constipation      Allergies  lidocaine (Rash)      Review of Systems:   Cardiovascular:  No Chest Pain, No Palpitations  Respiratory:  No Cough, No Dyspnea  Gastrointestinal:  As described in HPI  Skin:  No Skin Lesions, No Jaundice  Neuro:  No Syncope, No Dizziness    Physical Examination:  T(C): 36.9 (06-14-23 @ 17:30), Max: 36.9 (06-14-23 @ 17:30)  HR: 61 (06-14-23 @ 17:30) (58 - 86)  BP: 105/55 (06-14-23 @ 17:30) (88/53 - 136/60)  RR: 18 (06-14-23 @ 17:30) (18 - 20)  SpO2: 99% (06-14-23 @ 17:30) (95% - 99%)      06-13-23 @ 07:01  -  06-14-23 @ 07:00  --------------------------------------------------------  IN: 210 mL / OUT: 0 mL / NET: 210 mL    06-14-23 @ 07:01  -  06-14-23 @ 18:26  --------------------------------------------------------  IN: 210 mL / OUT: 375 mL / NET: -165 mL        GENERAL: AAOx3, no acute distress.  HEAD:  Atraumatic, Normocephalic  EYES: conjunctiva and sclera clear  NECK: Supple, no JVD or thyromegaly  CHEST/LUNG: Clear to auscultation bilaterally; No wheeze, rhonchi, or rales  HEART: Regular rate and rhythm; normal S1, S2   ABDOMEN: Soft, nontender, nondistended; Bowel sounds present  NEUROLOGY: No asterixis or tremor.   SKIN: Intact, no jaundice     Data:                        8.8    7.02  )-----------( 149      ( 14 Jun 2023 05:50 )             27.2     Hgb trend:  8.8  06-14-23 @ 05:50  9.0  06-13-23 @ 06:24  8.7  06-12-23 @ 18:38  8.8  06-12-23 @ 07:02  8.5  06-11-23 @ 21:37        06-14    138  |  106  |  14  ----------------------------<  69<L>  3.9   |  20  |  0.7    Ca    8.6      14 Jun 2023 05:50  Mg     2.1     06-14    TPro  4.9<L>  /  Alb  3.2<L>  /  TBili  0.4  /  DBili  x   /  AST  22  /  ALT  11  /  AlkPhos  74  06-14    Liver panel trend:  TBili 0.4   /   AST 22   /   ALT 11   /   AlkP 74   /   Tptn 4.9   /   Alb 3.2    /   DBili --      06-14  TBili 0.4   /   AST 25   /   ALT 13   /   AlkP 71   /   Tptn 5.0   /   Alb 3.2    /   DBili --      06-13  TBili 0.4   /   AST 18   /   ALT 9   /   AlkP 68   /   Tptn 5.0   /   Alb 3.1    /   DBili --      06-12  TBili 0.3   /   AST 19   /   ALT 6   /   AlkP 59   /   Tptn 4.8   /   Alb 3.1    /   DBili --      06-11  TBili 0.3   /   AST 19   /   ALT 10   /   AlkP 68   /   Tptn 5.1   /   Alb 3.4    /   DBili --      06-10  TBili <0.2   /   AST 18   /   ALT 11   /   AlkP 79   /   Tptn 5.7   /   Alb 3.7    /   DBili --      06-09      PT/INR - ( 13 Jun 2023 06:24 )   PT: 12.00 sec;   INR: 1.05 ratio         PTT - ( 13 Jun 2023 06:24 )  PTT:26.5 sec       Radiology:

## 2023-06-15 ENCOUNTER — TRANSCRIPTION ENCOUNTER (OUTPATIENT)
Age: 83
End: 2023-06-15

## 2023-06-15 VITALS
RESPIRATION RATE: 18 BRPM | DIASTOLIC BLOOD PRESSURE: 55 MMHG | TEMPERATURE: 96 F | HEART RATE: 73 BPM | SYSTOLIC BLOOD PRESSURE: 102 MMHG

## 2023-06-15 LAB
ALBUMIN SERPL ELPH-MCNC: 3.1 G/DL — LOW (ref 3.5–5.2)
ALP SERPL-CCNC: 81 U/L — SIGNIFICANT CHANGE UP (ref 30–115)
ALT FLD-CCNC: 11 U/L — SIGNIFICANT CHANGE UP (ref 0–41)
ANION GAP SERPL CALC-SCNC: 10 MMOL/L — SIGNIFICANT CHANGE UP (ref 7–14)
AST SERPL-CCNC: 20 U/L — SIGNIFICANT CHANGE UP (ref 0–41)
BASOPHILS # BLD AUTO: 0.04 K/UL — SIGNIFICANT CHANGE UP (ref 0–0.2)
BASOPHILS NFR BLD AUTO: 0.6 % — SIGNIFICANT CHANGE UP (ref 0–1)
BILIRUB SERPL-MCNC: 0.3 MG/DL — SIGNIFICANT CHANGE UP (ref 0.2–1.2)
BUN SERPL-MCNC: 16 MG/DL — SIGNIFICANT CHANGE UP (ref 10–20)
CALCIUM SERPL-MCNC: 8.7 MG/DL — SIGNIFICANT CHANGE UP (ref 8.4–10.5)
CHLORIDE SERPL-SCNC: 108 MMOL/L — SIGNIFICANT CHANGE UP (ref 98–110)
CO2 SERPL-SCNC: 21 MMOL/L — SIGNIFICANT CHANGE UP (ref 17–32)
CREAT SERPL-MCNC: 0.9 MG/DL — SIGNIFICANT CHANGE UP (ref 0.7–1.5)
EGFR: 63 ML/MIN/1.73M2 — SIGNIFICANT CHANGE UP
EOSINOPHIL # BLD AUTO: 0.14 K/UL — SIGNIFICANT CHANGE UP (ref 0–0.7)
EOSINOPHIL NFR BLD AUTO: 2.2 % — SIGNIFICANT CHANGE UP (ref 0–8)
GLUCOSE SERPL-MCNC: 79 MG/DL — SIGNIFICANT CHANGE UP (ref 70–99)
HCT VFR BLD CALC: 28 % — LOW (ref 37–47)
HGB BLD-MCNC: 9 G/DL — LOW (ref 12–16)
IMM GRANULOCYTES NFR BLD AUTO: 0.5 % — HIGH (ref 0.1–0.3)
LYMPHOCYTES # BLD AUTO: 1.26 K/UL — SIGNIFICANT CHANGE UP (ref 1.2–3.4)
LYMPHOCYTES # BLD AUTO: 19.5 % — LOW (ref 20.5–51.1)
MAGNESIUM SERPL-MCNC: 2.1 MG/DL — SIGNIFICANT CHANGE UP (ref 1.8–2.4)
MCHC RBC-ENTMCNC: 30.2 PG — SIGNIFICANT CHANGE UP (ref 27–31)
MCHC RBC-ENTMCNC: 32.1 G/DL — SIGNIFICANT CHANGE UP (ref 32–37)
MCV RBC AUTO: 94 FL — SIGNIFICANT CHANGE UP (ref 81–99)
MONOCYTES # BLD AUTO: 0.56 K/UL — SIGNIFICANT CHANGE UP (ref 0.1–0.6)
MONOCYTES NFR BLD AUTO: 8.7 % — SIGNIFICANT CHANGE UP (ref 1.7–9.3)
NEUTROPHILS # BLD AUTO: 4.44 K/UL — SIGNIFICANT CHANGE UP (ref 1.4–6.5)
NEUTROPHILS NFR BLD AUTO: 68.5 % — SIGNIFICANT CHANGE UP (ref 42.2–75.2)
NRBC # BLD: 0 /100 WBCS — SIGNIFICANT CHANGE UP (ref 0–0)
PLATELET # BLD AUTO: 158 K/UL — SIGNIFICANT CHANGE UP (ref 130–400)
PMV BLD: 11.7 FL — HIGH (ref 7.4–10.4)
POTASSIUM SERPL-MCNC: 3.8 MMOL/L — SIGNIFICANT CHANGE UP (ref 3.5–5)
POTASSIUM SERPL-SCNC: 3.8 MMOL/L — SIGNIFICANT CHANGE UP (ref 3.5–5)
PROT SERPL-MCNC: 5 G/DL — LOW (ref 6–8)
RBC # BLD: 2.98 M/UL — LOW (ref 4.2–5.4)
RBC # FLD: 15.8 % — HIGH (ref 11.5–14.5)
SODIUM SERPL-SCNC: 139 MMOL/L — SIGNIFICANT CHANGE UP (ref 135–146)
WBC # BLD: 6.47 K/UL — SIGNIFICANT CHANGE UP (ref 4.8–10.8)
WBC # FLD AUTO: 6.47 K/UL — SIGNIFICANT CHANGE UP (ref 4.8–10.8)

## 2023-06-15 PROCEDURE — 99239 HOSP IP/OBS DSCHRG MGMT >30: CPT

## 2023-06-15 RX ORDER — PANTOPRAZOLE SODIUM 20 MG/1
1 TABLET, DELAYED RELEASE ORAL
Qty: 30 | Refills: 0
Start: 2023-06-15 | End: 2023-07-14

## 2023-06-15 RX ORDER — FUROSEMIDE 40 MG
1 TABLET ORAL
Qty: 30 | Refills: 0
Start: 2023-06-15 | End: 2023-07-14

## 2023-06-15 RX ORDER — FUROSEMIDE 40 MG
1 TABLET ORAL
Qty: 0 | Refills: 0 | DISCHARGE
Start: 2023-06-15

## 2023-06-15 RX ADMIN — Medication 20 MILLIGRAM(S): at 05:21

## 2023-06-15 RX ADMIN — CHLORHEXIDINE GLUCONATE 1 APPLICATION(S): 213 SOLUTION TOPICAL at 12:32

## 2023-06-15 RX ADMIN — PANTOPRAZOLE SODIUM 40 MILLIGRAM(S): 20 TABLET, DELAYED RELEASE ORAL at 05:21

## 2023-06-15 RX ADMIN — Medication 100 MILLIGRAM(S): at 05:25

## 2023-06-15 RX ADMIN — Medication 100 MILLIGRAM(S): at 13:22

## 2023-06-15 RX ADMIN — Medication 2000 UNIT(S): at 12:49

## 2023-06-15 NOTE — DISCHARGE NOTE PROVIDER - PROVIDER TOKENS
PROVIDER:[TOKEN:[[id number]],FOLLOWUP:[2 weeks]],PROVIDER:[TOKEN:[[id number]],FOLLOWUP:[1 week]]

## 2023-06-15 NOTE — DISCHARGE NOTE NURSING/CASE MANAGEMENT/SOCIAL WORK - PATIENT PORTAL LINK FT
You can access the FollowMyHealth Patient Portal offered by [facility] by registering at the following website: [email or web address]. By joining [facility]’s FollowMyHealth portal, you will also be able to view your health information using other applications (apps) compatible with our system.

## 2023-06-15 NOTE — DISCHARGE NOTE PROVIDER - ATTENDING DISCHARGE PHYSICAL EXAMINATION:
GENERAL:  NAD pale  SKIN: No rashes or lesions  HEENT: Atraumatic. Normocephalic.    NECK: Supple, No JVD.     PULMONARY: CTA B/L. No wheezing.    CVS: Normal S1, S2. Rate and Rhythm are regular.    ABDOMEN/GI: Soft, Nontender, Nondistended   MSK:  No clubbing or cyanosis   NEUROLOGIC:  moves all extremities   PSYCH: Alert & oriented x 3, answers questions appropriately

## 2023-06-15 NOTE — DISCHARGE NOTE PROVIDER - NSDCMRMEDTOKEN_GEN_ALL_CORE_FT
disopyramide 100 mg oral capsule: 1 cap(s) orally 3 times a day  simvastatin 20 mg oral tablet: 1 tab(s) orally once a day (at bedtime)  Toprol-XL 50 mg oral tablet, extended release: 1 tab(s) orally once a day  verapamil 120 mg/24 hours oral capsule, extended release: 1 cap(s) orally once a day  Vitamin B12 250 mcg oral tablet: 1 tab(s) orally once a day  Vitamin D3 50 mcg (2000 intl units) oral tablet: 1 tab(s) orally once a day   areds 2: 1 cap 2 times a day  disopyramide 100 mg oral capsule: 1 cap(s) orally 3 times a day  furosemide 20 mg oral tablet: 1 tab(s) orally once a day  pantoprazole 40 mg oral delayed release tablet: 1 tab(s) orally once a day  simvastatin 20 mg oral tablet: 1 tab(s) orally once a day (at bedtime)  Toprol-XL 50 mg oral tablet, extended release: 1 tab(s) orally once a day  verapamil 120 mg/24 hours oral capsule, extended release: 1 cap(s) orally once a day  Vitamin B12 250 mcg oral tablet: 1 tab(s) orally once a day  Vitamin D3 50 mcg (2000 intl units) oral tablet: 1 tab(s) orally once a day   disopyramide 100 mg oral capsule: 1 cap(s) orally 3 times a day  furosemide 20 mg oral tablet: 1 tab(s) orally once a day  pantoprazole 40 mg oral delayed release tablet: 1 tab(s) orally once a day  simvastatin 20 mg oral tablet: 1 tab(s) orally once a day (at bedtime)  Toprol-XL 50 mg oral tablet, extended release: 1 tab(s) orally once a day  verapamil 120 mg/24 hours oral capsule, extended release: 1 cap(s) orally once a day  Vitamin B12 250 mcg oral tablet: 1 tab(s) orally once a day  Vitamin D3 50 mcg (2000 intl units) oral tablet: 1 tab(s) orally once a day

## 2023-06-15 NOTE — DISCHARGE NOTE PROVIDER - HOSPITAL COURSE
81yo female PMHx HOCM, HFpEF, aortic stenosis, and HLD presenting with intermittent chest "pressure" and generalized weakness for 2 days found to have hgb 6.5 s/p3 units prbc. Pt had VCE showing no acute bleed, multiple aphthous ulcer in the small intestine w/o active bleed, colon not well visualized, rec outpt egd/colon.     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 hoyos     Atypical chest pain - likely due to symptomatic anemia   STEMI code called in ED, cancelled by cardio   - Trop negative x3  - EKG showed  LBBB unchanged form prior   - Echo: EF 75%     hx of CHFpEF  - no shortness of breath, no lower extremity edema  - on PO lasix 20 mg daily at home, now on hold     Hypertrophic cardiomyopathy  - Follows with Dr. Hightower in [facility] -  Last echo June 2022 showed EF 75% and findings consistent hypertrophic cardiomyopathy  - Metoprolol 50 mg daily  - Verapamil 120 mg daily   - Disopyramide 100mg TID     Hx of constipation - Miralax and Senna PRN     DLD - c/w statin    Age related macular degeneration - C/w areds 2 BID     patient medically stable for discharge 83yo female PMHx HOCM, HFpEF, aortic stenosis, and HLD presenting with intermittent chest "pressure" and generalized weakness for 2 days found to have hgb 6.5 s/p3 units prbc. Pt had VCE showing no acute bleed, multiple aphthous ulcer in the small intestine w/o active bleed, colon not well visualized, rec outpt egd/colon.     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 hoyos     Atypical chest pain - likely due to symptomatic anemia   STEMI code called in ED, cancelled by cardio   - Trop negative x3  - EKG showed  LBBB unchanged form prior   - Echo: EF 75%     hx of CHFpEF  - no shortness of breath, no lower extremity edema  - on PO lasix 20 mg daily at home, now on hold     Hypertrophic cardiomyopathy  - Follows with Dr. Hightower in [facility] -  Last echo June 2022 showed EF 75% and findings consistent hypertrophic cardiomyopathy  - Metoprolol 50 mg daily  - Verapamil 120 mg daily   - Disopyramide 100mg TID     Hx of constipation - Miralax and Senna PRN     DLD - c/w statin    Age related macular degeneration - C/w areds 2 BID     patient medically stable for discharge 83yo female PMHx HOCM, HFpEF, aortic stenosis, and HLD presenting with intermittent chest "pressure" and generalized weakness for 2 days found to have hgb 6.5 s/p3 units prbc. Pt had VCE showing no acute bleed, multiple aphthous ulcer in the small intestine w/o active bleed, colon not well visualized, rec outpt egd/colon.     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 hoyos     Atypical chest pain - likely due to symptomatic anemia   STEMI code called in ED, cancelled by cardio   - Trop negative x3  - EKG showed  LBBB unchanged form prior   - Echo: EF 75%     hx of CHFpEF  - no shortness of breath, no lower extremity edema  - on PO lasix 20 mg daily at home    Hypertrophic cardiomyopathy  - Follows with Dr. Hightower in [facility] -  Last echo June 2022 showed EF 75% and findings consistent hypertrophic cardiomyopathy  - Metoprolol 50 mg daily  - Verapamil 120 mg daily   - Disopyramide 100mg TID     Hx of constipation - Miralax and Senna PRN     DLD - c/w statin    Age related macular degeneration - C/w areds 2 BID     patient medically stable for discharge

## 2023-06-15 NOTE — DISCHARGE NOTE PROVIDER - CARE PROVIDER_API CALL
Gatito Schroeder  Internal Medicine  [address] [place] [id number]  Phone: [phone]  Fax: [phone]  Follow Up Time: 2 weeks    Deshawn Aparicio  Gastroenterology  [address] [place] [id number]  Phone: [phone]  Fax: [phone]  Follow Up Time: 1 week

## 2023-06-15 NOTE — DISCHARGE NOTE PROVIDER - NSDCCPCAREPLAN_GEN_ALL_CORE_FT
PRINCIPAL DISCHARGE DIAGNOSIS  Diagnosis: Anemia  Assessment and Plan of Treatment: you had anemia that was causing you symptoms. you required 3 units of blood while you were here for low hemoglobin. your hemoglobin is now stable. you had a video capsule endoscopy showing no acute bleeding but did show multiple ulcers in the small intestine. please follow up with dr. hoyos for endoscopy/colonscopy outpatient.      SECONDARY DISCHARGE DIAGNOSES  Diagnosis: Weakness  Assessment and Plan of Treatment: you had weakness likely due to symptomatic anemia. please follow up with your pcp    Diagnosis: Chest pain  Assessment and Plan of Treatment: you had chest pain likely due to symptomatic anemia. please follow up with your pcp     PRINCIPAL DISCHARGE DIAGNOSIS  Diagnosis: Anemia  Assessment and Plan of Treatment: you had anemia that was causing you symptoms. you required 3 units of blood while you were here for low hemoglobin. your hemoglobin is now stable. you had a video capsule endoscopy showing no acute bleeding but did show multiple ulcers in the small intestine. please follow up with dr. hoyos for endoscopy/colonscopy outpatient. Please avoid all NSAIDS ( ibuprofen, advil, alleve, naproxen etc )      SECONDARY DISCHARGE DIAGNOSES  Diagnosis: Weakness  Assessment and Plan of Treatment: you had weakness likely due to symptomatic anemia. please follow up with your pcp    Diagnosis: Chest pain  Assessment and Plan of Treatment: you had chest pain likely due to symptomatic anemia. please follow up with your pcp

## 2023-06-27 NOTE — CDI QUERY NOTE - NSCDIOTHERTXTBX_GEN_ALL_CORE_HH
DOCUMENTATION CLARIFICATION FORM     Encounter #: [id number]                                                 Patient’s Name: Sandhya Jade  Medical Record #: [id number]                                              Admit Date: 2023  : 1940                                                                   Discharged: 6-  CDI Specialist/: Hina                                              Contact #: [phone]    Dear Dr. Paul,                      Date: 2023                  The Physician’s or Provider’s documentation of the patient’s presentation, evaluation and  medical management, as identified below, may support a diagnosis that is not documented in the medical record.  In order to accurately capture all diagnoses to the greatest degree of specificity reflecting the patient’s actual severity of illness, the documentation in this patient’s medical record requires additional clarification.  Please include more specific documentation, either known or suspected, of a corresponding diagnosis associated with the clinical information described below in your Progress Note and/or Discharge Summary.    QUERY  Based on your clinical judgment and the clinical indicators below, please clarify if suspected GI bleed with Hx of GIB due to rt colonic AVM can be further specified as:   •	Suspected GI bleed possibly associated with intestinal ulcers noted on capsule endoscopy and known right colon AVM could not be excluded at the time of discharge  •	Other (please specify) _________________________________________________________  •	Clinically unable to further specify GI bleed    CLINICAL INDICATORS  Documentation  •	6-10 H&P…81yo F PMHx HOCM, HFpEF, aortic stenosis, and HLD presenting with intermittent chest "pressure" and generalized weakness for 2 days, associated with COWART, without any SOB, n/v.  mid sternal chest 'pressure', non-radiating. No fevers, cough, HA. …In ED vitals wnl, EKG LBBB unchanged from prior, Trop neg x 1. Code STEMI activated and cancelled by cardiology. Labs significant for Hgb 6.5 s/p 1 unit PRBC… hx of GI bleed secondary to colonic AVM, admits to red blood in stool 1 weeks ago but no recent episodes of melena, BRPR, coffee ground emesis. Last BM was 3 days ago (pt usually constipated) … GI consult.  •	6-10 Medical Attending PN…Normocytic anemia…pt noted blood in toilet about one-week prior s/p 2 units prbcs  •	6-10 GI Consult…will benefit from egd/colonsocopy but is adamantly refusing…. Risks of missed recurrent AVMs, bleeding ulcers, large polyps or even malignancy was explained to the patient, and she is willing to accept this risk.   •	- once pt is agreeable for EGD please notify GI. Will need cardiology risk stratification  •	 Medical Attending… Anemia likely due to GIB…Pt is hypotensive and dropped her Hb again. Will transfuse one more unit of blood and transfer her to ICU…pt is hemodynamically unstable and I am transferring the pt to unit.   •	 Medicine Attending … s/p 3 nit PRBC…Hgb 6.5 on admission received 2 units --> 8,3 then dropped to 7.1 yesterday, received one unit -->today 8.8…Hx Rt colon AVM (Heyde syndrome?)  •	 GI… had EGD/CF done in 2018 for anemia and was found to have large internal hemorrhoids, Severe pan diverticulosis and AVM in cecum/AC s/p APC. Pt did not follow up with GI thereafter… Benefit, risks and limitations of capsule endoscopy was discussed, both opted to proceed with capsule endoscopy understanding its limited role for evaluation of colon, and the need for EGD/ colon if it showed active bleeding...plan for capsule endoscopy tomorrow  •	GI… VCE : No active bleeding throughout GI tract. Noted multiple aphthous ulcers in small intestine at 65 % and 80 % without evidence of active bleed. R/o NSAID induced ulcers versus others. Evaluation of the colon limited due to prep… compliance with GI follow-up was stressed. Patient aware that capsule is not a good test for colon evaluation.    Documentation clarification is required for compliance, accuracy in coding and billing, and reporting severity of illness, quality data   and risk of mortality.  --------------------------------------------------------------------------------------------------------------------------------------------  DO NOT REMOVE THIS RECORD WITHOUT FIRST NOTIFYING THE CDI SPECIALIST  This form is NOT a part of the permanent Medical Record.

## 2023-06-28 ENCOUNTER — INPATIENT (INPATIENT)
Facility: HOSPITAL | Age: 83
LOS: 5 days | Discharge: HOME | DRG: 377 | End: 2023-07-04
Attending: INTERNAL MEDICINE | Admitting: STUDENT IN AN ORGANIZED HEALTH CARE EDUCATION/TRAINING PROGRAM
Payer: MEDICARE

## 2023-06-28 VITALS
RESPIRATION RATE: 20 BRPM | WEIGHT: 141.98 LBS | SYSTOLIC BLOOD PRESSURE: 96 MMHG | TEMPERATURE: 98 F | OXYGEN SATURATION: 100 % | DIASTOLIC BLOOD PRESSURE: 50 MMHG | HEART RATE: 56 BPM | HEIGHT: 62 IN

## 2023-06-28 DIAGNOSIS — D50.9 IRON DEFICIENCY ANEMIA, UNSPECIFIED: ICD-10-CM

## 2023-06-28 LAB
ALBUMIN SERPL ELPH-MCNC: 3.5 G/DL — SIGNIFICANT CHANGE UP (ref 3.5–5.2)
ALP SERPL-CCNC: 86 U/L — SIGNIFICANT CHANGE UP (ref 30–115)
ALT FLD-CCNC: 10 U/L — SIGNIFICANT CHANGE UP (ref 0–41)
ANION GAP SERPL CALC-SCNC: 10 MMOL/L — SIGNIFICANT CHANGE UP (ref 7–14)
APTT BLD: 27.6 SEC — SIGNIFICANT CHANGE UP (ref 27–39.2)
AST SERPL-CCNC: 18 U/L — SIGNIFICANT CHANGE UP (ref 0–41)
BASOPHILS # BLD AUTO: 0.09 K/UL — SIGNIFICANT CHANGE UP (ref 0–0.2)
BASOPHILS NFR BLD AUTO: 0.9 % — SIGNIFICANT CHANGE UP (ref 0–1)
BILIRUB DIRECT SERPL-MCNC: <0.2 MG/DL — SIGNIFICANT CHANGE UP (ref 0–0.3)
BILIRUB INDIRECT FLD-MCNC: >0 MG/DL — LOW (ref 0.2–1.2)
BILIRUB SERPL-MCNC: 0.2 MG/DL — SIGNIFICANT CHANGE UP (ref 0.2–1.2)
BILIRUB SERPL-MCNC: 0.2 MG/DL — SIGNIFICANT CHANGE UP (ref 0.2–1.2)
BUN SERPL-MCNC: 62 MG/DL — CRITICAL HIGH (ref 10–20)
CALCIUM SERPL-MCNC: 8.9 MG/DL — SIGNIFICANT CHANGE UP (ref 8.4–10.4)
CHLORIDE SERPL-SCNC: 103 MMOL/L — SIGNIFICANT CHANGE UP (ref 98–110)
CO2 SERPL-SCNC: 22 MMOL/L — SIGNIFICANT CHANGE UP (ref 17–32)
CREAT SERPL-MCNC: 1.3 MG/DL — SIGNIFICANT CHANGE UP (ref 0.7–1.5)
EGFR: 41 ML/MIN/1.73M2 — LOW
EOSINOPHIL # BLD AUTO: 0 K/UL — SIGNIFICANT CHANGE UP (ref 0–0.7)
EOSINOPHIL NFR BLD AUTO: 0 % — SIGNIFICANT CHANGE UP (ref 0–8)
GLUCOSE SERPL-MCNC: 115 MG/DL — HIGH (ref 70–99)
HCT VFR BLD CALC: 20.5 % — LOW (ref 37–47)
HGB BLD-MCNC: 6.7 G/DL — CRITICAL LOW (ref 12–16)
INR BLD: 1.12 RATIO — SIGNIFICANT CHANGE UP (ref 0.65–1.3)
LYMPHOCYTES # BLD AUTO: 1.86 K/UL — SIGNIFICANT CHANGE UP (ref 1.2–3.4)
LYMPHOCYTES # BLD AUTO: 19.3 % — LOW (ref 20.5–51.1)
MAGNESIUM SERPL-MCNC: 1.9 MG/DL — SIGNIFICANT CHANGE UP (ref 1.8–2.4)
MCHC RBC-ENTMCNC: 30.3 PG — SIGNIFICANT CHANGE UP (ref 27–31)
MCHC RBC-ENTMCNC: 32.7 G/DL — SIGNIFICANT CHANGE UP (ref 32–37)
MCV RBC AUTO: 92.8 FL — SIGNIFICANT CHANGE UP (ref 81–99)
MONOCYTES # BLD AUTO: 0.09 K/UL — LOW (ref 0.1–0.6)
MONOCYTES NFR BLD AUTO: 0.9 % — LOW (ref 1.7–9.3)
NEUTROPHILS # BLD AUTO: 7.59 K/UL — HIGH (ref 1.4–6.5)
NEUTROPHILS NFR BLD AUTO: 76.3 % — HIGH (ref 42.2–75.2)
NT-PROBNP SERPL-SCNC: 2925 PG/ML — HIGH (ref 0–300)
PLATELET # BLD AUTO: 236 K/UL — SIGNIFICANT CHANGE UP (ref 130–400)
PMV BLD: 12.2 FL — HIGH (ref 7.4–10.4)
POTASSIUM SERPL-MCNC: 4.6 MMOL/L — SIGNIFICANT CHANGE UP (ref 3.5–5)
POTASSIUM SERPL-SCNC: 4.6 MMOL/L — SIGNIFICANT CHANGE UP (ref 3.5–5)
PROT SERPL-MCNC: 5.5 G/DL — LOW (ref 6–8)
PROTHROM AB SERPL-ACNC: 12.8 SEC — SIGNIFICANT CHANGE UP (ref 9.95–12.87)
RBC # BLD: 2.21 M/UL — LOW (ref 4.2–5.4)
RBC # FLD: 16 % — HIGH (ref 11.5–14.5)
SODIUM SERPL-SCNC: 135 MMOL/L — SIGNIFICANT CHANGE UP (ref 135–146)
TROPONIN T SERPL-MCNC: <0.01 NG/ML — SIGNIFICANT CHANGE UP
WBC # BLD: 9.62 K/UL — SIGNIFICANT CHANGE UP (ref 4.8–10.8)
WBC # FLD AUTO: 9.62 K/UL — SIGNIFICANT CHANGE UP (ref 4.8–10.8)

## 2023-06-28 PROCEDURE — 86850 RBC ANTIBODY SCREEN: CPT

## 2023-06-28 PROCEDURE — 88312 SPECIAL STAINS GROUP 1: CPT

## 2023-06-28 PROCEDURE — 36430 TRANSFUSION BLD/BLD COMPNT: CPT

## 2023-06-28 PROCEDURE — 84466 ASSAY OF TRANSFERRIN: CPT

## 2023-06-28 PROCEDURE — 80048 BASIC METABOLIC PNL TOTAL CA: CPT

## 2023-06-28 PROCEDURE — 88305 TISSUE EXAM BY PATHOLOGIST: CPT

## 2023-06-28 PROCEDURE — 93005 ELECTROCARDIOGRAM TRACING: CPT

## 2023-06-28 PROCEDURE — 84100 ASSAY OF PHOSPHORUS: CPT

## 2023-06-28 PROCEDURE — 71045 X-RAY EXAM CHEST 1 VIEW: CPT | Mod: 26

## 2023-06-28 PROCEDURE — 82728 ASSAY OF FERRITIN: CPT

## 2023-06-28 PROCEDURE — 83540 ASSAY OF IRON: CPT

## 2023-06-28 PROCEDURE — 86901 BLOOD TYPING SEROLOGIC RH(D): CPT

## 2023-06-28 PROCEDURE — 83735 ASSAY OF MAGNESIUM: CPT

## 2023-06-28 PROCEDURE — C9113: CPT

## 2023-06-28 PROCEDURE — 99285 EMERGENCY DEPT VISIT HI MDM: CPT | Mod: FS

## 2023-06-28 PROCEDURE — 93925 LOWER EXTREMITY STUDY: CPT

## 2023-06-28 PROCEDURE — 86900 BLOOD TYPING SEROLOGIC ABO: CPT

## 2023-06-28 PROCEDURE — 36415 COLL VENOUS BLD VENIPUNCTURE: CPT

## 2023-06-28 PROCEDURE — 85025 COMPLETE CBC W/AUTO DIFF WBC: CPT

## 2023-06-28 PROCEDURE — 80053 COMPREHEN METABOLIC PANEL: CPT

## 2023-06-28 PROCEDURE — 83550 IRON BINDING TEST: CPT

## 2023-06-28 PROCEDURE — P9016: CPT

## 2023-06-28 PROCEDURE — 85610 PROTHROMBIN TIME: CPT

## 2023-06-28 PROCEDURE — 93010 ELECTROCARDIOGRAM REPORT: CPT

## 2023-06-28 PROCEDURE — 85730 THROMBOPLASTIN TIME PARTIAL: CPT

## 2023-06-28 RX ORDER — VERAPAMIL HCL 240 MG
120 CAPSULE, EXTENDED RELEASE PELLETS 24 HR ORAL DAILY
Refills: 0 | Status: DISCONTINUED | OUTPATIENT
Start: 2023-06-28 | End: 2023-06-29

## 2023-06-28 RX ORDER — CHOLECALCIFEROL (VITAMIN D3) 125 MCG
2000 CAPSULE ORAL DAILY
Refills: 0 | Status: DISCONTINUED | OUTPATIENT
Start: 2023-06-28 | End: 2023-07-04

## 2023-06-28 RX ORDER — FUROSEMIDE 40 MG
20 TABLET ORAL ONCE
Refills: 0 | Status: COMPLETED | OUTPATIENT
Start: 2023-06-28 | End: 2023-06-28

## 2023-06-28 RX ORDER — METOPROLOL TARTRATE 50 MG
50 TABLET ORAL DAILY
Refills: 0 | Status: DISCONTINUED | OUTPATIENT
Start: 2023-06-28 | End: 2023-06-29

## 2023-06-28 RX ORDER — PREGABALIN 225 MG/1
1000 CAPSULE ORAL DAILY
Refills: 0 | Status: DISCONTINUED | OUTPATIENT
Start: 2023-06-28 | End: 2023-07-04

## 2023-06-28 RX ORDER — SIMVASTATIN 20 MG/1
20 TABLET, FILM COATED ORAL AT BEDTIME
Refills: 0 | Status: DISCONTINUED | OUTPATIENT
Start: 2023-06-28 | End: 2023-07-04

## 2023-06-28 RX ORDER — PANTOPRAZOLE SODIUM 20 MG/1
40 TABLET, DELAYED RELEASE ORAL
Refills: 0 | Status: DISCONTINUED | OUTPATIENT
Start: 2023-06-28 | End: 2023-07-03

## 2023-06-28 RX ADMIN — PANTOPRAZOLE SODIUM 40 MILLIGRAM(S): 20 TABLET, DELAYED RELEASE ORAL at 23:13

## 2023-06-28 RX ADMIN — Medication 20 MILLIGRAM(S): at 20:06

## 2023-06-28 NOTE — H&P ADULT - NSHPLABSRESULTS_GEN_ALL_CORE
LABS:                        6.7    9.62  )-----------( 236      ( 28 Jun 2023 17:15 )             20.5     06-28    135  |  103  |  62<HH>  ----------------------------<  115<H>  4.6   |  22  |  1.3    Ca    8.9      28 Jun 2023 17:15  Mg     1.9     06-28    TPro  5.5<L>  /  Alb  3.5  /  TBili  0.2  /  DBili  <0.2  /  AST  18  /  ALT  10  /  AlkPhos  86  06-28    PT/INR - ( 28 Jun 2023 17:15 )   PT: 12.80 sec;   INR: 1.12 ratio         PTT - ( 28 Jun 2023 17:15 )  PTT:27.6 sec

## 2023-06-28 NOTE — ED PROVIDER NOTE - OBJECTIVE STATEMENT
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 patient has been experiencing progressive generalized weakness and fatigue over the past few days. Last night she developed mid sternal chest pain and tightness associated with shortness of breath which has been intermittent. Chest pain not present at this time however exacerbated with exertion. Admits to persistent dark stool but denies bright red blood per rectum. Denies fever, chills, abdominal pain, nausea, vomiting, diarrhea, constipation, dysuria, hematuria, lower extremity swelling, rash.

## 2023-06-28 NOTE — H&P ADULT - NSHPPHYSICALEXAM_GEN_ALL_CORE
General: AOx3  Lungs: GBAE, no crackles, no wheezing   Heart: RRR, systolic mumur heard   Abdomen: Soft, non-tender non-distended + BS  Extremities: + PP, no edema General: AOx3  Lungs: GBAE, mild bl crackles, no wheezing   Heart: RRR, systolic mumur heard   Abdomen: Soft, non-tender non-distended + BS  Extremities: + PP, decreased peripheral pulses on the right, cold right extremitiy

## 2023-06-28 NOTE — ED PROVIDER NOTE - PHYSICAL EXAMINATION
Physical Exam    Constitutional: No acute distress.   Eyes: Conjunctiva pink, Sclera clear, PERRLA, EOMI.  ENT: No sinus tenderness. No nasal discharge. No oropharyngeal erythema, edema, or exudates. Uvula midline.   Cardiovascular: Regular rate, regular rhythm. +murmur  Respiratory: unlabored respiratory effort, clear to auscultation bilaterally no wheezing, rales or rhonchi  Gastrointestinal: Normal bowel sounds. soft, non distended, non-tender abdomen.   Musculoskeletal: supple neck, no midline tenderness. No joint or bony deformity.   Integumentary: warm, dry, pale and cool skin  Neurologic: awake, alert, cranial nerves II-XII grossly intact, extremities’ motor and sensory functions grossly intact  Psychiatric: appropriate mood, appropriate affect

## 2023-06-28 NOTE — ED PROVIDER NOTE - CARE PLAN
Principal Discharge DX:	Anemia  Secondary Diagnosis:	Chronic GI bleeding  Secondary Diagnosis:	Shortness of breath  Secondary Diagnosis:	Generalized weakness  Secondary Diagnosis:	Chest pain  Secondary Diagnosis:	Fluid overload   1

## 2023-06-28 NOTE — H&P ADULT - ATTENDING COMMENTS
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 Yesterday the patient had chest pain, shortness of breath and chest tightness with SOB of breath and tingling in the shoulders. that went away. Patient reports dark stool but denies any BRPR and this has been there since discharge. Patient expressed refusal of endoscopy or colonoscopy.     1. Acute blood loss anemia might be due to upper GI bleeding s/p 2 PRBC   * vital signs: no tachy but pt is on metoprolol , bp soft.   - Admit to medicine   - Cont PPI bid   - Target hb>8 due to chest pain   *  During the previous admission : Capsule Endoscopy showed no acute bleed, multiple aphthous ulcer in the small intestine w/o active bleed, The GI strongly encouraged outpatient EGD/ colon within one month of prior discharge and patient opted to follow up with Dr. Aparicio. Importance of compliance with GI followup was stressed by GI team.  - Patient is refusing any endoscopy or colonoscopy ot be done   - Patient is DNR/DNI as per her wishes     2. Chest pain / HOCM / severe MR   - Telemetry                - ECG:   - Troponins negative   - Last TTE on 06/23 showed: EF>75%, severe asymmetric septal hypertrophy with MEGHAN and LVOT obstrution with a resting gradient of 60 mmHg, Grade II diastolic dysfunction, Severely enlarged left atrium, Severe mitral regurgitation.  - pro BNP 2925, trop 0.01   - Cardiology FU     3. Decrease pulses RLE  - decreased pulses   -cold RLE extremity   - FU VA arterial duplex     4. FATOU:    - Cr 1.3 from baseline 0.9, eGFR 41,   - pre-renal, and elevation of BUN might be also due to bleeding   - BUN 62    5. HOCM:   - cw Disopyramide (non-formulary sent) and verapamil     # DVT proph: held for bled    Gi proph: protonix   Diet: Keep npo for now for possible intervention   Status : DNR/DNI

## 2023-06-28 NOTE — H&P ADULT - HISTORY OF PRESENT ILLNESS
83yo female PMHx HOCM, HFpEF, aortic stenosis, HLD, Age related macular degeneration, hx of recurrent GI bleeds due to R. colon AVM, last discharge on 06/15/23, presents for chest pain and weakness, patient had a similar presentation where patient also presented for chest pain. Patient is known to have LBBB, Yesterday the patient had chest pain, shortness of breath and chest tightness. Pain is exacerbated on exertion. Patient reports dark stool but denies any BRPR.     During the previous admission : Capsule Endoscopy showed no acute bleed, multiple aphthous ulcer in the small intestine w/o active bleed, The GI strongly encouraged outpatient EGD/ colon within one month of prior discharge and patient opted to follow up with Dr. Aparicio. Importance of compliance with GI followup was stressed by GI team.    Labs noticeable for Hb 6.7 s/p pRBC, BUN 62, Cr 1.3 from baseline 0.9, eGFR 41, pro BNP 2925, trop 0.01   VS noticeable for BP (96/50)  Last TTE on 06/23 showed: EF>75%, severe asymmetric septal hypertrophy with MEGHAN and LVOT obstrution with a resting gradient of 60 mmHg, Grade II diastolic dysfunction, Severely enlarged left atrium, Severe mitral regurgitation.     Vital Signs Last 24 Hrs  T(C): 36.6 (28 Jun 2023 20:45), Max: 36.8 (28 Jun 2023 20:20)  T(F): 97.9 (28 Jun 2023 20:45), Max: 98.2 (28 Jun 2023 20:20)  HR: 68 (28 Jun 2023 20:45) (56 - 71)  BP: 105/50 (28 Jun 2023 20:45) (96/50 - 111/52)  RR: 18 (28 Jun 2023 20:45) (18 - 20)  SpO2: 98% (28 Jun 2023 20:45) (98% - 100%)  Patient On (Oxygen Delivery Method): room air         83yo female PMHx HOCM, HFpEF, aortic stenosis, HLD, Age related macular degeneration, hx of 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 Yesterday the patient had chest pain, shortness of breath and chest tightness with SOB of breath and tingling in the shoulders. that went away. Patient reports dark stool but denies any BRPR and this has been there since discharge. Patient expressed refusal of endoscopy or colonoscopy.     During the previous admission : Capsule Endoscopy showed no acute bleed, multiple aphthous ulcer in the small intestine w/o active bleed, The GI strongly encouraged outpatient EGD/ colon within one month of prior discharge and patient opted to follow up with Dr. Aparicio. Importance of compliance with GI followup was stressed by GI team.    Labs noticeable for Hb 6.7 s/p pRBC, BUN 62, Cr 1.3 from baseline 0.9, eGFR 41, pro BNP 2925, trop 0.01   VS noticeable for BP (96/50)  Last TTE on 06/23 showed: EF>75%, severe asymmetric septal hypertrophy with MEGHAN and LVOT obstrution with a resting gradient of 60 mmHg, Grade II diastolic dysfunction, Severely enlarged left atrium, Severe mitral regurgitation.     Vital Signs Last 24 Hrs  T(C): 36.6 (28 Jun 2023 20:45), Max: 36.8 (28 Jun 2023 20:20)  T(F): 97.9 (28 Jun 2023 20:45), Max: 98.2 (28 Jun 2023 20:20)  HR: 68 (28 Jun 2023 20:45) (56 - 71)  BP: 105/50 (28 Jun 2023 20:45) (96/50 - 111/52)  RR: 18 (28 Jun 2023 20:45) (18 - 20)  SpO2: 98% (28 Jun 2023 20:45) (98% - 100%)  Patient On (Oxygen Delivery Method): room air

## 2023-06-28 NOTE — ED PROVIDER NOTE - EKG ADDITIONAL INFORMATION FREE TEXT
Sinus bradycardia with first-degree AV block, prolonged QTc, left bundle branch block, no Sgarbossa's

## 2023-06-28 NOTE — ED ADULT NURSE NOTE - NSFALLHARMRISKINTERV_ED_ALL_ED
Assistance OOB with selected safe patient handling equipment if applicable/Assistance with ambulation/Communicate risk of Fall with Harm to all staff, patient, and family/Monitor gait and stability/Provide patient with walking aids/Provide visual cue: red socks, yellow wristband, yellow gown, etc/Reinforce activity limits and safety measures with patient and family/Use of alarms - bed, stretcher, chair and/or video monitoring/Bed in lowest position, wheels locked, appropriate side rails in place/Call bell, personal items and telephone in reach/Instruct patient to call for assistance before getting out of bed/chair/stretcher/Non-slip footwear applied when patient is off stretcher/[place] to call system/Physically safe environment - no spills, clutter or unnecessary equipment/Purposeful Proactive Rounding/Room/bathroom lighting operational, light cord in reach

## 2023-06-28 NOTE — ED PROVIDER NOTE - CLINICAL SUMMARY MEDICAL DECISION MAKING FREE TEXT BOX
82-year-old female with past medical history of cardiomyopathy, heart failure, aortic stenosis, GI bleed, presents with chest pain and generalized fatigue.  Progressively getting worse over the last few days and then developed chest pain.  Also has shortness of breath.  Has been having darker stools but no bright red blood per rectum.  On exam nontoxic, pale appearing, chronically frail appearing, systolic murmur, lungs clear bilaterally, 1+ bilateral lower extremity symmetric pitting edema.  Differential diagnosis heart failure, symptomatic anemia, less likely ACS.  EKG sinus bradycardia, first-degree AV block, old left bundle branch block, no Sgarbossa's met, prolonged QTc, similar to prior.  Troponin negative.  Given the chest pain, fluid overload, will admit to medicine for further treatment and monitoring and give gentle diuresis during the fluids in order to mitigate risk for TACO.

## 2023-06-28 NOTE — H&P ADULT - ASSESSMENT
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 Yesterday the patient had chest pain, shortness of breath and chest tightness with SOB of breath and tingling in the shoulders. that went away. Patient reports dark stool but denies any BRPR and this has been there since discharge. Patient expressed refusal of endoscopy or colonoscopy.     # GI bleed:  - dark stools, not BRPR,   -  Hb low 6.7  -  similar presentation   - given 1 pRBC, will give another unit since HOCM is preload dependant   - monitor Hb   - During the previous admission : Capsule Endoscopy showed no acute bleed, multiple aphthous ulcer in the small intestine w/o active bleed, The GI strongly encouraged outpatient EGD/ colon within one month of prior discharge and patient opted to follow up with Dr. Aparicio. Importance of compliance with GI followup was stressed by GI team.  - Patient is refusing any endoscopy or colonoscopy ot be done   - risks explained , risks understood   - Patient is DNR/DNI as per her wishes   - Consult IR for possible embolization   - VS noticeable for BP (96/50)  - Labs noticeable for Hb 6.7 s/p pRBC,   - BUN elevated   - PPI BID   - FU CBC< CMP ,A!C, TSH, lipid panel , keep Active type and screen   - keep HB > 8   - telemetry monitoring      # Chest pain   # HOCM   # severe MR   - similar presentation to before   - Cardio fellow updated   - EKG changes noted   - roponins negative   - chest pain resolved   - Last TTE on 06/23 showed: EF>75%, severe asymmetric septal hypertrophy with MEGHAN and LVOT obstrution with a resting gradient of 60 mmHg, Grade II diastolic dysfunction, Severely enlarged left atrium, Severe mitral regurgitation.  - pro BNP 2925, trop 0.01   - Cardiology FU     # Decrease pulses RLE  - decreased pulses   -cold RLE extremity   - FU VA arterial duplex     # FATOU:    - Cr 1.3 from baseline 0.9, eGFR 41,   - pre-renal, and elevation of BUN might be also due to bleeding   - BUN 62    # HOCM:   - cw Disopyramide (non-formulary sent) and verapamil     # DVT proph: held for bled    Gi proph: protonix   Diet: Keep npo for now for possible intervention   Status : DNR/DNI

## 2023-06-29 LAB
ALBUMIN SERPL ELPH-MCNC: 3.5 G/DL — SIGNIFICANT CHANGE UP (ref 3.5–5.2)
ALP SERPL-CCNC: 82 U/L — SIGNIFICANT CHANGE UP (ref 30–115)
ALT FLD-CCNC: 10 U/L — SIGNIFICANT CHANGE UP (ref 0–41)
ANION GAP SERPL CALC-SCNC: 16 MMOL/L — HIGH (ref 7–14)
APTT BLD: 26.9 SEC — LOW (ref 27–39.2)
AST SERPL-CCNC: 18 U/L — SIGNIFICANT CHANGE UP (ref 0–41)
BASOPHILS # BLD AUTO: 0.1 K/UL — SIGNIFICANT CHANGE UP (ref 0–0.2)
BASOPHILS NFR BLD AUTO: 0.9 % — SIGNIFICANT CHANGE UP (ref 0–1)
BILIRUB SERPL-MCNC: 0.5 MG/DL — SIGNIFICANT CHANGE UP (ref 0.2–1.2)
BLD GP AB SCN SERPL QL: SIGNIFICANT CHANGE UP
BUN SERPL-MCNC: 64 MG/DL — CRITICAL HIGH (ref 10–20)
CALCIUM SERPL-MCNC: 9.2 MG/DL — SIGNIFICANT CHANGE UP (ref 8.4–10.5)
CHLORIDE SERPL-SCNC: 105 MMOL/L — SIGNIFICANT CHANGE UP (ref 98–110)
CO2 SERPL-SCNC: 22 MMOL/L — SIGNIFICANT CHANGE UP (ref 17–32)
CREAT SERPL-MCNC: 1.2 MG/DL — SIGNIFICANT CHANGE UP (ref 0.7–1.5)
EGFR: 45 ML/MIN/1.73M2 — LOW
EOSINOPHIL # BLD AUTO: 0.03 K/UL — SIGNIFICANT CHANGE UP (ref 0–0.7)
EOSINOPHIL NFR BLD AUTO: 0.3 % — SIGNIFICANT CHANGE UP (ref 0–8)
GLUCOSE SERPL-MCNC: 99 MG/DL — SIGNIFICANT CHANGE UP (ref 70–99)
HCT VFR BLD CALC: 34.8 % — LOW (ref 37–47)
HGB BLD-MCNC: 11.4 G/DL — LOW (ref 12–16)
IMM GRANULOCYTES NFR BLD AUTO: 0.5 % — HIGH (ref 0.1–0.3)
INR BLD: 0.97 RATIO — SIGNIFICANT CHANGE UP (ref 0.65–1.3)
IRON SATN MFR SERPL: 19 % — SIGNIFICANT CHANGE UP (ref 15–50)
IRON SATN MFR SERPL: 71 UG/DL — SIGNIFICANT CHANGE UP (ref 35–150)
LYMPHOCYTES # BLD AUTO: 1.74 K/UL — SIGNIFICANT CHANGE UP (ref 1.2–3.4)
LYMPHOCYTES # BLD AUTO: 16.3 % — LOW (ref 20.5–51.1)
MAGNESIUM SERPL-MCNC: 2.1 MG/DL — SIGNIFICANT CHANGE UP (ref 1.8–2.4)
MCHC RBC-ENTMCNC: 28.4 PG — SIGNIFICANT CHANGE UP (ref 27–31)
MCHC RBC-ENTMCNC: 32.8 G/DL — SIGNIFICANT CHANGE UP (ref 32–37)
MCV RBC AUTO: 86.8 FL — SIGNIFICANT CHANGE UP (ref 81–99)
MONOCYTES # BLD AUTO: 0.81 K/UL — HIGH (ref 0.1–0.6)
MONOCYTES NFR BLD AUTO: 7.6 % — SIGNIFICANT CHANGE UP (ref 1.7–9.3)
NEUTROPHILS # BLD AUTO: 7.92 K/UL — HIGH (ref 1.4–6.5)
NEUTROPHILS NFR BLD AUTO: 74.4 % — SIGNIFICANT CHANGE UP (ref 42.2–75.2)
NRBC # BLD: 0 /100 WBCS — SIGNIFICANT CHANGE UP (ref 0–0)
PHOSPHATE SERPL-MCNC: 3.3 MG/DL — SIGNIFICANT CHANGE UP (ref 2.1–4.9)
PLATELET # BLD AUTO: 199 K/UL — SIGNIFICANT CHANGE UP (ref 130–400)
PMV BLD: 12.5 FL — HIGH (ref 7.4–10.4)
POTASSIUM SERPL-MCNC: 4.2 MMOL/L — SIGNIFICANT CHANGE UP (ref 3.5–5)
POTASSIUM SERPL-SCNC: 4.2 MMOL/L — SIGNIFICANT CHANGE UP (ref 3.5–5)
PROT SERPL-MCNC: 5.6 G/DL — LOW (ref 6–8)
PROTHROM AB SERPL-ACNC: 11.1 SEC — SIGNIFICANT CHANGE UP (ref 9.95–12.87)
RBC # BLD: 4.01 M/UL — LOW (ref 4.2–5.4)
RBC # FLD: 17.2 % — HIGH (ref 11.5–14.5)
SODIUM SERPL-SCNC: 143 MMOL/L — SIGNIFICANT CHANGE UP (ref 135–146)
TIBC SERPL-MCNC: 369 UG/DL — SIGNIFICANT CHANGE UP (ref 220–430)
TRANSFERRIN SERPL-MCNC: 316 MG/DL — SIGNIFICANT CHANGE UP (ref 200–360)
UIBC SERPL-MCNC: 298 UG/DL — SIGNIFICANT CHANGE UP (ref 110–370)
WBC # BLD: 10.65 K/UL — SIGNIFICANT CHANGE UP (ref 4.8–10.8)
WBC # FLD AUTO: 10.65 K/UL — SIGNIFICANT CHANGE UP (ref 4.8–10.8)

## 2023-06-29 PROCEDURE — 99223 1ST HOSP IP/OBS HIGH 75: CPT

## 2023-06-29 PROCEDURE — 93925 LOWER EXTREMITY STUDY: CPT | Mod: 26

## 2023-06-29 RX ORDER — FUROSEMIDE 40 MG
40 TABLET ORAL ONCE
Refills: 0 | Status: COMPLETED | OUTPATIENT
Start: 2023-06-29 | End: 2023-06-29

## 2023-06-29 RX ORDER — VERAPAMIL HCL 240 MG
40 CAPSULE, EXTENDED RELEASE PELLETS 24 HR ORAL EVERY 8 HOURS
Refills: 0 | Status: DISCONTINUED | OUTPATIENT
Start: 2023-06-29 | End: 2023-07-04

## 2023-06-29 RX ORDER — DISOPYRAMIDE PHOSPHATE 100 MG
100 CAPSULE ORAL THREE TIMES A DAY
Refills: 0 | Status: DISCONTINUED | OUTPATIENT
Start: 2023-06-29 | End: 2023-07-04

## 2023-06-29 RX ORDER — METOPROLOL TARTRATE 50 MG
25 TABLET ORAL EVERY 12 HOURS
Refills: 0 | Status: DISCONTINUED | OUTPATIENT
Start: 2023-06-29 | End: 2023-07-04

## 2023-06-29 RX ORDER — IRON SUCROSE 20 MG/ML
200 INJECTION, SOLUTION INTRAVENOUS ONCE
Refills: 0 | Status: COMPLETED | OUTPATIENT
Start: 2023-06-29 | End: 2023-06-29

## 2023-06-29 RX ADMIN — Medication 100 MILLIGRAM(S): at 21:29

## 2023-06-29 RX ADMIN — PANTOPRAZOLE SODIUM 40 MILLIGRAM(S): 20 TABLET, DELAYED RELEASE ORAL at 08:02

## 2023-06-29 RX ADMIN — Medication 120 MILLIGRAM(S): at 06:37

## 2023-06-29 RX ADMIN — PREGABALIN 1000 MICROGRAM(S): 225 CAPSULE ORAL at 13:30

## 2023-06-29 RX ADMIN — PANTOPRAZOLE SODIUM 40 MILLIGRAM(S): 20 TABLET, DELAYED RELEASE ORAL at 19:29

## 2023-06-29 RX ADMIN — IRON SUCROSE 110 MILLIGRAM(S): 20 INJECTION, SOLUTION INTRAVENOUS at 13:33

## 2023-06-29 RX ADMIN — Medication 2000 UNIT(S): at 14:41

## 2023-06-29 RX ADMIN — Medication 40 MILLIGRAM(S): at 08:00

## 2023-06-29 RX ADMIN — Medication 100 MILLIGRAM(S): at 06:42

## 2023-06-29 RX ADMIN — SIMVASTATIN 20 MILLIGRAM(S): 20 TABLET, FILM COATED ORAL at 21:29

## 2023-06-29 NOTE — CONSULT NOTE ADULT - SUBJECTIVE AND OBJECTIVE BOX
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 Yesterday the patient had chest pain, shortness of breath and chest tightness with SOB of breath and tingling in the shoulders. that went away. Patient reports dark stool but denies any BRPR and this has been there since discharge. Patient expressed refusal of endoscopy or colonoscopy.     During the previous admission : Capsule Endoscopy showed no acute bleed, multiple aphthous ulcer in the small intestine w/o active bleed, The GI strongly encouraged outpatient EGD/ colon within one month of prior discharge and patient opted to follow up with Dr. Aparicio. Importance of compliance with GI followup was stressed by GI team.    Labs noticeable for Hb 6.7 s/p pRBC, BUN 62, Cr 1.3 from baseline 0.9, eGFR 41, pro BNP 2925, trop 0.01   VS noticeable for BP (96/50)  Last TTE on 06/23 showed: EF>75%, severe asymmetric septal hypertrophy with MEGHAN and LVOT obstrution with a resting gradient of 60 mmHg, Grade II diastolic dysfunction, Severely enlarged left atrium, Severe mitral regurgitation.     Vital Signs Last 24 Hrs  T(C): 36.6 (28 Jun 2023 20:45), Max: 36.8 (28 Jun 2023 20:20)  T(F): 97.9 (28 Jun 2023 20:45), Max: 98.2 (28 Jun 2023 20:20)  HR: 68 (28 Jun 2023 20:45) (56 - 71)  BP: 105/50 (28 Jun 2023 20:45) (96/50 - 111/52)  RR: 18 (28 Jun 2023 20:45) (18 - 20)  SpO2: 98% (28 Jun 2023 20:45) (98% - 100%)  Patient On (Oxygen Delivery Method): room air     (28 Jun 2023 22:41)    GI HPI: Patient found to have acute anemia. Patient endorses dark brown stool, denies melena or hemtochezia. Patient denies any GERD symptoms, dysphagia or odynophagia or weight loss. Patient denies any abdominal pain, nausea, vomiting or diarrhea. She states occasionally when constipated and when she has to strain she notices some bright red blood on the tissue when she wipes or in the toilet bowl. Patient denies alcohol or tobacco use. Patient denies any FHx of GI malignancy.      Prior records Reviewed (Y/N): Y  History obtained from person other than patient (Y/N): N    Prior EGD: VCE 6/14: No active bleeding throughout GI tract. Noted multiple aphthous  ulcers in small intestine at 65 % and 80 % without evidence of active bleed. R/o NSAID induced ulcers versus others.   Evaluation of the colon limited due to prep.     Prior Colonoscopy:      PAST MEDICAL & SURGICAL HISTORY:  High cholesterol  Hypertrophic obstructive cardiomyopathy (HOCM)  Aortic stenosis  No significant past surgical history    FAMILY HISTORY: Denies any FHx of GI Malignancy       Social History:  Tobacco: none   Alcohol: none  Drugs: none     Home Medications:  disopyramide 100 mg oral capsule: 1 cap(s) orally 3 times a day (01 Sep 2022 11:00)  furosemide 20 mg oral tablet: 1 tab(s) orally once a day (15 Maurilio 2023 10:06)  simvastatin 20 mg oral tablet: 1 tab(s) orally once a day (at bedtime) (01 Sep 2022 11:00)  verapamil 120 mg/24 hours oral capsule, extended release: 1 cap(s) orally once a day (01 Sep 2022 11:00)  Vitamin B12 250 mcg oral tablet: 1 tab(s) orally once a day (01 Sep 2022 11:00)  Vitamin D3 50 mcg (2000 intl units) oral tablet: 1 tab(s) orally once a day (01 Sep 2022 11:00)    MEDICATIONS  (STANDING):  cholecalciferol 2000 Unit(s) Oral daily  cyanocobalamin 1000 MICROGram(s) Oral daily  disopyramide 100 milliGRAM(s) Oral three times a day  metoprolol tartrate 25 milliGRAM(s) Oral every 12 hours  pantoprazole  Injectable 40 milliGRAM(s) IV Push two times a day  simvastatin 20 milliGRAM(s) Oral at bedtime  verapamil 40 milliGRAM(s) Oral every 8 hours    MEDICATIONS  (PRN):      Allergies  lidocaine (Rash)      Review of Systems:   Constitutional:  No Fever, No Chills  ENT/Mouth:  No Hearing Changes,  No Difficulty Swallowing  Eyes:  No Eye Pain, No Vision Changes  Cardiovascular:  No Chest Pain, No Palpitations  Respiratory:  No Cough, No Dyspnea  Gastrointestinal:  As described in HPI  Musculoskeletal:  No Joint Swelling, No Back Pain  Skin:  No Skin Lesions, No Jaundice  Neuro:  No Syncope, No Dizziness  Heme/Lymph:  No Bruising, No Bleeding.          Physical Examination:  T(C): 36.6 (06-29-23 @ 15:35), Max: 36.8 (06-28-23 @ 20:20)  HR: 71 (06-29-23 @ 15:35) (62 - 71)  BP: 106/52 (06-29-23 @ 15:35) (91/43 - 126/60)  RR: 18 (06-29-23 @ 15:35) (18 - 18)  SpO2: 97% (06-29-23 @ 15:35) (97% - 99%)        Constitutional: No acute distress.  Eyes:. Conjunctivae are clear, Sclera is non-icteric.  Ears Nose and Throat: The external ears are normal appearing,  Oral mucosa is pink and moist.  Respiratory:  No signs of respiratory distress. Lung sounds are clear bilaterally.  Cardiovascular:  S1 S2, Regular rate and rhythm.  GI: Abdomen is soft, symmetric, and non-tender without distention. There are no visible lesions. Bowel sounds are present and normoactive in all four quadrants. No masses, hepatomegaly, or splenomegaly are noted.   Neuro: No Tremor, No involuntary movements  Skin: No rashes, No Jaundice.          Data: (reviewed by attending)                        11.4   10.65 )-----------( 199      ( 29 Jun 2023 11:42 )             34.8     Hgb Trend:  11.4  06-29-23 @ 11:42  6.7  06-28-23 @ 17:15      06-29    143  |  105  |  64<HH>  ----------------------------<  99  4.2   |  22  |  1.2    Ca    9.2      29 Jun 2023 11:42  Phos  3.3     06-29  Mg     2.1     06-29    TPro  5.6<L>  /  Alb  3.5  /  TBili  0.5  /  DBili  x   /  AST  18  /  ALT  10  /  AlkPhos  82  06-29    Liver panel trend:  TBili 0.5   /   AST 18   /   ALT 10   /   AlkP 82   /   Tptn 5.6   /   Alb 3.5    /   DBili --      06-29  TBili 0.2   /   AST 18   /   ALT 10   /   AlkP 86   /   Tptn 5.5   /   Alb 3.5    /   DBili <0.2      06-28      PT/INR - ( 29 Jun 2023 11:42 )   PT: 11.10 sec;   INR: 0.97 ratio         PTT - ( 29 Jun 2023 11:42 )  PTT:26.9 sec        Radiology:(reviewed by attending)       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 Yesterday the patient had chest pain, shortness of breath and chest tightness with SOB of breath and tingling in the shoulders. that went away. Patient reports dark stool but denies any BRPR and this has been there since discharge. Patient expressed refusal of endoscopy or colonoscopy.     During the previous admission : Capsule Endoscopy showed no acute bleed, multiple aphthous ulcer in the small intestine w/o active bleed, The GI strongly encouraged outpatient EGD/ colon within one month of prior discharge and patient opted to follow up with Dr. Aparicio. Importance of compliance with GI followup was stressed by GI team.    Labs noticeable for Hb 6.7 s/p pRBC, BUN 62, Cr 1.3 from baseline 0.9, eGFR 41, pro BNP 2925, trop 0.01   VS noticeable for BP (96/50)  Last TTE on 06/23 showed: EF>75%, severe asymmetric septal hypertrophy with MEGHAN and LVOT obstrution with a resting gradient of 60 mmHg, Grade II diastolic dysfunction, Severely enlarged left atrium, Severe mitral regurgitation.     Vital Signs Last 24 Hrs  T(C): 36.6 (28 Jun 2023 20:45), Max: 36.8 (28 Jun 2023 20:20)  T(F): 97.9 (28 Jun 2023 20:45), Max: 98.2 (28 Jun 2023 20:20)  HR: 68 (28 Jun 2023 20:45) (56 - 71)  BP: 105/50 (28 Jun 2023 20:45) (96/50 - 111/52)  RR: 18 (28 Jun 2023 20:45) (18 - 20)  SpO2: 98% (28 Jun 2023 20:45) (98% - 100%)  Patient On (Oxygen Delivery Method): room air     (28 Jun 2023 22:41)    GI HPI: Patient found to have acute anemia. Patient endorses dark brown stool, denies melena or hematochezia. Patient denies any GERD symptoms, dysphagia or odynophagia or weight loss. Patient denies any abdominal pain, nausea, vomiting or diarrhea. She states occasionally when constipated and when she has to strain she notices some bright red blood on the tissue when she wipes or in the toilet bowl. Patient denies alcohol or tobacco use. Patient denies any FHx of GI malignancy.      Prior records Reviewed (Y/N): Y  History obtained from person other than patient (Y/N): N    Prior EGD: VCE 6/14: No active bleeding throughout GI tract. Noted multiple aphthous  ulcers in small intestine at 65 % and 80 % without evidence of active bleed. R/o NSAID induced ulcers versus others.   Evaluation of the colon limited due to prep.     Prior Colonoscopy:      PAST MEDICAL & SURGICAL HISTORY:  High cholesterol  Hypertrophic obstructive cardiomyopathy (HOCM)  Aortic stenosis  No significant past surgical history    FAMILY HISTORY: Denies any FHx of GI Malignancy       Social History:  Tobacco: none   Alcohol: none  Drugs: none     Home Medications:  disopyramide 100 mg oral capsule: 1 cap(s) orally 3 times a day (01 Sep 2022 11:00)  furosemide 20 mg oral tablet: 1 tab(s) orally once a day (15 Maurilio 2023 10:06)  simvastatin 20 mg oral tablet: 1 tab(s) orally once a day (at bedtime) (01 Sep 2022 11:00)  verapamil 120 mg/24 hours oral capsule, extended release: 1 cap(s) orally once a day (01 Sep 2022 11:00)  Vitamin B12 250 mcg oral tablet: 1 tab(s) orally once a day (01 Sep 2022 11:00)  Vitamin D3 50 mcg (2000 intl units) oral tablet: 1 tab(s) orally once a day (01 Sep 2022 11:00)    MEDICATIONS  (STANDING):  cholecalciferol 2000 Unit(s) Oral daily  cyanocobalamin 1000 MICROGram(s) Oral daily  disopyramide 100 milliGRAM(s) Oral three times a day  metoprolol tartrate 25 milliGRAM(s) Oral every 12 hours  pantoprazole  Injectable 40 milliGRAM(s) IV Push two times a day  simvastatin 20 milliGRAM(s) Oral at bedtime  verapamil 40 milliGRAM(s) Oral every 8 hours    MEDICATIONS  (PRN):      Allergies  lidocaine (Rash)      Review of Systems:   Constitutional:  No Fever, No Chills  ENT/Mouth:  No Hearing Changes,  No Difficulty Swallowing  Eyes:  No Eye Pain, No Vision Changes  Cardiovascular:  No Chest Pain, No Palpitations  Respiratory:  No Cough, No Dyspnea  Gastrointestinal:  As described in HPI  Musculoskeletal:  No Joint Swelling, No Back Pain  Skin:  No Skin Lesions, No Jaundice  Neuro:  No Syncope, No Dizziness  Heme/Lymph:  No Bruising, No Bleeding.          Physical Examination:  T(C): 36.6 (06-29-23 @ 15:35), Max: 36.8 (06-28-23 @ 20:20)  HR: 71 (06-29-23 @ 15:35) (62 - 71)  BP: 106/52 (06-29-23 @ 15:35) (91/43 - 126/60)  RR: 18 (06-29-23 @ 15:35) (18 - 18)  SpO2: 97% (06-29-23 @ 15:35) (97% - 99%)        Constitutional: No acute distress.  Eyes:. Conjunctivae are clear, Sclera is non-icteric.  Ears Nose and Throat: The external ears are normal appearing,  Oral mucosa is pink and moist.  Respiratory:  No signs of respiratory distress. Lung sounds are clear bilaterally.  Cardiovascular:  S1 S2, Regular rate and rhythm.  GI: Abdomen is soft, symmetric, and non-tender without distention. There are no visible lesions. Bowel sounds are present and normoactive in all four quadrants. No masses, hepatomegaly, or splenomegaly are noted.   Neuro: No Tremor, No involuntary movements  Skin: No rashes, No Jaundice.          Data: (reviewed by attending)                        11.4   10.65 )-----------( 199      ( 29 Jun 2023 11:42 )             34.8     Hgb Trend:  11.4  06-29-23 @ 11:42  6.7  06-28-23 @ 17:15      06-29    143  |  105  |  64<HH>  ----------------------------<  99  4.2   |  22  |  1.2    Ca    9.2      29 Jun 2023 11:42  Phos  3.3     06-29  Mg     2.1     06-29    TPro  5.6<L>  /  Alb  3.5  /  TBili  0.5  /  DBili  x   /  AST  18  /  ALT  10  /  AlkPhos  82  06-29    Liver panel trend:  TBili 0.5   /   AST 18   /   ALT 10   /   AlkP 82   /   Tptn 5.6   /   Alb 3.5    /   DBili --      06-29  TBili 0.2   /   AST 18   /   ALT 10   /   AlkP 86   /   Tptn 5.5   /   Alb 3.5    /   DBili <0.2      06-28      PT/INR - ( 29 Jun 2023 11:42 )   PT: 11.10 sec;   INR: 0.97 ratio         PTT - ( 29 Jun 2023 11:42 )  PTT:26.9 sec        Radiology:(reviewed by attending)

## 2023-06-29 NOTE — CONSULT NOTE ADULT - ASSESSMENT
This is a 83 y/o female PMHx HOCM, HFpEF, Moderate/Severe TVR, Severe MVR, Moderate PHTN, and HLD presenting with chest pain and SOB. Labs significant for Hgb 6.7 s/p 3 unit PRBC  Pt endorses no active bleeding and has been having brown stools. GI consulted for anemia.  Patient was seen recently for similar complaint and had VCE 6/14, which showed No active bleeding throughout GI tract. Noted multiple aphthous ulcers in small intestine at 65 % and 80 % without evidence of active bleed. Evaluation of the colon limited due to prep. Patient opted to follow up with Dr. Aparicio for EGD/Colon at that time. Patient is now agreeable to EGD+Colon. Will need cardiology risk stratification and optimization prior to EGD+Colon.    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 Noted multiple aphthous  ulcers in small intestine at 65 % and 80 % without evidence of active bleed. R/o NSAID induced ulcers versus others. Evaluation of the colon limited due to prep.       PLAN   - IV PPI BID   - 2 large bore IV  - Trend CBC  - keep Hb > 8   - keep active type and screen  - avoid NSAIDs   - If hemodynamically unstable/active GIB please obtain STAT CTA and IR evaluation   - Please Obtain cardiology risk stratification and optimization prior to EGD+Colon.  - Spoke to patient and son, they are agreeable to procedure, also agreeable to resending DNR/DNI status for procedure. Patient also wants to speak to their private cardiologist prior to giving official consent    #Constipation   - c/w Miralax qd and senna x2 tabs qhs, tap water enema as needed   This is a 81 y/o female PMHx HOCM, HFpEF, Moderate/Severe TVR, Severe MVR, Moderate PHTN, and HLD presenting with chest pain and SOB. Labs significant for Hgb 6.7 s/p 3 unit PRBC  Pt endorses no active bleeding and has been having brown stools. GI consulted for anemia.  Patient was seen recently acute anemia and had VCE 6/14, which showed No active bleeding throughout GI tract. Noted multiple aphthous ulcers in small intestine at 65 % and 80 % without evidence of active bleed. Evaluation of the colon limited due to prep. Patient opted to follow up with Dr. Aparicio for EGD/Colon at that time. Patient is now agreeable to EGD+Colon. Patient will need cardiology risk stratification and optimization prior to EGD+Colon.    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 Noted multiple aphthous  ulcers in small intestine at 65 % and 80 % without evidence of active bleed. R/o NSAID induced ulcers versus others. Evaluation of the colon limited due to prep.       PLAN   - IV PPI BID   - 2 large bore IV  - Trend CBC  - keep Hb > 8   - keep active type and screen  - avoid NSAIDs   - If hemodynamically unstable/active GIB please obtain STAT CTA and IR evaluation   - Please Obtain cardiology risk stratification and optimization prior to EGD+Colon.  - Spoke to patient and son, they are agreeable to procedure, also agreeable to resending DNR/DNI status for procedure. Patient also wants to speak to their private cardiologist prior to giving official consent    #Constipation   - c/w Miralax qd and senna x2 tabs qhs, tap water enema as needed

## 2023-06-29 NOTE — ED ADULT NURSE REASSESSMENT NOTE - NSFALLRISKFACTORS_ED_ALL_ED
Coagulation: Bleeding disorder either through use of anticoagulants or underlying clinical condition(s)/Other

## 2023-06-29 NOTE — ED ADULT NURSE REASSESSMENT NOTE - NSFALLHARMRISKINTERV_ED_ALL_ED
Assistance OOB with selected safe patient handling equipment if applicable/Assistance with ambulation/Communicate risk of Fall with Harm to all staff, patient, and family/Encourage patient to sit up slowly, dangle for a short time, stand at bedside before walking/Monitor gait and stability/Monitor for mental status changes and reorient to person, place, and time, as needed/Move patient closer to nursing station/within visual sight of ED staff/Orthostatic vital signs/Provide patient with walking aids/Provide visual cue: red socks, yellow wristband, yellow gown, etc/Reinforce activity limits and safety measures with patient and family/Review medications for side effects contributing to fall risk/Toileting schedule using arm’s reach rule for commode and bathroom/Use of alarms - bed, stretcher, chair and/or video monitoring/Bed in lowest position, wheels locked, appropriate side rails in place/Call bell, personal items and telephone in reach/Instruct patient to call for assistance before getting out of bed/chair/stretcher/Non-slip footwear applied when patient is off stretcher/[place] to call system/Physically safe environment - no spills, clutter or unnecessary equipment/Purposeful Proactive Rounding/Room/bathroom lighting operational, light cord in reach

## 2023-06-29 NOTE — CONSULT NOTE ADULT - SUBJECTIVE AND OBJECTIVE BOX
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 Yesterday the patient had chest pain, shortness of breath and chest tightness with SOB of breath and tingling in the shoulders. that went away. Patient reports dark stool but denies any BRPR and this has been there since discharge. Patient expressed refusal of endoscopy or colonoscopy.     During the previous admission : Capsule Endoscopy showed no acute bleed, multiple aphthous ulcer in the small intestine w/o active bleed, The GI strongly encouraged outpatient EGD/ colon within one month of prior discharge and patient opted to follow up with Dr. Aparicio. Importance of compliance with GI followup was stressed by GI team.    Labs noticeable for Hb 6.7 s/p pRBC, BUN 62, Cr 1.3 from baseline 0.9, eGFR 41, pro BNP 2925, trop 0.01   VS noticeable for BP (96/50)  Last TTE on 06/23 showed: EF>75%, severe asymmetric septal hypertrophy with MEGHAN and LVOT obstrution with a resting gradient of 60 mmHg, Grade II diastolic dysfunction, Severely enlarged left atrium, Severe mitral regurgitation.     Vital Signs Last 24 Hrs  T(C): 36.6 (28 Jun 2023 20:45), Max: 36.8 (28 Jun 2023 20:20)  T(F): 97.9 (28 Jun 2023 20:45), Max: 98.2 (28 Jun 2023 20:20)  HR: 68 (28 Jun 2023 20:45) (56 - 71)  BP: 105/50 (28 Jun 2023 20:45) (96/50 - 111/52)  RR: 18 (28 Jun 2023 20:45) (18 - 20)  SpO2: 98% (28 Jun 2023 20:45) (98% - 100%)  Patient On (Oxygen Delivery Method): room air         (28 Jun 2023 22:41)      PAST MEDICAL & SURGICAL HISTORY:  High cholesterol      Hypertrophic obstructive cardiomyopathy (HOCM)      Aortic stenosis      No significant past surgical history          MEDICATIONS  (STANDING):  cholecalciferol 2000 Unit(s) Oral daily  cyanocobalamin 1000 MICROGram(s) Oral daily  disopyramide 100 milliGRAM(s) Oral three times a day  metoprolol succinate ER 50 milliGRAM(s) Oral daily  pantoprazole  Injectable 40 milliGRAM(s) IV Push two times a day  simvastatin 20 milliGRAM(s) Oral at bedtime  verapamil 40 milliGRAM(s) Oral every 8 hours    MEDICATIONS  (PRN):      Allergies    lidocaine (Rash)    Intolerances          FAMILY HISTORY:      Physical Exam:   Vital Signs Last 24 Hrs  T(C): 36.8 (29 Jun 2023 04:45), Max: 36.8 (28 Jun 2023 20:20)  T(F): 98.2 (29 Jun 2023 04:45), Max: 98.2 (28 Jun 2023 20:20)  HR: 62 (29 Jun 2023 07:44) (56 - 71)  BP: 126/60 (29 Jun 2023 07:44) (96/50 - 126/60)  BP(mean): --  RR: 18 (29 Jun 2023 07:44) (18 - 20)  SpO2: 98% (29 Jun 2023 07:44) (98% - 100%)    Parameters below as of 29 Jun 2023 07:44  Patient On (Oxygen Delivery Method): room air          Labs:                         6.7    9.62  )-----------( 236      ( 28 Jun 2023 17:15 )             20.5     06-28    135  |  103  |  62<HH>  ----------------------------<  115<H>  4.6   |  22  |  1.3    Ca    8.9      28 Jun 2023 17:15  Mg     1.9     06-28    TPro  5.5<L>  /  Alb  3.5  /  TBili  0.2  /  DBili  <0.2  /  AST  18  /  ALT  10  /  AlkPhos  86  06-28    PT/INR - ( 28 Jun 2023 17:15 )   PT: 12.80 sec;   INR: 1.12 ratio         PTT - ( 28 Jun 2023 17:15 )  PTT:27.6 sec    Pertinent labs:                      6.7    9.62  )-----------( 236      ( 28 Jun 2023 17:15 )             20.5       06-28    135  |  103  |  62<HH>  ----------------------------<  115<H>  4.6   |  22  |  1.3    Ca    8.9      28 Jun 2023 17:15  Mg     1.9     06-28    TPro  5.5<L>  /  Alb  3.5  /  TBili  0.2  /  DBili  <0.2  /  AST  18  /  ALT  10  /  AlkPhos  86  06-28      PT/INR - ( 28 Jun 2023 17:15 )   PT: 12.80 sec;   INR: 1.12 ratio         PTT - ( 28 Jun 2023 17:15 )  PTT:27.6 sec    Radiology & Additional Studies:     Radiology imaging reviewed.       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 Patient reportedly refusing endoscopy. IR is consulted for embolization.    -Recommend GI consult.  -If endoscopy is unable to be performed, please obtain CTA to identify source of bleeding.    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 Yesterday the patient had chest pain, shortness of breath and chest tightness with SOB of breath and tingling in the shoulders. that went away. Patient reports dark stool but denies any BRPR and this has been there since discharge. Patient expressed refusal of endoscopy or colonoscopy.     During the previous admission : Capsule Endoscopy showed no acute bleed, multiple aphthous ulcer in the small intestine w/o active bleed, The GI strongly encouraged outpatient EGD/ colon within one month of prior discharge and patient opted to follow up with Dr. Aparicio. Importance of compliance with GI followup was stressed by GI team.    Labs noticeable for Hb 6.7 s/p pRBC, BUN 62, Cr 1.3 from baseline 0.9, eGFR 41, pro BNP 2925, trop 0.01   VS noticeable for BP (96/50)  Last TTE on 06/23 showed: EF>75%, severe asymmetric septal hypertrophy with MEGHAN and LVOT obstrution with a resting gradient of 60 mmHg, Grade II diastolic dysfunction, Severely enlarged left atrium, Severe mitral regurgitation.     Vital Signs Last 24 Hrs  T(C): 36.6 (28 Jun 2023 20:45), Max: 36.8 (28 Jun 2023 20:20)  T(F): 97.9 (28 Jun 2023 20:45), Max: 98.2 (28 Jun 2023 20:20)  HR: 68 (28 Jun 2023 20:45) (56 - 71)  BP: 105/50 (28 Jun 2023 20:45) (96/50 - 111/52)  RR: 18 (28 Jun 2023 20:45) (18 - 20)  SpO2: 98% (28 Jun 2023 20:45) (98% - 100%)  Patient On (Oxygen Delivery Method): room air         (28 Jun 2023 22:41)      PAST MEDICAL & SURGICAL HISTORY:  High cholesterol      Hypertrophic obstructive cardiomyopathy (HOCM)      Aortic stenosis      No significant past surgical history          MEDICATIONS  (STANDING):  cholecalciferol 2000 Unit(s) Oral daily  cyanocobalamin 1000 MICROGram(s) Oral daily  disopyramide 100 milliGRAM(s) Oral three times a day  metoprolol succinate ER 50 milliGRAM(s) Oral daily  pantoprazole  Injectable 40 milliGRAM(s) IV Push two times a day  simvastatin 20 milliGRAM(s) Oral at bedtime  verapamil 40 milliGRAM(s) Oral every 8 hours    MEDICATIONS  (PRN):      Allergies    lidocaine (Rash)    Intolerances          FAMILY HISTORY:      Physical Exam:   Vital Signs Last 24 Hrs  T(C): 36.8 (29 Jun 2023 04:45), Max: 36.8 (28 Jun 2023 20:20)  T(F): 98.2 (29 Jun 2023 04:45), Max: 98.2 (28 Jun 2023 20:20)  HR: 62 (29 Jun 2023 07:44) (56 - 71)  BP: 126/60 (29 Jun 2023 07:44) (96/50 - 126/60)  BP(mean): --  RR: 18 (29 Jun 2023 07:44) (18 - 20)  SpO2: 98% (29 Jun 2023 07:44) (98% - 100%)    Parameters below as of 29 Jun 2023 07:44  Patient On (Oxygen Delivery Method): room air          Labs:                         6.7    9.62  )-----------( 236      ( 28 Jun 2023 17:15 )             20.5     06-28    135  |  103  |  62<HH>  ----------------------------<  115<H>  4.6   |  22  |  1.3    Ca    8.9      28 Jun 2023 17:15  Mg     1.9     06-28    TPro  5.5<L>  /  Alb  3.5  /  TBili  0.2  /  DBili  <0.2  /  AST  18  /  ALT  10  /  AlkPhos  86  06-28    PT/INR - ( 28 Jun 2023 17:15 )   PT: 12.80 sec;   INR: 1.12 ratio         PTT - ( 28 Jun 2023 17:15 )  PTT:27.6 sec    Pertinent labs:                      6.7    9.62  )-----------( 236      ( 28 Jun 2023 17:15 )             20.5       06-28    135  |  103  |  62<HH>  ----------------------------<  115<H>  4.6   |  22  |  1.3    Ca    8.9      28 Jun 2023 17:15  Mg     1.9     06-28    TPro  5.5<L>  /  Alb  3.5  /  TBili  0.2  /  DBili  <0.2  /  AST  18  /  ALT  10  /  AlkPhos  86  06-28      PT/INR - ( 28 Jun 2023 17:15 )   PT: 12.80 sec;   INR: 1.12 ratio         PTT - ( 28 Jun 2023 17:15 )  PTT:27.6 sec    Radiology & Additional Studies:     Radiology imaging reviewed.       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 Patient reportedly refusing endoscopy. IR is consulted for embolization.    -Recommend GI consult.  -If endoscopy/colonoscopy is unable to be performed, please obtain CTA to identify source of bleeding.    Please call Interventional Radiology with questions or concerns:   - M-F [phone]: [phone]   - All other hours: [phone]

## 2023-06-29 NOTE — ED ADULT NURSE REASSESSMENT NOTE - NS ED NURSE REASSESS COMMENT FT1
patient completed 1 unit of RPBC transfusion. Patient tolerated well with no signs of transfusion reaction noted. Will continue to monitor.
patient completed 1 unit of RPBC, patient tolerated well with no signs of transfusion reaction noted. Will continue to monitor.
patient received 1 unit RPBC transfusion. 15 min reassessment vs, Patient tolerating well with no signs of transfusion reaction noted. Will continue to monitor.
patient receiving 1 unit RPBC 15 min reassessment patient tolerating well with no signs of transfusion reaction noted. Will continue to monitor.
patient receiving 1 unit RPBC, 15 min reassessment patient tolerating well with no signs of transfusion reaction noted. Will continue to monitor.
patient receiving 1 unit of RPBC, 15 min reassessment patient tolerating well and no signs of transfusion reaction noted. Will continue to monitor.
Hand off report given by previous nurse Odell. PT came for weakness and anemia. PT had blood transfusions during ER stay. PT is admit to tele waiting for bed. She is on cardiac monitor. Plan of care explained to pt and family member. Safety and comfort measure in place. Will continue to monitor pt.
06-Jun-2021 19:10

## 2023-06-30 LAB
ANION GAP SERPL CALC-SCNC: 15 MMOL/L — HIGH (ref 7–14)
BASOPHILS # BLD AUTO: 0.08 K/UL — SIGNIFICANT CHANGE UP (ref 0–0.2)
BASOPHILS NFR BLD AUTO: 1.1 % — HIGH (ref 0–1)
BUN SERPL-MCNC: 40 MG/DL — HIGH (ref 10–20)
CALCIUM SERPL-MCNC: 8.9 MG/DL — SIGNIFICANT CHANGE UP (ref 8.4–10.5)
CHLORIDE SERPL-SCNC: 108 MMOL/L — SIGNIFICANT CHANGE UP (ref 98–110)
CO2 SERPL-SCNC: 20 MMOL/L — SIGNIFICANT CHANGE UP (ref 17–32)
CREAT SERPL-MCNC: 0.9 MG/DL — SIGNIFICANT CHANGE UP (ref 0.7–1.5)
EGFR: 63 ML/MIN/1.73M2 — SIGNIFICANT CHANGE UP
EOSINOPHIL # BLD AUTO: 0.16 K/UL — SIGNIFICANT CHANGE UP (ref 0–0.7)
EOSINOPHIL NFR BLD AUTO: 2.2 % — SIGNIFICANT CHANGE UP (ref 0–8)
FERRITIN SERPL-MCNC: 53 NG/ML — SIGNIFICANT CHANGE UP (ref 15–150)
GLUCOSE SERPL-MCNC: 81 MG/DL — SIGNIFICANT CHANGE UP (ref 70–99)
HCT VFR BLD CALC: 31.5 % — LOW (ref 37–47)
HGB BLD-MCNC: 10.4 G/DL — LOW (ref 12–16)
IMM GRANULOCYTES NFR BLD AUTO: 0.6 % — HIGH (ref 0.1–0.3)
LYMPHOCYTES # BLD AUTO: 1.52 K/UL — SIGNIFICANT CHANGE UP (ref 1.2–3.4)
LYMPHOCYTES # BLD AUTO: 21.2 % — SIGNIFICANT CHANGE UP (ref 20.5–51.1)
MAGNESIUM SERPL-MCNC: 2.2 MG/DL — SIGNIFICANT CHANGE UP (ref 1.8–2.4)
MCHC RBC-ENTMCNC: 29.6 PG — SIGNIFICANT CHANGE UP (ref 27–31)
MCHC RBC-ENTMCNC: 33 G/DL — SIGNIFICANT CHANGE UP (ref 32–37)
MCV RBC AUTO: 89.7 FL — SIGNIFICANT CHANGE UP (ref 81–99)
MONOCYTES # BLD AUTO: 0.57 K/UL — SIGNIFICANT CHANGE UP (ref 0.1–0.6)
MONOCYTES NFR BLD AUTO: 7.9 % — SIGNIFICANT CHANGE UP (ref 1.7–9.3)
NEUTROPHILS # BLD AUTO: 4.8 K/UL — SIGNIFICANT CHANGE UP (ref 1.4–6.5)
NEUTROPHILS NFR BLD AUTO: 67 % — SIGNIFICANT CHANGE UP (ref 42.2–75.2)
NRBC # BLD: 0 /100 WBCS — SIGNIFICANT CHANGE UP (ref 0–0)
PLATELET # BLD AUTO: 187 K/UL — SIGNIFICANT CHANGE UP (ref 130–400)
PMV BLD: 11.9 FL — HIGH (ref 7.4–10.4)
POTASSIUM SERPL-MCNC: 4 MMOL/L — SIGNIFICANT CHANGE UP (ref 3.5–5)
POTASSIUM SERPL-SCNC: 4 MMOL/L — SIGNIFICANT CHANGE UP (ref 3.5–5)
RBC # BLD: 3.51 M/UL — LOW (ref 4.2–5.4)
RBC # FLD: 18.1 % — HIGH (ref 11.5–14.5)
SODIUM SERPL-SCNC: 143 MMOL/L — SIGNIFICANT CHANGE UP (ref 135–146)
WBC # BLD: 7.17 K/UL — SIGNIFICANT CHANGE UP (ref 4.8–10.8)
WBC # FLD AUTO: 7.17 K/UL — SIGNIFICANT CHANGE UP (ref 4.8–10.8)

## 2023-06-30 PROCEDURE — 99232 SBSQ HOSP IP/OBS MODERATE 35: CPT

## 2023-06-30 RX ORDER — POLYETHYLENE GLYCOL 3350 17 G/17G
17 POWDER, FOR SOLUTION ORAL DAILY
Refills: 0 | Status: DISCONTINUED | OUTPATIENT
Start: 2023-06-30 | End: 2023-07-04

## 2023-06-30 RX ORDER — SENNA PLUS 8.6 MG/1
2 TABLET ORAL AT BEDTIME
Refills: 0 | Status: DISCONTINUED | OUTPATIENT
Start: 2023-06-30 | End: 2023-07-04

## 2023-06-30 RX ADMIN — Medication 40 MILLIGRAM(S): at 05:01

## 2023-06-30 RX ADMIN — Medication 25 MILLIGRAM(S): at 05:01

## 2023-06-30 RX ADMIN — SIMVASTATIN 20 MILLIGRAM(S): 20 TABLET, FILM COATED ORAL at 21:21

## 2023-06-30 RX ADMIN — Medication 100 MILLIGRAM(S): at 21:21

## 2023-06-30 RX ADMIN — Medication 100 MILLIGRAM(S): at 05:02

## 2023-06-30 RX ADMIN — PANTOPRAZOLE SODIUM 40 MILLIGRAM(S): 20 TABLET, DELAYED RELEASE ORAL at 05:01

## 2023-06-30 RX ADMIN — Medication 25 MILLIGRAM(S): at 17:05

## 2023-06-30 RX ADMIN — PREGABALIN 1000 MICROGRAM(S): 225 CAPSULE ORAL at 13:40

## 2023-06-30 RX ADMIN — PANTOPRAZOLE SODIUM 40 MILLIGRAM(S): 20 TABLET, DELAYED RELEASE ORAL at 17:05

## 2023-06-30 RX ADMIN — Medication 2000 UNIT(S): at 13:40

## 2023-06-30 RX ADMIN — Medication 100 MILLIGRAM(S): at 13:41

## 2023-06-30 NOTE — CONSULT NOTE ADULT - ASSESSMENT
81 y/o   female    history HOCM    with   anemia  GI bleed         pre op  EGD /  colonoscopy      at moderate    cardiac  risk

## 2023-06-30 NOTE — CONSULT NOTE ADULT - SUBJECTIVE AND OBJECTIVE BOX
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 Yesterday the patient had chest pain, shortness of breath and chest tightness with SOB of breath and tingling in the shoulders. that went away. Patient reports dark stool but denies any BRPR and this has been there since discharge. Patient expressed refusal of endoscopy or colonoscopy.     During the previous admission : Capsule Endoscopy showed no acute bleed, multiple aphthous ulcer in the small intestine w/o active bleed, The GI strongly encouraged outpatient EGD/ colon within one month of prior discharge and patient opted to follow up with Dr. Aparicio. Importance of compliance with GI followup was stressed by GI team.    Labs noticeable for Hb 6.7 s/p pRBC, BUN 62, Cr 1.3 from baseline 0.9, eGFR 41, pro BNP 2925, trop 0.01   VS noticeable for BP (96/50)  Last TTE on 06/23 showed: EF>75%, severe asymmetric septal hypertrophy with MEGHAN and LVOT obstrution with a resting gradient of 60 mmHg, Grade II diastolic dysfunction, Severely enlarged left atrium, Severe mitral regurgitation.     Vital Signs Last 24 Hrs  T(C): 36.6 (28 Jun 2023 20:45), Max: 36.8 (28 Jun 2023 20:20)  T(F): 97.9 (28 Jun 2023 20:45), Max: 98.2 (28 Jun 2023 20:20)  HR: 68 (28 Jun 2023 20:45) (56 - 71)  BP: 105/50 (28 Jun 2023 20:45) (96/50 - 111/52)  RR: 18 (28 Jun 2023 20:45) (18 - 20)  SpO2: 98% (28 Jun 2023 20:45) (98% - 100%)  Patient On (Oxygen Delivery Method): room air         (28 Jun 2023 22:41)      PAST MEDICAL & SURGICAL HISTORY:  High cholesterol      Hypertrophic obstructive cardiomyopathy (HOCM)      Aortic stenosis      No significant past surgical history          PREVIOUS DIAGNOSTIC TESTING:      ECHO  FINDINGS:    STRESS  FINDINGS:    CATHETERIZATION  FINDINGS:    MEDICATIONS  (STANDING):  cholecalciferol 2000 Unit(s) Oral daily  cyanocobalamin 1000 MICROGram(s) Oral daily  disopyramide 100 milliGRAM(s) Oral three times a day  metoprolol tartrate 25 milliGRAM(s) Oral every 12 hours  pantoprazole  Injectable 40 milliGRAM(s) IV Push two times a day  senna 2 Tablet(s) Oral at bedtime  simvastatin 20 milliGRAM(s) Oral at bedtime  verapamil 40 milliGRAM(s) Oral every 8 hours    MEDICATIONS  (PRN):  polyethylene glycol 3350 17 Gram(s) Oral daily PRN Constipation      FAMILY HISTORY:      SOCIAL HISTORY:  CIGARETTES:    ALCOHOL:    REVIEW OF SYSTEMS:  CONSTITUTIONAL: No fever, weight loss, or fatigue  NECK: No pain or stiffness  RESPIRATORY: No cough, wheezing, chills or hemoptysis; No shortness of breath  CARDIOVASCULAR: No chest pain, palpitations, dizziness, or leg swelling  GASTROINTESTINAL: No abdominal or epigastric pain. No nausea, vomiting, or hematemesis; No diarrhea or constipation. No melena or hematochezia.  GENITOURINARY: No dysuria, frequency, hematuria, or incontinence  NEUROLOGICAL: No headaches, memory loss, loss of strength, numbness, or tremors  SKIN: No itching, burning, rashes, or lesions   ENDOCRINE: No heat or cold intolerance; No hair loss  MUSCULOSKELETAL: No joint pain or swelling; No muscle, back, or extremity pain  HEME/LYMPH: No easy bruising, or bleeding gums          Vital Signs Last 24 Hrs  T(C): 36.5 (30 Jun 2023 15:30), Max: 36.7 (30 Jun 2023 04:59)  T(F): 97.7 (30 Jun 2023 15:30), Max: 98 (30 Jun 2023 04:59)  HR: 58 (30 Jun 2023 15:30) (57 - 70)  BP: 108/56 (30 Jun 2023 15:30) (108/56 - 151/64)  BP(mean): 76 (30 Jun 2023 07:59) (76 - 92)  RR: 18 (30 Jun 2023 15:30) (17 - 18)  SpO2: 97% (30 Jun 2023 15:30) (96% - 99%)    Parameters below as of 30 Jun 2023 04:59  Patient On (Oxygen Delivery Method): room air            PHYSICAL EXAM:  GENERAL: NAD, well-groomed, well-developed  HEAD:  Atraumatic, Normocephalic  NECK: Supple, No JVD, Normal thyroid  NERVOUS SYSTEM:  Alert & Oriented X3, Good concentration  CHEST/LUNG: Clear to percussion bilaterally; No rales, rhonchi, wheezing, or rubs  HEART: Regular rate and rhythm; No murmurs, rubs, or gallops  ABDOMEN: Soft, Nontender, Nondistended; Bowel sounds present  EXTREMITIES:  2+ Peripheral Pulses, No clubbing, cyanosis, or edema  SKIN: No rashes or lesions    INTERPRETATION OF TELEMETRY:    ECG:    I&O's Detail      LABS:                        10.4   7.17  )-----------( 187      ( 30 Jun 2023 11:32 )             31.5     06-30    143  |  108  |  40<H>  ----------------------------<  81  4.0   |  20  |  0.9    Ca    8.9      30 Jun 2023 11:32  Phos  3.3     06-29  Mg     2.2     06-30    TPro  5.6<L>  /  Alb  3.5  /  TBili  0.5  /  DBili  x   /  AST  18  /  ALT  10  /  AlkPhos  82  06-29        PT/INR - ( 29 Jun 2023 11:42 )   PT: 11.10 sec;   INR: 0.97 ratio         PTT - ( 29 Jun 2023 11:42 )  PTT:26.9 sec  Urinalysis Basic - ( 30 Jun 2023 11:32 )    Color: x / Appearance: x / SG: x / pH: x  Gluc: 81 mg/dL / Ketone: x  / Bili: x / Urobili: x   Blood: x / Protein: x / Nitrite: x   Leuk Esterase: x / RBC: x / WBC x   Sq Epi: x / Non Sq Epi: x / Bacteria: x      I&O's Summary      RADIOLOGY & ADDITIONAL STUDIES:

## 2023-06-30 NOTE — PROGRESS NOTE ADULT - SUBJECTIVE AND OBJECTIVE BOX
[facility] FRIED  82[facility]  [facility]      Patient is a 82y old  Female who presents with a chief complaint of     INTERVAL HPI/OVERNIGHT EVENTS:        REVIEW OF SYSTEMS:        FAMILY HISTORY:    T(C): 36.6 (06-30-23 @ 07:59), Max: 36.7 (06-30-23 @ 04:59)  HR: 57 (06-30-23 @ 07:59) (57 - 71)  BP: 116/53 (06-30-23 @ 07:59) (91/43 - 151/64)  RR: 18 (06-30-23 @ 07:59) (17 - 18)  SpO2: 96% (06-30-23 @ 07:59) (96% - 99%)  Wt(kg): --Vital Signs Last 24 Hrs  T(C): 36.6 (30 Jun 2023 07:59), Max: 36.7 (30 Jun 2023 04:59)  T(F): 97.9 (30 Jun 2023 07:59), Max: 98 (30 Jun 2023 04:59)  HR: 57 (30 Jun 2023 07:59) (57 - 71)  BP: 116/53 (30 Jun 2023 07:59) (91/43 - 151/64)  BP(mean): 76 (30 Jun 2023 07:59) (62 - 92)  RR: 18 (30 Jun 2023 07:59) (17 - 18)  SpO2: 96% (30 Jun 2023 07:59) (96% - 99%)    Parameters below as of 30 Jun 2023 04:59  Patient On (Oxygen Delivery Method): room air        PHYSICAL EXAM:  GENERAL: NAD, well-groomed, well-developed  HEAD:  Atraumatic, Normocephalic  EYES: EOMI, PERRLA, conjunctiva and sclera clear  ENMT: No tonsillar erythema, exudates, or enlargement; Moist mucous membranes, Good dentition, No lesions  NECK: Supple, No JVD, Normal thyroid  NERVOUS SYSTEM:  Alert & Oriented X3, Good concentration; Motor Strength 5/5 B/L upper and lower extremities; DTRs 2+ intact and symmetric  PULM: Clear to auscultation bilaterally  CARDIAC: Regular rate and rhythm; No murmurs, rubs, or gallops  GI: Soft, Nontender, Nondistended; Bowel sounds present  EXTREMITIES:  2+ Peripheral Pulses, No clubbing, cyanosis, or edema  LYMPH: No lymphadenopathy noted  SKIN: No rashes or lesions    Consultant(s) Notes Reviewed:  [x ] YES  [ ] NO  Care Discussed with Consultants/Other Providers [ x] YES  [ ] NO    LABS:                            11.4   10.65 )-----------( 199      ( 29 Jun 2023 11:42 )             34.8   06-29    143  |  105  |  64<HH>  ----------------------------<  99  4.2   |  22  |  1.2    Ca    9.2      29 Jun 2023 11:42  Phos  3.3     06-29  Mg     2.1     06-29    TPro  5.6<L>  /  Alb  3.5  /  TBili  0.5  /  DBili  x   /  AST  18  /  ALT  10  /  AlkPhos  82  06-29            cholecalciferol 2000 Unit(s) Oral daily  cyanocobalamin 1000 MICROGram(s) Oral daily  disopyramide 100 milliGRAM(s) Oral three times a day  metoprolol tartrate 25 milliGRAM(s) Oral every 12 hours  pantoprazole  Injectable 40 milliGRAM(s) IV Push two times a day  simvastatin 20 milliGRAM(s) Oral at bedtime  verapamil 40 milliGRAM(s) Oral every 8 hours    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 Yesterday the patient had chest pain, shortness of breath and chest tightness with SOB of breath and tingling in the shoulders. that went away. Patient reports dark stool but denies any BRPR and this has been there since discharge. Patient expressed refusal of endoscopy or colonoscopy.     1. Acute blood loss anemia might be due to upper GI bleeding s/p 3 PRBC   * pt received 3 PRBC that was ordered upon admission   * vital signs: no tachy but pt is on metoprolol , bp soft.   - Admit to medicine   - Cont PPI bid   - Target hb>8 due to chest pain   *  During the previous admission : Capsule Endoscopy showed no acute bleed, multiple aphthous ulcer in the small intestine w/o active bleed, The GI strongly encouraged outpatient EGD/ colon within one month of prior discharge and patient opted to follow up with Dr. Aparicio. Importance of compliance with GI followup was stressed by GI team.  - Now pt is agreeable with endoscopy.   - GI consult:  a)If hemodynamically unstable/active GIB please obtain STAT CTA and IR evaluation   b) Please Obtain cardiology risk stratification and optimization prior to EGD+Colon.  - Cardio consult for clearance:pending   - Patient is DNR/DNI as per her wishes     2. Chest pain / HOCM / severe MR   - Telemetry                - ECG: no change   - Troponins negative   - Last TTE on 06/23 showed: EF>75%, severe asymmetric septal hypertrophy with MEGHAN and LVOT obstrution with a resting gradient of 60 mmHg, Grade II diastolic dysfunction, Severely enlarged left atrium, Severe mitral regurgitation.  - pro BNP 2925, trop 0.01     3. Decrease pulses RLE  - decreased pulses   -cold RLE extremity   - FU VA arterial duplex     4. FATOU:    - Cr 1.3 from baseline 0.9, eGFR 41,   - pre-renal, and elevation of BUN might be also due to bleeding   - BUN 62    5. HOCM:   - cw Disopyramide (non-formulary sent) and verapamil     6. Constipation   - Start Miralax and senna     # DVT proph: held for bled    Gi proph: protonix   Diet: Clear liquid   Status : DNR/DNI .       GERRY FRIED  82y  [facility]-N ED Hold 061 A      Patient is a 82y old  Female who presents with a chief complaint of     INTERVAL HPI/OVERNIGHT EVENTS:    Patient feels well. She has no complains. no overnight events.   she couldn't sleep at night but no melena, no hematochezia. no other events             FAMILY HISTORY:    T(C): 36.6 (06-30-23 @ 07:59), Max: 36.7 (06-30-23 @ 04:59)  HR: 57 (06-30-23 @ 07:59) (57 - 71)  BP: 116/53 (06-30-23 @ 07:59) (91/43 - 151/64)  RR: 18 (06-30-23 @ 07:59) (17 - 18)  SpO2: 96% (06-30-23 @ 07:59) (96% - 99%)  Wt(kg): --Vital Signs Last 24 Hrs  T(C): 36.6 (30 Jun 2023 07:59), Max: 36.7 (30 Jun 2023 04:59)  T(F): 97.9 (30 Jun 2023 07:59), Max: 98 (30 Jun 2023 04:59)  HR: 57 (30 Jun 2023 07:59) (57 - 71)  BP: 116/53 (30 Jun 2023 07:59) (91/43 - 151/64)  BP(mean): 76 (30 Jun 2023 07:59) (62 - 92)  RR: 18 (30 Jun 2023 07:59) (17 - 18)  SpO2: 96% (30 Jun 2023 07:59) (96% - 99%)    Parameters below as of 30 Jun 2023 04:59  Patient On (Oxygen Delivery Method): room air        PHYSICAL EXAM:  GENERAL: NAD, well-groomed, well-developed  NERVOUS SYSTEM:  Alert & Oriented X3  PULM: Clear to auscultation bilaterally  CARDIAC: Regular rate and rhythm;  GI: Soft, Nontender, Nondistended; Bowel sounds present  EXTREMITIES:  2+ Peripheral Pulses,    Consultant(s) Notes Reviewed:  [x ] YES  [ ] NO  Care Discussed with Consultants/Other Providers [ x] YES  [ ] NO    LABS:                            11.4   10.65 )-----------( 199      ( 29 Jun 2023 11:42 )             34.8   06-29    143  |  105  |  64<HH>  ----------------------------<  99  4.2   |  22  |  1.2    Ca    9.2      29 Jun 2023 11:42  Phos  3.3     06-29  Mg     2.1     06-29    TPro  5.6<L>  /  Alb  3.5  /  TBili  0.5  /  DBili  x   /  AST  18  /  ALT  10  /  AlkPhos  82  06-29            cholecalciferol 2000 Unit(s) Oral daily  cyanocobalamin 1000 MICROGram(s) Oral daily  disopyramide 100 milliGRAM(s) Oral three times a day  metoprolol tartrate 25 milliGRAM(s) Oral every 12 hours  pantoprazole  Injectable 40 milliGRAM(s) IV Push two times a day  simvastatin 20 milliGRAM(s) Oral at bedtime  verapamil 40 milliGRAM(s) Oral every 8 hours    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 Yesterday the patient had chest pain, shortness of breath and chest tightness with SOB of breath and tingling in the shoulders. that went away. Patient reports dark stool but denies any BRPR and this has been there since discharge. Patient expressed refusal of endoscopy or colonoscopy.     1. Acute blood loss anemia might be due to upper GI bleeding s/p 3 PRBC (was ordered upon admission)   * pt received 3 PRBC that was ordered upon admission   * vital signs: no tachy but pt is on metoprolol , bp soft.   - Admit to medicine   - Cont PPI bid   - Target hb>8 due to chest pain . hb stable but BUN/Cr still significantly elevated   *  During the previous admission : Capsule Endoscopy showed no acute bleed, multiple aphthous ulcer in the small intestine w/o active bleed, The GI strongly encouraged outpatient EGD/ colon within one month of prior discharge and patient opted to follow up with Dr. Aparicio. Importance of compliance with GI followup was stressed by GI team.  - Now pt is agreeable with endoscopy.   - GI consult:  a)If hemodynamically unstable/active GIB please obtain STAT CTA and IR evaluation   b) Please Obtain cardiology risk stratification and optimization prior to EGD+Colon.  - Cardio consult for clearance:pending   - Patient is DNR/DNI as per her wishes     2. Chest pain / HOCM / severe MR   - Telemetry                - ECG: no change   - Troponins negative   - Last TTE on 06/23 showed: EF>75%, severe asymmetric septal hypertrophy with MEGHAN and LVOT obstrution with a resting gradient of 60 mmHg, Grade II diastolic dysfunction, Severely enlarged left atrium, Severe mitral regurgitation.  - pro BNP 2925, trop 0.01     3. Decrease pulses RLE  - decreased pulses   -cold RLE extremity   -  Arterial duplex pending    4. FATOU:    - Cr 1.3 from baseline 0.9, eGFR 41,   - pre-renal, and elevation of BUN might be also due to bleeding   - BUN 62    5. HOCM:   - cw Disopyramide (non-formulary sent) and verapamil     6. Constipation   - Start Miralax and senna     # DVT proph: held for bled    Gi proph: protonix   Diet: Clear liquid   Status : DNR/DNI .    Patient with no active bleeding. BUN/Cr is still elevated. has recurrent admission for this bleeding. likely will benefit from inpatient evaluation awaiting cardio clearance.

## 2023-06-30 NOTE — PATIENT PROFILE ADULT - FALL HARM RISK - HARM RISK INTERVENTIONS

## 2023-07-01 LAB
ANION GAP SERPL CALC-SCNC: 9 MMOL/L — SIGNIFICANT CHANGE UP (ref 7–14)
BASOPHILS # BLD AUTO: 0.06 K/UL — SIGNIFICANT CHANGE UP (ref 0–0.2)
BASOPHILS NFR BLD AUTO: 0.8 % — SIGNIFICANT CHANGE UP (ref 0–1)
BUN SERPL-MCNC: 24 MG/DL — HIGH (ref 10–20)
CALCIUM SERPL-MCNC: 9 MG/DL — SIGNIFICANT CHANGE UP (ref 8.4–10.4)
CHLORIDE SERPL-SCNC: 109 MMOL/L — SIGNIFICANT CHANGE UP (ref 98–110)
CO2 SERPL-SCNC: 23 MMOL/L — SIGNIFICANT CHANGE UP (ref 17–32)
CREAT SERPL-MCNC: 0.8 MG/DL — SIGNIFICANT CHANGE UP (ref 0.7–1.5)
EGFR: 73 ML/MIN/1.73M2 — SIGNIFICANT CHANGE UP
EOSINOPHIL # BLD AUTO: 0.21 K/UL — SIGNIFICANT CHANGE UP (ref 0–0.7)
EOSINOPHIL NFR BLD AUTO: 3 % — SIGNIFICANT CHANGE UP (ref 0–8)
GLUCOSE SERPL-MCNC: 88 MG/DL — SIGNIFICANT CHANGE UP (ref 70–99)
HCT VFR BLD CALC: 33.1 % — LOW (ref 37–47)
HGB BLD-MCNC: 10.8 G/DL — LOW (ref 12–16)
IMM GRANULOCYTES NFR BLD AUTO: 0.3 % — SIGNIFICANT CHANGE UP (ref 0.1–0.3)
LYMPHOCYTES # BLD AUTO: 1.42 K/UL — SIGNIFICANT CHANGE UP (ref 1.2–3.4)
LYMPHOCYTES # BLD AUTO: 20 % — LOW (ref 20.5–51.1)
MAGNESIUM SERPL-MCNC: 2.1 MG/DL — SIGNIFICANT CHANGE UP (ref 1.8–2.4)
MCHC RBC-ENTMCNC: 29.6 PG — SIGNIFICANT CHANGE UP (ref 27–31)
MCHC RBC-ENTMCNC: 32.6 G/DL — SIGNIFICANT CHANGE UP (ref 32–37)
MCV RBC AUTO: 90.7 FL — SIGNIFICANT CHANGE UP (ref 81–99)
MONOCYTES # BLD AUTO: 0.58 K/UL — SIGNIFICANT CHANGE UP (ref 0.1–0.6)
MONOCYTES NFR BLD AUTO: 8.2 % — SIGNIFICANT CHANGE UP (ref 1.7–9.3)
NEUTROPHILS # BLD AUTO: 4.82 K/UL — SIGNIFICANT CHANGE UP (ref 1.4–6.5)
NEUTROPHILS NFR BLD AUTO: 67.7 % — SIGNIFICANT CHANGE UP (ref 42.2–75.2)
NRBC # BLD: 0 /100 WBCS — SIGNIFICANT CHANGE UP (ref 0–0)
PLATELET # BLD AUTO: 182 K/UL — SIGNIFICANT CHANGE UP (ref 130–400)
PMV BLD: 12 FL — HIGH (ref 7.4–10.4)
POTASSIUM SERPL-MCNC: 4 MMOL/L — SIGNIFICANT CHANGE UP (ref 3.5–5)
POTASSIUM SERPL-SCNC: 4 MMOL/L — SIGNIFICANT CHANGE UP (ref 3.5–5)
RBC # BLD: 3.65 M/UL — LOW (ref 4.2–5.4)
RBC # FLD: 18.1 % — HIGH (ref 11.5–14.5)
SODIUM SERPL-SCNC: 141 MMOL/L — SIGNIFICANT CHANGE UP (ref 135–146)
WBC # BLD: 7.11 K/UL — SIGNIFICANT CHANGE UP (ref 4.8–10.8)
WBC # FLD AUTO: 7.11 K/UL — SIGNIFICANT CHANGE UP (ref 4.8–10.8)

## 2023-07-01 PROCEDURE — 99233 SBSQ HOSP IP/OBS HIGH 50: CPT

## 2023-07-01 RX ADMIN — PANTOPRAZOLE SODIUM 40 MILLIGRAM(S): 20 TABLET, DELAYED RELEASE ORAL at 05:59

## 2023-07-01 RX ADMIN — SIMVASTATIN 20 MILLIGRAM(S): 20 TABLET, FILM COATED ORAL at 21:16

## 2023-07-01 RX ADMIN — Medication 40 MILLIGRAM(S): at 13:49

## 2023-07-01 RX ADMIN — Medication 2000 UNIT(S): at 11:44

## 2023-07-01 RX ADMIN — Medication 25 MILLIGRAM(S): at 17:00

## 2023-07-01 RX ADMIN — Medication 100 MILLIGRAM(S): at 13:49

## 2023-07-01 RX ADMIN — PANTOPRAZOLE SODIUM 40 MILLIGRAM(S): 20 TABLET, DELAYED RELEASE ORAL at 17:00

## 2023-07-01 RX ADMIN — PREGABALIN 1000 MICROGRAM(S): 225 CAPSULE ORAL at 11:44

## 2023-07-01 RX ADMIN — SENNA PLUS 2 TABLET(S): 8.6 TABLET ORAL at 21:16

## 2023-07-01 RX ADMIN — Medication 100 MILLIGRAM(S): at 21:16

## 2023-07-01 RX ADMIN — Medication 100 MILLIGRAM(S): at 06:00

## 2023-07-01 NOTE — PROGRESS NOTE ADULT - SUBJECTIVE AND OBJECTIVE BOX
SUBJECTIVE:    Patient is a 82y old Female who presents with a chief complaint of shortness of breath and  dyspnea   Currently admitted to medicine with the primary diagnosis of Anemia    Today is hospital day 3d. This morning she is resting comfortably in bed and reports no new issues or overnight events.     PAST MEDICAL & SURGICAL HISTORY  High cholesterol    Hypertrophic obstructive cardiomyopathy (HOCM)    Aortic stenosis    No significant past surgical history      SOCIAL HISTORY:  Negative for smoking/alcohol/drug use.     ALLERGIES:  lidocaine (Rash)    MEDICATIONS:  STANDING MEDICATIONS  cholecalciferol 2000 Unit(s) Oral daily  cyanocobalamin 1000 MICROGram(s) Oral daily  disopyramide 100 milliGRAM(s) Oral three times a day  metoprolol tartrate 25 milliGRAM(s) Oral every 12 hours  pantoprazole  Injectable 40 milliGRAM(s) IV Push two times a day  senna 2 Tablet(s) Oral at bedtime  simvastatin 20 milliGRAM(s) Oral at bedtime  verapamil 40 milliGRAM(s) Oral every 8 hours    PRN MEDICATIONS  polyethylene glycol 3350 17 Gram(s) Oral daily PRN    VITALS:   T(F): 97.3  HR: 65  BP: 125/53  RR: 18  SpO2: 97%    LABS:                        10.8   7.11  )-----------( 182      ( 01 Jul 2023 05:41 )             33.1     07-01    141  |  109  |  24<H>  ----------------------------<  88  4.0   |  23  |  0.8    Ca    9.0      01 Jul 2023 05:41  Phos  3.3     06-29  Mg     2.1     07-01    TPro  5.6<L>  /  Alb  3.5  /  TBili  0.5  /  DBili  x   /  AST  18  /  ALT  10  /  AlkPhos  82  06-29    PT/INR - ( 29 Jun 2023 11:42 )   PT: 11.10 sec;   INR: 0.97 ratio         PTT - ( 29 Jun 2023 11:42 )  PTT:26.9 sec    RADIOLOGY:    arterial duplex for lower extremities :  Normal arterial flow in the bilateral lower extremities.      PHYSICAL EXAM:  GEN: No acute distress, lethargic , pale , poorly injected conjunctiva   LUNGS: Clear to auscultation bilaterally   HEART: Regular  ABD: Soft, non-tender, non-distended.  EXT: NC/NC/NE/2+PP/MOORE/Skin Intact.   NEURO: AAOX3    Intravenous access: No  sign of phlebitis .

## 2023-07-01 NOTE — PROGRESS NOTE ADULT - SUBJECTIVE AND OBJECTIVE BOX
CHIEF COMPLAINT:    Patient is a 82y old  Female who presents with a chief complaint of SOB due to low HGB level     INTERVAL HPI/OVERNIGHT EVENTS:    Patient seen and examined at bedside. No acute overnight events occurred.    ROS: Denies SOB,chest pain.All other systems are negative.    Medications:  Standing  cholecalciferol 2000 Unit(s) Oral daily  cyanocobalamin 1000 MICROGram(s) Oral daily  disopyramide 100 milliGRAM(s) Oral three times a day  metoprolol tartrate 25 milliGRAM(s) Oral every 12 hours  pantoprazole  Injectable 40 milliGRAM(s) IV Push two times a day  senna 2 Tablet(s) Oral at bedtime  simvastatin 20 milliGRAM(s) Oral at bedtime  verapamil 40 milliGRAM(s) Oral every 8 hours    PRN Meds  polyethylene glycol 3350 17 Gram(s) Oral daily PRN        Vital Signs:    T(F): 97.3 (23 @ 05:16), Max: 97.7 (23 @ 15:30)  HR: 65 (23 @ 05:16) (58 - 65)  BP: 125/53 (23 @ 05:16) (108/56 - 125/53)  RR: 18 (23 @ 05:16) (18 - 18)  SpO2: 97% (23 @ 20:45) (97% - 97%)  I&O's Summary    2023 07:  -  2023 12:21  --------------------------------------------------------  IN: 240 mL / OUT: 240 mL / NET: 0 mL      Daily Height in cm: 160.02 (2023 23:02)    Daily Weight in k.6 (2023 05:16)  CAPILLARY BLOOD GLUCOSE          PHYSICAL EXAM:  GENERAL:  NAD  SKIN: No rashes or lesions  HEENT: Atraumatic. Normocephalic. Anicteric  NECK:  No JVD.   PULMONARY: Clear to ausculation bilaterally. No wheezing. No rales  CVS: Normal S1, S2. Regular rate and rhythm. No murmurs.  ABDOMEN/GI: Soft, Nontender, Nondistended; Bowel sounds are present  EXTREMITIES:  No edema B/L LE.  NEUROLOGIC:  No motor deficit.  PSYCH: Alert & oriented x 3, normal affect      LABS:                        10.8   7.11  )-----------( 182      ( 2023 05:41 )             33.1     07-    141  |  109  |  24<H>  ----------------------------<  88  4.0   |  23  |  0.8    Ca    9.0      2023 05:41  Mg     2.1     07-          Trop <0.01, CKMB --, CK --, 23 @ 17:15        RADIOLOGY & ADDITIONAL TESTS:  Imaging or report Personally Reviewed:  [ ] YES  [ ] NO -->no new images    Telemetry reviewed independently - NSR, no acute events  EKG reviewed independently -->no new EKGs    Consultant(s) Notes Reviewed:  [ ] YES  [ ] NO  Care Discussed with Consultants/Other Providers [ ] YES  [ ] NO    Case discussed with resident  Care discussed with pt

## 2023-07-02 LAB
ANION GAP SERPL CALC-SCNC: 11 MMOL/L — SIGNIFICANT CHANGE UP (ref 7–14)
BASOPHILS # BLD AUTO: 0.06 K/UL — SIGNIFICANT CHANGE UP (ref 0–0.2)
BASOPHILS NFR BLD AUTO: 1 % — SIGNIFICANT CHANGE UP (ref 0–1)
BUN SERPL-MCNC: 28 MG/DL — HIGH (ref 10–20)
CALCIUM SERPL-MCNC: 8.9 MG/DL — SIGNIFICANT CHANGE UP (ref 8.4–10.5)
CHLORIDE SERPL-SCNC: 106 MMOL/L — SIGNIFICANT CHANGE UP (ref 98–110)
CO2 SERPL-SCNC: 21 MMOL/L — SIGNIFICANT CHANGE UP (ref 17–32)
CREAT SERPL-MCNC: 1 MG/DL — SIGNIFICANT CHANGE UP (ref 0.7–1.5)
EGFR: 56 ML/MIN/1.73M2 — LOW
EOSINOPHIL # BLD AUTO: 0.16 K/UL — SIGNIFICANT CHANGE UP (ref 0–0.7)
EOSINOPHIL NFR BLD AUTO: 2.7 % — SIGNIFICANT CHANGE UP (ref 0–8)
GLUCOSE SERPL-MCNC: 87 MG/DL — SIGNIFICANT CHANGE UP (ref 70–99)
HCT VFR BLD CALC: 30.3 % — LOW (ref 37–47)
HGB BLD-MCNC: 10 G/DL — LOW (ref 12–16)
IMM GRANULOCYTES NFR BLD AUTO: 0.7 % — HIGH (ref 0.1–0.3)
LYMPHOCYTES # BLD AUTO: 1.57 K/UL — SIGNIFICANT CHANGE UP (ref 1.2–3.4)
LYMPHOCYTES # BLD AUTO: 26.2 % — SIGNIFICANT CHANGE UP (ref 20.5–51.1)
MAGNESIUM SERPL-MCNC: 2.1 MG/DL — SIGNIFICANT CHANGE UP (ref 1.8–2.4)
MCHC RBC-ENTMCNC: 29.9 PG — SIGNIFICANT CHANGE UP (ref 27–31)
MCHC RBC-ENTMCNC: 33 G/DL — SIGNIFICANT CHANGE UP (ref 32–37)
MCV RBC AUTO: 90.4 FL — SIGNIFICANT CHANGE UP (ref 81–99)
MONOCYTES # BLD AUTO: 0.57 K/UL — SIGNIFICANT CHANGE UP (ref 0.1–0.6)
MONOCYTES NFR BLD AUTO: 9.5 % — HIGH (ref 1.7–9.3)
NEUTROPHILS # BLD AUTO: 3.59 K/UL — SIGNIFICANT CHANGE UP (ref 1.4–6.5)
NEUTROPHILS NFR BLD AUTO: 59.9 % — SIGNIFICANT CHANGE UP (ref 42.2–75.2)
NRBC # BLD: 0 /100 WBCS — SIGNIFICANT CHANGE UP (ref 0–0)
PLATELET # BLD AUTO: 166 K/UL — SIGNIFICANT CHANGE UP (ref 130–400)
PMV BLD: 12 FL — HIGH (ref 7.4–10.4)
POTASSIUM SERPL-MCNC: 3.9 MMOL/L — SIGNIFICANT CHANGE UP (ref 3.5–5)
POTASSIUM SERPL-SCNC: 3.9 MMOL/L — SIGNIFICANT CHANGE UP (ref 3.5–5)
RBC # BLD: 3.35 M/UL — LOW (ref 4.2–5.4)
RBC # FLD: 18.3 % — HIGH (ref 11.5–14.5)
SODIUM SERPL-SCNC: 138 MMOL/L — SIGNIFICANT CHANGE UP (ref 135–146)
WBC # BLD: 5.99 K/UL — SIGNIFICANT CHANGE UP (ref 4.8–10.8)
WBC # FLD AUTO: 5.99 K/UL — SIGNIFICANT CHANGE UP (ref 4.8–10.8)

## 2023-07-02 PROCEDURE — 99233 SBSQ HOSP IP/OBS HIGH 50: CPT

## 2023-07-02 RX ORDER — SOD SULF/SODIUM/NAHCO3/KCL/PEG
4000 SOLUTION, RECONSTITUTED, ORAL ORAL ONCE
Refills: 0 | Status: COMPLETED | OUTPATIENT
Start: 2023-07-02 | End: 2023-07-02

## 2023-07-02 RX ADMIN — Medication 100 MILLIGRAM(S): at 05:45

## 2023-07-02 RX ADMIN — PREGABALIN 1000 MICROGRAM(S): 225 CAPSULE ORAL at 11:49

## 2023-07-02 RX ADMIN — Medication 20 MILLIGRAM(S): at 21:49

## 2023-07-02 RX ADMIN — Medication 2000 UNIT(S): at 11:52

## 2023-07-02 RX ADMIN — Medication 40 MILLIGRAM(S): at 21:48

## 2023-07-02 RX ADMIN — Medication 25 MILLIGRAM(S): at 17:32

## 2023-07-02 RX ADMIN — SENNA PLUS 2 TABLET(S): 8.6 TABLET ORAL at 21:49

## 2023-07-02 RX ADMIN — PANTOPRAZOLE SODIUM 40 MILLIGRAM(S): 20 TABLET, DELAYED RELEASE ORAL at 17:32

## 2023-07-02 RX ADMIN — PANTOPRAZOLE SODIUM 40 MILLIGRAM(S): 20 TABLET, DELAYED RELEASE ORAL at 05:45

## 2023-07-02 RX ADMIN — SIMVASTATIN 20 MILLIGRAM(S): 20 TABLET, FILM COATED ORAL at 21:49

## 2023-07-02 RX ADMIN — Medication 100 MILLIGRAM(S): at 13:55

## 2023-07-02 RX ADMIN — Medication 4000 MILLILITER(S): at 11:46

## 2023-07-02 RX ADMIN — Medication 100 MILLIGRAM(S): at 21:48

## 2023-07-02 NOTE — PROGRESS NOTE ADULT - SUBJECTIVE AND OBJECTIVE BOX
[facility] FRIED  82[facility]      Patient is a 82y old  Female who presents with a chief complaint of SOB due to low HGB level (01 Jul 2023 08:47)      My note supersedes the resident's note      INTERVAL HPI/OVERNIGHT EVENTS:    no acute events overnight     REVIEW OF SYSTEMS:  CONSTITUTIONAL: No fever, weight loss, or fatigue  EYES: No eye pain, visual disturbances, or discharge  ENMT:  No difficulty hearing, tinnitus, vertigo; No sinus or throat pain  NECK: No pain or stiffness  BREASTS: No pain, masses, or nipple discharge  RESPIRATORY: No cough, wheezing, chills or hemoptysis; No shortness of breath  CARDIOVASCULAR: No chest pain, palpitations, dizziness, or leg swelling  GASTROINTESTINAL: No abdominal or epigastric pain. No nausea, vomiting, or hematemesis; No diarrhea or constipation. No melena or hematochezia.  GENITOURINARY: No dysuria, frequency, hematuria, or incontinence  NEUROLOGICAL: No headaches, memory loss, loss of strength, numbness, or tremors  SKIN: No itching, burning, rashes, or lesions   LYMPH NODES: No enlarged glands  ENDOCRINE: No heat or cold intolerance; No hair loss  MUSCULOSKELETAL: No joint pain or swelling; No muscle, back, or extremity pain  PSYCHIATRIC: No depression, anxiety, mood swings, or difficulty sleeping  HEME/LYMPH: No easy bruising, or bleeding gums  ALLERY AND IMMUNOLOGIC: No hives or eczema  FAMILY HISTORY:    T(C): 36 (07-02-23 @ 08:51), Max: 36.4 (07-01-23 @ 20:46)  HR: 63 (07-02-23 @ 08:51) (60 - 70)  BP: 132/60 (07-02-23 @ 08:51) (96/47 - 132/60)  RR: 17 (07-02-23 @ 05:46) (17 - 18)  SpO2: 98% (07-02-23 @ 05:46) (96% - 98%)  Wt(kg): --Vital Signs Last 24 Hrs  T(C): 36 (02 Jul 2023 08:51), Max: 36.4 (01 Jul 2023 20:46)  T(F): 96.8 (02 Jul 2023 08:51), Max: 97.6 (01 Jul 2023 20:46)  HR: 63 (02 Jul 2023 08:51) (60 - 70)  BP: 132/60 (02 Jul 2023 08:51) (96/47 - 132/60)  BP(mean): --  RR: 17 (02 Jul 2023 05:46) (17 - 18)  SpO2: 98% (02 Jul 2023 05:46) (96% - 98%)    Parameters below as of 02 Jul 2023 05:46  Patient On (Oxygen Delivery Method): room air        PHYSICAL EXAM:  GENERAL: NAD, well-groomed, well-developed  HEAD:  Atraumatic, Normocephalic  EYES: EOMI, PERRLA, conjunctiva and sclera clear  ENMT: No tonsillar erythema, exudates, or enlargement; Moist mucous membranes, Good dentition, No lesions  NECK: Supple, No JVD, Normal thyroid  NERVOUS SYSTEM:  Alert & Oriented X3, Good concentration; Motor Strength 5/5 B/L upper and lower extremities; DTRs 2+ intact and symmetric  PULM: Clear to auscultation bilaterally  CARDIAC: Regular rate and rhythm; No murmurs, rubs, or gallops  GI: Soft, Nontender, Nondistended; Bowel sounds present  EXTREMITIES:  2+ Peripheral Pulses, No clubbing, cyanosis, or edema  LYMPH: No lymphadenopathy noted  SKIN: No rashes or lesions    Consultant(s) Notes Reviewed:  [x ] YES  [ ] NO  Care Discussed with Consultants/Other Providers [ x] YES  [ ] NO    LABS:                            10.0   5.99  )-----------( 166      ( 02 Jul 2023 06:29 )             30.3   07-02    138  |  106  |  28<H>  ----------------------------<  87  3.9   |  21  |  1.0    Ca    8.9      02 Jul 2023 06:29  Mg     2.1     07-02              cholecalciferol 2000 Unit(s) Oral daily  cyanocobalamin 1000 MICROGram(s) Oral daily  disopyramide 100 milliGRAM(s) Oral three times a day  metoprolol tartrate 25 milliGRAM(s) Oral every 12 hours  pantoprazole  Injectable 40 milliGRAM(s) IV Push two times a day  polyethylene glycol 3350 17 Gram(s) Oral daily PRN  senna 2 Tablet(s) Oral at bedtime  simvastatin 20 milliGRAM(s) Oral at bedtime  verapamil 40 milliGRAM(s) Oral every 8 hours      HEALTH ISSUES - PROBLEM Dx:          Case Discussed with House Staff   51 minutes spent on total encounter; more than 50% of the visit was spent counseling and/or coordinating care by the attending physician.    [employer] [id number]

## 2023-07-03 ENCOUNTER — RESULT REVIEW (OUTPATIENT)
Age: 83
End: 2023-07-03

## 2023-07-03 ENCOUNTER — TRANSCRIPTION ENCOUNTER (OUTPATIENT)
Age: 83
End: 2023-07-03

## 2023-07-03 LAB
ANION GAP SERPL CALC-SCNC: 12 MMOL/L — SIGNIFICANT CHANGE UP (ref 7–14)
BASOPHILS # BLD AUTO: 0.05 K/UL — SIGNIFICANT CHANGE UP (ref 0–0.2)
BASOPHILS NFR BLD AUTO: 1 % — SIGNIFICANT CHANGE UP (ref 0–1)
BUN SERPL-MCNC: 19 MG/DL — SIGNIFICANT CHANGE UP (ref 10–20)
CALCIUM SERPL-MCNC: 8.6 MG/DL — SIGNIFICANT CHANGE UP (ref 8.4–10.5)
CHLORIDE SERPL-SCNC: 106 MMOL/L — SIGNIFICANT CHANGE UP (ref 98–110)
CO2 SERPL-SCNC: 22 MMOL/L — SIGNIFICANT CHANGE UP (ref 17–32)
CREAT SERPL-MCNC: 0.8 MG/DL — SIGNIFICANT CHANGE UP (ref 0.7–1.5)
EGFR: 73 ML/MIN/1.73M2 — SIGNIFICANT CHANGE UP
EOSINOPHIL # BLD AUTO: 0.09 K/UL — SIGNIFICANT CHANGE UP (ref 0–0.7)
EOSINOPHIL NFR BLD AUTO: 1.8 % — SIGNIFICANT CHANGE UP (ref 0–8)
GLUCOSE SERPL-MCNC: 93 MG/DL — SIGNIFICANT CHANGE UP (ref 70–99)
HCT VFR BLD CALC: 30.3 % — LOW (ref 37–47)
HGB BLD-MCNC: 9.8 G/DL — LOW (ref 12–16)
IMM GRANULOCYTES NFR BLD AUTO: 0.4 % — HIGH (ref 0.1–0.3)
LYMPHOCYTES # BLD AUTO: 1.21 K/UL — SIGNIFICANT CHANGE UP (ref 1.2–3.4)
LYMPHOCYTES # BLD AUTO: 24.7 % — SIGNIFICANT CHANGE UP (ref 20.5–51.1)
MAGNESIUM SERPL-MCNC: 1.9 MG/DL — SIGNIFICANT CHANGE UP (ref 1.8–2.4)
MCHC RBC-ENTMCNC: 29.5 PG — SIGNIFICANT CHANGE UP (ref 27–31)
MCHC RBC-ENTMCNC: 32.3 G/DL — SIGNIFICANT CHANGE UP (ref 32–37)
MCV RBC AUTO: 91.3 FL — SIGNIFICANT CHANGE UP (ref 81–99)
MONOCYTES # BLD AUTO: 0.47 K/UL — SIGNIFICANT CHANGE UP (ref 0.1–0.6)
MONOCYTES NFR BLD AUTO: 9.6 % — HIGH (ref 1.7–9.3)
NEUTROPHILS # BLD AUTO: 3.05 K/UL — SIGNIFICANT CHANGE UP (ref 1.4–6.5)
NEUTROPHILS NFR BLD AUTO: 62.5 % — SIGNIFICANT CHANGE UP (ref 42.2–75.2)
NRBC # BLD: 0 /100 WBCS — SIGNIFICANT CHANGE UP (ref 0–0)
PLATELET # BLD AUTO: 164 K/UL — SIGNIFICANT CHANGE UP (ref 130–400)
PMV BLD: 11.6 FL — HIGH (ref 7.4–10.4)
POTASSIUM SERPL-MCNC: 3.2 MMOL/L — LOW (ref 3.5–5)
POTASSIUM SERPL-SCNC: 3.2 MMOL/L — LOW (ref 3.5–5)
RBC # BLD: 3.32 M/UL — LOW (ref 4.2–5.4)
RBC # FLD: 18.2 % — HIGH (ref 11.5–14.5)
SODIUM SERPL-SCNC: 140 MMOL/L — SIGNIFICANT CHANGE UP (ref 135–146)
WBC # BLD: 4.89 K/UL — SIGNIFICANT CHANGE UP (ref 4.8–10.8)
WBC # FLD AUTO: 4.89 K/UL — SIGNIFICANT CHANGE UP (ref 4.8–10.8)

## 2023-07-03 PROCEDURE — 43239 EGD BIOPSY SINGLE/MULTIPLE: CPT

## 2023-07-03 PROCEDURE — 45378 DIAGNOSTIC COLONOSCOPY: CPT

## 2023-07-03 PROCEDURE — 88312 SPECIAL STAINS GROUP 1: CPT | Mod: 26

## 2023-07-03 PROCEDURE — 99233 SBSQ HOSP IP/OBS HIGH 50: CPT

## 2023-07-03 PROCEDURE — 88305 TISSUE EXAM BY PATHOLOGIST: CPT | Mod: 26

## 2023-07-03 PROCEDURE — 43255 EGD CONTROL BLEEDING ANY: CPT | Mod: 59

## 2023-07-03 PROCEDURE — 93010 ELECTROCARDIOGRAM REPORT: CPT

## 2023-07-03 RX ORDER — PANTOPRAZOLE SODIUM 20 MG/1
40 TABLET, DELAYED RELEASE ORAL
Refills: 0 | Status: DISCONTINUED | OUTPATIENT
Start: 2023-07-03 | End: 2023-07-04

## 2023-07-03 RX ORDER — SODIUM CHLORIDE 9 MG/ML
1000 INJECTION, SOLUTION INTRAVENOUS
Refills: 0 | Status: DISCONTINUED | OUTPATIENT
Start: 2023-07-03 | End: 2023-07-04

## 2023-07-03 RX ORDER — POTASSIUM CHLORIDE 20 MEQ
20 PACKET (EA) ORAL
Refills: 0 | Status: DISCONTINUED | OUTPATIENT
Start: 2023-07-03 | End: 2023-07-03

## 2023-07-03 RX ORDER — POTASSIUM CHLORIDE 20 MEQ
40 PACKET (EA) ORAL EVERY 4 HOURS
Refills: 0 | Status: DISCONTINUED | OUTPATIENT
Start: 2023-07-03 | End: 2023-07-04

## 2023-07-03 RX ORDER — ONDANSETRON 8 MG/1
4 TABLET, FILM COATED ORAL ONCE
Refills: 0 | Status: COMPLETED | OUTPATIENT
Start: 2023-07-03 | End: 2023-07-03

## 2023-07-03 RX ADMIN — ONDANSETRON 4 MILLIGRAM(S): 8 TABLET, FILM COATED ORAL at 11:28

## 2023-07-03 RX ADMIN — Medication 100 MILLIGRAM(S): at 21:22

## 2023-07-03 RX ADMIN — Medication 100 MILLIGRAM(S): at 06:58

## 2023-07-03 RX ADMIN — Medication 40 MILLIGRAM(S): at 06:59

## 2023-07-03 RX ADMIN — PREGABALIN 1000 MICROGRAM(S): 225 CAPSULE ORAL at 14:31

## 2023-07-03 RX ADMIN — SODIUM CHLORIDE 100 MILLILITER(S): 9 INJECTION, SOLUTION INTRAVENOUS at 14:48

## 2023-07-03 RX ADMIN — PANTOPRAZOLE SODIUM 40 MILLIGRAM(S): 20 TABLET, DELAYED RELEASE ORAL at 17:59

## 2023-07-03 RX ADMIN — Medication 25 MILLIGRAM(S): at 06:57

## 2023-07-03 RX ADMIN — Medication 2000 UNIT(S): at 14:31

## 2023-07-03 RX ADMIN — Medication 100 MILLIGRAM(S): at 14:48

## 2023-07-03 RX ADMIN — Medication 40 MILLIEQUIVALENT(S): at 23:45

## 2023-07-03 RX ADMIN — PANTOPRAZOLE SODIUM 40 MILLIGRAM(S): 20 TABLET, DELAYED RELEASE ORAL at 06:59

## 2023-07-03 RX ADMIN — SENNA PLUS 2 TABLET(S): 8.6 TABLET ORAL at 21:20

## 2023-07-03 RX ADMIN — SIMVASTATIN 20 MILLIGRAM(S): 20 TABLET, FILM COATED ORAL at 21:20

## 2023-07-03 NOTE — ASU PREOP CHECKLIST - DENTURES
Left a voicemail to call back    Patient saw radiologist yesterday and they noticed a growth under her left breast. Patient states she also has a spot on the top of her head that hurts when she brushes her hair. Radiologist said to get them looked at before her December appt with Kayleen Stevens. Would Kayleen Stevens want to see her sooner? no

## 2023-07-03 NOTE — PROGRESS NOTE ADULT - SUBJECTIVE AND OBJECTIVE BOX
24H events:    Patient is a 82y old Female who presents with a chief complaint of SOB due to low HGB level (01 Jul 2023 08:47)    Primary diagnosis of Anemia       Today is hospital day 5d. This morning patient was seen and examined at bedside, resting comfortably in bed.    No acute or major events overnight.    PAST MEDICAL & SURGICAL HISTORY  High cholesterol    Hypertrophic obstructive cardiomyopathy (HOCM)    Aortic stenosis    No significant past surgical history      SOCIAL HISTORY:  Social History:      ALLERGIES:  lidocaine (Rash)    MEDICATIONS:  STANDING MEDICATIONS  cholecalciferol 2000 Unit(s) Oral daily  cyanocobalamin 1000 MICROGram(s) Oral daily  disopyramide 100 milliGRAM(s) Oral three times a day  metoprolol tartrate 25 milliGRAM(s) Oral every 12 hours  pantoprazole  Injectable 40 milliGRAM(s) IV Push two times a day  potassium chloride  20 mEq/100 mL IVPB 20 milliEquivalent(s) IV Intermittent every 2 hours  senna 2 Tablet(s) Oral at bedtime  simvastatin 20 milliGRAM(s) Oral at bedtime  verapamil 40 milliGRAM(s) Oral every 8 hours    PRN MEDICATIONS  polyethylene glycol 3350 17 Gram(s) Oral daily PRN    VITALS:   T(F): 97  HR: 66  BP: 128/56  RR: 18  SpO2: 94%    PHYSICAL EXAM:  GENERAL: NAD, well-groomed, well-developed  HEAD:  Atraumatic, Normocephalic  EYES: EOMI  NECK: Supple  NERVOUS SYSTEM:  Alert & Oriented X3, non focal   CHEST/LUNG: Clear to auscultation bilaterally; No rales, rhonchi, wheezing, or rubs  HEART: Regular rate and rhythm; No murmurs, rubs, or gallops  ABDOMEN: Soft, Nontender, Nondistended; Bowel sounds present  EXTREMITIES:  2+ Peripheral Pulses, No clubbing, cyanosis, or edema  LYMPH: No lymphadenopathy noted  SKIN: No rashes or lesions  LABS:                        9.8    4.89  )-----------( 164      ( 03 Jul 2023 06:20 )             30.3     07-03    140  |  106  |  19  ----------------------------<  93  3.2<L>   |  22  |  0.8    Ca    8.6      03 Jul 2023 06:20  Mg     1.9     07-03        Urinalysis Basic - ( 03 Jul 2023 06:20 )    Color: x / Appearance: x / SG: x / pH: x  Gluc: 93 mg/dL / Ketone: x  / Bili: x / Urobili: x   Blood: x / Protein: x / Nitrite: x   Leuk Esterase: x / RBC: x / WBC x   Sq Epi: x / Non Sq Epi: x / Bacteria: x                RADIOLOGY:           24H events:    Patient is a 82y old Female who presents with a chief complaint of SOB due to low HGB level (01 Jul 2023 08:47)    Primary diagnosis of Anemia       Today is hospital day 5d. This morning patient was seen and examined at bedside, resting comfortably in bed.    No acute or major events overnight.    PAST MEDICAL & SURGICAL HISTORY  High cholesterol    Hypertrophic obstructive cardiomyopathy (HOCM)    Aortic stenosis    No significant past surgical history      SOCIAL HISTORY:  Social History:      ALLERGIES:  lidocaine (Rash)    MEDICATIONS:  STANDING MEDICATIONS  cholecalciferol 2000 Unit(s) Oral daily  cyanocobalamin 1000 MICROGram(s) Oral daily  disopyramide 100 milliGRAM(s) Oral three times a day  metoprolol tartrate 25 milliGRAM(s) Oral every 12 hours  pantoprazole  Injectable 40 milliGRAM(s) IV Push two times a day  potassium chloride  20 mEq/100 mL IVPB 20 milliEquivalent(s) IV Intermittent every 2 hours  senna 2 Tablet(s) Oral at bedtime  simvastatin 20 milliGRAM(s) Oral at bedtime  verapamil 40 milliGRAM(s) Oral every 8 hours    PRN MEDICATIONS  polyethylene glycol 3350 17 Gram(s) Oral daily PRN    VITALS:   T(F): 97  HR: 66  BP: 128/56  RR: 18  SpO2: 94%    PHYSICAL EXAM:  GENERAL: NAD, well-groomed, well-developed  HEAD:  Atraumatic, Normocephalic  EYES: EOMI  NECK: Supple  NERVOUS SYSTEM:  Alert & Oriented X3, non focal   CHEST/LUNG: Clear to auscultation bilaterally; No rales, rhonchi, wheezing, or rubs  HEART: Regular rate and rhythm; No murmurs, rubs, or gallops  ABDOMEN: Soft, Nontender, Nondistended; Bowel sounds present  EXTREMITIES:  2+ Peripheral Pulses, No clubbing, cyanosis, or edema  LYMPH: No lymphadenopathy noted  SKIN: No rashes or lesions  LABS:                        9.8    4.89  )-----------( 164      ( 03 Jul 2023 06:20 )             30.3     07-03    140  |  106  |  19  ----------------------------<  93  3.2<L>   |  22  |  0.8    Ca    8.6      03 Jul 2023 06:20  Mg     1.9     07-03

## 2023-07-03 NOTE — PRE-ANESTHESIA EVALUATION ADULT - NSANTHOSAYNRD_GEN_A_CORE
No. PETEY screening performed.  STOP BANG Legend: 0-2 = LOW Risk; 3-4 = INTERMEDIATE Risk; 5-8 = HIGH Risk

## 2023-07-03 NOTE — CHART NOTE - NSCHARTNOTEFT_GEN_A_CORE
- will plan for EGD/colonoscopy tomorrow   - Please correct electrolytes (Target Na = 135-145 | Mg = 1.7-2.2 | K = 3.5-5)  - Please correct INR to <1.5  - Please target Hb  >7  - Please ensure there is one set of CBC BMP and Coagulation profile day prior to procedure and all labs to be optimized the day prior to procedure  - NPO after midnight  - Water Enema q 6 starting now   - CLEAR LIQUID DIET   - 1 Gallon of Golytely, dulcolax 20mg at night
EGD Findings:  A medium size hiatal hernia was seen. Retroflexion view in the stomach confirmed the size and morphology of the hernia.  Normal mucosa was noted in the whole esophagus.  A single medium non-bleeding angiectasia was seen in the Lesser curvature. The angioectasia was ablated completely. Hemostasis was performed.  Diffuse erythema of the mucosa was noted in the stomach. These findings are compatible with non-erosive gastritis. Biopsies were obtained to evaluate for H.Pylori infection.  Normal mucosa was noted in the whole examined duodenum. Random mucosal biopsies were obtained to evaluate for histologic features of celiac disease.    Colonoscopy Findings:  Multiple non-bleeding diverticula were seen in the sigmoid colon. Diverticulosis appeared to be of moderate severity.	  Multiple non-bleeding ulcers ranging in size from 3 mm to 5 mm were found in the terminal ileum. Mild erythema was noted in the terminal ileum. Multiple cold forceps biopsies were performed for histology.	  Normal mucosa was noted in the whole colon. There were no AVMs, masses, evidence of colitis or other abnormalities seen. Retroflexion of the scope in the rectum revealed no abnormalities. Small polyps could have been missed due to poor preparation.	  Medium non-bleeding internal and external hemorrhoids were noted.	  Old blood was noted in the terminal ileum and in the right colon. No active bleeding was noted during the exam..    PLAN:   No further Colonoscopy needed for screening.   Repeat colonoscopy as needed.   Protonix 40 mg BID    Clear liquids Diet.     Avoid NSAIDs.     Await pathology.     Monitor CBC.     Monitor for signs of bleeding.     Will follow.
PACU ANESTHESIA ADMISSION NOTE      Procedure: EGD/Colonoscopy  Post op diagnosis:  GI Bleeding    __x__  Patent Airway    __x__  Full return of protective reflexes    __x__  Full recovery from anesthesia / back to baseline status    Vitals:  T(C): 36.1 (07-03-23 @ 09:25), Max: 36.3 (07-03-23 @ 00:05)  HR: 66 (07-03-23 @ 09:25) (66 - 78)  BP: 128/56 (07-03-23 @ 09:25) (111/53 - 128/56)  RR: 18 (07-03-23 @ 09:25) (16 - 18)  SpO2: 94% (07-03-23 @ 09:25) (93% - 100%)    Mental Status:  __x__ Awake   ___x__ Alert   _____ Drowsy   _____ Sedated    Nausea/Vomiting:  ____ NO  ___x___Yes (nausea)   See Post - Op Orders          Pain Scale (0-10):  __0___    Treatment: ____ None    __x__ See Post - Op/PCA Orders    Post - Operative Fluids:   ____ Oral   __x__ See Post - Op Orders    Plan: Discharge:   ____Home       __x___Floor     _____Critical Care    _____  Other:_________________    Comments: Patient had smooth intraoperative event, no anesthesia complication.  PACU Vital signs: HR: 70            BP:     151/71          RR: 20            O2 Sat:       100%     Temp 97.2F

## 2023-07-03 NOTE — PROGRESS NOTE ADULT - SUBJECTIVE AND OBJECTIVE BOX
[facility] FRIED  82[facility]      Patient is a 82y old  Female who presents with a chief complaint of SOB due to low HGB level (01 Jul 2023 08:47)      My note supersedes the resident's note      INTERVAL HPI/OVERNIGHT EVENTS:    no acute events overnight , tolerated prep well     REVIEW OF SYSTEMS:  CONSTITUTIONAL: No fever, weight loss, or fatigue  EYES: No eye pain, visual disturbances, or discharge  ENMT:  No difficulty hearing, tinnitus, vertigo; No sinus or throat pain  NECK: No pain or stiffness  BREASTS: No pain, masses, or nipple discharge  RESPIRATORY: No cough, wheezing, chills or hemoptysis; No shortness of breath  CARDIOVASCULAR: No chest pain, palpitations, dizziness, or leg swelling  GASTROINTESTINAL: No abdominal or epigastric pain. No nausea, vomiting, or hematemesis; No diarrhea or constipation. No melena or hematochezia.  GENITOURINARY: No dysuria, frequency, hematuria, or incontinence  NEUROLOGICAL: No headaches, memory loss, loss of strength, numbness, or tremors  SKIN: No itching, burning, rashes, or lesions   LYMPH NODES: No enlarged glands  ENDOCRINE: No heat or cold intolerance; No hair loss  MUSCULOSKELETAL: No joint pain or swelling; No muscle, back, or extremity pain  PSYCHIATRIC: No depression, anxiety, mood swings, or difficulty sleeping  HEME/LYMPH: No easy bruising, or bleeding gums  ALLERY AND IMMUNOLOGIC: No hives or eczema  FAMILY HISTORY:    T(C): 36.1 (07-03-23 @ 09:25), Max: 36.3 (07-03-23 @ 00:05)  HR: 66 (07-03-23 @ 09:25) (66 - 78)  BP: 128/56 (07-03-23 @ 09:25) (111/53 - 128/56)  RR: 18 (07-03-23 @ 09:25) (16 - 18)  SpO2: 94% (07-03-23 @ 09:25) (93% - 100%)  Wt(kg): --Vital Signs Last 24 Hrs  T(C): 36.1 (03 Jul 2023 09:25), Max: 36.3 (03 Jul 2023 00:05)  T(F): 97 (03 Jul 2023 09:12), Max: 97.4 (03 Jul 2023 00:05)  HR: 66 (03 Jul 2023 09:25) (66 - 78)  BP: 128/56 (03 Jul 2023 09:25) (111/53 - 128/56)  BP(mean): 79 (03 Jul 2023 09:25) (79 - 79)  RR: 18 (03 Jul 2023 09:25) (16 - 18)  SpO2: 94% (03 Jul 2023 09:25) (93% - 100%)    Parameters below as of 03 Jul 2023 07:00  Patient On (Oxygen Delivery Method): room air        PHYSICAL EXAM:  GENERAL: NAD, well-groomed, well-developed  HEAD:  Atraumatic, Normocephalic  EYES: EOMI, PERRLA, conjunctiva and sclera clear  ENMT: No tonsillar erythema, exudates, or enlargement; Moist mucous membranes, Good dentition, No lesions  NECK: Supple, No JVD, Normal thyroid  NERVOUS SYSTEM:  Alert & Oriented X3, Good concentration; Motor Strength 5/5 B/L upper and lower extremities; DTRs 2+ intact and symmetric  PULM: Clear to auscultation bilaterally  CARDIAC: s1s2  GI: Soft, Nontender, Nondistended; Bowel sounds present  EXTREMITIES:  2+ Peripheral Pulses, No clubbing, cyanosis, or edema  LYMPH: No lymphadenopathy noted  SKIN: No rashes or lesions    Consultant(s) Notes Reviewed:  [x ] YES  [ ] NO  Care Discussed with Consultants/Other Providers [ x] YES  [ ] NO    LABS:                            9.8    4.89  )-----------( 164      ( 03 Jul 2023 06:20 )             30.3   07-03    140  |  106  |  19  ----------------------------<  93  3.2<L>   |  22  |  0.8    Ca    8.6      03 Jul 2023 06:20  Mg     1.9     07-03              cholecalciferol 2000 Unit(s) Oral daily  cyanocobalamin 1000 MICROGram(s) Oral daily  disopyramide 100 milliGRAM(s) Oral three times a day  metoprolol tartrate 25 milliGRAM(s) Oral every 12 hours  pantoprazole  Injectable 40 milliGRAM(s) IV Push two times a day  polyethylene glycol 3350 17 Gram(s) Oral daily PRN  potassium chloride  20 mEq/100 mL IVPB 20 milliEquivalent(s) IV Intermittent every 2 hours  senna 2 Tablet(s) Oral at bedtime  simvastatin 20 milliGRAM(s) Oral at bedtime  verapamil 40 milliGRAM(s) Oral every 8 hours      HEALTH ISSUES - PROBLEM Dx:          Case Discussed with House Staff   45 minutes spent on total encounter; more than 50% of the visit was spent counseling and/or coordinating care by the attending physician.    Spectra [phone]

## 2023-07-03 NOTE — PROVIDER CONTACT NOTE (OTHER) - SITUATION
Pt told RN during evening medication administration, she felt a weird feeling in her chest. Stated "This happens often when her blood is low" MD notified. PT denies pain, just states she feels "weird"

## 2023-07-04 ENCOUNTER — TRANSCRIPTION ENCOUNTER (OUTPATIENT)
Age: 83
End: 2023-07-04

## 2023-07-04 VITALS — DIASTOLIC BLOOD PRESSURE: 49 MMHG | HEART RATE: 84 BPM | SYSTOLIC BLOOD PRESSURE: 101 MMHG

## 2023-07-04 LAB
ANION GAP SERPL CALC-SCNC: 7 MMOL/L — SIGNIFICANT CHANGE UP (ref 7–14)
BASOPHILS # BLD AUTO: 0.07 K/UL — SIGNIFICANT CHANGE UP (ref 0–0.2)
BASOPHILS NFR BLD AUTO: 1.1 % — HIGH (ref 0–1)
BUN SERPL-MCNC: 9 MG/DL — LOW (ref 10–20)
CALCIUM SERPL-MCNC: 8.4 MG/DL — SIGNIFICANT CHANGE UP (ref 8.4–10.5)
CHLORIDE SERPL-SCNC: 110 MMOL/L — SIGNIFICANT CHANGE UP (ref 98–110)
CO2 SERPL-SCNC: 24 MMOL/L — SIGNIFICANT CHANGE UP (ref 17–32)
CREAT SERPL-MCNC: 0.8 MG/DL — SIGNIFICANT CHANGE UP (ref 0.7–1.5)
EGFR: 73 ML/MIN/1.73M2 — SIGNIFICANT CHANGE UP
EOSINOPHIL # BLD AUTO: 0.2 K/UL — SIGNIFICANT CHANGE UP (ref 0–0.7)
EOSINOPHIL NFR BLD AUTO: 3.1 % — SIGNIFICANT CHANGE UP (ref 0–8)
GLUCOSE SERPL-MCNC: 72 MG/DL — SIGNIFICANT CHANGE UP (ref 70–99)
HCT VFR BLD CALC: 29.3 % — LOW (ref 37–47)
HGB BLD-MCNC: 9.5 G/DL — LOW (ref 12–16)
IMM GRANULOCYTES NFR BLD AUTO: 0.6 % — HIGH (ref 0.1–0.3)
LYMPHOCYTES # BLD AUTO: 1.51 K/UL — SIGNIFICANT CHANGE UP (ref 1.2–3.4)
LYMPHOCYTES # BLD AUTO: 23.7 % — SIGNIFICANT CHANGE UP (ref 20.5–51.1)
MAGNESIUM SERPL-MCNC: 1.9 MG/DL — SIGNIFICANT CHANGE UP (ref 1.8–2.4)
MCHC RBC-ENTMCNC: 30.3 PG — SIGNIFICANT CHANGE UP (ref 27–31)
MCHC RBC-ENTMCNC: 32.4 G/DL — SIGNIFICANT CHANGE UP (ref 32–37)
MCV RBC AUTO: 93.3 FL — SIGNIFICANT CHANGE UP (ref 81–99)
MONOCYTES # BLD AUTO: 0.52 K/UL — SIGNIFICANT CHANGE UP (ref 0.1–0.6)
MONOCYTES NFR BLD AUTO: 8.2 % — SIGNIFICANT CHANGE UP (ref 1.7–9.3)
NEUTROPHILS # BLD AUTO: 4.04 K/UL — SIGNIFICANT CHANGE UP (ref 1.4–6.5)
NEUTROPHILS NFR BLD AUTO: 63.3 % — SIGNIFICANT CHANGE UP (ref 42.2–75.2)
NRBC # BLD: 0 /100 WBCS — SIGNIFICANT CHANGE UP (ref 0–0)
PLATELET # BLD AUTO: 153 K/UL — SIGNIFICANT CHANGE UP (ref 130–400)
PMV BLD: 11.8 FL — HIGH (ref 7.4–10.4)
POTASSIUM SERPL-MCNC: 3.8 MMOL/L — SIGNIFICANT CHANGE UP (ref 3.5–5)
POTASSIUM SERPL-SCNC: 3.8 MMOL/L — SIGNIFICANT CHANGE UP (ref 3.5–5)
RBC # BLD: 3.14 M/UL — LOW (ref 4.2–5.4)
RBC # FLD: 18 % — HIGH (ref 11.5–14.5)
SODIUM SERPL-SCNC: 141 MMOL/L — SIGNIFICANT CHANGE UP (ref 135–146)
WBC # BLD: 6.38 K/UL — SIGNIFICANT CHANGE UP (ref 4.8–10.8)
WBC # FLD AUTO: 6.38 K/UL — SIGNIFICANT CHANGE UP (ref 4.8–10.8)

## 2023-07-04 PROCEDURE — 99239 HOSP IP/OBS DSCHRG MGMT >30: CPT

## 2023-07-04 RX ORDER — VERAPAMIL HCL 240 MG
1 CAPSULE, EXTENDED RELEASE PELLETS 24 HR ORAL
Qty: 0 | Refills: 0 | DISCHARGE

## 2023-07-04 RX ORDER — PANTOPRAZOLE SODIUM 20 MG/1
1 TABLET, DELAYED RELEASE ORAL
Qty: 60 | Refills: 0
Start: 2023-07-04 | End: 2023-08-02

## 2023-07-04 RX ADMIN — PREGABALIN 1000 MICROGRAM(S): 225 CAPSULE ORAL at 11:47

## 2023-07-04 RX ADMIN — Medication 2000 UNIT(S): at 11:47

## 2023-07-04 RX ADMIN — PANTOPRAZOLE SODIUM 40 MILLIGRAM(S): 20 TABLET, DELAYED RELEASE ORAL at 05:26

## 2023-07-04 RX ADMIN — SODIUM CHLORIDE 100 MILLILITER(S): 9 INJECTION, SOLUTION INTRAVENOUS at 05:27

## 2023-07-04 RX ADMIN — Medication 100 MILLIGRAM(S): at 05:40

## 2023-07-04 NOTE — PROGRESS NOTE ADULT - PROVIDER SPECIALTY LIST ADULT
Hospitalist
Internal Medicine
Hospitalist
Internal Medicine
Internal Medicine

## 2023-07-04 NOTE — PROGRESS NOTE ADULT - ASSESSMENT
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 Patient had a similar presentation 2 weeks prior, where patient also presented for chest pain. Patient reports dark stool but denies any BRPR and this has been there since discharge. Pt has frequent blood loss anemia due to GIB.    Acute on chronic blood loss anemia   Likely due to upper GI bleeding   - s/p 3 PRBC   - Cont PPI bid   - Target hb>8 due to chest pain   - stable but BUN/Cr still significantly elevated   -  Capsule Endoscopy showed no acute bleed, multiple aphthous ulcer in the small intestine w/o active bleed, The GI strongly encouraged outpatient EGD/ colon within one month of prior discharge and patient opted to follow up with Dr. Aparicio. Importance of compliance with GI followup was stressed by GI team.  - Now pt is agreeable with endoscopy.   - awaiting GI follow up for scheduling of scope    Chest pain   HOCM   severe MR   - likely from anemia  - pain resoled  - dc telemetry    Decrease pulses RLE  - decreased pulses   -cold RLE extremity   -  Arterial duplex unremarkable    FATOU:    - Cr 1.3 from baseline 0.9, eGFR 41,   - resolved    HOCM:   - cw Disopyramide (non-formulary) and verapamil     Constipation   - Start Miralax and senna     # DVT proph: held for bled    Gi proph: protonix   Diet: Clear liquid   Status : DNR/DNI .    #Progress Note Handoff:  Pending (specify):  colonoscopy  Family discussion: discussed pending colonoscopy  Disposition: Home__x_/SNF___/Other________/Unknown at this time________
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 Yesterday the patient had chest pain, shortness of breath and chest tightness with SOB of breath and tingling in the shoulders. that went away. Patient reports dark stool.   today patient is clinically and hemodynamically stable , no complaints .    # GI bleed:  - dark stools,   -  Hb low 6.7 on admission patient took 3 PRBC patient currently has a Hgb of 10.8 keep hemoglobin above 8   - During the previous admission : Capsule Endoscopy showed no acute bleed, multiple aphthous ulcer in the small intestine w/o active bleed, The GI strongly encouraged outpatient EGD/ colon within one month of prior discharge and patient opted to follow up with Dr. Aparicio. Importance of compliance with GI followup was stressed by GI team.   - IR consulted for possible embolisation , recommneded GI consult for EGD and colonoscopy.  - Patient is DNR/DNI as per her wishes   - PPI BID awaiting possible gastroscopy and coloscopy   - If hemodynamically unstable/active GIB please obtain STAT CTA and IR evaluation   - awaiting risk stratification by cardio for Possible EGD and colonoscopy        # Chest pain   # HOCM   # severe MR   - similar presentation to before   - Cardio fellow updated   - EKG changes noted   - roponins negative   - chest pain resolved   - Last TTE on 06/23 showed: EF>75%, severe asymmetric septal hypertrophy with MEGHAN and LVOT obstrution with a resting gradient of 60 mmHg, Grade II diastolic dysfunction, Severely enlarged left atrium, Severe mitral regurgitation.  - pro BNP 2925, trop 0.01   - Cardiology FU for further risk assessment   - cw Disopyramide (non-formulary sent) and verapamil     # Decrease pulses RLE  - patient had a duplex arterial for the lower extremities and showed no arterial obstruction and normal flow .  - no further management.      # FATOU: ( cr 1.3 on admission)   - Patient back to baseline , no further management    - Pre-renal azotemia in a setting of GI bleed and low Hgb     # possible retention   Patient complaining of inability to urinate   will do bladder scans and assess accordingly.      # DVT proph: held for bled    Gi proph: protonix   Diet: Keep npo for now for possible intervention   Status : DNR/DNI   D/C off telemetry   
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 Patient had a similar presentation 2 weeks prior, where patient also presented for chest pain. Patient reports dark stool but denies any BRPR and this has been there since discharge. Pt has frequent blood loss anemia due to GIB.    Acute on chronic blood loss anemia secondary to suspected GI hemorrhage    s/p 3 PRBC     Capsule Endoscopy showed no acute bleed, multiple aphthous ulcer in the small intestine w/o active bleed, The GI strongly encouraged outpatient EGD/ colon within one month of prior discharge and patient opted to follow up with Dr. Aparicio. Importance of compliance with GI followup was stressed by GI team.  A medium size hiatal hernia was seen. Retroflexion view in the stomach confirmed the size and morphology of the hernia.  Normal mucosa was noted in the whole esophagus.  A single medium non-bleeding angiectasia was seen in the Lesser curvature. The angioectasia was ablated completely. Hemostasis was performed.  Diffuse erythema of the mucosa was noted in the stomach. These findings are compatible with non-erosive gastritis. Biopsies were obtained to evaluate for H.Pylori infection.  Normal mucosa was noted in the whole examined duodenum. Random mucosal biopsies were obtained to evaluate for histologic features of celiac disease.  Multiple non-bleeding diverticula were seen in the sigmoid colon. Diverticulosis appeared to be of moderate severity.	  Multiple non-bleeding ulcers ranging in size from 3 mm to 5 mm were found in the terminal ileum. Mild erythema was noted in the terminal ileum. Multiple cold forceps biopsies were performed for histology.	  Normal mucosa was noted in the whole colon. There were no AVMs, masses, evidence of colitis or other abnormalities seen. Retroflexion of the scope in the rectum revealed no abnormalities. Small polyps could have been missed due to poor preparation.	  Medium non-bleeding internal and external hemorrhoids were noted.	  Old blood was noted in the terminal ileum and in the right colon. No active bleeding was noted during the exam..    cleared by GI for outpatient follow up      #Hypertrophic obstructive cardiomyopathy     #severe MR     #FATOU on CKD 2   Creatinine Trend: 0.8<--, 0.8<--, 1.0<--, 0.8<--, 0.9<--, 1.2<--  resolved     #Hypotension post endoscopy resolved     #Mild tricuspid regurgitation.    #Suspected vitamin b12 deficiency on supplementation     #Hypokalemia resolved     PROGRESS NOTE HANDOFF    Pending: DC today     Family discussion: patient verbalized understanding and agreeable to plan of care     Disposition: home 
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 Patient had a similar presentation 2 weeks prior, where patient also presented for chest pain. Patient reports dark stool but denies any BRPR and this has been there since discharge. Pt has frequent blood loss anemia due to GIB.    Acute on chronic blood loss anemia secondary to suspected GI hemorrhage    s/p 3 PRBC     Capsule Endoscopy showed no acute bleed, multiple aphthous ulcer in the small intestine w/o active bleed, The GI strongly encouraged outpatient EGD/ colon within one month of prior discharge and patient opted to follow up with Dr. Aparicio. Importance of compliance with GI followup was stressed by GI team.  EGD / Colon today     #Hypertrophic obstructive cardiomyopathy     #severe MR     #FTAOU on CKD 2   Creatinine Trend: 0.8<--, 1.0<--, 0.8<--, 0.9<--, 1.2<--, 1.3<--  resolved     #Constipation   - Start Miralax and senna     #Mild tricuspid regurgitation.    #Suspected vitamin b12 deficiency on supplementation     #Hypokalemia replete   PROGRESS NOTE HANDOFF    Pending: EGD/ Colonoscopy today     Family discussion: patient verbalized understanding and agreeable to plan of care     Disposition: home 
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 Patient had a similar presentation 2 weeks prior, where patient also presented for chest pain. Patient reports dark stool but denies any BRPR and this has been there since discharge. Pt has frequent blood loss anemia due to GIB.    Acute on chronic blood loss anemia secondary to suspected GI hemorrhage    s/p 3 PRBC     Capsule Endoscopy showed no acute bleed, multiple aphthous ulcer in the small intestine w/o active bleed, The GI strongly encouraged outpatient EGD/ colon within one month of prior discharge and patient opted to follow up with Dr. Aparicio. Importance of compliance with GI followup was stressed by GI team.  GI follow up as patient agreeable for endoscopic investigation     #Hypertrophic obstructive cardiomyopathy     #severe MR     #FATOU on CKD 2    Creatinine Trend: 1.0<--, 0.8<--, 0.9<--, 1.2<--, 1.3<--, 0.9<--  resolved     #Constipation   - Start Miralax and senna     #Mild tricuspid regurgitation.    #Suspected vitamin b12 deficiency on supplementation     PROGRESS NOTE HANDOFF    Pending: gi follow up     Family discussion: patient verbalized understanding and agreeable to plan of care     Disposition: home 
  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 Patient had a similar presentation 2 weeks prior, where patient also presented for chest pain. Patient reports dark stool but denies any BRPR and this has been there since discharge. Pt has frequent blood loss anemia due to GIB.    #Acute on chronic blood loss anemia secondary to suspected GI hemorrhage   - s/p 3 PRBC   -Capsule Endoscopy showed no acute bleed, multiple aphthous ulcer in the small intestine w/o active bleed, The GI strongly encouraged outpatient EGD/ colon within one month of prior discharge and patient opted to follow up with Dr. Aparicio. Importance of compliance with GI followup was stressed by GI team.  - on 07/03/2023: EGD + colonoscopy done and revealed:   Pertinent EGD Findings:  A medium size hiatal hernia.   A single medium non-bleeding angiectasia was seen in the Lesser curvature. The angioectasia was ablated completely. Hemostasis was performed.  Diffuse erythema of the mucosa was noted in the stomach. These findings are compatible with non-erosive gastritis. Biopsies were obtained to evaluate for H.Pylori infection.  Normal mucosa was noted in the whole examined duodenum. Random mucosal biopsies were obtained to evaluate for histologic features of celiac disease.    Colonoscopy Findings:  Multiple non-bleeding diverticula were seen in the sigmoid colon. Diverticulosis appeared to be of moderate severity.	  Multiple non-bleeding ulcers ranging in size from 3 mm to 5 mm were found in the terminal ileum. Mild erythema was noted in the terminal ileum. Multiple cold forceps biopsies were performed for histology.	  Normal mucosa was noted in the whole colon. There were no AVMs, masses, evidence of colitis or other abnormalities seen. Retroflexion of the scope in the rectum revealed no abnormalities. Small polyps could have been missed due to poor preparation.	  Medium non-bleeding internal and external hemorrhoids were noted.	  Old blood was noted in the terminal ileum and in the right colon. No active bleeding was noted during the exam..    PLAN as per GI team is:   No further Colonoscopy needed for screening. Repeat colonoscopy as needed. Protonix 40 mg BID  Clear liquids Diet.   Avoid NSAIDs.   Await pathology.   Monitor CBC.   Monitor for signs of bleeding.     #Hypertrophic obstructive cardiomyopathy     #severe MR     #FATOU on CKD 2    Creatinine Trend: 1.0<--, 0.8<--, 0.9<--, 1.2<--, 1.3<--, 0.9<--  resolved     # Mild tricuspid regurgitation.    #Suspected vitamin b12 deficiency on supplementation

## 2023-07-04 NOTE — DISCHARGE NOTE PROVIDER - PROVIDER TOKENS
PROVIDER:[TOKEN:[[id number]],FOLLOWUP:[1 week]] PROVIDER:[TOKEN:[[id number]],FOLLOWUP:[1-3 days]],PROVIDER:[TOKEN:[[id number]],FOLLOWUP:[2 weeks]]

## 2023-07-04 NOTE — DISCHARGE NOTE PROVIDER - HOSPITAL COURSE
81yo female PMHx HOCM, HFpEF, aortic stenosis, HLD, Age related macular degeneration, hx of recurrent GI bleeds due to R, colon AVM, last discharge on 06/15/23, presents for chest pain and  SOB, Patient had a similar presentation 2 weeks ago, where patient also presented for chest pain. One day prior to presentation, the patient had chest pain, shortness of breath and chest tightness with SOB of breath and tingling in the shoulders. that went away.   Patient was reporting dark stool but denies any BRPR and this has been there since discharge.     Labs noticeable for Hb 6.7 s/p pRBC, BUN 62, Cr 1.3 from baseline 0.9, eGFR 41, pro BNP 2925, trop 0.01   VS noticeable for BP (96/50)  Last TTE on 06/23 showed: EF>75%, severe asymmetric septal hypertrophy with MEGHAN and LVOT obstrution with a resting gradient of 60 mmHg, Grade II diastolic dysfunction, Severely enlarged left atrium, Severe mitral regurgitation.  Vitals were stable    #Acute on chronic blood loss anemia secondary to suspected GI hemorrhage   - s/p 3 PRBC   - During the previous admission: Capsule Endoscopy showed no acute bleed, multiple aphthous ulcer in the small intestine w/o active bleed, The GI strongly encouraged outpatient EGD/ colon within one month of prior discharge and patient opted to follow up with Dr. Aparicio.   - on 07/03/2023: EGD + colonoscopy done and revealed:   Pertinent EGD Findings:  	A medium size hiatal hernia.   	A single medium non-bleeding angiectasia was seen in the Lesser curvature. The angioectasia was ablated completely. Hemostasis was performed.  	Diffuse erythema of the mucosa was noted in the stomach. These findings are compatible with non-erosive gastritis. Biopsies were obtained to evaluate for H.Pylori infection.  	Normal mucosa was noted in the whole examined duodenum. Random mucosal biopsies were obtained to evaluate for histologic features of celiac disease.    Colonoscopy Findings:  	Multiple non-bleeding diverticula were seen in the sigmoid colon. Diverticulosis appeared to be of moderate severity.	  	Multiple non-bleeding ulcers ranging in size from 3 mm to 5 mm were found in the terminal ileum. Mild erythema was noted in the terminal ileum. Multiple cold forceps biopsies were performed for histology.	  	Normal mucosa was noted in the whole colon. There were no AVMs, masses, evidence of colitis or other abnormalities seen. Retroflexion of the scope in the rectum revealed no abnormalities. Small polyps could have been missed due to poor preparation.	  	Medium non-bleeding internal and external hemorrhoids were noted.	  	Old blood was noted in the terminal ileum and in the right colon. No active bleeding was noted during the exam..    PLAN as per GI team is:   No further Colonoscopy needed for screening. Repeat colonoscopy as needed. Protonix 40 mg BID  Clear liquids Diet.   Avoid NSAIDs.   Await pathology.     Repeated CBC- remained stable on the second day  Patient did not have any additional blood per rectum or melena.     #Hypertrophic obstructive cardiomyopathy   #severe MR   # Mild tricuspid regurgitation.  F up with cardiologist on outside basis     #FATOU - resolved  Creatinine Trend: 1.0<--, 0.8<--, 0.9<--, 1.2<--, 1.3<--, 0.9<--    #Suspected vitamin b12 deficiency: on supplementation     patient is medically stable to be discharged at home. Patient is to F up with PCP in a week, cardiology and GI (for the biopsies) in 10 days.

## 2023-07-04 NOTE — DISCHARGE NOTE PROVIDER - NSDCCPCAREPLAN_GEN_ALL_CORE_FT
PRINCIPAL DISCHARGE DIAGNOSIS  Diagnosis: Anemia  Assessment and Plan of Treatment: You came in for chest discomfort and dark stools. We found that you had anemia  so you received 3 units of blood. EGD + colonoscopy were done and revealed mainly:   - A single medium non-bleeding angiectasia was seen in the Lesser curvature. The angioectasia was ablated completely. Hemostasis was performed.  - Non-erosive gastritis. Biopsies were obtained to evaluate for H.Pylori infection.  - Random mucosal biopsies were obtained to evaluate for histologic features of celiac disease.  - Multiple non-bleeding diverticula were seen in the sigmoid colon. Diverticulosis appeared to be of moderate severity.	  - Multiple non-bleeding ulcers ranging in size from 3 mm to 5 mm were found in the terminal ileum. Mild erythema was noted in the terminal ileum. Multiple cold forceps biopsies were performed for histology.	  - Small polyps could have been missed due to poor preparation.	  - Medium non-bleeding internal and external hemorrhoids were noted.	  As per GI team there is no further Colonoscopy needed for screening. Please continue with Protonix 40 mg BID, Avoid NSAIDs, and follow up with GI team for the pathology results.   As well, please follow up with your PCP in a week and your cardiologist on outside basis.  If you experience any abdominal pain, dark stools, blood in the stools, dizziness, chest pain or any other major concern please seek immediate medical help or call 911.      SECONDARY DISCHARGE DIAGNOSES  Diagnosis: Chronic GI bleeding  Assessment and Plan of Treatment:     Diagnosis: Shortness of breath  Assessment and Plan of Treatment:     Diagnosis: Generalized weakness  Assessment and Plan of Treatment:     Diagnosis: Chest pain  Assessment and Plan of Treatment:     Diagnosis: Fluid overload  Assessment and Plan of Treatment:      PRINCIPAL DISCHARGE DIAGNOSIS  Diagnosis: Anemia  Assessment and Plan of Treatment: You came in for chest discomfort and dark stools. We found that you had anemia  so you received 3 units of blood. EGD + colonoscopy were done and revealed mainly:   - A single medium non-bleeding angiectasia was seen in the Lesser curvature. The angioectasia was ablated completely. Hemostasis was performed.  - Non-erosive gastritis. Biopsies were obtained to evaluate for H.Pylori infection.  - Random mucosal biopsies were obtained to evaluate for histologic features of celiac disease.  - Multiple non-bleeding diverticula were seen in the sigmoid colon. Diverticulosis appeared to be of moderate severity.	  - Multiple non-bleeding ulcers ranging in size from 3 mm to 5 mm were found in the terminal ileum. Mild erythema was noted in the terminal ileum. Multiple cold forceps biopsies were performed for histology.	  - Small polyps could have been missed due to poor preparation.	  - Medium non-bleeding internal and external hemorrhoids were noted.	  As per GI team there is no further Colonoscopy needed for screening. Please continue with Protonix 40 mg BID, Avoid NSAIDs, and follow up with GI team for the pathology results.   As well, please follow up with your PCP in a 3 days (mainly for the blood pressure medications) and your cardiologist on outside basis.  If you experience any abdominal pain, dark stools, blood in the stools, dizziness, chest pain or any other major concern please seek immediate medical help or call 911.      SECONDARY DISCHARGE DIAGNOSES  Diagnosis: Chronic GI bleeding  Assessment and Plan of Treatment:     Diagnosis: Shortness of breath  Assessment and Plan of Treatment:     Diagnosis: Generalized weakness  Assessment and Plan of Treatment:     Diagnosis: Chest pain  Assessment and Plan of Treatment:     Diagnosis: Fluid overload  Assessment and Plan of Treatment:

## 2023-07-04 NOTE — DISCHARGE NOTE NURSING/CASE MANAGEMENT/SOCIAL WORK - PATIENT PORTAL LINK FT
You can access the FollowMyHealth Patient Portal offered by [facility] by registering at the following website: [email or web address]. By joining [facility]’s FollowMyHealth portal, you will also be able to view your health information using other applications (apps) compatible with our system.

## 2023-07-04 NOTE — DISCHARGE NOTE NURSING/CASE MANAGEMENT/SOCIAL WORK - NSFLUVACAGEDISCH_IMM_ALL_CORE
Adult O-L Flap Text: The defect edges were debeveled with a #15 scalpel blade.  Given the location of the defect, shape of the defect and the proximity to free margins an O-L flap was deemed most appropriate.  Using a sterile surgical marker, an appropriate advancement flap was drawn incorporating the defect and placing the expected incisions within the relaxed skin tension lines where possible.    The area thus outlined was incised deep to adipose tissue with a #15 scalpel blade.  The skin margins were undermined to an appropriate distance in all directions utilizing iris scissors.

## 2023-07-04 NOTE — DISCHARGE NOTE PROVIDER - NSDCMRMEDTOKEN_GEN_ALL_CORE_FT
disopyramide 100 mg oral capsule: 1 cap(s) orally 3 times a day  furosemide 20 mg oral tablet: 1 tab(s) orally once a day  Protonix 40 mg oral delayed release tablet: 1 tab(s) orally 2 times a day  simvastatin 20 mg oral tablet: 1 tab(s) orally once a day (at bedtime)  Toprol-XL 50 mg oral tablet, extended release: 1 tab(s) orally once a day  verapamil 120 mg/24 hours oral capsule, extended release: 1 cap(s) orally once a day  Vitamin B12 250 mcg oral tablet: 1 tab(s) orally once a day  Vitamin D3 50 mcg (2000 intl units) oral tablet: 1 tab(s) orally once a day   disopyramide 100 mg oral capsule: 1 cap(s) orally 3 times a day  furosemide 20 mg oral tablet: 1 tab(s) orally once a day  Protonix 40 mg oral delayed release tablet: 1 tab(s) orally 2 times a day  simvastatin 20 mg oral tablet: 1 tab(s) orally once a day (at bedtime)  verapamil 120 mg/24 hours oral capsule, extended release: 1 cap(s) orally once a day  Vitamin B12 250 mcg oral tablet: 1 tab(s) orally once a day  Vitamin D3 50 mcg (2000 intl units) oral tablet: 1 tab(s) orally once a day   disopyramide 100 mg oral capsule: 1 cap(s) orally 3 times a day  furosemide 20 mg oral tablet: 1 tab(s) orally once a day  Protonix 40 mg oral delayed release tablet: 1 tab(s) orally 2 times a day  simvastatin 20 mg oral tablet: 1 tab(s) orally once a day (at bedtime)  Vitamin B12 250 mcg oral tablet: 1 tab(s) orally once a day  Vitamin D3 50 mcg (2000 intl units) oral tablet: 1 tab(s) orally once a day

## 2023-07-04 NOTE — DISCHARGE NOTE PROVIDER - NSDCFUADDINST_GEN_ALL_CORE_FT
We are holding your metoprolol as your blood pressure was on the lower side. Please follow up with your Primary care physician in 3-4 days and reassess the need for the blood pressure medication.  We are holding your metoprolol and verapamil as your blood pressure was on the lower side. Please follow up with your Primary care physician in 3-4 days and reassess the need for the blood pressure medication.

## 2023-07-04 NOTE — DISCHARGE NOTE NURSING/CASE MANAGEMENT/SOCIAL WORK - NSDCPEFALRISK_GEN_ALL_CORE
For information on Fall & Injury Prevention, visit: [email or web address] OR  [email or web address] OR  https://www.cdc.gov/steadi/patient.html

## 2023-07-04 NOTE — PROGRESS NOTE ADULT - SUBJECTIVE AND OBJECTIVE BOX
GERRY FRIED  82y  [facility]      Patient is a 82y old  Female who presents with a chief complaint of Chest discomfort and melena (04 Jul 2023 10:12)      My note supersedes the resident's note      INTERVAL HPI/OVERNIGHT EVENTS:    episode of hypotension overnight which resolved     REVIEW OF SYSTEMS:  CONSTITUTIONAL: No fever, weight loss, or fatigue  EYES: No eye pain, visual disturbances, or discharge  ENMT:  No difficulty hearing, tinnitus, vertigo; No sinus or throat pain  NECK: No pain or stiffness  BREASTS: No pain, masses, or nipple discharge  RESPIRATORY: No cough, wheezing, chills or hemoptysis; No shortness of breath  CARDIOVASCULAR: No chest pain, palpitations, dizziness, or leg swelling  GASTROINTESTINAL: No abdominal or epigastric pain. No nausea, vomiting, or hematemesis; No diarrhea or constipation. No melena or hematochezia.  GENITOURINARY: No dysuria, frequency, hematuria, or incontinence  NEUROLOGICAL: No headaches, memory loss, loss of strength, numbness, or tremors  SKIN: No itching, burning, rashes, or lesions   LYMPH NODES: No enlarged glands  ENDOCRINE: No heat or cold intolerance; No hair loss  MUSCULOSKELETAL: No joint pain or swelling; No muscle, back, or extremity pain  PSYCHIATRIC: No depression, anxiety, mood swings, or difficulty sleeping  HEME/LYMPH: No easy bruising, or bleeding gums  ALLERY AND IMMUNOLOGIC: No hives or eczema  FAMILY HISTORY:    T(C): 37.3 (07-04-23 @ 07:00), Max: 37.3 (07-04-23 @ 07:00)  HR: 75 (07-04-23 @ 07:00) (65 - 82)  BP: 104/54 (07-04-23 @ 07:00) (96/49 - 165/71)  RR: 18 (07-04-23 @ 07:00) (18 - 22)  SpO2: 92% (07-04-23 @ 07:00) (92% - 100%)  Wt(kg): --Vital Signs Last 24 Hrs  T(C): 37.3 (04 Jul 2023 07:00), Max: 37.3 (04 Jul 2023 07:00)  T(F): 99.1 (04 Jul 2023 07:00), Max: 99.1 (04 Jul 2023 07:00)  HR: 75 (04 Jul 2023 07:00) (65 - 82)  BP: 104/54 (04 Jul 2023 07:00) (96/49 - 165/71)  BP(mean): --  RR: 18 (04 Jul 2023 07:00) (18 - 22)  SpO2: 92% (04 Jul 2023 07:00) (92% - 100%)    Parameters below as of 04 Jul 2023 07:00  Patient On (Oxygen Delivery Method): room air        PHYSICAL EXAM:  GENERAL: NAD, well-groomed, well-developed  HEAD:  Atraumatic, Normocephalic  EYES: EOMI, PERRLA, conjunctiva and sclera clear  ENMT: No tonsillar erythema, exudates, or enlargement; Moist mucous membranes, Good dentition, No lesions  NECK: Supple, No JVD, Normal thyroid  NERVOUS SYSTEM:  Alert & Oriented X3, Good concentration; Motor Strength 5/5 B/L upper and lower extremities; DTRs 2+ intact and symmetric  PULM: Clear to auscultation bilaterally  CARDIAC: Regular rate and rhythm; No murmurs, rubs, or gallops  GI: Soft, Nontender, Nondistended; Bowel sounds present  EXTREMITIES:  2+ Peripheral Pulses, No clubbing, cyanosis, or edema  LYMPH: No lymphadenopathy noted  SKIN: No rashes or lesions    Consultant(s) Notes Reviewed:  [x ] YES  [ ] NO  Care Discussed with Consultants/Other Providers [ x] YES  [ ] NO    LABS:                            9.5    6.38  )-----------( 153      ( 04 Jul 2023 07:35 )             29.3   07-04    141  |  110  |  9<L>  ----------------------------<  72  3.8   |  24  |  0.8    Ca    8.4      04 Jul 2023 07:35  Mg     1.9     07-04              cholecalciferol 2000 Unit(s) Oral daily  cyanocobalamin 1000 MICROGram(s) Oral daily  disopyramide 100 milliGRAM(s) Oral three times a day  metoprolol tartrate 25 milliGRAM(s) Oral every 12 hours  pantoprazole    Tablet 40 milliGRAM(s) Oral two times a day  polyethylene glycol 3350 17 Gram(s) Oral daily PRN  potassium chloride    Tablet ER 40 milliEquivalent(s) Oral every 4 hours  senna 2 Tablet(s) Oral at bedtime  simvastatin 20 milliGRAM(s) Oral at bedtime  verapamil 40 milliGRAM(s) Oral every 8 hours      HEALTH ISSUES - PROBLEM Dx:          Case Discussed with House Staff   50 minutes spent on total encounter; more than 50% of the visit was spent counseling and/or coordinating care by the attending physician.    Nutrinsic [phone]

## 2023-07-04 NOTE — DISCHARGE NOTE PROVIDER - CARE PROVIDER_API CALL
Gatito Schroeder  Internal Medicine  [address] [place] [id number]  Phone: [phone]  Fax: [phone]  Follow Up Time: 1 week   Gatito Schroeder  Internal Medicine  [address] [place] [address]  Phone: [phone]  Fax: [phone]  Follow Up Time: 1-3 days    Cal Spear  Gastroenterology  [address] [place] [id number]  Phone: [phone]  Fax: [phone]  Follow Up Time: 2 weeks

## 2023-07-05 LAB
SURGICAL PATHOLOGY STUDY: SIGNIFICANT CHANGE UP
SURGICAL PATHOLOGY STUDY: SIGNIFICANT CHANGE UP

## 2023-07-05 NOTE — CDI QUERY NOTE - NSCDIOTHERTXTBX_GEN_ALL_CORE_HH
Based on your profession judgement and the clinical indicators, please clarify if the CHF and fluid overload documented can be further specified as:   • Fluid overload associated with decompensated diastolic heart failure   • Fluid overload associated with mild acute noncardiogenic pulmonary edema   • Fluid overload without mild acute noncardiogenic pulmonary edema or decompensated diastolic heart failure   • Other (please specify):   • Clinically unable to determine      CLINICAL INDICATORS  6/28 ED Provider Note: … Given the chest pain, fluid overload, will admit to medicine for further treatment and monitoring and give gentle diuresis    6/28 H&P Adult: … PMHx HOCM, HFpEF … Acute blood loss anemia might be due to upper GI bleeding s/p 2 PRBC … Chest pain / HOCM / severe MR … pro BNP 2925    6/30 Consult Note Adult-Cardiology Attending: … Reason for Referral/Consultation: preop … 81 y/o   female    history HOCM    with   anemia  GI bleed         pre op  EGD /  colonoscopy      at moderate    cardiac  risk    7/4 Discharge Note Provider: … PMHx HOCM, HFpEF … presents for chest pain and SOB, Patient had a similar presentation 2 weeks ago, where patient also presented for chest pain. One day prior to presentation, the patient had chest pain, shortness of breath and chest tightness with SOB of breath and tingling in the shoulders. that went away … pro BNP 2925 … Acute on chronic blood loss anemia secondary to suspected GI hemorrhage - s/p 3 PRBC … SECONDARY DISCHARGE DIAGNOSES: Fluid overload     Chest xray 6/28: Bilateral interstitial opacities which may be secondary to vascular congestion, atypical pneumonia as well as low lung volume.    Per MAR: Lasix 20mg IV push. Administered 6/28                 Lasix 40 mg IV push. Administered 6/29    Thank you,  Kiara Black RN [facility] CCDS  [phone]

## 2023-07-14 DIAGNOSIS — I42.1 OBSTRUCTIVE HYPERTROPHIC CARDIOMYOPATHY: ICD-10-CM

## 2023-07-14 DIAGNOSIS — I08.3 COMBINED RHEUMATIC DISORDERS OF MITRAL, AORTIC AND TRICUSPID VALVES: ICD-10-CM

## 2023-07-14 DIAGNOSIS — I95.9 HYPOTENSION, UNSPECIFIED: ICD-10-CM

## 2023-07-14 DIAGNOSIS — I50.33 ACUTE ON CHRONIC DIASTOLIC (CONGESTIVE) HEART FAILURE: ICD-10-CM

## 2023-07-14 DIAGNOSIS — K57.30 DIVERTICULOSIS OF LARGE INTESTINE WITHOUT PERFORATION OR ABSCESS WITHOUT BLEEDING: ICD-10-CM

## 2023-07-14 DIAGNOSIS — D62 ACUTE POSTHEMORRHAGIC ANEMIA: ICD-10-CM

## 2023-07-14 DIAGNOSIS — E87.6 HYPOKALEMIA: ICD-10-CM

## 2023-07-14 DIAGNOSIS — K92.2 GASTROINTESTINAL HEMORRHAGE, UNSPECIFIED: ICD-10-CM

## 2023-07-14 DIAGNOSIS — E78.5 HYPERLIPIDEMIA, UNSPECIFIED: ICD-10-CM

## 2023-07-14 DIAGNOSIS — I50.32 CHRONIC DIASTOLIC (CONGESTIVE) HEART FAILURE: ICD-10-CM

## 2023-07-14 DIAGNOSIS — K44.9 DIAPHRAGMATIC HERNIA WITHOUT OBSTRUCTION OR GANGRENE: ICD-10-CM

## 2023-07-14 DIAGNOSIS — K64.4 RESIDUAL HEMORRHOIDAL SKIN TAGS: ICD-10-CM

## 2023-07-14 DIAGNOSIS — Z66 DO NOT RESUSCITATE: ICD-10-CM

## 2023-07-14 DIAGNOSIS — Z88.5 ALLERGY STATUS TO NARCOTIC AGENT: ICD-10-CM

## 2023-07-14 DIAGNOSIS — N17.9 ACUTE KIDNEY FAILURE, UNSPECIFIED: ICD-10-CM

## 2023-07-14 DIAGNOSIS — N18.2 CHRONIC KIDNEY DISEASE, STAGE 2 (MILD): ICD-10-CM

## 2023-07-14 DIAGNOSIS — Z79.899 OTHER LONG TERM (CURRENT) DRUG THERAPY: ICD-10-CM

## 2023-07-17 ENCOUNTER — APPOINTMENT (OUTPATIENT)
Dept: GASTROENTEROLOGY | Facility: CLINIC | Age: 83
End: 2023-07-17
Payer: MEDICARE

## 2023-07-17 DIAGNOSIS — I99.8 OTHER DISORDER OF CIRCULATORY SYSTEM: ICD-10-CM

## 2023-07-17 DIAGNOSIS — D64.9 ANEMIA, UNSPECIFIED: ICD-10-CM

## 2023-07-17 DIAGNOSIS — Z78.9 OTHER SPECIFIED HEALTH STATUS: ICD-10-CM

## 2023-07-17 PROCEDURE — 99443: CPT | Mod: 95

## 2023-07-17 NOTE — ASSESSMENT
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 EGD performed gastric angioectasia s/p APC, hiatal hernia. Colonoscopy revealing ulcers in terminal ileum, sigmoid diverticulosis, int/ext hemorrhoids, otherwise no evidence of bleeding. Path unremarkable. \par \par #Anemia\par #Gastric/colon angioectasias s/p APC\par #Terminal ileum ulcers possibly NSAID induced\par \par -Recommend update cbc, iron studies/ferritin\par -Per pt to remain off aspirin though to be clarified with cardiologist at next appt in august\par -Protonix 40mg daily\par -Antireflux measures\par -Periodic cbc monitoring\par -Fiber supplementation\par -Miralax daily if pt amenable, prefers not to try additional medication for now\par -Would defer further endoscopies considering pts age/comorbidities and underlying cardiopulmonary disease unless evidence of overt Gi bleeding or worsening anemia\par \par \par Follow up 2 months\par Pt agreeable with plan, advised to contact us if questions/concerns or if new symptoms develop\par

## 2023-07-17 NOTE — HISTORY OF PRESENT ILLNESS
[Home] : at home, [unfilled] , at the time of the visit. [Medical Office: ([facility])___] : at the medical office located in  [Verbal consent obtained from patient] : the patient, [unfilled] [FreeTextEntry4] : MARTIN GONSALEZ [de-identified] : 7/3/23 [FreeTextEntry1] : 7/3/23

## 2023-07-17 NOTE — REVIEW OF SYSTEMS
[Lower Ext Edema (lower leg swelling)] : lower extremity edema [Negative] : Neurological [Fever] : no fever [Chills] : no chills [Recent Weight Loss (___ Lbs)] : no recent weight loss [Sore Throat] : no sore throat [Hoarseness] : no hoarseness [Chest Pain] : no chest pain [Palpitations] : no palpitations [Shortness Of Breath] : no shortness of breath [Cough] : no cough [SOB on Exertion] : no shortness of breath during exertion [Abdominal Pain] : no abdominal pain [Vomiting] : no vomiting [Constipation] : no constipation [Diarrhea] : no diarrhea [Heartburn] : no heartburn [Melena (black stool)] : no melena [Bleeding] : no bleeding [Bloating (gassiness)] : no bloating

## 2023-07-28 LAB
ALBUMIN SERPL ELPH-MCNC: 4 G/DL
ALP BLD-CCNC: 103 U/L
ALT SERPL-CCNC: 13 U/L
ANION GAP SERPL CALC-SCNC: 12 MMOL/L
AST SERPL-CCNC: 21 U/L
BILIRUB SERPL-MCNC: 0.3 MG/DL
BUN SERPL-MCNC: 32 MG/DL
CALCIUM SERPL-MCNC: 9.4 MG/DL
CHLORIDE SERPL-SCNC: 108 MMOL/L
CO2 SERPL-SCNC: 25 MMOL/L
CREAT SERPL-MCNC: 1.3 MG/DL
EGFR: 41 ML/MIN/1.73M2
GLUCOSE SERPL-MCNC: 101 MG/DL
IRON SATN MFR SERPL: 15 %
IRON SERPL-MCNC: 51 UG/DL
POTASSIUM SERPL-SCNC: 4.5 MMOL/L
PROT SERPL-MCNC: 6.2 G/DL
SODIUM SERPL-SCNC: 145 MMOL/L
TIBC SERPL-MCNC: 331 UG/DL
UIBC SERPL-MCNC: 280 UG/DL

## 2023-07-29 LAB — FERRITIN SERPL-MCNC: 71 NG/ML

## 2023-10-27 NOTE — PROGRESS NOTE ADULT - ASSESSMENT
Addended by: SIMEON LIVINGSTON on: 10/27/2023 09:05 AM     Modules accepted: Orders     DIAGNOSIS:  #) Lower GI bleed   #) QT Prolongation   #) CHF exacerbation   #) Hyperlipidemia   #) Presyncope    PLAN:  CNS:   - No sedatives     HEENT:  - Oral care    PULMONARY:  - HOB @ 45 degrees     CARDIOVASCULAR:  - Keep I = O  - CE negative x 4  - f/u 2D Echo  - QTc 491, avoid QT prolonging medications   - BNP 3189; hold HCTZ until BP improves   - GI requesting Cardio clearance for procedure  - c/w Zocor 20 mg QHS     GI:  - c/w IV Protonix 40 mg BID   - Diet: Clears  - NPO after midnight for EGD and Colonoscopy (05/04), 10 am   - Golytely and Dulcolax  - c/w Multivitamin QD    RENAL:  - None     INFECTIOUS DISEASE:  - None     HEMATOLOGICAL:  - s/p 2 U PRBC in ED   - f/u CBC Q6H  - Transfuse Hb < 7  - Active T+S     ENDOCRINE:  - None     MUSCULOSKELETAL:  - Activity: Bedrest    CODE STATUS:  - Full Code    DISPOSITION:   - Continued MICU observation until after EGD (unless high risk finding)

## 2023-11-03 ENCOUNTER — APPOINTMENT (OUTPATIENT)
Dept: GASTROENTEROLOGY | Facility: CLINIC | Age: 83
End: 2023-11-03

## 2024-03-14 NOTE — PROGRESS NOTE ADULT - ASSESSMENT
Bacitracin and gauze placed on wound.   83yo female PMHx HOCM, HFpEF, aortic stenosis, and HLD presenting with intermittent chest "pressure" and generalized weakness for 2 days found to have hgb 6.5 s/p3 units prbc; currently in SDU     Acute normocytic  anemia   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  - No melena, bloody BM, coffee ground emesis  - s/p 3 u PRBC since admission   - s/p 2 unit PRBC, F/u Rpt CBC   - PPI bid, CBC q12, active type and screen   - Holding anticoagulation and aspirin    - Capsule Endoscopy placed yesterday, no plan for egd/colonoscopy as patient is too high risk for procedure per GI 6/13  - patient currently with capsule endoscopy, f/u GI     Atypical chest pain - likely due to symptomatic anemia   STEMI code called in ED, cancelled by cardio   - Trop negative x3  - EKG showed  LBBB unchanged form prior   - Echo: EF 75%     hx of CHFpEF  - no shortness of breath, no lower extremity edema  - on PO lasix 20 mg daily at home, now on hold     Hypertrophic cardiomyopathy  - Follows with Dr. Hightower in [facility] -  Last echo June 2022 showed EF 75% and findings consistent hypertrophic cardiomyopathy  - Metoprolol 50 mg daily  - Verapamil 120 mg daily   - Disopyramide 100mg TID     Hx of constipation - Miralax and Senna PRN     DLD - c/w statin    Age related macular degeneration - C/w areds 2 BID     DNR/DNI    Pending: capsule endoscopy with GI (ongoing), monitor labs, PT   Discussed with patient

## 2024-06-18 ENCOUNTER — RESULT CHARGE (OUTPATIENT)
Age: 84
End: 2024-06-18

## 2024-06-18 ENCOUNTER — APPOINTMENT (OUTPATIENT)
Dept: ORTHOPEDIC SURGERY | Facility: CLINIC | Age: 84
End: 2024-06-18
Payer: MEDICARE

## 2024-06-18 DIAGNOSIS — Z96.642 PRESENCE OF LEFT ARTIFICIAL HIP JOINT: ICD-10-CM

## 2024-06-18 DIAGNOSIS — M54.50 LOW BACK PAIN, UNSPECIFIED: ICD-10-CM

## 2024-06-18 DIAGNOSIS — M70.62 TROCHANTERIC BURSITIS, LEFT HIP: ICD-10-CM

## 2024-06-18 DIAGNOSIS — G89.29 LOW BACK PAIN, UNSPECIFIED: ICD-10-CM

## 2024-06-18 PROCEDURE — 73521 X-RAY EXAM HIPS BI 2 VIEWS: CPT

## 2024-06-18 PROCEDURE — 99213 OFFICE O/P EST LOW 20 MIN: CPT

## 2024-06-18 NOTE — IMAGING
[de-identified] : Left hip exam: No effusion no ecchymosis no erythema, tenderness palpation to left SI joint, tenderness palpation to greater trochanter, good range of motion with no groin pain, mild discomfort to lower back/lateral hip  Lower back exam: No effusion no ecchymosis no erythema mild tenderness to left SI joint, good range of motion mild discomfort, neurovascular intact

## 2024-06-18 NOTE — DISCUSSION/SUMMARY
[de-identified] : X-ray bilateral hips for comparison: No acute fractures, subluxations, dislocations.  Left hip surgical hardware intact and position.  Noted osteoarthritis of bilateral SI joint, degenerative changes of lumbar spine  Discussed with patient in detail that her surgical hardware is intact and position.  Discussed with patient that she has strain, hip strain, greater trochanteric bursitis.  Discussed treatment option detail including Tylenol, rest, ice that she can range of motion today, physical therapy.  Physical therapy given.  Patient will follow-up in 2 to 3 months reevaluation if pain continues.  Patient understands agrees with plan

## 2024-06-18 NOTE — HISTORY OF PRESENT ILLNESS
[de-identified] : Patient is 83-year-old female here for evaluation of left hip.  Patient has history of left total hip replacement 2 years ago.  Patient states a few months ago she started having pain to her left hip.  Patient also has history of sciatica/lower back pain.  Denies injury/trauma, numbness or tingling, instability

## 2024-08-22 ENCOUNTER — APPOINTMENT (OUTPATIENT)
Dept: OBGYN | Facility: CLINIC | Age: 84
End: 2024-08-22
Payer: MEDICARE

## 2024-08-22 ENCOUNTER — NON-APPOINTMENT (OUTPATIENT)
Age: 84
End: 2024-08-22

## 2024-08-22 VITALS
HEIGHT: 62 IN | SYSTOLIC BLOOD PRESSURE: 114 MMHG | WEIGHT: 143 LBS | BODY MASS INDEX: 26.31 KG/M2 | DIASTOLIC BLOOD PRESSURE: 75 MMHG

## 2024-08-22 DIAGNOSIS — N90.89 OTHER SPECIFIED NONINFLAMMATORY DISORDERS OF VULVA AND PERINEUM: ICD-10-CM

## 2024-08-22 DIAGNOSIS — Z86.39 PERSONAL HISTORY OF OTHER ENDOCRINE, NUTRITIONAL AND METABOLIC DISEASE: ICD-10-CM

## 2024-08-22 PROCEDURE — 99203 OFFICE O/P NEW LOW 30 MIN: CPT

## 2024-08-22 PROCEDURE — 99459 PELVIC EXAMINATION: CPT

## 2024-08-22 RX ORDER — LIDOCAINE AND PRILOCAINE 25; 25 MG/G; MG/G
2.5-2.5 CREAM TOPICAL
Qty: 1 | Refills: 0 | Status: ACTIVE | COMMUNITY
Start: 2024-08-22 | End: 1900-01-01

## 2024-08-26 PROBLEM — Z86.39 HISTORY OF HYPERLIPIDEMIA: Status: RESOLVED | Noted: 2024-08-26 | Resolved: 2024-08-26

## 2024-08-26 RX ORDER — POTASSIUM CHLORIDE 1500 MG/1
20 TABLET, EXTENDED RELEASE ORAL
Refills: 0 | Status: ACTIVE | COMMUNITY

## 2024-08-26 RX ORDER — METOPROLOL SUCCINATE 50 MG/1
50 TABLET, EXTENDED RELEASE ORAL
Refills: 0 | Status: ACTIVE | COMMUNITY

## 2024-08-26 RX ORDER — SIMVASTATIN 80 MG/1
TABLET, FILM COATED ORAL
Refills: 0 | Status: ACTIVE | COMMUNITY

## 2024-08-26 RX ORDER — DISOPYRAMIDE PHOSPHATE 100 MG/1
100 CAPSULE, GELATIN COATED ORAL
Refills: 0 | Status: ACTIVE | COMMUNITY

## 2024-08-26 RX ORDER — PYRIDOXINE HCL (VITAMIN B6) 50 MG
250 TABLET ORAL
Refills: 0 | Status: ACTIVE | COMMUNITY

## 2024-08-26 RX ORDER — VERAPAMIL HYDROCHLORIDE 120 MG/1
120 CAPSULE, EXTENDED RELEASE ORAL
Refills: 0 | Status: ACTIVE | COMMUNITY

## 2024-08-26 RX ORDER — FUROSEMIDE 20 MG/1
20 TABLET ORAL
Refills: 0 | Status: ACTIVE | COMMUNITY

## 2024-09-05 ENCOUNTER — APPOINTMENT (OUTPATIENT)
Dept: OBGYN | Facility: CLINIC | Age: 84
End: 2024-09-05

## 2024-09-05 VITALS
HEIGHT: 62 IN | BODY MASS INDEX: 26.31 KG/M2 | WEIGHT: 143 LBS | SYSTOLIC BLOOD PRESSURE: 115 MMHG | DIASTOLIC BLOOD PRESSURE: 50 MMHG

## 2024-09-05 DIAGNOSIS — N90.89 OTHER SPECIFIED NONINFLAMMATORY DISORDERS OF VULVA AND PERINEUM: ICD-10-CM

## 2024-09-05 PROCEDURE — 56605 BIOPSY OF VULVA/PERINEUM: CPT | Mod: 59

## 2024-09-05 PROCEDURE — 56606 BIOPSY OF VULVA/PERINEUM: CPT

## 2024-09-13 LAB — CORE LAB BIOPSY: NORMAL

## 2024-09-19 ENCOUNTER — APPOINTMENT (OUTPATIENT)
Dept: OBGYN | Facility: CLINIC | Age: 84
End: 2024-09-19
Payer: MEDICARE

## 2024-09-19 VITALS
HEIGHT: 62 IN | WEIGHT: 143 LBS | BODY MASS INDEX: 26.31 KG/M2 | SYSTOLIC BLOOD PRESSURE: 121 MMHG | DIASTOLIC BLOOD PRESSURE: 73 MMHG

## 2024-09-19 PROCEDURE — 99212 OFFICE O/P EST SF 10 MIN: CPT

## 2024-09-20 ENCOUNTER — NON-APPOINTMENT (OUTPATIENT)
Age: 84
End: 2024-09-20

## 2024-09-25 ENCOUNTER — APPOINTMENT (OUTPATIENT)
Dept: GYNECOLOGIC ONCOLOGY | Facility: CLINIC | Age: 84
End: 2024-09-25
Payer: MEDICARE

## 2024-09-25 DIAGNOSIS — N90.89 OTHER SPECIFIED NONINFLAMMATORY DISORDERS OF VULVA AND PERINEUM: ICD-10-CM

## 2024-09-25 DIAGNOSIS — C51.9 MALIGNANT NEOPLASM OF VULVA, UNSPECIFIED: ICD-10-CM

## 2024-09-25 DIAGNOSIS — Z96.642 PRESENCE OF LEFT ARTIFICIAL HIP JOINT: ICD-10-CM

## 2024-09-25 DIAGNOSIS — Z86.2 PERSONAL HISTORY OF DISEASES OF THE BLOOD AND BLOOD-FORMING ORGANS AND CERTAIN DISORDERS INVOLVING THE IMMUNE MECHANISM: ICD-10-CM

## 2024-09-25 PROCEDURE — 99204 OFFICE O/P NEW MOD 45 MIN: CPT | Mod: 57

## 2024-09-25 NOTE — DISCUSSION/SUMMARY
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 All questions answered.  - Referral given for gyn oncology.  - Follow up for annual or PRN

## 2024-09-25 NOTE — PHYSICAL EXAM
[Absent] : CVAT: absent [Abnormal] : External genitalia: Abnormal [Normal] : Adnexa(ae): Normal [FreeTextEntry1] : NAD [de-identified] : BRANDEN [de-identified] : no edema [de-identified] : soft NT/ND [de-identified] : see diagram [de-identified] : 2x3 cm [de-identified] : 5 weeks [Restricted in physically strenuous activity but ambulatory and able to carry out work of a light or sedentary nature] : Status 1- Restricted in physically strenuous activity but ambulatory and able to carry out work of a light or sedentary nature, e.g., light house work, office work

## 2024-09-25 NOTE — HISTORY OF PRESENT ILLNESS
[FreeTextEntry1] : 82 y/o P2 referred for evaluation and treatment of newly Dx vulvar cancer.  Noted this "vaginal cyst near the urethra some time now - maybe 1-2 years, increased in size recently. No pruritus or any other complaints.  NSVDx2 PMH: Valvular heart disease, HTN FHx no cancer in her immediate family

## 2024-09-25 NOTE — REVIEW OF SYSTEMS
[Negative] : Musculoskeletal [Rash] : no rash noted [Ulcer] : no ulcer was seen [Syncope] : no syncope noted [Neuropathy] : no neuropathy [Depression] : no depression [Hematuria] : no hematuria [Abn Vag Bleeding] : no abnormal vaginal bleeding [Fatigue] : no fatigue [Recent Wt Gain ___ Lbs] : no recent weight gain [Recent Wt Loss___ Lbs] : no recent weight loss [Chest Pain] : no chest pain [COWART] : no dyspnea on exertion [Wheezing] : no wheezing [SOB on Exertion] : no shortness of breath during exertion [Bloody Stools] : no bloody stools [Nausea] : no nausea/vomitting [Muscle Weakness] : no muscle weakness

## 2024-09-25 NOTE — DISCUSSION/SUMMARY
[Reviewed Clinical Lab Test(s)] : Results of clinical tests were reviewed. [Reviewed Radiology Report(s)] : Radiology reports were reviewed. [Visit Time ___ Minutes] : [unfilled] minutes [Face to Face Time___ Minutes] : with [unfilled] minutes in face to face consultation. [FreeTextEntry1] : Reviewed old history and medical co morbidities. She has significant valvular disease and is at moderate surgical risk. Options include either surgical excision or RT/chemo. In my opinion the former is a significantly superior option.  Would order PET CT and if the ILNs look benign would avoid any surgical groin procedure. In the event the PET is suspicious would also recommend the groin dissection. Risks of the procedure detailed, including infection and wound breakdown.  All questions answered, concerns addressed.  Will schedule asap/cardiology input requested.

## 2024-10-03 ENCOUNTER — RESULT REVIEW (OUTPATIENT)
Age: 84
End: 2024-10-03

## 2024-10-03 ENCOUNTER — OUTPATIENT (OUTPATIENT)
Dept: OUTPATIENT SERVICES | Facility: HOSPITAL | Age: 84
LOS: 1 days | End: 2024-10-03

## 2024-10-03 DIAGNOSIS — Z98.890 OTHER SPECIFIED POSTPROCEDURAL STATES: Chronic | ICD-10-CM

## 2024-10-03 DIAGNOSIS — C51.9 MALIGNANT NEOPLASM OF VULVA, UNSPECIFIED: ICD-10-CM

## 2024-10-03 DIAGNOSIS — Z96.642 PRESENCE OF LEFT ARTIFICIAL HIP JOINT: Chronic | ICD-10-CM

## 2024-10-03 LAB — GLUCOSE BLDC GLUCOMTR-MCNC: 72 MG/DL — SIGNIFICANT CHANGE UP (ref 70–99)

## 2024-10-03 PROCEDURE — 78815 PET IMAGE W/CT SKULL-THIGH: CPT | Mod: 26,PI,MH

## 2024-10-04 DIAGNOSIS — C51.9 MALIGNANT NEOPLASM OF VULVA, UNSPECIFIED: ICD-10-CM

## 2024-10-17 ENCOUNTER — TRANSCRIPTION ENCOUNTER (OUTPATIENT)
Age: 84
End: 2024-10-17

## 2024-10-30 NOTE — ASU PATIENT PROFILE, ADULT - NSICDXPASTSURGICALHX_GEN_ALL_CORE_FT
DISPLAY PLAN FREE TEXT
PAST SURGICAL HISTORY:  H/O left wrist surgery     History of colonoscopy     History of total left hip replacement

## 2024-10-31 ENCOUNTER — OUTPATIENT (OUTPATIENT)
Dept: OUTPATIENT SERVICES | Facility: HOSPITAL | Age: 84
LOS: 1 days | Discharge: HOME | End: 2024-10-31
Payer: MEDICARE

## 2024-10-31 ENCOUNTER — APPOINTMENT (OUTPATIENT)
Dept: GYNECOLOGIC ONCOLOGY | Facility: HOSPITAL | Age: 84
End: 2024-10-31

## 2024-10-31 ENCOUNTER — RESULT REVIEW (OUTPATIENT)
Age: 84
End: 2024-10-31

## 2024-10-31 ENCOUNTER — TRANSCRIPTION ENCOUNTER (OUTPATIENT)
Age: 84
End: 2024-10-31

## 2024-10-31 VITALS
OXYGEN SATURATION: 97 % | RESPIRATION RATE: 18 BRPM | HEART RATE: 57 BPM | DIASTOLIC BLOOD PRESSURE: 68 MMHG | SYSTOLIC BLOOD PRESSURE: 138 MMHG

## 2024-10-31 VITALS
HEART RATE: 53 BPM | SYSTOLIC BLOOD PRESSURE: 138 MMHG | RESPIRATION RATE: 16 BRPM | WEIGHT: 143.08 LBS | OXYGEN SATURATION: 98 % | DIASTOLIC BLOOD PRESSURE: 63 MMHG | HEIGHT: 61 IN | TEMPERATURE: 98 F

## 2024-10-31 DIAGNOSIS — Z98.890 OTHER SPECIFIED POSTPROCEDURAL STATES: Chronic | ICD-10-CM

## 2024-10-31 DIAGNOSIS — C51.9 MALIGNANT NEOPLASM OF VULVA, UNSPECIFIED: ICD-10-CM

## 2024-10-31 DIAGNOSIS — Z96.642 PRESENCE OF LEFT ARTIFICIAL HIP JOINT: Chronic | ICD-10-CM

## 2024-10-31 PROCEDURE — 56630 VULVECTOMY RADICAL PARTIAL: CPT

## 2024-10-31 PROCEDURE — C1751: CPT

## 2024-10-31 PROCEDURE — 88341 IMHCHEM/IMCYTCHM EA ADD ANTB: CPT | Mod: 26

## 2024-10-31 PROCEDURE — 88342 IMHCHEM/IMCYTCHM 1ST ANTB: CPT

## 2024-10-31 PROCEDURE — 88307 TISSUE EXAM BY PATHOLOGIST: CPT

## 2024-10-31 PROCEDURE — 88307 TISSUE EXAM BY PATHOLOGIST: CPT | Mod: 26

## 2024-10-31 PROCEDURE — 88341 IMHCHEM/IMCYTCHM EA ADD ANTB: CPT

## 2024-10-31 PROCEDURE — C9399: CPT

## 2024-10-31 PROCEDURE — 88342 IMHCHEM/IMCYTCHM 1ST ANTB: CPT | Mod: 26

## 2024-10-31 RX ORDER — ENOXAPARIN SODIUM 40MG/0.4ML
40 SYRINGE (ML) SUBCUTANEOUS ONCE
Refills: 0 | Status: DISCONTINUED | OUTPATIENT
Start: 2024-10-31 | End: 2024-10-31

## 2024-10-31 RX ORDER — POLYETHYLENE GLYCOL 3350 17 G/17G
17 POWDER, FOR SOLUTION ORAL
Qty: 1 | Refills: 0
Start: 2024-10-31 | End: 2024-11-06

## 2024-10-31 RX ORDER — ENOXAPARIN SODIUM 40MG/0.4ML
30 SYRINGE (ML) SUBCUTANEOUS ONCE
Refills: 0 | Status: DISCONTINUED | OUTPATIENT
Start: 2024-10-31 | End: 2024-10-31

## 2024-10-31 RX ORDER — ACETAMINOPHEN 500 MG
650 TABLET ORAL EVERY 6 HOURS
Refills: 0 | Status: DISCONTINUED | OUTPATIENT
Start: 2024-10-31 | End: 2024-10-31

## 2024-10-31 RX ORDER — OXYCODONE HYDROCHLORIDE 30 MG/1
1 TABLET ORAL
Qty: 8 | Refills: 0
Start: 2024-10-31

## 2024-10-31 RX ORDER — CEPHALEXIN 125 MG/5ML
1 SUSPENSION, RECONSTITUTED, ORAL (ML) ORAL
Qty: 2 | Refills: 0
Start: 2024-10-31 | End: 2024-10-31

## 2024-10-31 RX ORDER — APIXABAN 5 MG/1
1 TABLET, FILM COATED ORAL
Qty: 28 | Refills: 0
Start: 2024-10-31 | End: 2024-11-13

## 2024-10-31 RX ORDER — OXYCODONE HYDROCHLORIDE 30 MG/1
5 TABLET ORAL ONCE
Refills: 0 | Status: DISCONTINUED | OUTPATIENT
Start: 2024-10-31 | End: 2024-10-31

## 2024-10-31 RX ORDER — SIMETHICONE 80 MG/1
1 TABLET, CHEWABLE ORAL
Qty: 30 | Refills: 0
Start: 2024-10-31 | End: 2024-11-04

## 2024-10-31 RX ORDER — HYDROMORPHONE HCL/0.9% NACL/PF 6 MG/30 ML
0.2 PATIENT CONTROLLED ANALGESIA SYRINGE INTRAVENOUS
Refills: 0 | Status: DISCONTINUED | OUTPATIENT
Start: 2024-10-31 | End: 2024-10-31

## 2024-10-31 RX ORDER — ACETAMINOPHEN 500 MG
3 TABLET ORAL
Qty: 168 | Refills: 0
Start: 2024-10-31 | End: 2024-11-13

## 2024-10-31 RX ORDER — ONDANSETRON HYDROCHLORIDE 2 MG/ML
4 INJECTION, SOLUTION INTRAMUSCULAR; INTRAVENOUS ONCE
Refills: 0 | Status: DISCONTINUED | OUTPATIENT
Start: 2024-10-31 | End: 2024-10-31

## 2024-10-31 NOTE — ASU DISCHARGE PLAN (ADULT/PEDIATRIC) - CARE PROVIDER_API CALL
Moo Ceja [place]  Gynecologic Oncology  [address] [place] [address], [address] [place] [address]  Phone: [phone]  Fax: [phone]  Follow Up Time:

## 2024-10-31 NOTE — PRE-ANESTHESIA EVALUATION ADULT - NSANTHADDINFOFT_GEN_ALL_CORE
GA planned; Risks discussed including dental injury and more serious complications including cardiac and pulmonary complications and stroke.  Patient expresses understanding with regard to risks of anesthesia and wishes to proceed.     pre-induction arterial line

## 2024-10-31 NOTE — CHART NOTE - NSCHARTNOTEFT_GEN_A_CORE
PACU ANESTHESIA ADMISSION NOTE      Procedure:   Post op diagnosis:      ____  Intubated  TV:______       Rate: ______      FiO2: ______    __x__  Patent Airway    __x__  Full return of protective reflexes    __x__  Full recovery from anesthesia / back to baseline status    Vitals:  T(C): 36.4 (10-31-24 @ 09:06), Max: 36.4 (10-31-24 @ 06:00)  HR: 53 (10-31-24 @ 09:06) (53 - 53)  BP: 138/63 (10-31-24 @ 09:06) (138/63 - 138/63)  RR: 16 (10-31-24 @ 09:06) (16 - 16)  SpO2: 98% (10-31-24 @ 09:06) (98% - 98%)    Mental Status:  __x__ Awake   ___x__ Alert   _____ Drowsy   _____ Sedated    Nausea/Vomiting:  __x__ NO  ______Yes,   See Post - Op Orders          Pain Scale (0-10):  _____    Treatment: ____ None    __x__ See Post - Op/PCA Orders    Post - Operative Fluids:   ____ Oral   __x__ See Post - Op Orders    Plan: Discharge:   ____Home       _____Floor     _____Critical Care    _____  Other:_________________    Comments: Patient had smooth intraoperative event, no anesthesia complication.  PACU Vital signs: HR:      61      BP:   154     /   67       RR:  12          O2 Sat:   100   %     Temp 97.3 F

## 2024-10-31 NOTE — PRE-ANESTHESIA EVALUATION ADULT - NSANTHPMHFT_GEN_ALL_CORE
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

## 2024-10-31 NOTE — ASU DISCHARGE PLAN (ADULT/PEDIATRIC) - ASU DC SPECIAL INSTRUCTIONSFT
Keep incisions clean and dry. Nothing in the vagina for 6 weeks - no sex, tampons, douching, tub baths or pools. May Shower. If you have a fever over 100.4F, severe pain or severe bleeding, please call your doctor or visit the emergency room.    For pain take motrin 600mg every 6 hours and 975mg tylenol every 6hours. Oxycodone 5mg every 4-6hours as needed for breakthrough pain.    Take one capful of miralax a day starting day after surgery.    Take eliquis twice a day for two weeks. Keep incisions clean and dry. Nothing in the vagina for 6 weeks - no sex, tampons, douching, tub baths or pools. May Shower. If you have a fever over 100.4F, severe pain or severe bleeding, please call your doctor or visit the emergency room.    For pain take 975mg tylenol every 6hours. Oxycodone 5mg every 4-6hours as needed for breakthrough pain.    Take one capful of miralax a day starting day after surgery.    Take eliquis twice a day for two weeks.

## 2024-10-31 NOTE — ASU DISCHARGE PLAN (ADULT/PEDIATRIC) - FINANCIAL ASSISTANCE
[facility] provides services at a reduced cost to those who are determined to be eligible through [facility]’s financial assistance program. Information regarding [facility]’s financial assistance program can be found by going to [email or web address] or by calling [phone].

## 2024-11-05 LAB — SURGICAL PATHOLOGY STUDY: SIGNIFICANT CHANGE UP

## 2024-11-12 ENCOUNTER — APPOINTMENT (OUTPATIENT)
Dept: GYNECOLOGIC ONCOLOGY | Facility: CLINIC | Age: 84
End: 2024-11-12
Payer: MEDICARE

## 2024-11-12 DIAGNOSIS — C51.9 MALIGNANT NEOPLASM OF VULVA, UNSPECIFIED: ICD-10-CM

## 2024-11-12 PROCEDURE — 99024 POSTOP FOLLOW-UP VISIT: CPT

## 2024-12-03 ENCOUNTER — APPOINTMENT (OUTPATIENT)
Dept: GYNECOLOGIC ONCOLOGY | Facility: CLINIC | Age: 84
End: 2024-12-03
Payer: MEDICARE

## 2024-12-03 DIAGNOSIS — C51.9 MALIGNANT NEOPLASM OF VULVA, UNSPECIFIED: ICD-10-CM

## 2024-12-03 PROCEDURE — 99024 POSTOP FOLLOW-UP VISIT: CPT

## 2025-03-04 ENCOUNTER — APPOINTMENT (OUTPATIENT)
Dept: GYNECOLOGIC ONCOLOGY | Facility: CLINIC | Age: 85
End: 2025-03-04
Payer: MEDICARE

## 2025-03-04 VITALS
WEIGHT: 143 LBS | DIASTOLIC BLOOD PRESSURE: 70 MMHG | SYSTOLIC BLOOD PRESSURE: 131 MMHG | HEART RATE: 69 BPM | HEIGHT: 62 IN | BODY MASS INDEX: 26.31 KG/M2

## 2025-03-04 DIAGNOSIS — C51.9 MALIGNANT NEOPLASM OF VULVA, UNSPECIFIED: ICD-10-CM

## 2025-03-04 PROCEDURE — 99214 OFFICE O/P EST MOD 30 MIN: CPT

## 2025-04-29 NOTE — PATIENT PROFILE ADULT - HEALTH LITERACY
Subjective   Patient ID: Georgia is a 81 year old female.    Chief Complaint   Patient presents with    Office Visit     Patient is partially fasting. Only had a pear    Medicare Wellness Visit     Patient is due for MWV. Last seen 3/21/25 with pcp. MWV is being done early, last MWV was 5/1/24   Medicare Health Risk Assessment    Please complete the questions below. Your responses will help you receive the best health care possible. Check the box that applies or fill in the blanks when applicable.:   1.) Do you have an Advance directive, living will, or power of  for health care document that contains your wishes for end of life care?: Yes  2.) Would you like additional information on advance directives?: No  3.) During the past 4 weeks, how would you rate your health?: Very Good  4.) During the past 4 weeks, what was the hardest physical activity you could do for at least 2 minutes?: Moderate  5.) Do you do moderate to strenuous exercise (brisk walk) for about 20 minutes for 3 or more days per week?: No, but I am active with housework or yard work (vacuuming, raking, etc.)  6.) How many servings of the following would you typically eat in a day?:  Fruits and Vegetables ( 1 serving = 1 piece of fruit, 1/2 cup fruits or vegetables): 1 per day   High Fiber / Whole Grain Foods ( 1 serving = 1 cup cold cereal, 1/2 cup cooked cereal, 1 slice bread): 1 per day   Fried or High Fat Foods (1 serving = 1 Rush, French Fries, chips, doughnut, fried chicken/fish): 1 per day   Sugar Sweetened Beverages ( 1 serving = 1 can or 12 oz cup of soda or juice): None      7a.) Have you had a fall in the past year?: No  7b.) Do you feel unsteady when standing or walking?: No  7c.) Do you worry about falling?: No  8.) During the past 4 weeks, has your physical and emotional health limited your social activities with family, friends, neighbors, or other groups? Fall Risk Assessment: Not at all  9.) Do you feel safe at home?:  Yes  10. How often do you have trouble taking medicines the way you have been told to take them?: I always take my prescribed medications  11a.) Bladder Control problems (urine leaking): Seldom  11b.) Bowel control problems: Seldom  11c.) Teeth or Denture Problems: Seldom  11d.) Bodily Pain: Seldom  11e.) Tiredness or Fatigue: Seldom  11f.) Feeling stressed or overwhelmed: Seldom  11g.) Anger or frustration: Seldom  11h.) Problems with your hearing: Seldom  11i.) Problems using the telephone: Never  11k.) Driven/Ridden in a car without wearing your seatbelt: Never  11l.) Sexual Problems: Never  12.) Do you need help with any of the following activities? (check all that apply): None of these apply to me  13.) Do you need help with any of the following activities?: None of these apply to me  14.) During the past 4 weeks, was someone available to help if you needed and wanted help?: Yes, as much as I wanted  15.) How confident are you that you can control and manage most of your health problems?: Very confident    HRA form completed and reviewed with patient  Consultants:  PCPs Kelli Garza MD General       Other Patient Care Team Members Specialty     Darian Slater MD Orthopedic Surgery  Polo De La Cruz MD Orthopedic Surgery  Bunny Huber MD Urology  Charlee Montanez MD Ophthalmology    Depression screen: negative  Hearing screen: normal to conversation  Fall risk: recent TKA  Activities of daily living: independent  Home safety issues: none  Cognitive impairment: none  Personal health advice/education:  Referral for identified health risks:  Opiod use: does not use  Screening schedule: UTD and aged out.       Not using LABA regularly and uses ZEFERINO <2x/week.  Needs a refill on her rescue inhaler.    Having a bit more swelling in the legs on CCB; home BP great.               Georgia has a past medical history of Allergy, Arthritis (2017), Asthma (CMD) (1999), Bilateral cataracts  (03/21/2025), Cataract, Chronic pain, Clavicle fracture (1962), Diverticulitis (05/2015), Encounter for Medicare annual wellness exam (05/18/2023), H/O complete eye exam (04/17/2025), History of anesthesia reaction, HTN (hypertension), Humerus fracture (12/01/2010), Hydronephrosis, Hyperlipidemia, Nasal polyp (03/01/2019), No known problems (problem with right knee), Optic nerve cupping, PONV (postoperative nausea and vomiting), Refusal of statin medication by patient (04/18/2018), Squamous cell carcinoma of skin (2020), and Wrist fracture (12/01/2010).    She has no past medical history of Anemia, Anxiety, Chronic kidney disease, Clotting disorder  (CMD), Congestive cardiac failure  (CMD), COPD (chronic obstructive pulmonary disease)  (CMD), Depressive disorder, Diabetes mellitus  (CMD), Emphysema of lung  (CMD), GERD (gastroesophageal reflux disease), Glaucoma (increased eye pressure), Heart murmur, HIV disease  (CMD), Malignant neoplasm  (CMD), Meningitis (CMD), Myocardial infarction  (CMD), Neuromuscular disorder  (CMD), Osteoporosis, Seizures  (CMD), Sickle cell anemia  (CMD), Stroke  (CMD), Substance abuse (CMD), Thyroid disease, or Tuberculosis.  Georgia has Essential hypertension with goal blood pressure less than 140/90; Mixed hyperlipidemia; Mild intermittent asthma without complication (CMD); Severe obesity (BMI 35.0-35.9 with comorbidity)  (CMD); Chronic sinusitis; Numbness; Preop examination; Acute pain of left knee; Status post total knee replacement using cement, right; Encounter for Medicare annual wellness exam; Skin lesions, generalized; Personal history of skin cancer; Infrarenal abdominal aortic aneurysm (AAA) without rupture (CMD); Bilateral renal cysts; Diverticulosis of colon; and Bilateral cataracts on their problem list.  Georgia has a past surgical history that includes Colonoscopy (04/13/2013); Appendectomy (12/31/1964); colectomy (06/22/2015); Colonoscopy (05/15/2018); Breast biopsy  (Right, 06/20/2018); ORIF wrist fracture (Right, 12/01/2010); Nasal sinus surgery (Bilateral, 03/18/2019); No past surgeries; Total knee arthroplasty (Right); Breast biopsy (Right, 06/20/2018); cataract extraction w/  intraocular lens implant; and extracapsular cataract removal w insert io lens prosth wo ecp (Bilateral).  Her family history includes Cancer in her mother; Cancer, Lung in her mother; Diabetes in her brother; Heart disease in her brother and brother; High cholesterol in her sister; Hyperlipidemia in her sister; Patient is unaware of any medical problems in her sister; Stroke in her father.  Georgia reports that she has quit smoking. Her smoking use included cigarettes. She has a 22.5 pack-year smoking history. She has been exposed to tobacco smoke. She has never used smokeless tobacco. She reports current alcohol use of about 22.0 standard drinks of alcohol per week. She reports that she does not use drugs.  Georgia has a current medication list which includes the following prescription(s): valsartan, nifedipine cc, wixela inhub, rosuvastatin, and albuterol.  [place]ia   Current Outpatient Medications   Medication Sig Dispense Refill    valsartan (DIOVAN) 320 MG tablet TAKE 1 TABLET BY MOUTH DAILY 90 tablet 3    NIFEdipine CC (ADALAT CC) 30 MG 24 hr tablet Take 1 tablet by mouth daily. 90 tablet 3    Wixela Inhub 250-50 MCG/ACT inhaler INHALE 1 PUFF INTO THE LUNGS EVERY 12 HOURS 180 each 1    rosuvastatin (CRESTOR) 10 MG tablet Take 1 tablet by mouth daily. 90 tablet 3    albuterol 108 (90 Base) MCG/ACT inhaler Inhale 2 puffs into the lungs every 4 hours as needed for Wheezing or Shortness of Breath. Inhale 2 puffs (180 mcg total) every 4 hours as needed for wheezing or asthma symptoms 1 each 5     No current facility-administered medications for this visit.     Georgia is allergic to ciprofloxacin hcl, lisinopril, and prednisone.    Review of Systems   Constitutional:  Negative for chills, fever and  unexpected weight change.   HENT:  Negative for ear pain, hearing loss, postnasal drip, trouble swallowing and voice change.    Eyes:  Negative for pain, discharge and visual disturbance.   Respiratory:  Positive for wheezing (occ). Negative for cough (occ) and shortness of breath.    Cardiovascular:  Positive for leg swelling (right). Negative for chest pain and palpitations.   Gastrointestinal:  Negative for abdominal pain, constipation and diarrhea.   Endocrine: Negative for polydipsia and polyuria.   Genitourinary:  Negative for frequency, hematuria and menstrual problem.        No breast lump   Musculoskeletal:  Positive for back pain and gait problem. Negative for arthralgias and joint swelling.   Skin:  Negative for rash.   Allergic/Immunologic: Negative for environmental allergies and food allergies.   Neurological:  Negative for dizziness, tremors, speech difficulty, weakness, numbness and headaches.   Hematological:  Does not bruise/bleed easily.   Psychiatric/Behavioral:  Negative for confusion and suicidal ideas. The patient is not nervous/anxious.        Objective    Visit Vitals  /81 (BP Location: RUE - Right upper extremity, Patient Position: Sitting, Cuff Size: Regular) Comment (Cuff Size): long cuff   Pulse 65   Temp 97.3 °F (36.3 °C) (Tympanic)   Resp 17   Ht 5' 7\" (1.702 m)   Wt 96.5 kg (212 lb 12.8 oz)   LMP  (LMP Unknown)   Breastfeeding No   BMI 33.33 kg/m²       Physical Exam  Vitals and nursing note reviewed.   Constitutional:       Appearance: She is well-developed. She is not ill-appearing.   HENT:      Head: Normocephalic and atraumatic.      Right Ear: Hearing, tympanic membrane, ear canal and external ear normal. There is no impacted cerumen.      Left Ear: Hearing, tympanic membrane, ear canal and external ear normal. There is no impacted cerumen.      Nose: Nose normal.      Mouth/Throat:      Mouth: Mucous membranes are moist.      Neck: Normal range of motion and neck supple.    Eyes:      General: No scleral icterus.        Right eye: No discharge.         Left eye: No discharge.      Conjunctiva/sclera: Conjunctivae normal.   Neck:      Thyroid: No thyromegaly.   Cardiovascular:      Rate and Rhythm: Normal rate and regular rhythm.      Pulses: Normal pulses.      Heart sounds: Normal heart sounds. No murmur heard.  Pulmonary:      Effort: Pulmonary effort is normal. No respiratory distress.      Breath sounds: Normal breath sounds. No wheezing or rales.   Abdominal:      General: Bowel sounds are normal.      Palpations: Abdomen is soft. There is no mass.      Tenderness: There is no abdominal tenderness.      Hernia: No hernia is present.   Musculoskeletal:         General: Deformity present. Normal range of motion.      Right lower leg: Edema present.      Left lower leg: Edema present.   Lymphadenopathy:      Cervical: No cervical adenopathy.   Skin:     General: Skin is warm and dry.      Findings: No rash.   Neurological:      Mental Status: She is alert and oriented to person, place, and time.      Cranial Nerves: No cranial nerve deficit.      Sensory: No sensory deficit.      Motor: No abnormal muscle tone.      Gait: Gait normal.   Psychiatric:         Cognition and Memory: Cognition and memory normal.         Assessment   Problem List Items Addressed This Visit          Cardiac and Vasculature    Essential hypertension with goal blood pressure less than 140/90    BP well controlled on ARB and CCB; continue to check BP at home.          Mixed hyperlipidemia    On statin; check lipids         Relevant Orders    CBC with Automated Differential    Comprehensive Metabolic Panel    Lipid Panel With Reflex    Thyroid Stimulating Hormone       Health Encounters    Encounter for Medicare annual wellness exam - Primary       Pulmonary and Pneumonias    Mild intermittent asthma without complication (CMD)    Rarely uses rescue; continue wixela            Cervical cancer screening no  longer required     Health Maintenance Summary       COVID-19 Vaccine (7 - 2024-25 season)  Overdue since 4/14/2025    Medicare Advantage- Medicare Wellness Visit (Yearly - January to December)  Scheduled for 4/29/2025    Depression Screening (Yearly)  Next due on 4/23/2026    DTaP/Tdap/Td Vaccine (3 - Td or Tdap)  Next due on 8/22/2027    Osteoporosis Screening   Completed    Influenza Vaccine   Completed    Shingles Vaccine   Completed    Respiratory Syncytial Virus (RSV) Vaccine 60+   Completed    Pneumococcal Vaccine 50+   Completed    Hepatitis A Vaccine   Aged Out    Meningococcal Vaccine   Aged Out    Hepatitis B Vaccine (For Physician/APC Discussion)   Aged Out    Meningococcal Serogroup B Vaccine   Aged Out    HPV Vaccine   Aged Out            Schedule follow up: in 3 months   no

## 2025-06-10 ENCOUNTER — NON-APPOINTMENT (OUTPATIENT)
Age: 85
End: 2025-06-10

## 2025-06-10 ENCOUNTER — APPOINTMENT (OUTPATIENT)
Dept: GYNECOLOGIC ONCOLOGY | Facility: CLINIC | Age: 85
End: 2025-06-10
Payer: MEDICARE

## 2025-06-10 VITALS — SYSTOLIC BLOOD PRESSURE: 127 MMHG | DIASTOLIC BLOOD PRESSURE: 86 MMHG | HEART RATE: 74 BPM

## 2025-06-10 VITALS — HEIGHT: 62 IN | BODY MASS INDEX: 26.31 KG/M2 | WEIGHT: 143 LBS

## 2025-06-10 PROCEDURE — 99214 OFFICE O/P EST MOD 30 MIN: CPT
